# Patient Record
Sex: FEMALE | Race: WHITE | NOT HISPANIC OR LATINO | Employment: OTHER | RURAL
[De-identification: names, ages, dates, MRNs, and addresses within clinical notes are randomized per-mention and may not be internally consistent; named-entity substitution may affect disease eponyms.]

---

## 2017-04-27 DIAGNOSIS — E55.9 VITAMIN D DEFICIENCY DISEASE: ICD-10-CM

## 2017-04-27 DIAGNOSIS — R53.83 FATIGUE, UNSPECIFIED TYPE: ICD-10-CM

## 2017-04-27 DIAGNOSIS — E78.5 HYPERLIPIDEMIA, UNSPECIFIED HYPERLIPIDEMIA TYPE: ICD-10-CM

## 2017-04-27 DIAGNOSIS — D64.9 ANEMIA, UNSPECIFIED TYPE: Primary | ICD-10-CM

## 2017-04-29 LAB
25(OH)D3+25(OH)D2 SERPL-MCNC: 31 NG/ML (ref 30–100)
ALBUMIN SERPL-MCNC: 4.5 G/DL (ref 3.6–4.8)
ALBUMIN/GLOB SERPL: 1.7 {RATIO} (ref 1.2–2.2)
ALP SERPL-CCNC: 87 IU/L (ref 39–117)
ALT SERPL-CCNC: 16 IU/L (ref 0–32)
AST SERPL-CCNC: 17 IU/L (ref 0–40)
BASOPHILS # BLD AUTO: 0 X10E3/UL (ref 0–0.2)
BASOPHILS NFR BLD AUTO: 0 %
BILIRUB SERPL-MCNC: 0.4 MG/DL (ref 0–1.2)
BUN SERPL-MCNC: 15 MG/DL (ref 8–27)
BUN/CREAT SERPL: 18 (ref 12–28)
CALCIUM SERPL-MCNC: 9.4 MG/DL (ref 8.7–10.3)
CHLORIDE SERPL-SCNC: 100 MMOL/L (ref 96–106)
CHOLEST SERPL-MCNC: 198 MG/DL (ref 100–199)
CO2 SERPL-SCNC: 22 MMOL/L (ref 18–29)
CREAT SERPL-MCNC: 0.85 MG/DL (ref 0.57–1)
EOSINOPHIL # BLD AUTO: 0.1 X10E3/UL (ref 0–0.4)
EOSINOPHIL NFR BLD AUTO: 2 %
ERYTHROCYTE [DISTWIDTH] IN BLOOD BY AUTOMATED COUNT: 13.9 % (ref 12.3–15.4)
FERRITIN SERPL-MCNC: 68 NG/ML (ref 15–150)
FOLATE SERPL-MCNC: 10.7 NG/ML
GLOBULIN SER CALC-MCNC: 2.6 G/DL (ref 1.5–4.5)
GLUCOSE SERPL-MCNC: 100 MG/DL (ref 65–99)
HCT VFR BLD AUTO: 41.9 % (ref 34–46.6)
HDLC SERPL-MCNC: 56 MG/DL
HGB BLD-MCNC: 14.1 G/DL (ref 11.1–15.9)
IMM GRANULOCYTES # BLD: 0 X10E3/UL (ref 0–0.1)
IMM GRANULOCYTES NFR BLD: 0 %
IRON SERPL-MCNC: 93 UG/DL (ref 27–139)
LDLC SERPL CALC-MCNC: 118 MG/DL (ref 0–99)
LYMPHOCYTES # BLD AUTO: 1.5 X10E3/UL (ref 0.7–3.1)
LYMPHOCYTES NFR BLD AUTO: 18 %
MCH RBC QN AUTO: 29.7 PG (ref 26.6–33)
MCHC RBC AUTO-ENTMCNC: 33.7 G/DL (ref 31.5–35.7)
MCV RBC AUTO: 88 FL (ref 79–97)
MONOCYTES # BLD AUTO: 0.4 X10E3/UL (ref 0.1–0.9)
MONOCYTES NFR BLD AUTO: 5 %
NEUTROPHILS # BLD AUTO: 6.4 X10E3/UL (ref 1.4–7)
NEUTROPHILS NFR BLD AUTO: 75 %
PLATELET # BLD AUTO: 280 X10E3/UL (ref 150–379)
POTASSIUM SERPL-SCNC: 4.5 MMOL/L (ref 3.5–5.2)
PROT SERPL-MCNC: 7.1 G/DL (ref 6–8.5)
RBC # BLD AUTO: 4.74 X10E6/UL (ref 3.77–5.28)
RETICS/RBC NFR AUTO: 1.1 % (ref 0.6–2.6)
SODIUM SERPL-SCNC: 142 MMOL/L (ref 134–144)
T4 SERPL-MCNC: 8.1 UG/DL (ref 4.5–12)
TRIGL SERPL-MCNC: 121 MG/DL (ref 0–149)
TSH SERPL DL<=0.005 MIU/L-ACNC: 1.47 UIU/ML (ref 0.45–4.5)
VIT B12 SERPL-MCNC: 505 PG/ML (ref 211–946)
VLDLC SERPL CALC-MCNC: 24 MG/DL (ref 5–40)
WBC # BLD AUTO: 8.4 X10E3/UL (ref 3.4–10.8)

## 2017-04-30 PROBLEM — I10 HYPERTENSION: Status: ACTIVE | Noted: 2017-04-30

## 2017-04-30 PROBLEM — Z00.00 WELLNESS EXAMINATION: Status: ACTIVE | Noted: 2017-04-30

## 2017-04-30 PROBLEM — G89.29 CHRONIC BACK PAIN: Status: ACTIVE | Noted: 2017-04-30

## 2017-04-30 PROBLEM — N20.9 UROLITHIASIS: Status: ACTIVE | Noted: 2017-04-30

## 2017-04-30 PROBLEM — F41.9 ANXIETY: Status: ACTIVE | Noted: 2017-04-30

## 2017-04-30 PROBLEM — R93.89 ABNORMAL X-RAY: Status: ACTIVE | Noted: 2017-04-30

## 2017-04-30 PROBLEM — M54.9 CHRONIC BACK PAIN: Status: ACTIVE | Noted: 2017-04-30

## 2017-04-30 PROBLEM — D64.9 ANEMIA: Status: ACTIVE | Noted: 2017-04-30

## 2017-04-30 PROBLEM — G89.29 CHRONIC NECK PAIN: Status: ACTIVE | Noted: 2017-04-30

## 2017-04-30 PROBLEM — M54.2 CHRONIC NECK PAIN: Status: ACTIVE | Noted: 2017-04-30

## 2017-04-30 PROBLEM — E55.9 VITAMIN D DEFICIENCY: Status: ACTIVE | Noted: 2017-04-30

## 2017-04-30 PROBLEM — E78.5 HYPERLIPIDEMIA: Status: ACTIVE | Noted: 2017-04-30

## 2017-04-30 PROBLEM — R31.9 HEMATURIA: Status: ACTIVE | Noted: 2017-04-30

## 2017-04-30 PROBLEM — F32.A DEPRESSION: Status: ACTIVE | Noted: 2017-04-30

## 2017-07-10 ENCOUNTER — OFFICE VISIT (OUTPATIENT)
Dept: FAMILY MEDICINE CLINIC | Facility: CLINIC | Age: 62
End: 2017-07-10

## 2017-07-10 VITALS
SYSTOLIC BLOOD PRESSURE: 138 MMHG | BODY MASS INDEX: 32.32 KG/M2 | OXYGEN SATURATION: 97 % | DIASTOLIC BLOOD PRESSURE: 80 MMHG | HEART RATE: 98 BPM | WEIGHT: 194 LBS | HEIGHT: 65 IN | TEMPERATURE: 99 F | RESPIRATION RATE: 16 BRPM

## 2017-07-10 DIAGNOSIS — G89.29 CHRONIC BACK PAIN, UNSPECIFIED BACK LOCATION, UNSPECIFIED BACK PAIN LATERALITY: ICD-10-CM

## 2017-07-10 DIAGNOSIS — I10 ESSENTIAL HYPERTENSION: ICD-10-CM

## 2017-07-10 DIAGNOSIS — M54.2 CHRONIC NECK PAIN: ICD-10-CM

## 2017-07-10 DIAGNOSIS — R11.0 NAUSEA: ICD-10-CM

## 2017-07-10 DIAGNOSIS — R73.01 ELEVATED FASTING GLUCOSE: Primary | ICD-10-CM

## 2017-07-10 DIAGNOSIS — G89.29 CHRONIC NECK PAIN: ICD-10-CM

## 2017-07-10 DIAGNOSIS — M54.9 CHRONIC BACK PAIN, UNSPECIFIED BACK LOCATION, UNSPECIFIED BACK PAIN LATERALITY: ICD-10-CM

## 2017-07-10 PROBLEM — Z98.890 HISTORY OF NISSEN FUNDOPLICATION: Status: ACTIVE | Noted: 2017-07-10

## 2017-07-10 PROCEDURE — 99213 OFFICE O/P EST LOW 20 MIN: CPT | Performed by: FAMILY MEDICINE

## 2017-07-10 RX ORDER — AMITRIPTYLINE HYDROCHLORIDE 100 MG/1
TABLET, FILM COATED ORAL
Qty: 180 TABLET | Refills: 1 | Status: SHIPPED | OUTPATIENT
Start: 2017-07-10 | End: 2017-12-05 | Stop reason: SDUPTHER

## 2017-07-10 RX ORDER — SENNOSIDES 8.6 MG
1300 CAPSULE ORAL EVERY 8 HOURS PRN
COMMUNITY
End: 2018-05-04

## 2017-07-10 RX ORDER — ONDANSETRON 4 MG/1
4 TABLET, ORALLY DISINTEGRATING ORAL EVERY 8 HOURS PRN
Qty: 10 TABLET | Refills: 0 | Status: SHIPPED | OUTPATIENT
Start: 2017-07-10 | End: 2021-04-08

## 2017-07-10 NOTE — PROGRESS NOTES
"Subjective   Sedrick Leahy is a 62 y.o. female presenting with chief complaint of:   Chief Complaint   Patient presents with   • Anemia   • Hyperlipidemia   • Hypertension   • Follow-up     labs \"my blood sugar\"       History of Present Illness :  Alone.   Has multiple chronic problems; interval appointment.  One of these problems is HTN.      The HTN has been present for years/it is chronic.  The HTN is assumed essential/without testing needed to look for other.  The HTN is controlled manifest by todays blood pressure and no home monitoring.   Other chronic problem/s to consider:   Chronic neck pain:  The pain has been present for years/over a year.   It is chronic.   The pain is stable. 1-2 /10 pain most of time and usually is worse after activities.  The pain limits activities.  The problem has been evaulated and the patient has no desire current eval/change tx.  Chronic back pain:  The pain has been present for years/over a year.   It is chronic.   The pain is stable.  1-2 /10 pain most of time and usually is worse after activities.  The pain limits activities.  The problem has been evaulated and the patient has same as neck; mowing grass with pain.   GE reflux: This has been present for years/over a year.  It is controlled with the nissen's she had years ago   It is stable as there is no change; no heartburn and no dysphagia but when has any nausea cannot vomit; wants refill or rarely used Zofran.   Hyperlipidemia: This has been present for years/over a year.  It is chronic.   It is controlled.   Labs are not done regularly.  Obesity: This has been present for years/over a year.  It is chronic.     It is stable; there has been no recent major change in weight, or dieting.     Has an/another acute issue today:  Elevated fasting glucose: This has been present for years/over a year.  It is chronic.  It is controlled. No home accuchecks have yet been requested.  No signs hypoglycmeia manifest as syncope/near " syncope or diaphoretic/sweating spisodes.  A1c below if available.    Date Glucose, Serum Hemoglobin A1c  2008-09-29 125  (not fasting)   2010-10-19 71    2011-02-01 99    2011-12-13 89    2012-12-31 96    2014-09-16 91      4.2017 epic       The following portions of the patient's history were reviewed and updated as appropriate: allergies, current medications, past family history, past medical history, past social history, past surgical history and problem list.      Current Outpatient Prescriptions:   •  acetaminophen (TYLENOL 8 HOUR ARTHRITIS PAIN) 650 MG 8 hr tablet, Take 1,300 mg by mouth Every 8 (Eight) Hours As Needed for Mild Pain ., Disp: , Rfl:   •  amitriptyline (ELAVIL) 100 MG tablet, One to two by mouth at bedtime as needed, Disp: 180 tablet, Rfl: 1  •  ondansetron ODT (ZOFRAN ODT) 4 MG disintegrating tablet, Place 1 tablet under the tongue Every 8 (Eight) Hours As Needed for Nausea or Vomiting., Disp: 10 tablet, Rfl: 0    Allergies   Allergen Reactions   • Penicillins Swelling and Rash       Review of Systems  GENERAL:  Active/slower with limits, speed, samni for age and neck/back pain. Sleep is ok; denies apnea.  ENDO:  No syncope, near or diaphoretic sweaty spells.  BS on meter range 100-200; no download noted.  HEENT: No head injury or headache,  No vision change, No hearing loss.  Ears without pain/drainage.  No sore throat.  No nasal/sinus congestion/drainage. No epistaxis.  CHEST: No chest wall tenderness or mass. No cough,  without wheeze, SOB; no hemoptysis.  CV: No chest pain, palpatations, ankle edema.  GI: No heartburn,or dysphagia.  No abdominal pain, diarrhea, constipation, rectal bleeding, or melena.  Occ nausea without ability to vomit.   :  Voids without dysuria, or incontience to completion.  ORTHO: No painful/swollen joints but various on /off sore.  No change daily sore neck or back.  No acute neck or back pain without recent injury.   NEURO: No dizziness, weakness of  extremities.  No change occ UE/LE numbness/parethesias.   PSYCH: No memory loss.  Mood good; not that anxious, depressed but/and not suicidal.  Tolerated stress.     Results for orders placed or performed in visit on 04/27/17   Comprehensive Metabolic Panel   Result Value Ref Range    Glucose 100 (H) 65 - 99 mg/dL    BUN 15 8 - 27 mg/dL    Creatinine 0.85 0.57 - 1.00 mg/dL    eGFR Non African Am 74 >59 mL/min/1.73    eGFR African Am 85 >59 mL/min/1.73    BUN/Creatinine Ratio 18 12 - 28    Sodium 142 134 - 144 mmol/L    Potassium 4.5 3.5 - 5.2 mmol/L    Chloride 100 96 - 106 mmol/L    Total CO2 22 18 - 29 mmol/L    Calcium 9.4 8.7 - 10.3 mg/dL    Total Protein 7.1 6.0 - 8.5 g/dL    Albumin 4.5 3.6 - 4.8 g/dL    Globulin 2.6 1.5 - 4.5 g/dL    A/G Ratio 1.7 1.2 - 2.2    Total Bilirubin 0.4 0.0 - 1.2 mg/dL    Alkaline Phosphatase 87 39 - 117 IU/L    AST (SGOT) 17 0 - 40 IU/L    ALT (SGPT) 16 0 - 32 IU/L   Lipid Panel   Result Value Ref Range    Total Cholesterol 198 100 - 199 mg/dL    Triglycerides 121 0 - 149 mg/dL    HDL Cholesterol 56 >39 mg/dL    VLDL Cholesterol 24 5 - 40 mg/dL    LDL Cholesterol  118 (H) 0 - 99 mg/dL   TSH   Result Value Ref Range    TSH 1.470 0.450 - 4.500 uIU/mL   T4   Result Value Ref Range    T4, Total 8.1 4.5 - 12.0 ug/dL   Vitamin D 25 hydroxy   Result Value Ref Range    25 Hydroxy, Vitamin D 31.0 30.0 - 100.0 ng/mL   Iron level   Result Value Ref Range    Iron 93 27 - 139 ug/dL   Ferritin   Result Value Ref Range    Ferritin 68 15 - 150 ng/mL   Vitamin B12   Result Value Ref Range    Vitamin B-12 505 211 - 946 pg/mL   Folate   Result Value Ref Range    Folate 10.7 >3.0 ng/mL   Reticulocytes   Result Value Ref Range    Reticulocyte Absolute 1.1 0.6 - 2.6 %   CBC & Differential   Result Value Ref Range    WBC 8.4 3.4 - 10.8 x10E3/uL    RBC 4.74 3.77 - 5.28 x10E6/uL    Hemoglobin 14.1 11.1 - 15.9 g/dL    Hematocrit 41.9 34.0 - 46.6 %    MCV 88 79 - 97 fL    MCH 29.7 26.6 - 33.0 pg    MCHC 33.7  "31.5 - 35.7 g/dL    RDW 13.9 12.3 - 15.4 %    Platelets 280 150 - 379 x10E3/uL    Neutrophil Rel % 75 %    Lymphocyte Rel % 18 %    Monocyte Rel % 5 %    Eosinophil Rel % 2 %    Basophil Rel % 0 %    Neutrophils Absolute 6.4 1.4 - 7.0 x10E3/uL    Lymphocytes Absolute 1.5 0.7 - 3.1 x10E3/uL    Monocytes Absolute 0.4 0.1 - 0.9 x10E3/uL    Eosinophils Absolute 0.1 0.0 - 0.4 x10E3/uL    Basophils Absolute 0.0 0.0 - 0.2 x10E3/uL    Immature Granulocyte Rel % 0 %    Immature Grans Absolute 0.0 0.0 - 0.1 x10E3/uL       Lab Results   Component Value Date    HGBA1C 5.9 05/21/2015       Lab Results   Component Value Date     04/28/2017     05/22/2015     05/20/2015    K 4.5 04/28/2017    K 4.1 05/22/2015    K 4.2 05/20/2015     04/28/2017     05/22/2015     05/20/2015    CO2 22 04/28/2017    CO2 28 05/22/2015    CO2 25 05/20/2015    GLUCOSE 84 05/22/2015    GLUCOSE 118 (H) 05/20/2015    BUN 15 04/28/2017    BUN 12 05/22/2015    BUN 14 05/20/2015    CREATININE 0.85 04/28/2017    CREATININE 0.81 05/22/2015    CREATININE 0.76 05/20/2015    CALCIUM 9.4 04/28/2017    CALCIUM 8.8 05/22/2015    CALCIUM 9.4 05/20/2015       No results found for: PSA     Lab Results:  CBC:    Lab Results - Last 18 Months  Lab Units 04/28/17  0710   WBC x10E3/uL 8.4   HEMATOCRIT % 41.9     CMP:    Lab Results - Last 18 Months  Lab Units 04/28/17  0710   SODIUM mmol/L 142   CHLORIDE mmol/L 100   TOTAL CO2, ARTERIAL mmol/L 22   BUN mg/dL 15   CREATININE mg/dL 0.85   EGFR IF NONAFRICN AM mL/min/1.73 74   EGFR IF AFRICN AM mL/min/1.73 85   CALCIUM mg/dL 9.4     THYROID:    Lab Results - Last 18 Months  Lab Units 04/28/17  0710   TSH uIU/mL 1.470     A1C:No results for input(s): HGBA1C in the last 00744 hours.    Objective   /80 (BP Location: Right arm, Patient Position: Sitting, Cuff Size: Large Adult)  Pulse 98  Temp 99 °F (37.2 °C) (Oral)   Resp 16  Ht 65\" (165.1 cm)  Wt 194 lb (88 kg)  SpO2 97%  BMI 32.28 " kg/m2    Physical Exam  GENERAL:  Well nourished/developed in no acute distress. Obese   SKIN: Turgor excellent, without wound, rash, lesion.  HEENT: Normal cephalic without trauma.  Pupils equal round reactive to light. Extraocular motions full without nystagmus.   External canals nonobstructive nontender without reddness. Tymphatic membranes brown with tyler structures intact.   Oral cavity without growths, exudates, and moist.  Posterior pharnyx without mass, obstruction, reddness.  No thyroidmegaly, mass, tenderness, lymphadenopathy and supple.  CV: Regular rhythm.  No murmur, gallop,  edema. Posterior pulses intact.  No carotid bruits.  CHEST: No chest wall tenderness or mass.   LUNGS: Symmetric motion with clear to auscultation.  No dullness to percussion  ABD: Soft, nontender without mass.   ORTHO: Symmetric extremities without swelling/point tenderness.  Full gross range of motion.  NEURO: CN 2-12 grossly intact.  Symmetric facies. 1/4 x bicep equal reflexes.  UE/LE   3/5 strength throughout.  Nonfocal use extremities. Speech clear.  Intact light touch with monofilament, vibratory sensation with tuning fork; equal toes/distal feet.    PSYCH: Oriented x 3.  Pleasant calm, well kept.  Purposeful/directed conservation with intact short/long gross memory.     Assessment/Plan     1. Elevated fasting glucose  Relatively new dx  - Hemoglobin A1c  - Basic Metabolic Panel    2. Essential hypertension  controlled  - Hemoglobin A1c  - Basic Metabolic Panel    3. Chronic neck pain  DDD/DJD; tolerated    4. Chronic back pain, unspecified back location, unspecified back pain laterality  DDD/DJD; tolerated    5. Nausea-problem with nissen  - ondansetron ODT (ZOFRAN ODT) 4 MG disintegrating tablet; Place 1 tablet under the tongue Every 8 (Eight) Hours As Needed for Nausea or Vomiting.  Dispense: 10 tablet; Refill: 0    Rx: reviewed.  Any other changes above and:   LAB: reviewed/above.  Orders above and:   Wrap-up/other  instructions: discussed as applicable  Regular cardio exercise something everyone should consider and try to do.  Normal weight a goal for everyone.  Healthy diet helpful for weight management, illness prevention.  If over 50-screening exams include men PSA/rectal exam, women mammograms, and  everyone colonoscopy screening for colon cancer.   Patient Instructions   Offered referral to dietary anytime you want for more training      Follow up: Return in about 4 weeks (around 8/7/2017).

## 2017-07-11 LAB
BUN SERPL-MCNC: 13 MG/DL (ref 5–21)
BUN/CREAT SERPL: 16.7 (ref 7–25)
CALCIUM SERPL-MCNC: 9.5 MG/DL (ref 8.4–10.4)
CHLORIDE SERPL-SCNC: 104 MMOL/L (ref 98–110)
CO2 SERPL-SCNC: 26 MMOL/L (ref 24–31)
CREAT SERPL-MCNC: 0.78 MG/DL (ref 0.5–1.4)
GLUCOSE SERPL-MCNC: 87 MG/DL (ref 70–100)
HBA1C MFR BLD: 6 %
POTASSIUM SERPL-SCNC: 4.4 MMOL/L (ref 3.5–5.3)
SODIUM SERPL-SCNC: 141 MMOL/L (ref 135–145)

## 2017-07-12 RX ORDER — METFORMIN HYDROCHLORIDE 500 MG/1
500 TABLET, EXTENDED RELEASE ORAL
Qty: 90 TABLET | Refills: 1 | Status: SHIPPED | OUTPATIENT
Start: 2017-07-12 | End: 2017-12-05 | Stop reason: SDUPTHER

## 2017-08-11 ENCOUNTER — OFFICE VISIT (OUTPATIENT)
Dept: FAMILY MEDICINE CLINIC | Facility: CLINIC | Age: 62
End: 2017-08-11

## 2017-08-11 VITALS
BODY MASS INDEX: 31.99 KG/M2 | DIASTOLIC BLOOD PRESSURE: 80 MMHG | HEIGHT: 65 IN | HEART RATE: 90 BPM | WEIGHT: 192 LBS | OXYGEN SATURATION: 97 % | SYSTOLIC BLOOD PRESSURE: 132 MMHG | RESPIRATION RATE: 16 BRPM | TEMPERATURE: 98.4 F

## 2017-08-11 DIAGNOSIS — R73.01 ELEVATED FASTING GLUCOSE: Primary | ICD-10-CM

## 2017-08-11 DIAGNOSIS — I10 ESSENTIAL HYPERTENSION: ICD-10-CM

## 2017-08-11 PROCEDURE — 99213 OFFICE O/P EST LOW 20 MIN: CPT | Performed by: FAMILY MEDICINE

## 2017-12-05 RX ORDER — AMITRIPTYLINE HYDROCHLORIDE 100 MG/1
TABLET, FILM COATED ORAL
Qty: 180 TABLET | Refills: 1 | Status: SHIPPED | OUTPATIENT
Start: 2017-12-05 | End: 2018-06-25 | Stop reason: SDUPTHER

## 2017-12-05 RX ORDER — METFORMIN HYDROCHLORIDE 500 MG/1
500 TABLET, EXTENDED RELEASE ORAL
Qty: 90 TABLET | Refills: 1 | Status: SHIPPED | OUTPATIENT
Start: 2017-12-05 | End: 2018-06-25 | Stop reason: SDUPTHER

## 2018-05-04 ENCOUNTER — OFFICE VISIT (OUTPATIENT)
Dept: FAMILY MEDICINE CLINIC | Facility: CLINIC | Age: 63
End: 2018-05-04

## 2018-05-04 VITALS
OXYGEN SATURATION: 94 % | HEIGHT: 65 IN | SYSTOLIC BLOOD PRESSURE: 134 MMHG | RESPIRATION RATE: 18 BRPM | BODY MASS INDEX: 32.65 KG/M2 | WEIGHT: 196 LBS | HEART RATE: 96 BPM | TEMPERATURE: 98.3 F | DIASTOLIC BLOOD PRESSURE: 80 MMHG

## 2018-05-04 DIAGNOSIS — F32.A DEPRESSION, UNSPECIFIED DEPRESSION TYPE: ICD-10-CM

## 2018-05-04 DIAGNOSIS — R32 URINARY INCONTINENCE, UNSPECIFIED TYPE: ICD-10-CM

## 2018-05-04 DIAGNOSIS — D64.9 ANEMIA, UNSPECIFIED TYPE: Primary | ICD-10-CM

## 2018-05-04 DIAGNOSIS — M54.10 RADICULOPATHY, UNSPECIFIED SPINAL REGION: ICD-10-CM

## 2018-05-04 DIAGNOSIS — M54.2 CHRONIC NECK PAIN: ICD-10-CM

## 2018-05-04 DIAGNOSIS — F41.9 ANXIETY: ICD-10-CM

## 2018-05-04 DIAGNOSIS — G89.29 CHRONIC BACK PAIN, UNSPECIFIED BACK LOCATION, UNSPECIFIED BACK PAIN LATERALITY: ICD-10-CM

## 2018-05-04 DIAGNOSIS — R73.01 ELEVATED FASTING GLUCOSE: ICD-10-CM

## 2018-05-04 DIAGNOSIS — M54.9 CHRONIC BACK PAIN, UNSPECIFIED BACK LOCATION, UNSPECIFIED BACK PAIN LATERALITY: ICD-10-CM

## 2018-05-04 DIAGNOSIS — E78.5 HYPERLIPIDEMIA, UNSPECIFIED HYPERLIPIDEMIA TYPE: ICD-10-CM

## 2018-05-04 DIAGNOSIS — I10 HYPERTENSION, UNSPECIFIED TYPE: ICD-10-CM

## 2018-05-04 DIAGNOSIS — G89.29 CHRONIC NECK PAIN: ICD-10-CM

## 2018-05-04 PROBLEM — Z01.89 LABORATORY TEST: Status: ACTIVE | Noted: 2018-05-04

## 2018-05-04 PROCEDURE — 99214 OFFICE O/P EST MOD 30 MIN: CPT | Performed by: FAMILY MEDICINE

## 2018-05-04 RX ORDER — NAPROXEN SODIUM 220 MG
220 TABLET ORAL 2 TIMES DAILY PRN
COMMUNITY
End: 2020-11-12

## 2018-05-04 NOTE — PROGRESS NOTES
Subjective   Sedrick Leahy is a 62 y.o. female presenting with chief complaint of:   Chief Complaint   Patient presents with   • Back Pain   • Diabetes   • Hyperlipidemia   • Hypertension   • Anemia   • Anxiety   • Depression       History of Present Illness :  Alone.   Has multiple chronic problems to consider that might have a bearing on today's issues;  an interval appointment.       Other chronic problem/s to consider:   HTN.  The HTN has been present for years/it is chronic.  The HTN is assumed essential/without testing needed to look for other.  The HTN is controlled manifest by todays blood pressure and no mention home monitoring.  Associated illness below.   Anemia/iron deficiency: This has been present for years/over a year.  It is chronic.  These has been GI evaulation in the past. There is no current melena, hematochezia. It has been benign to date and stable/watching.  Anxiety: This has been present for years/over a year.  It is chronic.  It is variable.  It is associated with stressors:her health.  Medications being used help.   Medications/Rx change not requested.  Depression: This has been present for years/over a year.  It is chronic.  It is variable.  It is associated with stressors: her health.   Medications being used help.  Medications/Rx change not requested.   No suicide ideation/intent.   Elevated fasting glucose: This has been present for years/over a year.  It is chronic.  It is controlled. No home accuchecks have yet been requested yet as only impaired.  No signs hypoglycmeia manifest as syncope/near syncope or diaphoretic/sweating spisodes.  A1c below if available.  Diet loose  Chronic neck pain:  The pain has been present for years/over a year.   It is chronic/worsing  Has plans to see JESSICA .   The pain is variable/worsening   3-5/10 pain most of time and usually is worse after activities.  The pain limits activities.  The problem has been evaulated and the patient has above.  Chronic  back pain:  The pain has been present for years/over a year.   It is chronic.   The pain is worseing and variable;  3-5/10 pain most of time and usually is worse after activities.  The pain limits activities.  The problem has been evaulated and the patient has plans to review with surgery.  Degenerative Joint Disease/arthritis:  This has been present for years/over a year.  It is a chronic condition.  It causes on/off pain and joint stiffness.  There is no joint swelling;  mostly spinal.   Hyperlipidemia: This has been present for years/over a year.  It is chronic.   It is generally controlled.   .  Labs are needed periodic to monitor condition/safetly.   Rx therapy lifestyle  Urinary incontience:  Chronic/stable.  Lives with.     Has an/another acute issue today: none.    The following portions of the patient's history were reviewed and updated as appropriate: allergies, current medications, past family history, past medical history, past social history, past surgical history and problem list.  Records acquired and reviewed; TCC migrated.      Current Outpatient Prescriptions:   •  amitriptyline (ELAVIL) 100 MG tablet, One to two by mouth at bedtime as needed, Disp: 180 tablet, Rfl: 1  •  metFORMIN ER (GLUCOPHAGE-XR) 500 MG 24 hr tablet, Take 1 tablet by mouth Daily With Breakfast., Disp: 90 tablet, Rfl: 1  •  naproxen sodium (ALEVE) 220 MG tablet, Take 220 mg by mouth 2 (Two) Times a Day As Needed for Mild Pain ., Disp: , Rfl:   •  ondansetron ODT (ZOFRAN ODT) 4 MG disintegrating tablet, Place 1 tablet under the tongue Every 8 (Eight) Hours As Needed for Nausea or Vomiting., Disp: 10 tablet, Rfl: 0    No problems with medications.  Refills if needed done    Allergies   Allergen Reactions   • Penicillins Swelling and Rash       Review of Systems  GENERAL:  Active/slower with limits, speed, stamina for age and spinal pain. Sleep is ok. No fever now.  SKIN: No rash/skin lesion of concern:   ENDO:  No syncope, near or  diaphoretic sweaty spells.  HEENT: No recent head injury; or change rare headache,  No vision change.  No significant hearing loss.  Ears without pain/drainage.  No sore throat.  No significant nasal/sinus congestion/drainage. No epistaxis.  CHEST: No chest wall tenderness or mass. No cough, without wheeze.  No SOB; no hemoptysis.  CV: No chest pain, palpitations, ankle edema.  GI: No heartburn, dysphagia.  No abdominal pain, diarrhea, constipation.  No rectal bleeding, or melena.    Colonoscopy/unable to complete due to quality of prep5.2.16  Colonoscopy/[facility]/6.6.16/5y    :  Voids without dysuria, same urinary incontinence but voids to completion.  ORTHO: No painful/swollen joints but various on /off sore.  Much worse/ sore neck or back.  No acute neck or back pain without recent injury.  NEURO: No dizziness, weakness of extremities. UE/LE numbness/paresthesias.   PSYCH: No memory loss.  Mood good; occ anxious, depressed but/and not suicidal.  Tries to tolerate stress .     Results for orders placed or performed in visit on 07/10/17   Hemoglobin A1c   Result Value Ref Range    Hemoglobin A1C 6.00 %   Basic Metabolic Panel   Result Value Ref Range    Glucose 87 70 - 100 mg/dL    BUN 13 5 - 21 mg/dL    Creatinine 0.78 0.50 - 1.40 mg/dL    eGFR Non African Am 75 >60 mL/min/1.73    eGFR African Am 90 >60 mL/min/1.73    BUN/Creatinine Ratio 16.7 7.0 - 25.0    Sodium 141 135 - 145 mmol/L    Potassium 4.4 3.5 - 5.3 mmol/L    Chloride 104 98 - 110 mmol/L    Total CO2 26.0 24.0 - 31.0 mmol/L    Calcium 9.5 8.4 - 10.4 mg/dL       No results found for: PSA     Lab Results:  CBC:  Lab Results - Last 18 Months  Lab Units 04/28/17  0710   WBC x10E3/uL 8.4   HEMOGLOBIN g/dL 14.1   HEMATOCRIT % 41.9   PLATELETS x10E3/uL 280   IRON ug/dL 93      BMP/CMP:  Lab Results - Last 18 Months  Lab Units 07/11/17  0823 04/28/17  0710   SODIUM mmol/L 141 142   POTASSIUM mmol/L 4.4 4.5   CHLORIDE mmol/L 104 100   TOTAL CO2, ARTERIAL mmol/L  "26.0 22   GLUCOSE mg/dL 87 100*   BUN mg/dL 13 15   CREATININE mg/dL 0.78 0.85   EGFR IF NONAFRICN AM mL/min/1.73 75 74   EGFR IF AFRICN AM mL/min/1.73 90 85   CALCIUM mg/dL 9.5 9.4     HEPATIC:  Lab Results - Last 18 Months  Lab Units 04/28/17  0710   ALT (SGPT) IU/L 16   AST (SGOT) IU/L 17   ALK PHOS IU/L 87     THYROID:  Lab Results - Last 18 Months  Lab Units 04/28/17  0710   TSH uIU/mL 1.470     A1C:  Lab Results - Last 18 Months  Lab Units 07/11/17  0823   HEMOGLOBIN A1C % 6.00     PSA:No results for input(s): PSA in the last 04068 hours.    Objective   /80   Pulse 96   Temp 98.3 °F (36.8 °C) (Oral)   Resp 18   Ht 165.1 cm (65\")   Wt 88.9 kg (196 lb)   SpO2 94%   Breastfeeding? No   BMI 32.62 kg/m²   Body mass index is 32.62 kg/m².    Physical Exam  GENERAL:  Well nourished/developed in no acute distress.   SKIN: Turgor excellent, without wound, rash, lesion.  HEENT: Normal cephalic without trauma.  Pupils equal round reactive to light. Extraocular motions full without nystagmus.   External canals nonobstructive nontender without reddness. Tymphatic membranes brown with tyler structures intact.   Oral cavity without growths, exudates, and moist.  Posterior pharynx without mass, obstruction, redness.  No thyromegaly, mass, tenderness, lymphadenopathy and supple.  CV: Regular rhythm.  No murmur, gallop,  edema. Posterior pulses intact.  No carotid bruits.  CHEST: No chest wall tenderness or mass.   LUNGS: Symmetric motion with clear to auscultation.   ABD: Soft, nontender without mass.   ORTHO: Symmetric extremities without swelling/point tenderness.  Full gross range of motion.  NEURO: CN 2-12 grossly intact.  Symmetric facies and UE/LE. 2-3/5 strength throughout. 1/4 x bicep knee equal reflexes.  Nonfocal use extremities. Speech clear. Intact light touch with monofilament, vibratory sensation with tuning fork; equal toes/distal feet.    PSYCH: Oriented x 3.  Pleasant calm, well kept.  " Purposeful/directed conservation with intact short/long gross memory.     Assessment/Plan     1. Anemia, unspecified type    2. Anxiety    3. Chronic back pain, unspecified back location, unspecified back pain laterality    4. Chronic neck pain    5. Radiculopathy, unspecified spinal region    6. Depression, unspecified depression type    7. Hypertension, unspecified type    8. Elevated fasting glucose    9. Urinary incontinence, unspecified type    10. Hyperlipidemia, unspecified hyperlipidemia type        Rx: reviewed/changes:  same    LAB/Testing/Referrals: reviewed/orders:   Today: needed fasting  No orders of the defined types were placed in this encounter.    Usual:   6m BMP, A1c  12m CBC, CMP, A1c, LIPID, TSH, Vit D     Discussions:   Body mass index is 32.62 kg/m².   Patient's Body mass index is 32.62 kg/m². BMI is above normal parameters. Follow-up plan includes:  exercise counseling, nutrition counseling and as able.  Non-smoker      Patient Instructions   NSAID Education/Counseling:  We discussed the risks and benefits of Non-steroidal anti-inflammatories (NSAIDs), which include GI, renal, and cardiovascular toxicity. We discussed the risk for stomach ulcers, and the need to stop the drug or add a PPI if GI symptoms develop. We discussed the increased incidence of hypertension and cardiovascnlar events in patients taking NSAIDs We discussed the role the drugs may play in worsening renal disease, if present.     Regular cardio exercise something everyone should consider and try to do; even if health limitations (ie find that exercise UE/LE/cardio that they can tolerate). (as we discussed)  Normal weight a goal for everyone (as we discussed)  Healthy diet helpful for weight management, illness prevention (as we discussed)  If over 50-screening exams include men PSA/rectal exam, women mammograms, and  everyone colonoscopy screening for colon cancer.            Follow up: Return for lab due/fasting.  , lab  during/or just before next apt;, Dr Madden-, 6 m;.  Future Appointments  Date Time Provider Department Center   5/25/2018 8:40 AM [facility] [place] [facility] None   11/13/2018 8:30 AM [facility] [place] [facility] None   11/15/2018 11:00 AM Kiran Madden MD [facility] None

## 2018-05-04 NOTE — PATIENT INSTRUCTIONS
NSAID Education/Counseling:  We discussed the risks and benefits of Non-steroidal anti-inflammatories (NSAIDs), which include GI, renal, and cardiovascular toxicity. We discussed the risk for stomach ulcers, and the need to stop the drug or add a PPI if GI symptoms develop. We discussed the increased incidence of hypertension and cardiovascnlar events in patients taking NSAIDs We discussed the role the drugs may play in worsening renal disease, if present.     Regular cardio exercise something everyone should consider and try to do; even if health limitations (ie find that exercise UE/LE/cardio that they can tolerate). (as we discussed)  Normal weight a goal for everyone (as we discussed)  Healthy diet helpful for weight management, illness prevention (as we discussed)  If over 50-screening exams include men PSA/rectal exam, women mammograms, and  everyone colonoscopy screening for colon cancer.

## 2018-05-24 DIAGNOSIS — Z00.00 WELLNESS EXAMINATION: ICD-10-CM

## 2018-05-24 DIAGNOSIS — R31.9 HEMATURIA, UNSPECIFIED TYPE: ICD-10-CM

## 2018-05-24 DIAGNOSIS — R73.01 ELEVATED FASTING GLUCOSE: ICD-10-CM

## 2018-05-24 DIAGNOSIS — E55.9 VITAMIN D DEFICIENCY: ICD-10-CM

## 2018-05-24 DIAGNOSIS — D64.9 ANEMIA, UNSPECIFIED TYPE: ICD-10-CM

## 2018-05-24 DIAGNOSIS — F32.A DEPRESSION, UNSPECIFIED DEPRESSION TYPE: ICD-10-CM

## 2018-05-24 DIAGNOSIS — F41.9 ANXIETY: ICD-10-CM

## 2018-05-24 DIAGNOSIS — E78.5 HYPERLIPIDEMIA, UNSPECIFIED HYPERLIPIDEMIA TYPE: ICD-10-CM

## 2018-05-24 DIAGNOSIS — I10 HYPERTENSION, UNSPECIFIED TYPE: Primary | ICD-10-CM

## 2018-05-25 ENCOUNTER — RESULTS ENCOUNTER (OUTPATIENT)
Dept: FAMILY MEDICINE CLINIC | Facility: CLINIC | Age: 63
End: 2018-05-25

## 2018-05-25 DIAGNOSIS — I10 HYPERTENSION, UNSPECIFIED TYPE: ICD-10-CM

## 2018-05-25 DIAGNOSIS — F41.9 ANXIETY: ICD-10-CM

## 2018-05-25 DIAGNOSIS — Z00.00 WELLNESS EXAMINATION: ICD-10-CM

## 2018-05-25 DIAGNOSIS — R31.9 HEMATURIA, UNSPECIFIED TYPE: ICD-10-CM

## 2018-05-25 DIAGNOSIS — F32.A DEPRESSION, UNSPECIFIED DEPRESSION TYPE: ICD-10-CM

## 2018-05-25 DIAGNOSIS — R73.01 ELEVATED FASTING GLUCOSE: ICD-10-CM

## 2018-05-25 DIAGNOSIS — E55.9 VITAMIN D DEFICIENCY: ICD-10-CM

## 2018-05-25 DIAGNOSIS — D64.9 ANEMIA, UNSPECIFIED TYPE: ICD-10-CM

## 2018-05-25 DIAGNOSIS — E78.5 HYPERLIPIDEMIA, UNSPECIFIED HYPERLIPIDEMIA TYPE: ICD-10-CM

## 2018-06-19 PROBLEM — F11.90 CHRONIC NARCOTIC USE: Status: ACTIVE | Noted: 2018-06-19

## 2018-06-26 RX ORDER — AMITRIPTYLINE HYDROCHLORIDE 100 MG/1
TABLET, FILM COATED ORAL
Qty: 180 TABLET | Refills: 1 | Status: SHIPPED | OUTPATIENT
Start: 2018-06-26 | End: 2018-12-31 | Stop reason: SDUPTHER

## 2018-06-26 RX ORDER — METFORMIN HYDROCHLORIDE 500 MG/1
TABLET, EXTENDED RELEASE ORAL
Qty: 90 TABLET | Refills: 1 | Status: SHIPPED | OUTPATIENT
Start: 2018-06-26 | End: 2018-10-25 | Stop reason: SDUPTHER

## 2018-10-25 RX ORDER — METFORMIN HYDROCHLORIDE 500 MG/1
TABLET, EXTENDED RELEASE ORAL
Qty: 90 TABLET | Refills: 1 | Status: SHIPPED | OUTPATIENT
Start: 2018-10-25 | End: 2019-01-25

## 2018-11-13 DIAGNOSIS — R73.01 ELEVATED FASTING GLUCOSE: Primary | ICD-10-CM

## 2018-11-13 LAB
BUN SERPL-MCNC: 14 MG/DL (ref 5–21)
BUN/CREAT SERPL: 19.4 (ref 7–25)
CALCIUM SERPL-MCNC: 9.7 MG/DL (ref 8.4–10.4)
CHLORIDE SERPL-SCNC: 100 MMOL/L (ref 98–110)
CO2 SERPL-SCNC: 31 MMOL/L (ref 24–31)
CREAT SERPL-MCNC: 0.72 MG/DL (ref 0.5–1.4)
GLUCOSE SERPL-MCNC: 88 MG/DL (ref 70–100)
HBA1C MFR BLD: 5.8 %
POTASSIUM SERPL-SCNC: 4.2 MMOL/L (ref 3.5–5.3)
SODIUM SERPL-SCNC: 141 MMOL/L (ref 135–145)

## 2018-11-15 ENCOUNTER — OFFICE VISIT (OUTPATIENT)
Dept: FAMILY MEDICINE CLINIC | Facility: CLINIC | Age: 63
End: 2018-11-15

## 2018-11-15 VITALS
HEART RATE: 118 BPM | WEIGHT: 189 LBS | SYSTOLIC BLOOD PRESSURE: 144 MMHG | OXYGEN SATURATION: 97 % | HEIGHT: 65 IN | BODY MASS INDEX: 31.49 KG/M2 | TEMPERATURE: 97.6 F | DIASTOLIC BLOOD PRESSURE: 82 MMHG

## 2018-11-15 DIAGNOSIS — G89.29 CHRONIC BACK PAIN, UNSPECIFIED BACK LOCATION, UNSPECIFIED BACK PAIN LATERALITY: ICD-10-CM

## 2018-11-15 DIAGNOSIS — I10 HYPERTENSION, UNSPECIFIED TYPE: ICD-10-CM

## 2018-11-15 DIAGNOSIS — M54.2 CHRONIC NECK PAIN: ICD-10-CM

## 2018-11-15 DIAGNOSIS — R73.01 ELEVATED FASTING GLUCOSE: ICD-10-CM

## 2018-11-15 DIAGNOSIS — M54.9 CHRONIC BACK PAIN, UNSPECIFIED BACK LOCATION, UNSPECIFIED BACK PAIN LATERALITY: ICD-10-CM

## 2018-11-15 DIAGNOSIS — D64.9 ANEMIA, UNSPECIFIED TYPE: ICD-10-CM

## 2018-11-15 DIAGNOSIS — E78.5 HYPERLIPIDEMIA, UNSPECIFIED HYPERLIPIDEMIA TYPE: ICD-10-CM

## 2018-11-15 DIAGNOSIS — R00.2 PALPITATIONS: ICD-10-CM

## 2018-11-15 DIAGNOSIS — G89.29 CHRONIC NECK PAIN: ICD-10-CM

## 2018-11-15 DIAGNOSIS — F11.90 CHRONIC NARCOTIC USE: ICD-10-CM

## 2018-11-15 PROBLEM — Z78.0 MENOPAUSE: Status: ACTIVE | Noted: 2018-11-15

## 2018-11-15 LAB
BASOPHILS # BLD AUTO: 0.06 10*3/MM3 (ref 0–0.2)
BASOPHILS NFR BLD AUTO: 0.8 % (ref 0–2)
EOSINOPHIL # BLD AUTO: 0.31 10*3/MM3 (ref 0–0.7)
EOSINOPHIL NFR BLD AUTO: 4.1 % (ref 0–4)
ERYTHROCYTE [DISTWIDTH] IN BLOOD BY AUTOMATED COUNT: 13.5 % (ref 12–15)
HCT VFR BLD AUTO: 42.5 % (ref 37–47)
HGB BLD-MCNC: 13 G/DL (ref 12–16)
IMM GRANULOCYTES # BLD: 0.01 10*3/MM3 (ref 0–0.03)
IMM GRANULOCYTES NFR BLD: 0.1 % (ref 0–5)
IRON SATN MFR SERPL: 28 % (ref 20–45)
IRON SERPL-MCNC: 94 MCG/DL (ref 42–180)
LYMPHOCYTES # BLD AUTO: 2.98 10*3/MM3 (ref 0.72–4.86)
LYMPHOCYTES NFR BLD AUTO: 39.1 % (ref 15–45)
MCH RBC QN AUTO: 29.8 PG (ref 28–32)
MCHC RBC AUTO-ENTMCNC: 30.6 G/DL (ref 33–36)
MCV RBC AUTO: 97.5 FL (ref 82–98)
MONOCYTES # BLD AUTO: 0.51 10*3/MM3 (ref 0.19–1.3)
MONOCYTES NFR BLD AUTO: 6.7 % (ref 4–12)
NEUTROPHILS # BLD AUTO: 3.76 10*3/MM3 (ref 1.87–8.4)
NEUTROPHILS NFR BLD AUTO: 49.2 % (ref 39–78)
NRBC BLD AUTO-RTO: 0 /100 WBC (ref 0–0)
PLATELET # BLD AUTO: 312 10*3/MM3 (ref 130–400)
RBC # BLD AUTO: 4.36 10*6/MM3 (ref 4.2–5.4)
T4 FREE SERPL-MCNC: 1.26 NG/DL (ref 0.78–2.19)
TIBC SERPL-MCNC: 337 MCG/DL (ref 225–420)
TSH SERPL DL<=0.005 MIU/L-ACNC: 2.16 MIU/ML (ref 0.47–4.68)
UIBC SERPL-MCNC: 243 MCG/DL
WBC # BLD AUTO: 7.63 10*3/MM3 (ref 4.8–10.8)
WRITTEN AUTHORIZATION: NORMAL

## 2018-11-15 PROCEDURE — 99214 OFFICE O/P EST MOD 30 MIN: CPT | Performed by: FAMILY MEDICINE

## 2018-11-15 NOTE — PROGRESS NOTES
Pt informed labs were ok. She wants to know if she needs to do the holter monitor before starting any medication.

## 2018-11-15 NOTE — PROGRESS NOTES
"Subjective   Sedrick Leahy is a 63 y.o. female presenting with chief complaint of:   Chief Complaint   Patient presents with   • Anemia     6 month follow-up.   • Rapid Heart Rate     Patient states that her heart rate has been fast.       History of Present Illness :  Alone.   Has multiple chronic problems to consider that might have a bearing on today's issues;  an interval appointment.       Chronic/acute problems reviewed today:   1. Hypertension, unspecified type: Chronic/stable. Stable here/if home blood pressures.  No significant chest pain, SOB, LE edema, orthopnea, near syncope, dizziness/light headness.      2. Hyperlipidemia, unspecified hyperlipidemia type: Chronic/stable.  Tolerated use of statin with labs showing improved lipid values and tolerant liver labs. No muscle aches unexpected.      3. Elevated fasting glucose: Chronic/stable. No problem/pattern hypoglycemia/hyperglycemia manifest by poly- dypsia, phagia, uria, or sweats, diaphoretic episodes, syncope/near.      4. Chronic narcotic use-Linberg: chronic/stable as had \"nerves burned\" and needing a lot less Rx.    5. Anemia, unspecified type: chronic/variable benign to date.    6. Chronic back pain, unspecified back location, unspecified back pain laterality: chronic/variable DDD/DJD without desire for surgery.  Less pain since procedure.  Occ 1-3/10 pain worse with activity.    7. Chronic neck pain: chronic/variable similar to lower back.     8. Palpitations: chronic/variable lately worse feeling of skipping and fast.      Has an/another acute issue today: none.    The following portions of the patient's history were reviewed and updated as appropriate: allergies, current medications, past family history, past medical history, past social history, past surgical history and problem list.      Current Outpatient Medications:   •  amitriptyline (ELAVIL) 100 MG tablet, TAKE ONE TO TWO TABLETS BY MOUTH AT BEDTIME AS NEEDED, Disp: 180 tablet, Rfl: " 1  •  metFORMIN ER (GLUCOPHAGE-XR) 500 MG 24 hr tablet, TAKE 1 TABLET BY MOUTH IN THE MORNING WITH  BREAKFAST, Disp: 90 tablet, Rfl: 1  •  naproxen sodium (ALEVE) 220 MG tablet, Take 220 mg by mouth 2 (Two) Times a Day As Needed for Mild Pain ., Disp: , Rfl:   •  ondansetron ODT (ZOFRAN ODT) 4 MG disintegrating tablet, Place 1 tablet under the tongue Every 8 (Eight) Hours As Needed for Nausea or Vomiting., Disp: 10 tablet, Rfl: 0-available    No problems with medications.  Refills if needed done    Allergies   Allergen Reactions   • Penicillins Swelling and Rash       Review of Systems  GENERAL:  Active/slower with limits, speed, stamina for age and spinal pain. Sleep is ok. No fever now.  SKIN: No rash/skin lesion of concern:   ENDO:  No syncope, near or diaphoretic sweaty spells.  HEENT: No recent head injury; or change rare headache,  No vision change.  No significant hearing loss.  Ears without pain/drainage.  No sore throat.  No significant nasal/sinus congestion/drainage. No epistaxis.  CHEST: No chest wall tenderness or mass. No cough, without wheeze.  No SOB; no hemoptysis.  CV: No chest pain, palpitations, ankle edema.  GI: No heartburn, dysphagia.  No abdominal pain, diarrhea, constipation.  No rectal bleeding, or melena.    :  Voids without dysuria, same urinary incontinence but voids to completion.  ORTHO: No painful/swollen joints but various on /off sore.  Much worse/ sore neck or back.  No acute neck or back pain without recent injury.  NEURO: No dizziness, weakness of extremities. UE/LE numbness/paresthesias.   PSYCH: No memory loss.  Mood good; occ anxious, depressed but/and not suicidal.  Tries to tolerate stress .   Screening:  No gyne  Mammogram: [id number]  Bone density: 11- [facility]- LS -0.3 abd hip -0.3..no bad change but enough to encourage a biphosphate ie bonovia...or miacalcin... apt with cb to discuss tx. cb /pt notified is making an appt/-7/2019  Low dose CT  "chest: NA  GI: Colonoscopy//[facility]/6.6.16/5y  Prostate: NA  Usual lab order  6m BMP, A1c  12m CBC, CMP, A1c, LIPID, TSH, Vit D     Results for orders placed or performed in visit on 11/13/18   Basic metabolic panel   Result Value Ref Range    Glucose 88 70 - 100 mg/dL    BUN 14 5 - 21 mg/dL    Creatinine 0.72 0.50 - 1.40 mg/dL    eGFR Non African Am 82 >60 mL/min/1.73    eGFR African Am 99 >60 mL/min/1.73    BUN/Creatinine Ratio 19.4 7.0 - 25.0    Sodium 141 135 - 145 mmol/L    Potassium 4.2 3.5 - 5.3 mmol/L    Chloride 100 98 - 110 mmol/L    Total CO2 31.0 24.0 - 31.0 mmol/L    Calcium 9.7 8.4 - 10.4 mg/dL   Hemoglobin A1c   Result Value Ref Range    Hemoglobin A1C 5.80 %       No results found for: PSA     Lab Results:  CBC:  Lab Results - Last 18 Months   Lab Units  11/13/18   0724   HEMOGLOBIN g/dL  13.0   HEMATOCRIT %  42.5   PLATELETS 10*3/mm3  312   IRON mcg/dL  94      BMP/CMP:  Lab Results - Last 18 Months   Lab Units  11/13/18   0724  07/11/17   0823   SODIUM mmol/L  141  141   POTASSIUM mmol/L  4.2  4.4   CHLORIDE mmol/L  100  104   TOTAL CO2, ARTERIAL mmol/L  31.0  26.0   GLUCOSE mg/dL  88  87   BUN mg/dL  14  13   CREATININE mg/dL  0.72  0.78   EGFR IF NONAFRICN AM mL/min/1.73  82  75   EGFR IF AFRICN AM mL/min/1.73  99  90   CALCIUM mg/dL  9.7  9.5     HEPATIC:No results for input(s): ALT, AST, ALKPHOS, TOTAL in the last 83205 hours.  THYROID:No results for input(s): TSH, T3FREE, FTI in the last 65931 hours.    Invalid input(s): T4FREE, T3, T4, TEUP,  TOTALT4  A1C:  Lab Results - Last 18 Months   Lab Units  11/13/18   0724  07/11/17   0823   HEMOGLOBIN A1C %  5.80  6.00     PSA:No results for input(s): PSA in the last 83913 hours.    Objective   /82 (BP Location: Left arm, Patient Position: Sitting)   Pulse 118   Temp 97.6 °F (36.4 °C) (Oral)   Ht 165.1 cm (65\")   Wt 85.7 kg (189 lb)   SpO2 97%   BMI 31.45 kg/m²   Body mass index is 31.45 kg/m².    Physical Exam  GENERAL:  Well " nourished/developed in no acute distress.   SKIN: Turgor excellent, without wound, rash, lesion.  HEENT: Normal cephalic without trauma.  Pupils equal round reactive to light. Extraocular motions full without nystagmus.   External canals nonobstructive nontender without reddness. Tymphatic membranes brown with tyler structures intact.   Oral cavity without growths, exudates, and moist.  Posterior pharynx without mass, obstruction, redness.  No thyromegaly, mass, tenderness, lymphadenopathy and supple.  CV: Regular rhythm.  No murmur, gallop,  edema. Posterior pulses intact.  No carotid bruits.  CHEST: No chest wall tenderness or mass.   LUNGS: Symmetric motion with clear to auscultation.   ABD: Soft, nontender without mass.   ORTHO: Symmetric extremities without swelling/point tenderness.  Full gross range of motion.  NEURO: CN 2-12 grossly intact.  Symmetric facies and UE/LE. 2-3/5 strength throughout. 1/4 x bicep knee equal reflexes.  Nonfocal use extremities. Speech clear. Intact light touch with monofilament, vibratory sensation with tuning fork; equal toes/distal feet.    PSYCH: Oriented x 3.  Pleasant calm, well kept.  Purposeful/directed conservation with intact short/long gross memory.    Assessment/Plan     1. Hypertension, unspecified type    2. Hyperlipidemia, unspecified hyperlipidemia type    3. Elevated fasting glucose    4. Chronic narcotic use-Linberg    5. Anemia, unspecified type    6. Chronic back pain, unspecified back location, unspecified back pain laterality    7. Chronic neck pain    8. Palpitations      ? If palpitations equals thyroid, anemia issues.     Rx: reviewed/changes:  Maybe B-blocker; await labs    LAB/Testing/Referrals: reviewed/orders:   Today:   Orders Placed This Encounter   Procedures   • T4, Free   • TSH   • Iron Profile   • CBC & Differential   6m CBC, BMP, A1c, iron  12m CBC, CMP, A1c, LIPID, TSH, Vit D, iron, B12, folate      Discussions:   holter  Body mass index is 31.45  kg/m².   Patient's Body mass index is 31.45 kg/m². BMI is above normal parameters. Recommendations include: exercise counseling, nutrition counseling and as able.  Non-smoker      Patient Instructions   Anytime your heart rate becomes bothersome enough; we can do a holter.       Follow up: Return for lab today-try to add from 11.15;  lab during/or just before next apt;, Dr Madden-, 6 m;.  Future Appointments   Date Time Provider Department Center   5/20/2019  8:20 AM LAB [facility] None   5/23/2019  9:00 AM Kiran Madden MD [facility] None

## 2018-11-16 DIAGNOSIS — R00.2 PALPITATIONS: Primary | ICD-10-CM

## 2018-12-13 ENCOUNTER — TELEPHONE (OUTPATIENT)
Dept: FAMILY MEDICINE CLINIC | Facility: CLINIC | Age: 63
End: 2018-12-13

## 2018-12-13 DIAGNOSIS — R00.2 PALPITATIONS: ICD-10-CM

## 2018-12-13 NOTE — TELEPHONE ENCOUNTER
"Vm \"I had my heart test 2 weeks ago and still havnt heard anything?\" checked timeline and no report  "

## 2018-12-31 RX ORDER — AMITRIPTYLINE HYDROCHLORIDE 100 MG/1
100-200 TABLET, FILM COATED ORAL NIGHTLY PRN
Qty: 180 TABLET | Refills: 1 | Status: SHIPPED | OUTPATIENT
Start: 2018-12-31 | End: 2019-04-17 | Stop reason: SDUPTHER

## 2019-01-25 ENCOUNTER — TELEPHONE (OUTPATIENT)
Dept: FAMILY MEDICINE CLINIC | Facility: CLINIC | Age: 64
End: 2019-01-25

## 2019-01-25 DIAGNOSIS — R73.01 ELEVATED FASTING GLUCOSE: Primary | ICD-10-CM

## 2019-01-25 RX ORDER — METFORMIN HYDROCHLORIDE 500 MG/1
500 TABLET, EXTENDED RELEASE ORAL 2 TIMES DAILY
Qty: 180 TABLET | Refills: 1 | Status: SHIPPED | OUTPATIENT
Start: 2019-01-25 | End: 2019-04-17 | Stop reason: SDUPTHER

## 2019-01-25 NOTE — TELEPHONE ENCOUNTER
Attempted to call pt and vm left with Dr Madden information, new Rx to pt pharmacy, as she will run out before next rx is due

## 2019-01-25 NOTE — TELEPHONE ENCOUNTER
"Vm \"my blood sugar has been running 143-173 and I was taking metformin 500mg one at bedtime, and now I am taking one twice a day and my sugar is 80-90, I wanted to make sure that was ok?\"  "

## 2019-01-25 NOTE — TELEPHONE ENCOUNTER
Current Outpatient Medications:   •  amitriptyline (ELAVIL) 100 MG tablet, Take 1-2 tablets by mouth At Night As Needed for Sleep., Disp: 180 tablet, Rfl: 1  •  metFORMIN ER (GLUCOPHAGE-XR) 500 MG 24 hr tablet, TAKE 1 TABLET BY MOUTH IN THE MORNING WITH  BREAKFAST, Disp: 90 tablet, Rfl: 1  •  naproxen sodium (ALEVE) 220 MG tablet, Take 220 mg by mouth 2 (Two) Times a Day As Needed for Mild Pain ., Disp: , Rfl:   •  ondansetron ODT (ZOFRAN ODT) 4 MG disintegrating tablet, Place 1 tablet under the tongue Every 8 (Eight) Hours As Needed for Nausea or Vomiting., Disp: 10 tablet, Rfl: 0    Actually 80-90 fasting is perfect  dont want her however having any lows  Next A1c should really be god

## 2019-02-22 ENCOUNTER — TELEPHONE (OUTPATIENT)
Dept: FAMILY MEDICINE CLINIC | Facility: CLINIC | Age: 64
End: 2019-02-22

## 2019-02-22 RX ORDER — AZITHROMYCIN 250 MG/1
TABLET, FILM COATED ORAL
Qty: 6 TABLET | Refills: 0 | Status: SHIPPED | OUTPATIENT
Start: 2019-02-22 | End: 2019-04-17

## 2019-02-22 NOTE — TELEPHONE ENCOUNTER
"Vm \"My glad are swollen under my neck and low grade fever, can Dr Madden call me something into [facility] pharmacy\"     pt was very congested sounding on her vm  "

## 2019-02-22 NOTE — TELEPHONE ENCOUNTER
Tcc 10-, pt rec Zpack and rx to [facility] pharmacy, called and notified pt and stated she can take Zpack

## 2019-04-17 DIAGNOSIS — R73.01 ELEVATED FASTING GLUCOSE: ICD-10-CM

## 2019-04-17 RX ORDER — METFORMIN HYDROCHLORIDE 500 MG/1
500 TABLET, EXTENDED RELEASE ORAL 2 TIMES DAILY
Qty: 180 TABLET | Refills: 1 | Status: SHIPPED | OUTPATIENT
Start: 2019-04-17 | End: 2020-07-20

## 2019-04-17 RX ORDER — AMITRIPTYLINE HYDROCHLORIDE 100 MG/1
100-200 TABLET, FILM COATED ORAL NIGHTLY PRN
Qty: 180 TABLET | Refills: 1 | Status: SHIPPED | OUTPATIENT
Start: 2019-04-17 | End: 2020-04-17 | Stop reason: SDUPTHER

## 2019-04-17 NOTE — TELEPHONE ENCOUNTER
"Pt called \"I left my medication in a hotel in [place] and I need a new prescription sent to [facility] by the mall\" advised pt that she may have to pay out of pocket until time for her next refill?  Call placed to [facility] to advise of lost medication and ok to refill, advised they will help the patient  "

## 2019-05-16 DIAGNOSIS — E55.9 VITAMIN D DEFICIENCY: Primary | ICD-10-CM

## 2019-05-16 DIAGNOSIS — E78.5 HYPERLIPIDEMIA, UNSPECIFIED HYPERLIPIDEMIA TYPE: ICD-10-CM

## 2019-05-16 DIAGNOSIS — R73.01 ELEVATED FASTING GLUCOSE: ICD-10-CM

## 2019-05-16 DIAGNOSIS — I10 HYPERTENSION, UNSPECIFIED TYPE: ICD-10-CM

## 2019-05-16 DIAGNOSIS — Z11.59 NEED FOR HEPATITIS C SCREENING TEST: ICD-10-CM

## 2019-05-16 DIAGNOSIS — D64.9 ANEMIA, UNSPECIFIED TYPE: ICD-10-CM

## 2019-05-20 ENCOUNTER — RESULTS ENCOUNTER (OUTPATIENT)
Dept: FAMILY MEDICINE CLINIC | Facility: CLINIC | Age: 64
End: 2019-05-20

## 2019-05-20 DIAGNOSIS — I10 HYPERTENSION, UNSPECIFIED TYPE: ICD-10-CM

## 2019-05-20 DIAGNOSIS — Z98.890 HISTORY OF NISSEN FUNDOPLICATION: ICD-10-CM

## 2019-05-20 DIAGNOSIS — R32 URINARY INCONTINENCE, UNSPECIFIED TYPE: ICD-10-CM

## 2019-05-20 DIAGNOSIS — E55.9 VITAMIN D DEFICIENCY: ICD-10-CM

## 2019-05-20 DIAGNOSIS — R73.01 ELEVATED FASTING GLUCOSE: ICD-10-CM

## 2019-05-20 DIAGNOSIS — D64.9 ANEMIA, UNSPECIFIED TYPE: ICD-10-CM

## 2019-05-20 DIAGNOSIS — I10 HYPERTENSION, UNSPECIFIED TYPE: Primary | ICD-10-CM

## 2019-05-20 DIAGNOSIS — Z00.00 WELLNESS EXAMINATION: ICD-10-CM

## 2019-05-20 DIAGNOSIS — Z11.59 NEED FOR HEPATITIS C SCREENING TEST: ICD-10-CM

## 2019-05-20 DIAGNOSIS — E78.5 HYPERLIPIDEMIA, UNSPECIFIED HYPERLIPIDEMIA TYPE: ICD-10-CM

## 2019-05-23 ENCOUNTER — OFFICE VISIT (OUTPATIENT)
Dept: FAMILY MEDICINE CLINIC | Facility: CLINIC | Age: 64
End: 2019-05-23

## 2019-05-23 VITALS
HEART RATE: 73 BPM | BODY MASS INDEX: 29.66 KG/M2 | DIASTOLIC BLOOD PRESSURE: 78 MMHG | RESPIRATION RATE: 16 BRPM | HEIGHT: 65 IN | WEIGHT: 178 LBS | TEMPERATURE: 98.7 F | SYSTOLIC BLOOD PRESSURE: 128 MMHG | OXYGEN SATURATION: 96 %

## 2019-05-23 DIAGNOSIS — R21 RASH: ICD-10-CM

## 2019-05-23 DIAGNOSIS — M54.2 CHRONIC NECK PAIN: ICD-10-CM

## 2019-05-23 DIAGNOSIS — I10 ESSENTIAL HYPERTENSION: Primary | ICD-10-CM

## 2019-05-23 DIAGNOSIS — D64.9 ANEMIA, UNSPECIFIED TYPE: ICD-10-CM

## 2019-05-23 DIAGNOSIS — G89.29 CHRONIC NECK PAIN: ICD-10-CM

## 2019-05-23 DIAGNOSIS — M54.9 CHRONIC BACK PAIN, UNSPECIFIED BACK LOCATION, UNSPECIFIED BACK PAIN LATERALITY: ICD-10-CM

## 2019-05-23 DIAGNOSIS — G89.29 CHRONIC BACK PAIN, UNSPECIFIED BACK LOCATION, UNSPECIFIED BACK PAIN LATERALITY: ICD-10-CM

## 2019-05-23 PROBLEM — Z87.442 HISTORY OF KIDNEY STONES: Status: ACTIVE | Noted: 2017-04-30

## 2019-05-23 PROCEDURE — 99214 OFFICE O/P EST MOD 30 MIN: CPT | Performed by: FAMILY MEDICINE

## 2019-05-23 RX ORDER — TRIAMCINOLONE ACETONIDE 1 MG/G
CREAM TOPICAL 3 TIMES DAILY
Qty: 30 G | Refills: 0 | Status: SHIPPED | OUTPATIENT
Start: 2019-05-23 | End: 2019-07-18 | Stop reason: SDUPTHER

## 2019-05-23 NOTE — PROGRESS NOTES
Subjective   Sedrick Leahy is a 63 y.o. female presenting with chief complaint of:   Chief Complaint   Patient presents with   • Hypertension     6 month follow up   • Anemia   • Rash     scalp and neck X 1 month       History of Present Illness :  Alone.   Has multiple chronic problems to consider that might have a bearing on today's issues;  an interval appointment.       Chronic/acute problems reviewed today:   1. Essential hypertension: Chronic/stable. Stable here/if home blood pressures.  No significant chest pain, SOB, LE edema, orthopnea, near syncope, dizziness/light headness.      2. Chronic back pain, unspecified back location, unspecified back pain laterality: : chronic/variable 0-3/10 upper-neck/ lower back pain with infrequent/same radiation to UE/LE.  No change LE numbness.  Has bladder/bowel control. No desire to change approach of care. Working with pain management and therapy helping     3. Chronic neck pain: : chronic/variable 1-3/10 neck/UE pain with infrequent/same radiation to UE/LE.  No change any numbness.  Has bladder/bowel control. No desire to change approach of care.      4. Anemia, unspecified type: chronic/variable benign to date.    5. Rash: acute; 1m on/off rash L and posterior neck.  Using OTC antibiotic cream.  No pain, fever.      Has an/another acute issue today: none.    The following portions of the patient's history were reviewed and updated as appropriate: allergies, current medications, past family history, past medical history, past social history, past surgical history and problem list.      Current Outpatient Medications:   •  amitriptyline (ELAVIL) 100 MG tablet, Take 1-2 tablets by mouth At Night As Needed for Sleep., Disp: 180 tablet, Rfl: 1  •  metFORMIN ER (GLUCOPHAGE-XR) 500 MG 24 hr tablet, Take 1 tablet by mouth 2 (Two) Times a Day., Disp: 180 tablet, Rfl: 1  •  ondansetron ODT (ZOFRAN ODT) 4 MG disintegrating tablet, Place 1 tablet under the tongue Every 8 (Eight)  Hours As Needed for Nausea or Vomiting., Disp: 10 tablet, Rfl: 0  •  naproxen sodium (ALEVE) 220 MG tablet, Take 220 mg by mouth 2 (Two) Times a Day As Needed for Mild Pain ., Disp: , Rfl:     No problems with medications.  Refills if needed done    Allergies   Allergen Reactions   • Penicillins Swelling and Rash       Review of Systems  GENERAL:  Active/slower with limits, speed, stamina for age and spinal pain. Sleep is ok. No fever now.  SKIN: No rash/skin lesion of concern: other than L neck/see above  ENDO:  No syncope, near or diaphoretic sweaty spells.  HEENT: No recent head injury; or change occ headache.   No vision change.  No significant hearing loss.  Ears without pain/drainage.  No sore throat.  No significant nasal/sinus congestion/drainage. No epistaxis.  CHEST: No chest wall tenderness or mass. No cough, without wheeze.  No SOB; no hemoptysis.  CV: No chest pain, palpitations, ankle edema.  GI: No heartburn, dysphagia.  No abdominal pain, diarrhea, constipation.  No rectal bleeding, or melena.    :  Voids without dysuria, same urinary incontinence but voids to completion.  ORTHO: No painful/swollen joints but various on /off sore.  Variable sore neck or back.  No acute neck or back pain without recent injury.  NEURO: No dizziness, weakness of extremities or significant UE/LE numbness/paresthesias.   PSYCH: No memory loss.  Mood variable; with son having dx renal cancer; more anxious, depressed but/and not suicidal.  Tries to tolerate stress .   Screening:  No gyne  Mammogram: 12.22.17-[facility]; not 100% sure  Bone density: 11- [facility]- LS -0.3 abd hip -0.3..no bad change but enough to encourage a biphosphate ie bonovia...or miacalcin... apt with cb to discuss tx. cb /pt notified is making an appt/kh-7/2019-advised  Low dose CT chest:Tobacco-smoker/age 14/2 ppd/dc 1982 (9y)  NA  GI: Colonoscopy/[facility]/6.6.16/5y  Prostate: NA  Usual lab order  6m BMP, A1c  12m CBC, CMP, A1c, LIPID, TSH, Vit D      Lab Results:  Results for orders placed or performed in visit on 11/13/18   Basic metabolic panel   Result Value Ref Range    Glucose 88 70 - 100 mg/dL    BUN 14 5 - 21 mg/dL    Creatinine 0.72 0.50 - 1.40 mg/dL    eGFR Non African Am 82 >60 mL/min/1.73    eGFR African Am 99 >60 mL/min/1.73    BUN/Creatinine Ratio 19.4 7.0 - 25.0    Sodium 141 135 - 145 mmol/L    Potassium 4.2 3.5 - 5.3 mmol/L    Chloride 100 98 - 110 mmol/L    Total CO2 31.0 24.0 - 31.0 mmol/L    Calcium 9.7 8.4 - 10.4 mg/dL   Hemoglobin A1c   Result Value Ref Range    Hemoglobin A1C 5.80 %   Iron Profile   Result Value Ref Range    TIBC 337 225 - 420 mcg/dL    UIBC 243 mcg/dL    Iron 94 42 - 180 mcg/dL    Iron Saturation 28 20 - 45 %   T4, Free   Result Value Ref Range    Free T4 1.26 0.78 - 2.19 ng/dL   TSH   Result Value Ref Range    TSH 2.160 0.470 - 4.680 mIU/mL   Written Authorization   Result Value Ref Range    Written Authorization Comment    CBC & Differential   Result Value Ref Range    WBC 7.63 4.80 - 10.80 10*3/mm3    RBC 4.36 4.20 - 5.40 10*6/mm3    Hemoglobin 13.0 12.0 - 16.0 g/dL    Hematocrit 42.5 37.0 - 47.0 %    MCV 97.5 82.0 - 98.0 fL    MCH 29.8 28.0 - 32.0 pg    MCHC 30.6 (L) 33.0 - 36.0 g/dL    RDW 13.5 12.0 - 15.0 %    Platelets 312 130 - 400 10*3/mm3    Neutrophil Rel % 49.2 39.0 - 78.0 %    Lymphocyte Rel % 39.1 15.0 - 45.0 %    Monocyte Rel % 6.7 4.0 - 12.0 %    Eosinophil Rel % 4.1 (H) 0.0 - 4.0 %    Basophil Rel % 0.8 0.0 - 2.0 %    Neutrophils Absolute 3.76 1.87 - 8.40 10*3/mm3    Lymphocytes Absolute 2.98 0.72 - 4.86 10*3/mm3    Monocytes Absolute 0.51 0.19 - 1.30 10*3/mm3    Eosinophils Absolute 0.31 0.00 - 0.70 10*3/mm3    Basophils Absolute 0.06 0.00 - 0.20 10*3/mm3    Immature Granulocyte Rel % 0.1 0.0 - 5.0 %    Immature Grans Absolute 0.01 0.00 - 0.03 10*3/mm3    nRBC 0.0 0.0 - 0.0 /100 WBC       A1C:  Lab Results - Last 18 Months   Lab Units 11/13/18  0724   HEMOGLOBIN A1C % 5.80     PSA:No results for  "input(s): PSA in the last 98343 hours.  CBC:  Lab Results - Last 18 Months   Lab Units 11/13/18  0724   WBC 10*3/mm3 7.63   HEMOGLOBIN g/dL 13.0   HEMATOCRIT % 42.5   PLATELETS 10*3/mm3 312   IRON mcg/dL 94      BMP/CMP:  Lab Results - Last 18 Months   Lab Units 11/13/18  0724   SODIUM mmol/L 141   POTASSIUM mmol/L 4.2   CHLORIDE mmol/L 100   TOTAL CO2 mmol/L 31.0   GLUCOSE mg/dL 88   BUN mg/dL 14   CREATININE mg/dL 0.72   EGFR IF NONAFRICN AM mL/min/1.73 82   EGFR IF AFRICN AM mL/min/1.73 99   CALCIUM mg/dL 9.7     HEPATIC:No results for input(s): ALT, AST, ALKPHOS, TOTAL in the last 89042 hours.  THYROID:  Lab Results - Last 18 Months   Lab Units 11/13/18  0724   TSH mIU/mL 2.160       Objective   /78 (BP Location: Left arm, Patient Position: Sitting, Cuff Size: Adult)   Pulse 73   Temp 98.7 °F (37.1 °C) (Oral)   Resp 16   Ht 165.1 cm (65\")   Wt 80.7 kg (178 lb)   SpO2 96%   Breastfeeding? No   BMI 29.62 kg/m²   Body mass index is 29.62 kg/m².    Physical Exam  GENERAL:  Well nourished/developed in no acute distress.   SKIN: Turgor excellent, without wound, rash, lesion except PHOTO-L neck  HEENT: Normal cephalic without trauma.  Pupils equal round reactive to light. Extraocular motions full without nystagmus.   External canals nonobstructive nontender without reddness. Tymphatic membranes brown with tyler structures intact.   Oral cavity without growths, exudates, and moist.  Posterior pharynx without mass, obstruction, redness.  No thyromegaly, mass, tenderness, lymphadenopathy and supple.  CV: Regular rhythm.  No murmur, gallop,  edema. Posterior pulses intact.  No carotid bruits.  CHEST: No chest wall tenderness or mass.   LUNGS: Symmetric motion with clear to auscultation.   ABD: Soft, nontender without mass.   ORTHO: Symmetric extremities without swelling/point tenderness.  Full gross range of motion.  NEURO: CN 2-12 grossly intact.  Symmetric facies and UE/LE. 2-3/5 strength throughout. 1/4 " x bicep knee equal reflexes.  Nonfocal use extremities. Speech clear. Intact light touch with monofilament, vibratory sensation with tuning fork; equal toes/distal feet.    PSYCH: Oriented x 3.  Pleasant calm, well kept.  Purposeful/directed conservation with intact short/long gross memory.    Assessment/Plan     1. Essential hypertension    2. Chronic back pain, unspecified back location, unspecified back pain laterality    3. Chronic neck pain    4. Anemia, unspecified type    5. Rash      Rash appears nonspecific dermatitis; maybe even contact.     Rx: reviewed/changes:  New Medications Ordered This Visit   Medications   • triamcinolone (KENALOG) 0.1 % cream     Sig: Apply  topically to the appropriate area as directed 3 (Three) Times a Day.     Dispense:  30 g     Refill:  0     LAB/Testing/Referrals: reviewed/orders:   Today:   No orders of the defined types were placed in this encounter.    Chronic/recurrent labs above or change to:   Same; due     Discussions:   Watch for contacts, pattern of rash after exposures.   Body mass index is 29.62 kg/m².  Patient's Body mass index is 29.62 kg/m². BMI is above normal parameters. Recommendations include: exercise counseling, nutrition counseling and as able.    Tobacco use reviewed:  Non-smoker      Patient Instructions   With mammogram this year have us also order you a bone density      Follow up: Return for fasting lab due (avoid ro gone);  lab/Dr Madden 6m.  Future Appointments   Date Time Provider Department Center   5/28/2019  8:15 AM LAB [facility] None   11/22/2019  8:10 AM LAB [facility] None   11/25/2019  9:15 AM Kiran Madden MD [facility] None

## 2019-05-29 LAB
25(OH)D3+25(OH)D2 SERPL-MCNC: 21.6 NG/ML (ref 30–100)
ALBUMIN SERPL-MCNC: 4.2 G/DL (ref 3.5–5.2)
ALBUMIN/GLOB SERPL: 1.8 G/DL
ALP SERPL-CCNC: 104 U/L (ref 39–117)
ALT SERPL-CCNC: 13 U/L (ref 1–33)
AST SERPL-CCNC: 13 U/L (ref 1–32)
BASOPHILS # BLD AUTO: 0.05 10*3/MM3 (ref 0–0.2)
BASOPHILS NFR BLD AUTO: 0.7 % (ref 0–1.5)
BILIRUB SERPL-MCNC: 0.3 MG/DL (ref 0.2–1.2)
BUN SERPL-MCNC: 12 MG/DL (ref 8–23)
BUN/CREAT SERPL: 13.6 (ref 7–25)
CALCIUM SERPL-MCNC: 9.8 MG/DL (ref 8.6–10.5)
CHLORIDE SERPL-SCNC: 105 MMOL/L (ref 98–107)
CHOLEST SERPL-MCNC: 168 MG/DL (ref 0–200)
CHOLEST/HDLC SERPL: 2.95 {RATIO}
CO2 SERPL-SCNC: 27.1 MMOL/L (ref 22–29)
CREAT SERPL-MCNC: 0.88 MG/DL (ref 0.57–1)
EOSINOPHIL # BLD AUTO: 0.28 10*3/MM3 (ref 0–0.4)
EOSINOPHIL NFR BLD AUTO: 4.1 % (ref 0.3–6.2)
ERYTHROCYTE [DISTWIDTH] IN BLOOD BY AUTOMATED COUNT: 13.2 % (ref 12.3–15.4)
FOLATE SERPL-MCNC: 9.15 NG/ML (ref 4.78–24.2)
GLOBULIN SER CALC-MCNC: 2.3 GM/DL
GLUCOSE SERPL-MCNC: 92 MG/DL (ref 65–99)
HBA1C MFR BLD: 5.7 % (ref 4.8–5.6)
HCT VFR BLD AUTO: 42.4 % (ref 34–46.6)
HCV AB S/CO SERPL IA: <0.1 S/CO RATIO (ref 0–0.9)
HDLC SERPL-MCNC: 57 MG/DL (ref 40–60)
HGB BLD-MCNC: 12.7 G/DL (ref 12–15.9)
IMM GRANULOCYTES # BLD AUTO: 0.01 10*3/MM3 (ref 0–0.05)
IMM GRANULOCYTES NFR BLD AUTO: 0.1 % (ref 0–0.5)
IRON SERPL-MCNC: 78 MCG/DL (ref 37–145)
LDLC SERPL CALC-MCNC: 91 MG/DL (ref 0–100)
LYMPHOCYTES # BLD AUTO: 2.46 10*3/MM3 (ref 0.7–3.1)
LYMPHOCYTES NFR BLD AUTO: 36.2 % (ref 19.6–45.3)
MCH RBC QN AUTO: 29.3 PG (ref 26.6–33)
MCHC RBC AUTO-ENTMCNC: 30 G/DL (ref 31.5–35.7)
MCV RBC AUTO: 97.9 FL (ref 79–97)
MICROALBUMIN UR-MCNC: 6.9 UG/ML
MONOCYTES # BLD AUTO: 0.5 10*3/MM3 (ref 0.1–0.9)
MONOCYTES NFR BLD AUTO: 7.4 % (ref 5–12)
NEUTROPHILS # BLD AUTO: 3.5 10*3/MM3 (ref 1.7–7)
NEUTROPHILS NFR BLD AUTO: 51.5 % (ref 42.7–76)
NRBC BLD AUTO-RTO: 0 /100 WBC (ref 0–0.2)
PLATELET # BLD AUTO: 323 10*3/MM3 (ref 140–450)
POTASSIUM SERPL-SCNC: 4.4 MMOL/L (ref 3.5–5.2)
PROT SERPL-MCNC: 6.5 G/DL (ref 6–8.5)
RBC # BLD AUTO: 4.33 10*6/MM3 (ref 3.77–5.28)
SODIUM SERPL-SCNC: 143 MMOL/L (ref 136–145)
TRIGL SERPL-MCNC: 99 MG/DL (ref 0–150)
TSH SERPL DL<=0.005 MIU/L-ACNC: 2.25 MIU/ML (ref 0.27–4.2)
VIT B12 SERPL-MCNC: 338 PG/ML (ref 211–946)
VLDLC SERPL CALC-MCNC: 19.8 MG/DL
WBC # BLD AUTO: 6.8 10*3/MM3 (ref 3.4–10.8)

## 2019-07-18 DIAGNOSIS — R21 RASH: ICD-10-CM

## 2019-07-18 RX ORDER — TRIAMCINOLONE ACETONIDE 1 MG/G
CREAM TOPICAL 3 TIMES DAILY
Qty: 30 G | Refills: 0 | Status: SHIPPED | OUTPATIENT
Start: 2019-07-18 | End: 2019-11-25

## 2019-10-28 ENCOUNTER — RESULTS ENCOUNTER (OUTPATIENT)
Dept: FAMILY MEDICINE CLINIC | Facility: CLINIC | Age: 64
End: 2019-10-28

## 2019-10-28 ENCOUNTER — TELEPHONE (OUTPATIENT)
Dept: FAMILY MEDICINE CLINIC | Facility: CLINIC | Age: 64
End: 2019-10-28

## 2019-10-28 DIAGNOSIS — Z12.12 SCREENING FOR MALIGNANT NEOPLASM OF THE RECTUM: ICD-10-CM

## 2019-10-28 DIAGNOSIS — Z12.11 ENCOUNTER FOR SCREENING FOR MALIGNANT NEOPLASM OF COLON: ICD-10-CM

## 2019-10-28 DIAGNOSIS — Z12.11 ENCOUNTER FOR SCREENING FOR MALIGNANT NEOPLASM OF COLON: Primary | ICD-10-CM

## 2019-11-19 DIAGNOSIS — D64.9 ANEMIA, UNSPECIFIED TYPE: ICD-10-CM

## 2019-11-19 DIAGNOSIS — I10 ESSENTIAL HYPERTENSION: Primary | ICD-10-CM

## 2019-11-19 DIAGNOSIS — R73.01 ELEVATED FASTING GLUCOSE: ICD-10-CM

## 2019-11-19 DIAGNOSIS — R31.9 HEMATURIA, UNSPECIFIED TYPE: ICD-10-CM

## 2019-11-22 ENCOUNTER — RESULTS ENCOUNTER (OUTPATIENT)
Dept: FAMILY MEDICINE CLINIC | Facility: CLINIC | Age: 64
End: 2019-11-22

## 2019-11-22 DIAGNOSIS — D64.9 ANEMIA, UNSPECIFIED TYPE: ICD-10-CM

## 2019-11-22 DIAGNOSIS — R31.9 HEMATURIA, UNSPECIFIED TYPE: ICD-10-CM

## 2019-11-22 DIAGNOSIS — R19.5 POSITIVE COLORECTAL CANCER SCREENING USING COLOGUARD TEST: Primary | ICD-10-CM

## 2019-11-22 DIAGNOSIS — R73.01 ELEVATED FASTING GLUCOSE: ICD-10-CM

## 2019-11-22 DIAGNOSIS — I10 ESSENTIAL HYPERTENSION: ICD-10-CM

## 2019-11-25 ENCOUNTER — OFFICE VISIT (OUTPATIENT)
Dept: FAMILY MEDICINE CLINIC | Facility: CLINIC | Age: 64
End: 2019-11-25

## 2019-11-25 VITALS
TEMPERATURE: 98.8 F | HEIGHT: 65 IN | RESPIRATION RATE: 16 BRPM | SYSTOLIC BLOOD PRESSURE: 136 MMHG | WEIGHT: 181 LBS | BODY MASS INDEX: 30.16 KG/M2 | OXYGEN SATURATION: 98 % | HEART RATE: 100 BPM | DIASTOLIC BLOOD PRESSURE: 74 MMHG

## 2019-11-25 DIAGNOSIS — R00.2 PALPITATIONS: ICD-10-CM

## 2019-11-25 DIAGNOSIS — R32 URINARY INCONTINENCE, UNSPECIFIED TYPE: ICD-10-CM

## 2019-11-25 DIAGNOSIS — R73.01 ELEVATED FASTING GLUCOSE: ICD-10-CM

## 2019-11-25 DIAGNOSIS — G89.29 CHRONIC BACK PAIN, UNSPECIFIED BACK LOCATION, UNSPECIFIED BACK PAIN LATERALITY: ICD-10-CM

## 2019-11-25 DIAGNOSIS — D64.9 ANEMIA, UNSPECIFIED TYPE: ICD-10-CM

## 2019-11-25 DIAGNOSIS — Z78.0 MENOPAUSE: ICD-10-CM

## 2019-11-25 DIAGNOSIS — F32.A DEPRESSION, UNSPECIFIED DEPRESSION TYPE: ICD-10-CM

## 2019-11-25 DIAGNOSIS — E55.9 VITAMIN D DEFICIENCY: ICD-10-CM

## 2019-11-25 DIAGNOSIS — Z12.31 ENCOUNTER FOR SCREENING MAMMOGRAM FOR BREAST CANCER: Primary | ICD-10-CM

## 2019-11-25 DIAGNOSIS — E78.5 HYPERLIPIDEMIA, UNSPECIFIED HYPERLIPIDEMIA TYPE: ICD-10-CM

## 2019-11-25 DIAGNOSIS — Z00.00 WELLNESS EXAMINATION: ICD-10-CM

## 2019-11-25 DIAGNOSIS — M54.9 CHRONIC BACK PAIN, UNSPECIFIED BACK LOCATION, UNSPECIFIED BACK PAIN LATERALITY: ICD-10-CM

## 2019-11-25 DIAGNOSIS — F11.90 CHRONIC NARCOTIC USE: ICD-10-CM

## 2019-11-25 DIAGNOSIS — R19.5 POSITIVE COLORECTAL CANCER SCREENING USING DNA-BASED STOOL TEST: ICD-10-CM

## 2019-11-25 DIAGNOSIS — F41.9 ANXIETY: ICD-10-CM

## 2019-11-25 DIAGNOSIS — M54.2 CHRONIC NECK PAIN: ICD-10-CM

## 2019-11-25 DIAGNOSIS — G89.29 CHRONIC NECK PAIN: ICD-10-CM

## 2019-11-25 DIAGNOSIS — I10 ESSENTIAL HYPERTENSION: ICD-10-CM

## 2019-11-25 PROCEDURE — 99214 OFFICE O/P EST MOD 30 MIN: CPT | Performed by: FAMILY MEDICINE

## 2019-11-25 PROCEDURE — G0439 PPPS, SUBSEQ VISIT: HCPCS | Performed by: FAMILY MEDICINE

## 2019-11-25 NOTE — PROGRESS NOTES
The ABCs of the Annual Wellness Visit  Subsequent Medicare Wellness Visit    Chief Complaint   Patient presents with   • Annual Exam       Subjective   History of Present Illness:  Sedrick Leahy is a 64 y.o. female who presents for a Subsequent Medicare Wellness Visit.    HEALTH RISK ASSESSMENT    Recent Hospitalizations:  No hospitalization(s) within the last year.    Current Medical Providers:  Patient Care Team:  Kiran Madden MD as PCP - General    Smoking Status:  Social History     Tobacco Use   Smoking Status Former Smoker   • Packs/day: 2.00   • Types: Cigarettes   • Start date: 1954   • Last attempt to quit: 7/10/1990   • Years since quittin.3   Smokeless Tobacco Never Used       Alcohol Consumption:  Social History     Substance and Sexual Activity   Alcohol Use No       Depression Screen:   PHQ-2/PHQ-9 Depression Screening 2019   Little interest or pleasure in doing things 0   Feeling down, depressed, or hopeless 1   Total Score 1       Fall Risk Screen:  MOIRA Fall Risk Assessment was completed, and patient is at LOW risk for falls.Assessment completed on:2019    Health Habits and Functional and Cognitive Screening:  No flowsheet data found.      Does the patient have evidence of cognitive impairment? No    Asprin use counseling:Does not need ASA (and currently is not on it)    Age-appropriate Screening Schedule:  Refer to the list below for future screening recommendations based on patient's age, sex and/or medical conditions. Orders for these recommended tests are listed in the plan section. The patient has been provided with a written plan.    Health Maintenance   Topic Date Due   • TDAP/TD VACCINES (1 - Tdap) 06/15/1973   • ZOSTER VACCINE (1 of 2) 06/15/2004   • PAP SMEAR  10/06/2016   • DIABETIC EYE EXAM  10/06/2016   • COLONOSCOPY  10/06/2016   • HEMOGLOBIN A1C  2019   • MAMMOGRAM  2019   • LIPID PANEL  2020   • URINE MICROALBUMIN  2020   •  PNEUMOCOCCAL VACCINES (65+ LOW/MEDIUM RISK) (2 of 2 - PPSV23) 10/22/2020   • DIABETIC FOOT EXAM  11/25/2020   • INFLUENZA VACCINE  Completed          The following portions of the patient's history were reviewed and updated as appropriate: allergies, current medications, past family history, past medical history, past social history, past surgical history and problem list.    Outpatient Medications Prior to Visit   Medication Sig Dispense Refill   • amitriptyline (ELAVIL) 100 MG tablet Take 1-2 tablets by mouth At Night As Needed for Sleep. 180 tablet 1   • metFORMIN ER (GLUCOPHAGE-XR) 500 MG 24 hr tablet Take 1 tablet by mouth 2 (Two) Times a Day. 180 tablet 1   • naproxen sodium (ALEVE) 220 MG tablet Take 220 mg by mouth 2 (Two) Times a Day As Needed for Mild Pain .     • ondansetron ODT (ZOFRAN ODT) 4 MG disintegrating tablet Place 1 tablet under the tongue Every 8 (Eight) Hours As Needed for Nausea or Vomiting. 10 tablet 0   • triamcinolone (KENALOG) 0.1 % cream Apply  topically to the appropriate area as directed 3 (Three) Times a Day. 30 g 0     No facility-administered medications prior to visit.        Patient Active Problem List   Diagnosis   • Wellness examination-done   • Abnormal x-ray-resolved   • Anemia: iron,   • Chronic neck pain   • Chronic back pain-luca   • Depression   • Anxiety   • History of kidney stones   • Vitamin D deficiency   • Hypertension   • Hyperlipidemia-diet   • Hematuria-ayan   • Elevated fasting glucose   • Nausea-problem with nissen   • History of Nissen fundoplication   • Radiculopathy   • Urinary incontinence   • Laboratory test*   • Chronic narcotic use-Luca   • Menopause: 2005-offered   • Palpitations: holter benign   • Rash       Advanced Care Planning:  Patient does not have an advance directive - not interested in additional information    Review of Systems    Compared to one year ago, the patient feels her physical health is the same.  Compared to one year ago,  the patient feels her mental health is the same.    GENERAL:  Active/slower with limits, speed, stamina for age and spinal pain. Sleep is ok. No fever now.  SKIN: No rash/skin lesion of concern.   ENDO:  No syncope, near or diaphoretic sweaty spells.  HEENT: No recent head injury; or change occ headache.   No vision change.  No significant hearing loss.  Ears without pain/drainage.  No sore throat.  No significant nasal/sinus congestion/drainage. No epistaxis.  CHEST: No chest wall tenderness or mass. No cough, without wheeze.  No SOB; no hemoptysis.  CV: No chest pain, palpitations, ankle edema.  GI: No heartburn, dysphagia.  No abdominal pain, diarrhea, constipation.  No rectal bleeding, or melena.    :  Voids without dysuria, same urinary incontinence but voids to completion.  ORTHO: No painful/swollen joints but various on /off sore.  Variable sore neck or back.  No acute neck or back pain without recent injury.  NEURO: No dizziness, weakness of extremities or significant UE/LE numbness/paresthesias.   PSYCH: No memory loss.  Mood variable; with son having dx renal cancer; more anxious, depressed but/and not suicidal.  Tries to tolerate stress .   Screening:  No gyne  Mammogram: 12.22.17-[facility]; not 100% sure  Bone density: 11- [facility]- LS -0.3 abd hip -0.3..no bad change but enough to encourage a biphosphate ie bonovia...or miacalcin... apt with cb to discuss tx. cb /pt notified is making an appt/-7/2019-advised  Low dose CT chest:Tobacco-smoker/age 14/2 ppd/dc 1982 (9y)  NA  GI: Colonoscopy/[facility]/6.6.16/5y  Prostate: NA  Usual lab order  6m BMP, A1c  12m CBC, CMP, A1c, LIPID, TSH, Vit D     Reviewed chart for potential of high risk medication in the elderly: yes  Reviewed chart for potential of harmful drug interactions in the elderly:yes    Objective         Vitals:    11/25/19 0911   BP: 136/74   Pulse: 100   Resp: 16   Temp: 98.8 °F (37.1 °C)   SpO2: 98%   Weight: 82.1 kg (181 lb)   Height:  "165.1 cm (65\")       Body mass index is 30.12 kg/m².  Discussed the patient's BMI with her. The BMI is in the acceptable range.    Physical Exam  GENERAL:  Well nourished/developed in no acute distress.   SKIN: Turgor excellent, without wound, rash, lesion.  HEENT: Normal cephalic without trauma.  Pupils equal round reactive to light. Extraocular motions full without nystagmus.   External canals nonobstructive nontender without reddness. Tymphatic membranes brown with tyler structures intact.   Oral cavity without growths, exudates, and moist.  Posterior pharynx without mass, obstruction, redness.  No thyromegaly, mass, tenderness, lymphadenopathy and supple.  CV: Regular rhythm.  No murmur, gallop,  edema. Posterior pulses intact.  No carotid bruits.  CHEST: No chest wall tenderness or mass.   LUNGS: Symmetric motion with clear to auscultation.   ABD: Soft, nontender without mass.   ORTHO: Symmetric extremities without swelling/point tenderness.  Full gross range of motion.  NEURO: CN 2-12 grossly intact.  Symmetric facies and UE/LE. 2-3/5 strength throughout. 1/4 x bicep knee equal reflexes.  Nonfocal use extremities. Speech clear. Intact light touch with monofilament, vibratory sensation with tuning fork; equal toes/distal feet.    PSYCH: Oriented x 3.  Pleasant calm, well kept.  Purposeful/directed conservation with intact short/long gross memory.        Assessment/Plan   Medicare Risks and Personalized Health Plan  CMS Preventative Services Quick Reference  screening/vaccine for age and advance directives    The above risks/problems have been discussed with the patient.  Pertinent information has been shared with the patient in the After Visit Summary.  Follow up plans and orders are seen below in the Assessment/Plan Section.    Diagnoses and all orders for this visit:    1. Encounter for screening mammogram for breast cancer (Primary)  -     Mammo Screening Digital Tomosynthesis Bilateral With CAD; " Future    2. Palpitations: holter benign    3. Essential hypertension  -     CBC & Differential  -     Comprehensive metabolic panel  -     Hemoglobin A1c  -     Iron  -     Vitamin D 25 Hydroxy    4. Hyperlipidemia, unspecified hyperlipidemia type  -     CBC & Differential  -     Comprehensive metabolic panel  -     Hemoglobin A1c  -     Iron  -     Vitamin D 25 Hydroxy    5. Vitamin D deficiency  -     Cancel: Vitamin D 25 Hydroxy  -     CBC & Differential  -     Comprehensive metabolic panel  -     Hemoglobin A1c  -     Iron  -     Vitamin D 25 Hydroxy    6. Chronic neck pain    7. Chronic back pain, unspecified back location, unspecified back pain laterality    8. Urinary incontinence, unspecified type  -     CBC & Differential  -     Comprehensive metabolic panel  -     Hemoglobin A1c  -     Iron  -     Vitamin D 25 Hydroxy    9. Anemia, unspecified type  -     CBC & Differential  -     Comprehensive metabolic panel  -     Hemoglobin A1c  -     Iron  -     Vitamin D 25 Hydroxy    10. Elevated fasting glucose  -     CBC & Differential  -     Comprehensive metabolic panel  -     Hemoglobin A1c  -     Iron  -     Vitamin D 25 Hydroxy    11. Anxiety    12. Chronic narcotic use-Nazario    13. Depression, unspecified depression type    14. Wellness examination-done    15. Positive colorectal cancer screening using DNA-based stool test  -     Ambulatory Referral to Gastroenterology    16. Menopause: 2005-offered  -     DEXA Bone Density Axial; Future      Follow Up:  Return for lab today/, THEN, lab;, Dr Madden-, 6 m;.     An After Visit Summary and PPPS were given to the patient.

## 2019-11-25 NOTE — PATIENT INSTRUCTIONS
"Medicare offers certain visits called \"wellness\" that allows for time to deal with and  review the many aspects of \"being well\" that just might not get mentioned during other visits with your doctor through the year.  This includes things like reviews of health screenings (mammograms, various labs),  weight, exercise, vaccines for just a few examples.      In order to help you with this we wish to make you aware of a few things for you to consider:    1. Advanced directives.  These are documents used to help direct your care if your health/situation should reach a point that you cannot make your own decisions.  While it is likely you do not currently have a need for these documents now; it is something that we all might face at any time.   The hand outs you are being given today are simply for you to review and use to learn more about these documents and consider them as you wish.      2. Vaccines: Certain vaccines are important after age 50, 60, and 65 and some health situations (for example COPD), require even boosters beyond age 65.  We are happy to review with you your vaccine status and vaccines that might be needed for you at this point:      a. Tetanus.   Like anyone this needs to given every 10 years; sooner for/with lacerations/wounds.   Likely when getting this booster it needs to be a tetanus called Tdap (tetanus mixed with diptheria and pertussis).   Years ago you had this vaccine.  We now know we can lose our immunity to pertussis (a part of this vaccine) and run a risk of catching this.  Now only would this make us ill; but more importantly we can spread this to very young children (and for them it can be a much more dangerous illness).   We call this the grandparent vaccine for this reason.     b. Pneumonia (strept).   This comes now with two brands.   It is recommended to take pneumovax first; and a year later take the cousin prevnar.  Even if you have had these before; we need to review when and " your current health situation/s as you may need boosters.     c. Shingles.  You do not want to catch shingles.  Though you will recover from this; the pain associated with shingles can be severe.  Even if you have had the now older zostavax, or have had shingles; it is recommended you still get the Shingrix (the new vaccine just available early 2018 shingles vaccine).  A new shingles vaccine (a shot to lower your chance of catching shingles) is now available (shingrix).  This vaccine is the second vaccine created for this purpose; (we have had zostavax for years).  Shingrix provides a much better and longer immunity for shingles than zostavax.  For this and other reasons Shingrix can be started at age 50.  If you have had zostavax in the past; you can still take Shingrix.      This vaccine is not paid for in a doctor's office by medicare, medicaid and probably most insurances.  Like zostavax; this is covered in drug stores.  This is a vaccine that if you chose to get you need to get at a drug store that gives vaccines (like [facility], [facility] and [facility].      d. Yearly flu vaccine given from September through April each year (there is a special vaccine for those over 65).     e. Travel vaccines:  If you are one to do international travel; be sure and ask us for any particular unusual vaccines you may need.     f.  Miscellaneous:  If you have certain health situations/disease you may need specific/particular vaccines not give to the general public.     3. Exercise: regular cardio exercise something everyone should consider and try to do; even if health limitations (ie find that exercise UE/LE/cardio that they can tolerate).   Normal weight a goal for everyone (as we discussed)    4. Healthy diet helpful for weight management, illness prevention.     5. If over 50-screening exams include men PSA/rectal exam, women mammograms, and everyone colonoscopy screening for colon cancer.    6. If you use  tobacco you should stop. We are providing you some information to consider that could make this process easier.      ##################################

## 2019-11-25 NOTE — PROGRESS NOTES
Subjective   Sedrick Leahy is a 64 y.o. female presenting with chief complaint of:   Chief Complaint   Patient presents with   • Annual Exam       History of Present Illness :  Alone.  Here for many issues.       Has multiple chronic problems to consider that might have a bearing on today's issues;  an interval appointment.       Chronic/acute problems reviewed today:   1. Encounter for screening mammogram for breast cancer: Chronic/ongoing yearly need to review for breast cancer.  No breast pain, masses, discharge.       2. Palpitations: holter benign: chronic/stable occ palpitation without syncope/near.    3. Essential hypertension: Chronic/stable. Stable here/if home blood pressures.  No significant chest pain, SOB, LE edema, orthopnea, near syncope, dizziness/light headness.      4. Hyperlipidemia, unspecified hyperlipidemia type: Chronic/stable: prefers no statinPrefers lifestyle over Rx  with diet-exercise advised and lab moitored.     5. Vitamin D deficiency: chronic/variable up/down with past labs and risk to run low especially in winter. Lab monitored.      6. Chronic neck pain: : chronic/variable 2-4/10 neck/UE pain with infrequent and denies significant radiation to UE/LE  No change any numbness.  Has some leak/stable with bladder/bowel control. No desire to change approach of care.      7. Chronic back pain, unspecified back location, unspecified back pain laterality: : chronic/variable 2-4/10 lower back pain with infrequent/same occ radiation to LE.  No change LE numbness.  Has some leak but reasonable bladder/bowel control. No desire to change approach of care.      8. Urinary incontinence, unspecified type: occ leak; wears pads.  No desire more testing/treatment change.    9. Anemia, unspecified type: Chronic or past history/stable: This has been present before.    There has been GI evaulation in the past. There is no current melena, hematochezia. It has been benign to date and stable/watching.   Contributing comorbidities to date: iron def and now + cologuard.     10. Elevated fasting glucose: Chronic/stable  No problem/pattern hypoglycemia/hyperglycemia manifest by poly- dypsia, phagia, uria, or sweats, diaphoretic episodes, syncope/near.     11. Anxiety: Chronic/stable:   On/off anxiety tolerated well.  Rx helps and not interested in Rx change. Stress ongoing with son/cancer.      12. Chronic narcotic use-Linberg: Long term/current use of opiate analgesic.  The patient has improved activities of daily living, increased function, and decreased pain with opioid pain medication.  There are currently no side effects with this medication.    Bravo OK with refills.     13. Depression, unspecified depression type: chronic/stable without currently significant  mood swings, down moods, nervousness, difficulty with concentration to function home/work.  Others close have not been concerned.  No suicide ideation/intent.  Rx helps       14. Wellness examination-done: Chronic ongoing over time screening or advice for general health care/wellness.      15. Positive colorectal cancer screening using DNA-based stool test: acute/recent.  Advised risk for polyp/cancer.    16. Menopause: 2005-offered: Chronic/stable.  Last bone density/if below.   Declined in past Rx.  Ok now with further bone density screening when due.        Has an/another acute issue today: none.    The following portions of the patient's history were reviewed and updated as appropriate: allergies, current medications, past family history, past medical history, past social history, past surgical history and problem list.      Current Outpatient Medications:   •  amitriptyline (ELAVIL) 100 MG tablet, Take 1-2 tablets by mouth At Night As Needed for Sleep., Disp: 180 tablet, Rfl: 1  •  metFORMIN ER (GLUCOPHAGE-XR) 500 MG 24 hr tablet, Take 1 tablet by mouth 2 (Two) Times a Day., Disp: 180 tablet, Rfl: 1  •  naproxen sodium (ALEVE) 220 MG tablet, Take 220  mg by mouth 2 (Two) Times a Day As Needed for Mild Pain ., Disp: , Rfl:   •  ondansetron ODT (ZOFRAN ODT) 4 MG disintegrating tablet, Place 1 tablet under the tongue Every 8 (Eight) Hours As Needed for Nausea or Vomiting., Disp: 10 tablet, Rfl: 0    No problems with medications.  Refills if needed done    Allergies   Allergen Reactions   • Penicillins Swelling and Rash       Review of Systems  GENERAL:  Active/slower with limits, speed, stamina for age and spinal pain. Sleep is ok. No fever now.  SKIN: No rash/skin lesion of concern.   ENDO:  No syncope, near or diaphoretic sweaty spells.  HEENT: No recent head injury; or change occ headache.   No vision change.  No significant hearing loss.  Ears without pain/drainage.  No sore throat.  No significant nasal/sinus congestion/drainage. No epistaxis.  CHEST: No chest wall tenderness or mass. No cough, without wheeze.  No SOB; no hemoptysis.  CV: No chest pain, palpitations, ankle edema.  GI: No heartburn, dysphagia.  No abdominal pain, diarrhea, constipation.  No rectal bleeding, or melena.    :  Voids without dysuria, same urinary incontinence but voids to completion.  ORTHO: No painful/swollen joints but various on /off sore.  Variable sore neck or back.  No acute neck or back pain without recent injury.  NEURO: No dizziness, weakness of extremities or significant UE/LE numbness/paresthesias.   PSYCH: No memory loss.  Mood variable; with son having dx renal cancer; more anxious, depressed but/and not suicidal.  Tries to tolerate stress .   Screening:  No gyne  Mammogram: 12.22.17-[facility]; not 100% sure  Bone density: 11- [facility]- LS -0.3 abd hip -0.3..no bad change but enough to encourage a biphosphate ie bonovia...or miacalcin... apt with cb to discuss tx. cb /pt notified is making an appt/kh-7/2019-advised  Low dose CT chest:Tobacco-smoker/age 14/2 ppd/dc 1982 (9y)  NA  GI: Colonoscopy//[facility]/6.6.16/5y  Prostate: NA  Usual lab order  6m BMP, A1c  12m CBC,  CMP, A1c, LIPID, TSH, Vit D     Lab Results:  Results for orders placed or performed in visit on 05/20/19   Vitamin D 25 Hydroxy   Result Value Ref Range    25 Hydroxy, Vitamin D 21.6 (L) 30.0 - 100.0 ng/ml   Vitamin B12   Result Value Ref Range    Vitamin B-12 338 211 - 946 pg/mL   Folate   Result Value Ref Range    Folate 9.15 4.78 - 24.20 ng/mL   Iron   Result Value Ref Range    Iron 78 37 - 145 mcg/dL   Hepatitis C antibody   Result Value Ref Range    Hep C Virus Ab <0.1 0.0 - 0.9 s/co ratio   Hemoglobin A1c   Result Value Ref Range    Hemoglobin A1C 5.70 (H) 4.80 - 5.60 %   Comprehensive metabolic panel   Result Value Ref Range    Glucose 92 65 - 99 mg/dL    BUN 12 8 - 23 mg/dL    Creatinine 0.88 0.57 - 1.00 mg/dL    eGFR Non African Am 65 >60 mL/min/1.73    eGFR African Am 78 >60 mL/min/1.73    BUN/Creatinine Ratio 13.6 7.0 - 25.0    Sodium 143 136 - 145 mmol/L    Potassium 4.4 3.5 - 5.2 mmol/L    Chloride 105 98 - 107 mmol/L    Total CO2 27.1 22.0 - 29.0 mmol/L    Calcium 9.8 8.6 - 10.5 mg/dL    Total Protein 6.5 6.0 - 8.5 g/dL    Albumin 4.20 3.50 - 5.20 g/dL    Globulin 2.3 gm/dL    A/G Ratio 1.8 g/dL    Total Bilirubin 0.3 0.2 - 1.2 mg/dL    Alkaline Phosphatase 104 39 - 117 U/L    AST (SGOT) 13 1 - 32 U/L    ALT (SGPT) 13 1 - 33 U/L   Lipid Panel With / Chol / HDL Ratio   Result Value Ref Range    Total Cholesterol 168 0 - 200 mg/dL    Triglycerides 99 0 - 150 mg/dL    HDL Cholesterol 57 40 - 60 mg/dL    VLDL Cholesterol 19.8 mg/dL    LDL Cholesterol  91 0 - 100 mg/dL    Chol/HDL Ratio 2.95    TSH   Result Value Ref Range    TSH 2.250 0.270 - 4.200 mIU/mL   MicroAlbumin, Urine, Random - Urine, Clean Catch   Result Value Ref Range    Microalbumin, Urine 6.9 Not Estab. ug/mL   CBC & Differential   Result Value Ref Range    WBC 6.80 3.40 - 10.80 10*3/mm3    RBC 4.33 3.77 - 5.28 10*6/mm3    Hemoglobin 12.7 12.0 - 15.9 g/dL    Hematocrit 42.4 34.0 - 46.6 %    MCV 97.9 (H) 79.0 - 97.0 fL    MCH 29.3 26.6 - 33.0  "pg    MCHC 30.0 (L) 31.5 - 35.7 g/dL    RDW 13.2 12.3 - 15.4 %    Platelets 323 140 - 450 10*3/mm3    Neutrophil Rel % 51.5 42.7 - 76.0 %    Lymphocyte Rel % 36.2 19.6 - 45.3 %    Monocyte Rel % 7.4 5.0 - 12.0 %    Eosinophil Rel % 4.1 0.3 - 6.2 %    Basophil Rel % 0.7 0.0 - 1.5 %    Neutrophils Absolute 3.50 1.70 - 7.00 10*3/mm3    Lymphocytes Absolute 2.46 0.70 - 3.10 10*3/mm3    Monocytes Absolute 0.50 0.10 - 0.90 10*3/mm3    Eosinophils Absolute 0.28 0.00 - 0.40 10*3/mm3    Basophils Absolute 0.05 0.00 - 0.20 10*3/mm3    Immature Granulocyte Rel % 0.1 0.0 - 0.5 %    Immature Grans Absolute 0.01 0.00 - 0.05 10*3/mm3    nRBC 0.0 0.0 - 0.2 /100 WBC       A1C:  Lab Results - Last 18 Months   Lab Units 05/28/19  0709 11/13/18  0724   HEMOGLOBIN A1C % 5.70* 5.80     PSA:No results for input(s): PSA in the last 54541 hours.  CBC:  Lab Results - Last 18 Months   Lab Units 05/28/19  0709 11/13/18  0724   WBC 10*3/mm3 6.80 7.63   HEMOGLOBIN g/dL 12.7 13.0   HEMATOCRIT % 42.4 42.5   PLATELETS 10*3/mm3 323 312   IRON mcg/dL 78 94      BMP/CMP:  Lab Results - Last 18 Months   Lab Units 05/28/19  0709 11/13/18  0724   SODIUM mmol/L 143 141   POTASSIUM mmol/L 4.4 4.2   CHLORIDE mmol/L 105 100   TOTAL CO2 mmol/L 27.1 31.0   GLUCOSE mg/dL 92 88   BUN mg/dL 12 14   CREATININE mg/dL 0.88 0.72   EGFR IF NONAFRICN AM mL/min/1.73 65 82   EGFR IF AFRICN AM mL/min/1.73 78 99   CALCIUM mg/dL 9.8 9.7     HEPATIC:  Lab Results - Last 18 Months   Lab Units 05/28/19  0709   ALT (SGPT) U/L 13   AST (SGOT) U/L 13   ALK PHOS U/L 104     THYROID:  Lab Results - Last 18 Months   Lab Units 05/28/19  0709 11/13/18  0724   TSH mIU/mL 2.250 2.160       Objective   /74   Pulse 100   Temp 98.8 °F (37.1 °C)   Resp 16   Ht 165.1 cm (65\")   Wt 82.1 kg (181 lb)   SpO2 98%   Breastfeeding? No   BMI 30.12 kg/m²   Body mass index is 30.12 kg/m².    Physical Exam  GENERAL:  Well nourished/developed in no acute distress.   SKIN: Turgor excellent, " without wound, rash, lesion.  HEENT: Normal cephalic without trauma.  Pupils equal round reactive to light. Extraocular motions full without nystagmus.   External canals nonobstructive nontender without reddness. Tymphatic membranes brown with tyler structures intact.   Oral cavity without growths, exudates, and moist.  Posterior pharynx without mass, obstruction, redness.  No thyromegaly, mass, tenderness, lymphadenopathy and supple.  CV: Regular rhythm.  No murmur, gallop,  edema. Posterior pulses intact.  No carotid bruits.  CHEST: No chest wall tenderness or mass.   LUNGS: Symmetric motion with clear to auscultation.   ABD: Soft, nontender without mass.   ORTHO: Symmetric extremities without swelling/point tenderness.  Full gross range of motion.  NEURO: CN 2-12 grossly intact.  Symmetric facies and UE/LE. 2-3/5 strength throughout. 1/4 x bicep knee equal reflexes.  Nonfocal use extremities. Speech clear. Intact light touch with monofilament, vibratory sensation with tuning fork; equal toes/distal feet.    PSYCH: Oriented x 3.  Pleasant calm, well kept.  Purposeful/directed conservation with intact short/long gross memory.     Assessment/Plan     1. Encounter for screening mammogram for breast cancer    2. Palpitations: holter benign    3. Essential hypertension    4. Hyperlipidemia, unspecified hyperlipidemia type    5. Vitamin D deficiency    6. Chronic neck pain    7. Chronic back pain, unspecified back location, unspecified back pain laterality    8. Urinary incontinence, unspecified type    9. Anemia, unspecified type    10. Elevated fasting glucose    11. Anxiety    12. Chronic narcotic use-Nazario    13. Depression, unspecified depression type    14. Wellness examination-done    15. Positive colorectal cancer screening using DNA-based stool test    16. Menopause: 2005-offered        Rx: reviewed/changes:  No orders of the defined types were placed in this encounter.      LAB/Testing/Referrals:  "reviewed/orders:   Today:   Orders Placed This Encounter   Procedures   • DEXA Bone Density Axial   • Mammo Screening Digital Tomosynthesis Bilateral With CAD   • Vitamin D 25 Hydroxy   • Ambulatory Referral to Gastroenterology     Chronic/recurrent labs above or change to:   same     Discussions:   Needs colonoscopy-what + cologuard means  Needs mammogram  Needs bone density  Medicare wellness  Body mass index is 30.12 kg/m².  Patient's Body mass index is 30.12 kg/m². BMI is within normal parameters. No follow-up required..      Tobacco use reviewed:    Non-smoker    Patient Instructions   Medicare offers certain visits called \"wellness\" that allows for time to deal with and  review the many aspects of \"being well\" that just might not get mentioned during other visits with your doctor through the year.  This includes things like reviews of health screenings (mammograms, various labs),  weight, exercise, vaccines for just a few examples.      In order to help you with this we wish to make you aware of a few things for you to consider:    1. Advanced directives.  These are documents used to help direct your care if your health/situation should reach a point that you cannot make your own decisions.  While it is likely you do not currently have a need for these documents now; it is something that we all might face at any time.   The hand outs you are being given today are simply for you to review and use to learn more about these documents and consider them as you wish.      2. Vaccines: Certain vaccines are important after age 50, 60, and 65 and some health situations (for example COPD), require even boosters beyond age 65.  We are happy to review with you your vaccine status and vaccines that might be needed for you at this point:      a. Tetanus.   Like anyone this needs to given every 10 years; sooner for/with lacerations/wounds.   Likely when getting this booster it needs to be a tetanus called Tdap (tetanus mixed " with diptheria and pertussis).   Years ago you had this vaccine.  We now know we can lose our immunity to pertussis (a part of this vaccine) and run a risk of catching this.  Now only would this make us ill; but more importantly we can spread this to very young children (and for them it can be a much more dangerous illness).   We call this the grandparent vaccine for this reason.     b. Pneumonia (strept).   This comes now with two brands.   It is recommended to take pneumovax first; and a year later take the cousin prevnar.  Even if you have had these before; we need to review when and your current health situation/s as you may need boosters.     c. Shingles.  You do not want to catch shingles.  Though you will recover from this; the pain associated with shingles can be severe.  Even if you have had the now older zostavax, or have had shingles; it is recommended you still get the Shingrix (the new vaccine just available early 2018 shingles vaccine).  A new shingles vaccine (a shot to lower your chance of catching shingles) is now available (shingrix).  This vaccine is the second vaccine created for this purpose; (we have had zostavax for years).  Shingrix provides a much better and longer immunity for shingles than zostavax.  For this and other reasons Shingrix can be started at age 50.  If you have had zostavax in the past; you can still take Shingrix.      This vaccine is not paid for in a doctor's office by medicare, medicaid and probably most insurances.  Like zostavax; this is covered in drug stores.  This is a vaccine that if you chose to get you need to get at a drug store that gives vaccines (like [facility], [facility] and [facility].      d. Yearly flu vaccine given from September through April each year (there is a special vaccine for those over 65).     e. Travel vaccines:  If you are one to do international travel; be sure and ask us for any particular unusual vaccines you may need.      f.  Miscellaneous:  If you have certain health situations/disease you may need specific/particular vaccines not give to the general public.     3. Exercise: regular cardio exercise something everyone should consider and try to do; even if health limitations (ie find that exercise UE/LE/cardio that they can tolerate).   Normal weight a goal for everyone (as we discussed)    4. Healthy diet helpful for weight management, illness prevention.     5. If over 50-screening exams include men PSA/rectal exam, women mammograms, and everyone colonoscopy screening for colon cancer.    6. If you use tobacco you should stop. We are providing you some information to consider that could make this process easier.      ##################################                Follow up: Return for lab today/, THEN, lab;, Dr Madden-, 6 m;.  Future Appointments   Date Time Provider Department Center   5/20/2020  9:15 AM Kiran Madden MD [facility] None

## 2019-11-26 DIAGNOSIS — E55.9 VITAMIN D DEFICIENCY: Primary | ICD-10-CM

## 2019-11-26 LAB
25(OH)D3+25(OH)D2 SERPL-MCNC: 18.8 NG/ML (ref 30–100)
ALBUMIN SERPL-MCNC: 4.2 G/DL (ref 3.5–5.2)
ALBUMIN/GLOB SERPL: 1.7 G/DL
ALP SERPL-CCNC: 86 U/L (ref 39–117)
ALT SERPL-CCNC: 8 U/L (ref 1–33)
AST SERPL-CCNC: 15 U/L (ref 1–32)
BASOPHILS # BLD AUTO: 0.05 10*3/MM3 (ref 0–0.2)
BASOPHILS NFR BLD AUTO: 0.8 % (ref 0–1.5)
BILIRUB SERPL-MCNC: 0.3 MG/DL (ref 0.2–1.2)
BUN SERPL-MCNC: 14 MG/DL (ref 8–23)
BUN/CREAT SERPL: 16.9 (ref 7–25)
CALCIUM SERPL-MCNC: 9.5 MG/DL (ref 8.6–10.5)
CHLORIDE SERPL-SCNC: 102 MMOL/L (ref 98–107)
CO2 SERPL-SCNC: 25.6 MMOL/L (ref 22–29)
CREAT SERPL-MCNC: 0.83 MG/DL (ref 0.57–1)
EOSINOPHIL # BLD AUTO: 0.12 10*3/MM3 (ref 0–0.4)
EOSINOPHIL NFR BLD AUTO: 1.9 % (ref 0.3–6.2)
ERYTHROCYTE [DISTWIDTH] IN BLOOD BY AUTOMATED COUNT: 12.3 % (ref 12.3–15.4)
GLOBULIN SER CALC-MCNC: 2.5 GM/DL
GLUCOSE SERPL-MCNC: 81 MG/DL (ref 65–99)
HBA1C MFR BLD: 5.7 % (ref 4.8–5.6)
HCT VFR BLD AUTO: 38.6 % (ref 34–46.6)
HGB BLD-MCNC: 13.1 G/DL (ref 12–15.9)
IMM GRANULOCYTES # BLD AUTO: 0.01 10*3/MM3 (ref 0–0.05)
IMM GRANULOCYTES NFR BLD AUTO: 0.2 % (ref 0–0.5)
IRON SERPL-MCNC: 130 MCG/DL (ref 37–145)
LYMPHOCYTES # BLD AUTO: 2.7 10*3/MM3 (ref 0.7–3.1)
LYMPHOCYTES NFR BLD AUTO: 41.9 % (ref 19.6–45.3)
MCH RBC QN AUTO: 31 PG (ref 26.6–33)
MCHC RBC AUTO-ENTMCNC: 33.9 G/DL (ref 31.5–35.7)
MCV RBC AUTO: 91.5 FL (ref 79–97)
MONOCYTES # BLD AUTO: 0.4 10*3/MM3 (ref 0.1–0.9)
MONOCYTES NFR BLD AUTO: 6.2 % (ref 5–12)
NEUTROPHILS # BLD AUTO: 3.16 10*3/MM3 (ref 1.7–7)
NEUTROPHILS NFR BLD AUTO: 49 % (ref 42.7–76)
NRBC BLD AUTO-RTO: 0 /100 WBC (ref 0–0.2)
PLATELET # BLD AUTO: 269 10*3/MM3 (ref 140–450)
POTASSIUM SERPL-SCNC: 4.4 MMOL/L (ref 3.5–5.2)
PROT SERPL-MCNC: 6.7 G/DL (ref 6–8.5)
RBC # BLD AUTO: 4.22 10*6/MM3 (ref 3.77–5.28)
SODIUM SERPL-SCNC: 143 MMOL/L (ref 136–145)
WBC # BLD AUTO: 6.44 10*3/MM3 (ref 3.4–10.8)

## 2019-11-26 RX ORDER — ERGOCALCIFEROL 1.25 MG/1
50000 CAPSULE ORAL WEEKLY
Qty: 8 CAPSULE | Refills: 0 | Status: SHIPPED | OUTPATIENT
Start: 2019-11-26 | End: 2020-11-12

## 2019-12-04 ENCOUNTER — OFFICE VISIT (OUTPATIENT)
Dept: GASTROENTEROLOGY | Facility: CLINIC | Age: 64
End: 2019-12-04

## 2019-12-04 VITALS
HEART RATE: 69 BPM | OXYGEN SATURATION: 98 % | WEIGHT: 180 LBS | SYSTOLIC BLOOD PRESSURE: 132 MMHG | BODY MASS INDEX: 28.93 KG/M2 | HEIGHT: 66 IN | DIASTOLIC BLOOD PRESSURE: 78 MMHG

## 2019-12-04 DIAGNOSIS — R10.13 EPIGASTRIC PAIN: ICD-10-CM

## 2019-12-04 DIAGNOSIS — R19.5 POSITIVE COLORECTAL CANCER SCREENING USING COLOGUARD TEST: ICD-10-CM

## 2019-12-04 DIAGNOSIS — E11.9 TYPE 2 DIABETES MELLITUS WITHOUT COMPLICATION, WITHOUT LONG-TERM CURRENT USE OF INSULIN (HCC): ICD-10-CM

## 2019-12-04 DIAGNOSIS — Z80.0 FAMILY HISTORY OF COLON CANCER IN MOTHER: ICD-10-CM

## 2019-12-04 DIAGNOSIS — R19.5 DARK STOOLS: ICD-10-CM

## 2019-12-04 DIAGNOSIS — Z98.890 HISTORY OF NISSEN FUNDOPLICATION: ICD-10-CM

## 2019-12-04 DIAGNOSIS — K63.5 HYPERPLASTIC COLONIC POLYP, UNSPECIFIED PART OF COLON: Primary | ICD-10-CM

## 2019-12-04 PROCEDURE — 99214 OFFICE O/P EST MOD 30 MIN: CPT | Performed by: NURSE PRACTITIONER

## 2019-12-04 RX ORDER — SODIUM, POTASSIUM,MAG SULFATES 17.5-3.13G
SOLUTION, RECONSTITUTED, ORAL ORAL
Qty: 2 BOTTLE | Refills: 0 | Status: ON HOLD | OUTPATIENT
Start: 2019-12-04 | End: 2020-01-14

## 2019-12-04 NOTE — PROGRESS NOTES
Sedrick Leahy  1955    12/4/2019  Chief Complaint   Patient presents with   • GI Problem     Positive Cologuard     Subjective   HPI  Sedrick Leahy is a 64 y.o. female who presents with a recent lab study to include Cologuard positive per Dr Madden.  She has chronic constipation and she uses Miralax daily.  No bright red blood in her stools.  No diarrhea.  She does state that 2 weeks ago she had several days of dark stools and some upper abdominal discomfort, however, that only last 24 hours.  That has resolved completely.  Her last colonoscopy was ok in June 2016.  Endoscopy 2014 with #50 Parag passed, but pt denies any dysphagia at this time.  Labs show H&H 13.1/38.6 on November 2019.  Past Medical History:   Diagnosis Date   • Diabetes mellitus (CMS/HCC)    • Hx of colonic polyp    • Hypertension      Past Surgical History:   Procedure Laterality Date   • APPENDECTOMY     • COLONOSCOPY  06/06/2016    Normal exam repeat in 5 years   • COLONOSCOPY W/ POLYPECTOMY  04/04/2012    Hyperplastic polyp cecum repeat exam in 1 year   • ENDOSCOPY  08/14/2014    Very tortuous distal esophagus dilated 50 Fr, HH        Outpatient Medications Marked as Taking for the 12/4/19 encounter (Office Visit) with Tracey Schilling APRN   Medication Sig Dispense Refill   • amitriptyline (ELAVIL) 100 MG tablet Take 1-2 tablets by mouth At Night As Needed for Sleep. 180 tablet 1   • metFORMIN ER (GLUCOPHAGE-XR) 500 MG 24 hr tablet Take 1 tablet by mouth 2 (Two) Times a Day. 180 tablet 1   • naproxen sodium (ALEVE) 220 MG tablet Take 220 mg by mouth 2 (Two) Times a Day As Needed for Mild Pain .     • ondansetron ODT (ZOFRAN ODT) 4 MG disintegrating tablet Place 1 tablet under the tongue Every 8 (Eight) Hours As Needed for Nausea or Vomiting. 10 tablet 0   • vitamin D (ERGOCALCIFEROL) 1.25 MG (14343 UT) capsule capsule Take 1 capsule by mouth 1 (One) Time Per Week. 8 capsule 0     Allergies   Allergen Reactions   • Penicillins  Swelling and Rash     Social History     Socioeconomic History   • Marital status:      Spouse name: Jason   • Number of children: 2   • Years of education: 12   • Highest education level: Not on file   Occupational History   • Occupation: retired     Comment: [employer]   Tobacco Use   • Smoking status: Former Smoker     Packs/day: 2.00     Types: Cigarettes     Start date: 1955     Last attempt to quit: 7/10/1990     Years since quittin.4   • Smokeless tobacco: Never Used   Substance and Sexual Activity   • Alcohol use: No   • Drug use: No   • Sexual activity: Yes     Partners: Male     Birth control/protection: None     Family History   Problem Relation Age of Onset   • Colon cancer Mother    • Colon polyps Mother    • No Known Problems Father    • Diabetes Brother    • Diabetes Son    • Breast cancer Maternal Aunt    • Heart attack Paternal Grandmother    • Lymphoma Maternal Aunt    • Lung cancer Maternal Aunt    • Cancer Maternal Aunt    • Diabetes Maternal Aunt    • Diabetes Son    • Diabetes Brother      Health Maintenance   Topic Date Due   • TDAP/TD VACCINES (1 - Tdap) 06/15/1973   • ZOSTER VACCINE (1 of 2) 06/15/2004   • PAP SMEAR  10/06/2016   • DIABETIC EYE EXAM  10/06/2016   • MAMMOGRAM  2019   • HEMOGLOBIN A1C  2020   • LIPID PANEL  2020   • URINE MICROALBUMIN  2020   • PNEUMOCOCCAL VACCINES (65+ LOW/MEDIUM RISK) (2 of 2 - PPSV23) 10/22/2020   • MEDICARE ANNUAL WELLNESS  2020   • DIABETIC FOOT EXAM  2020   • COLONOSCOPY  2026   • HEPATITIS C SCREENING  Completed   • INFLUENZA VACCINE  Completed     Review of Systems   Constitutional: Negative for activity change, appetite change, chills, diaphoresis, fatigue, fever and unexpected weight change.   HENT: Negative for ear pain, hearing loss, mouth sores, sore throat, trouble swallowing and voice change.    Eyes: Negative.    Respiratory: Negative for cough, choking, shortness of breath and  "wheezing.    Cardiovascular: Negative for chest pain and palpitations.   Gastrointestinal: Negative for abdominal pain, blood in stool, constipation, diarrhea, nausea and vomiting.   Endocrine: Negative for cold intolerance and heat intolerance.   Genitourinary: Negative for decreased urine volume, dysuria, frequency, hematuria and urgency.   Musculoskeletal: Negative for back pain, gait problem and myalgias.   Skin: Negative for color change, pallor and rash.   Allergic/Immunologic: Negative for food allergies and immunocompromised state.   Neurological: Negative for dizziness, tremors, seizures, syncope, weakness, light-headedness, numbness and headaches.   Hematological: Negative for adenopathy. Does not bruise/bleed easily.   Psychiatric/Behavioral: Negative for agitation and confusion. The patient is not nervous/anxious.    All other systems reviewed and are negative.    Objective   Vitals:    12/04/19 1237   BP: 132/78   Pulse: 69   SpO2: 98%   Weight: 81.6 kg (180 lb)   Height: 167.6 cm (66\")     Body mass index is 29.05 kg/m².  Physical Exam   Constitutional: She is oriented to person, place, and time. She appears well-developed and well-nourished.   HENT:   Head: Normocephalic and atraumatic.   Eyes: Pupils are equal, round, and reactive to light.   Neck: Normal range of motion. Neck supple. No tracheal deviation present.   Cardiovascular: Normal rate, regular rhythm and normal heart sounds. Exam reveals no gallop and no friction rub.   No murmur heard.  Pulmonary/Chest: Effort normal and breath sounds normal. No respiratory distress. She has no wheezes. She has no rales. She exhibits no tenderness.   Abdominal: Soft. Bowel sounds are normal. She exhibits no distension. There is no hepatosplenomegaly. There is no tenderness. There is no rigidity, no rebound and no guarding.   Musculoskeletal: Normal range of motion. She exhibits no edema, tenderness or deformity.   Neurological: She is alert and oriented to " person, place, and time. She has normal reflexes.   Skin: Skin is warm and dry. No rash noted. No pallor.   Psychiatric: She has a normal mood and affect. Her behavior is normal. Judgment and thought content normal.     Assessment/Plan   Sedrick was seen today for gi problem.    Diagnoses and all orders for this visit:    Hyperplastic colonic polyp, unspecified part of colon  Comments:  last colonoscopy June 2016 unremarkable  Orders:  -     Case Request; Standing  -     Follow Anesthesia Guidelines / Standing Orders; Future  -     Obtain Informed Consent; Future  -     Implement Anesthesia Orders Day of Procedure; Standing  -     Obtain Informed Consent; Standing  -     Verify bowel prep was successful; Standing  -     Case Request  -     sodium-potassium-magnesium sulfates (SUPREP BOWEL PREP KIT) 17.5-3.13-1.6 GM/177ML solution oral solution; Take as directed per office    Positive colorectal cancer screening using Cologuard test  -     Case Request; Standing  -     Follow Anesthesia Guidelines / Standing Orders; Future  -     Obtain Informed Consent; Future  -     Implement Anesthesia Orders Day of Procedure; Standing  -     Obtain Informed Consent; Standing  -     Verify bowel prep was successful; Standing  -     Case Request  -     sodium-potassium-magnesium sulfates (SUPREP BOWEL PREP KIT) 17.5-3.13-1.6 GM/177ML solution oral solution; Take as directed per office    Family history of colon cancer in mother  -     Case Request; Standing  -     Follow Anesthesia Guidelines / Standing Orders; Future  -     Obtain Informed Consent; Future  -     Implement Anesthesia Orders Day of Procedure; Standing  -     Obtain Informed Consent; Standing  -     Verify bowel prep was successful; Standing  -     Case Request  -     sodium-potassium-magnesium sulfates (SUPREP BOWEL PREP KIT) 17.5-3.13-1.6 GM/177ML solution oral solution; Take as directed per office    Epigastric pain  Comments:  2 weeks ago, resolved at this time:  self limiting in nature    Dark stools  Comments:  RESOLVED, this was 2 weeks ago  But will check stools for OB to follow up  Orders:  -     Occult Blood X 3, Stool - Stool, Per Rectum    Type 2 diabetes mellitus without complication, without long-term current use of insulin (CMS/[facility])    History of Nissen fundoplication      COLONOSCOPY WITH ANESTHESIA (N/A)  EMR Dragon/transcription disclaimer: Much of this encounter note is electronic transcription/translation of spoken language to printed text. The electronic translation of spoken language may be erroneous, or at times, nonsensical words or phrases may be inadvertently transcribed. Although I have reviewed the note for such errors, some may still exist.  Body mass index is 29.05 kg/m².  No Follow-up on file.    Patient's Body mass index is 29.05 kg/m². BMI is within normal parameters. No follow-up required..      All risks, benefits, alternatives, and indications of colonoscopy and/or Endoscopy procedure have been discussed with the patient. Risks to include perforation of the colon requiring possible surgery or colostomy, risk of bleeding from biopsies or removal of colon tissue, possibility of missing a colon polyp or cancer, or adverse drug reaction.  Benefits to include the diagnosis and management of disease of the colon and rectum. Alternatives to include barium enema, radiographic evaluation, lab testing or no intervention. Pt verbalizes understanding and agrees.     Tracey Schilling, APRN  12/4/2019  2:26 PM      Obesity, Adult  Obesity is the condition of having too much total body fat. Being overweight or obese means that your weight is greater than what is considered healthy for your body size. Obesity is determined by a measurement called BMI. BMI is an estimate of body fat and is calculated from height and weight. For adults, a BMI of 30 or higher is considered obese.  Obesity can eventually lead to other health concerns and major illnesses,  including:  · Stroke.  · Coronary artery disease (CAD).  · Type 2 diabetes.  · Some types of cancer, including cancers of the colon, breast, uterus, and gallbladder.  · Osteoarthritis.  · High blood pressure (hypertension).  · High cholesterol.  · Sleep apnea.  · Gallbladder stones.  · Infertility problems.  What are the causes?  The main cause of obesity is taking in (consuming) more calories than your body uses for energy. Other factors that contribute to this condition may include:  · Being born with genes that make you more likely to become obese.  · Having a medical condition that causes obesity. These conditions include:  ¨ Hypothyroidism.  ¨ Polycystic ovarian syndrome (PCOS).  ¨ Binge-eating disorder.  ¨ Cushing syndrome.  · Taking certain medicines, such as steroids, antidepressants, and seizure medicines.  · Not being physically active (sedentary lifestyle).  · Living where there are limited places to exercise safely or buy healthy foods.  · Not getting enough sleep.  What increases the risk?  The following factors may increase your risk of this condition:  · Having a family history of obesity.  · Being a woman of -American descent.  · Being a man of  descent.  What are the signs or symptoms?  Having excessive body fat is the main symptom of this condition.  How is this diagnosed?  This condition may be diagnosed based on:  · Your symptoms.  · Your medical history.  · A physical exam. Your health care provider may measure:  ¨ Your BMI. If you are an adult with a BMI between 25 and less than 30, you are considered overweight. If you are an adult with a BMI of 30 or higher, you are considered obese.  ¨ The distances around your hips and your waist (circumferences). These may be compared to each other to help diagnose your condition.  ¨ Your skinfold thickness. Your health care provider may gently pinch a fold of your skin and measure it.  How is this treated?  Treatment for this condition  often includes changing your lifestyle. Treatment may include some or all of the following:  · Dietary changes. Work with your health care provider and a dietitian to set a weight-loss goal that is healthy and reasonable for you. Dietary changes may include eating:  ¨ Smaller portions. A portion size is the amount of a particular food that is healthy for you to eat at one time. This varies from person to person.  ¨ Low-calorie or low-fat options.  ¨ More whole grains, fruits, and vegetables.  · Regular physical activity. This may include aerobic activity (cardio) and strength training.  · Medicine to help you lose weight. Your health care provider may prescribe medicine if you are unable to lose 1 pound a week after 6 weeks of eating more healthily and doing more physical activity.  · Surgery. Surgical options may include gastric banding and gastric bypass. Surgery may be done if:  ¨ Other treatments have not helped to improve your condition.  ¨ You have a BMI of 40 or higher.  ¨ You have life-threatening health problems related to obesity.  Follow these instructions at home:     Eating and drinking     · Follow recommendations from your health care provider about what you eat and drink. Your health care provider may advise you to:  ¨ Limit fast foods, sweets, and processed snack foods.  ¨ Choose low-fat options, such as low-fat milk instead of whole milk.  ¨ Eat 5 or more servings of fruits or vegetables every day.  ¨ Eat at home more often. This gives you more control over what you eat.  ¨ Choose healthy foods when you eat out.  ¨ Learn what a healthy portion size is.  ¨ Keep low-fat snacks on hand.  ¨ Avoid sugary drinks, such as soda, fruit juice, iced tea sweetened with sugar, and flavored milk.  ¨ Eat a healthy breakfast.  · Drink enough water to keep your urine clear or pale yellow.  · Do not go without eating for long periods of time (do not fast) or follow a fad diet. Fasting and fad diets can be unhealthy  and even dangerous.  Physical Activity   · Exercise regularly, as told by your health care provider. Ask your health care provider what types of exercise are safe for you and how often you should exercise.  · Warm up and stretch before being active.  · Cool down and stretch after being active.  · Rest between periods of activity.  Lifestyle   · Limit the time that you spend in front of your TV, computer, or video game system.  · Find ways to reward yourself that do not involve food.  · Limit alcohol intake to no more than 1 drink a day for nonpregnant women and 2 drinks a day for men. One drink equals 12 oz of beer, 5 oz of wine, or 1½ oz of hard liquor.  General instructions   · Keep a weight loss journal to keep track of the food you eat and how much you exercise you get.  · Take over-the-counter and prescription medicines only as told by your health care provider.  · Take vitamins and supplements only as told by your health care provider.  · Consider joining a support group. Your health care provider may be able to recommend a support group.  · Keep all follow-up visits as told by your health care provider. This is important.  Contact a health care provider if:  · You are unable to meet your weight loss goal after 6 weeks of dietary and lifestyle changes.  This information is not intended to replace advice given to you by your health care provider. Make sure you discuss any questions you have with your health care provider.  Document Released: 01/25/2006 Document Revised: 05/22/2017 Document Reviewed: 10/05/2016  [employer] Interactive Patient Education © 2017 [employer]      If you smoke or use tobacco, 4 minutes reading provided  Steps to Quit Smoking  Smoking tobacco can be harmful to your health and can affect almost every organ in your body. Smoking puts you, and those around you, at risk for developing many serious chronic diseases. Quitting smoking is difficult, but it is one of the best things that you  can do for your health. It is never too late to quit.  What are the benefits of quitting smoking?  When you quit smoking, you lower your risk of developing serious diseases and conditions, such as:  · Lung cancer or lung disease, such as COPD.  · Heart disease.  · Stroke.  · Heart attack.  · Infertility.  · Osteoporosis and bone fractures.  Additionally, symptoms such as coughing, wheezing, and shortness of breath may get better when you quit. You may also find that you get sick less often because your body is stronger at fighting off colds and infections. If you are pregnant, quitting smoking can help to reduce your chances of having a baby of low birth weight.  How do I get ready to quit?  When you decide to quit smoking, create a plan to make sure that you are successful. Before you quit:  · Pick a date to quit. Set a date within the next two weeks to give you time to prepare.  · Write down the reasons why you are quitting. Keep this list in places where you will see it often, such as on your bathroom mirror or in your car or wallet.  · Identify the people, places, things, and activities that make you want to smoke (triggers) and avoid them. Make sure to take these actions:  ¨ Throw away all cigarettes at home, at work, and in your car.  ¨ Throw away smoking accessories, such as ashtrays and lighters.  ¨ Clean your car and make sure to empty the ashtray.  ¨ Clean your home, including curtains and carpets.  · Tell your family, friends, and coworkers that you are quitting. Support from your loved ones can make quitting easier.  · Talk with your health care provider about your options for quitting smoking.  · Find out what treatment options are covered by your health insurance.  What strategies can I use to quit smoking?  Talk with your healthcare provider about different strategies to quit smoking. Some strategies include:  · Quitting smoking altogether instead of gradually lessening how much you smoke over a  period of time. Research shows that quitting “cold turkey” is more successful than gradually quitting.  · Attending in-person counseling to help you build problem-solving skills. You are more likely to have success in quitting if you attend several counseling sessions. Even short sessions of 10 minutes can be effective.  · Finding resources and support systems that can help you to quit smoking and remain smoke-free after you quit. These resources are most helpful when you use them often. They can include:  ¨ Online chats with a counselor.  ¨ Telephone quitlines.  ¨ Printed self-help materials.  ¨ Support groups or group counseling.  ¨ Text messaging programs.  ¨ Mobile phone applications.  · Taking medicines to help you quit smoking. (If you are pregnant or breastfeeding, talk with your health care provider first.) Some medicines contain nicotine and some do not. Both types of medicines help with cravings, but the medicines that include nicotine help to relieve withdrawal symptoms. Your health care provider may recommend:  ¨ Nicotine patches, gum, or lozenges.  ¨ Nicotine inhalers or sprays.  ¨ Non-nicotine medicine that is taken by mouth.  Talk with your health care provider about combining strategies, such as taking medicines while you are also receiving in-person counseling. Using these two strategies together makes you more likely to succeed in quitting than if you used either strategy on its own.  If you are pregnant or breastfeeding, talk with your health care provider about finding counseling or other support strategies to quit smoking. Do not take medicine to help you quit smoking unless told to do so by your health care provider.  What things can I do to make it easier to quit?  Quitting smoking might feel overwhelming at first, but there is a lot that you can do to make it easier. Take these important actions:  · Reach out to your family and friends and ask that they support and encourage you during this  time. Call telephone quitlines, reach out to support groups, or work with a counselor for support.  · Ask people who smoke to avoid smoking around you.  · Avoid places that trigger you to smoke, such as bars, parties, or smoke-break areas at work.  · Spend time around people who do not smoke.  · Lessen stress in your life, because stress can be a smoking trigger for some people. To lessen stress, try:  ¨ Exercising regularly.  ¨ Deep-breathing exercises.  ¨ Yoga.  ¨ Meditating.  ¨ Performing a body scan. This involves closing your eyes, scanning your body from head to toe, and noticing which parts of your body are particularly tense. Purposefully relax the muscles in those areas.  · Download or purchase mobile phone or tablet apps (applications) that can help you stick to your quit plan by providing reminders, tips, and encouragement. There are many free apps, such as QuitGuide from the CDC (Centers for Disease Control and Prevention). You can find other support for quitting smoking (smoking cessation) through smokefree.gov and other websites.  How will I feel when I quit smoking?  Within the first 24 hours of quitting smoking, you may start to feel some withdrawal symptoms. These symptoms are usually most noticeable 2-3 days after quitting, but they usually do not last beyond 2-3 weeks. Changes or symptoms that you might experience include:  · Mood swings.  · Restlessness, anxiety, or irritation.  · Difficulty concentrating.  · Dizziness.  · Strong cravings for sugary foods in addition to nicotine.  · Mild weight gain.  · Constipation.  · Nausea.  · Coughing or a sore throat.  · Changes in how your medicines work in your body.  · A depressed mood.  · Difficulty sleeping (insomnia).  After the first 2-3 weeks of quitting, you may start to notice more positive results, such as:  · Improved sense of smell and taste.  · Decreased coughing and sore throat.  · Slower heart rate.  · Lower blood pressure.  · Clearer  skin.  · The ability to breathe more easily.  · Fewer sick days.  Quitting smoking is very challenging for most people. Do not get discouraged if you are not successful the first time. Some people need to make many attempts to quit before they achieve long-term success. Do your best to stick to your quit plan, and talk with your health care provider if you have any questions or concerns.  This information is not intended to replace advice given to you by your health care provider. Make sure you discuss any questions you have with your health care provider.  Document Released: 12/12/2002 Document Revised: 08/15/2017 Document Reviewed: 05/03/2016  [employer] Interactive Patient Education © 2017 [employer]

## 2020-01-09 ENCOUNTER — TELEPHONE (OUTPATIENT)
Dept: GASTROENTEROLOGY | Facility: CLINIC | Age: 65
End: 2020-01-09

## 2020-01-09 NOTE — TELEPHONE ENCOUNTER
In regards to note left on my desk by Giselle, pt was calling to see if she could get the Occult Blood x3 Stool study over in [place]?  I told her that would be fine.    She then asked to see if there were any earlier cx's for her colonoscopy that is scheduled 2/6/2020?  She said she was just nervous due to a positive cologuard result. I explained that I would have to transfer her to [place], Dr. Howell's , to see if there had been any cx's for a sooner appt.    Pt VU & will get lab test done soon and have results faxed.  I transferred her to [place].    Rae Solo MA

## 2020-01-10 DIAGNOSIS — Z12.31 ENCOUNTER FOR SCREENING MAMMOGRAM FOR BREAST CANCER: ICD-10-CM

## 2020-01-14 ENCOUNTER — HOSPITAL ENCOUNTER (OUTPATIENT)
Facility: HOSPITAL | Age: 65
Setting detail: HOSPITAL OUTPATIENT SURGERY
Discharge: HOME | End: 2020-01-14
Attending: INTERNAL MEDICINE | Admitting: INTERNAL MEDICINE

## 2020-01-14 ENCOUNTER — ANESTHESIA EVENT (OUTPATIENT)
Dept: GASTROENTEROLOGY | Facility: HOSPITAL | Age: 65
End: 2020-01-14

## 2020-01-14 ENCOUNTER — ANESTHESIA (OUTPATIENT)
Dept: GASTROENTEROLOGY | Facility: HOSPITAL | Age: 65
End: 2020-01-14

## 2020-01-14 VITALS
SYSTOLIC BLOOD PRESSURE: 106 MMHG | TEMPERATURE: 97.2 F | HEIGHT: 64 IN | HEART RATE: 74 BPM | RESPIRATION RATE: 19 BRPM | DIASTOLIC BLOOD PRESSURE: 57 MMHG | WEIGHT: 180 LBS | OXYGEN SATURATION: 100 % | BODY MASS INDEX: 30.73 KG/M2

## 2020-01-14 DIAGNOSIS — Z80.0 FAMILY HISTORY OF COLON CANCER IN MOTHER: ICD-10-CM

## 2020-01-14 DIAGNOSIS — K63.5 HYPERPLASTIC COLONIC POLYP, UNSPECIFIED PART OF COLON: ICD-10-CM

## 2020-01-14 DIAGNOSIS — R19.5 POSITIVE COLORECTAL CANCER SCREENING USING COLOGUARD TEST: ICD-10-CM

## 2020-01-14 LAB
COLLECT DATE SP2 STL: NORMAL
COLLECT DATE SP3 STL: NORMAL
COLLECT DATE STL: NORMAL
GLUCOSE BLDC GLUCOMTR-MCNC: 75 MG/DL (ref 70–130)
HEMOCCULT STL QL: NEGATIVE
Lab: 800

## 2020-01-14 PROCEDURE — 45378 DIAGNOSTIC COLONOSCOPY: CPT | Performed by: INTERNAL MEDICINE

## 2020-01-14 PROCEDURE — 82272 OCCULT BLD FECES 1-3 TESTS: CPT | Performed by: NURSE PRACTITIONER

## 2020-01-14 PROCEDURE — 25010000002 PROPOFOL 10 MG/ML EMULSION: Performed by: NURSE ANESTHETIST, CERTIFIED REGISTERED

## 2020-01-14 PROCEDURE — 82962 GLUCOSE BLOOD TEST: CPT

## 2020-01-14 RX ORDER — SODIUM CHLORIDE 0.9 % (FLUSH) 0.9 %
10 SYRINGE (ML) INJECTION AS NEEDED
Status: DISCONTINUED | OUTPATIENT
Start: 2020-01-14 | End: 2020-01-14 | Stop reason: HOSPADM

## 2020-01-14 RX ORDER — SODIUM CHLORIDE 9 MG/ML
100 INJECTION, SOLUTION INTRAVENOUS CONTINUOUS
Status: CANCELLED | OUTPATIENT
Start: 2020-01-14

## 2020-01-14 RX ORDER — PROPOFOL 10 MG/ML
VIAL (ML) INTRAVENOUS AS NEEDED
Status: DISCONTINUED | OUTPATIENT
Start: 2020-01-14 | End: 2020-01-14 | Stop reason: SURG

## 2020-01-14 RX ORDER — SODIUM CHLORIDE 0.9 % (FLUSH) 0.9 %
10 SYRINGE (ML) INJECTION AS NEEDED
Status: CANCELLED | OUTPATIENT
Start: 2020-01-14

## 2020-01-14 RX ORDER — SODIUM CHLORIDE 9 MG/ML
500 INJECTION, SOLUTION INTRAVENOUS CONTINUOUS PRN
Status: DISCONTINUED | OUTPATIENT
Start: 2020-01-14 | End: 2020-01-14 | Stop reason: HOSPADM

## 2020-01-14 RX ORDER — SODIUM CHLORIDE 0.9 % (FLUSH) 0.9 %
10 SYRINGE (ML) INJECTION EVERY 12 HOURS SCHEDULED
Status: CANCELLED | OUTPATIENT
Start: 2020-01-14

## 2020-01-14 RX ADMIN — PROPOFOL 200 MG: 10 INJECTION, EMULSION INTRAVENOUS at 07:54

## 2020-01-14 RX ADMIN — SODIUM CHLORIDE 500 ML: 9 INJECTION, SOLUTION INTRAVENOUS at 07:23

## 2020-01-14 RX ADMIN — LIDOCAINE HYDROCHLORIDE 50 MG: 20 INJECTION, SOLUTION INTRAVENOUS at 07:54

## 2020-01-14 NOTE — H&P
[facility] Gastroenterology  Pre Procedure History & Physical    Chief Complaint:   Cologuard positive    Subjective     HPI:   Here for colonoscopy.  Cologuard positive.    Past Medical History:   Past Medical History:   Diagnosis Date   • Diabetes mellitus (CMS/HCC)    • Hx of colonic polyp    • Hypertension        Past Surgical History:  Past Surgical History:   Procedure Laterality Date   • APPENDECTOMY     • COLONOSCOPY  06/06/2016    Normal exam repeat in 5 years   • COLONOSCOPY W/ POLYPECTOMY  04/04/2012    Hyperplastic polyp cecum repeat exam in 1 year   • ENDOSCOPY  08/14/2014    Very tortuous distal esophagus dilated 50 Fr, HH    • HYSTERECTOMY     • NECK SURGERY         Family History:  Family History   Problem Relation Age of Onset   • Colon cancer Mother    • Colon polyps Mother    • No Known Problems Father    • Diabetes Brother    • Diabetes Son    • Breast cancer Maternal Aunt    • Heart attack Paternal Grandmother    • Lymphoma Maternal Aunt    • Lung cancer Maternal Aunt    • Cancer Maternal Aunt    • Diabetes Maternal Aunt    • Diabetes Son    • Diabetes Brother        Social History:   reports that she quit smoking about 29 years ago. Her smoking use included cigarettes. She started smoking about 65 years ago. She smoked 2.00 packs per day. She has never used smokeless tobacco. She reports that she does not drink alcohol or use drugs.    Medications:   Prior to Admission medications    Medication Sig Start Date End Date Taking? Authorizing Provider   sodium-potassium-magnesium sulfates (SUPREP BOWEL PREP KIT) 17.5-3.13-1.6 GM/177ML solution oral solution Take as directed per office 12/4/19 1/14/20 Yes Tracey Schilling APRN   amitriptyline (ELAVIL) 100 MG tablet Take 1-2 tablets by mouth At Night As Needed for Sleep. 4/17/19   Kiran Madden MD   metFORMIN ER (GLUCOPHAGE-XR) 500 MG 24 hr tablet Take 1 tablet by mouth 2 (Two) Times a Day. 4/17/19   Kiran Madden MD   naproxen  "sodium (ALEVE) 220 MG tablet Take 220 mg by mouth 2 (Two) Times a Day As Needed for Mild Pain .    Provider, MD Cira   ondansetron ODT (ZOFRAN ODT) 4 MG disintegrating tablet Place 1 tablet under the tongue Every 8 (Eight) Hours As Needed for Nausea or Vomiting. 7/10/17   Kiran Madden MD   vitamin D (ERGOCALCIFEROL) 1.25 MG (98655 UT) capsule capsule Take 1 capsule by mouth 1 (One) Time Per Week. 11/26/19   Kiran Madden MD       Allergies:  Penicillins    Objective     Blood pressure 156/68, pulse 86, temperature 97.2 °F (36.2 °C), temperature source Temporal, resp. rate 18, height 162.6 cm (64\"), weight 81.6 kg (180 lb), SpO2 100 %, not currently breastfeeding.    Physical Exam   Constitutional: Pt is oriented to person, place, and in no distress.   HENT: Mouth/Throat: Oropharynx is clear.   Cardiovascular: Normal rate, regular rhythm.    Pulmonary/Chest: Effort normal. No respiratory distress. No  wheezes.   Abdominal: Soft. Non-distended.  Skin: Skin is warm and dry.   Psychiatric: Mood, memory, affect and judgment appear normal.     Assessment/Plan     Diagnosis:  Cologuard positive    Anticipated Surgical Procedure:    Proceed with colonoscopy as scheduled    The following major R/B/A were discussed with the patient, however the list is not all inclusive . Risk:  Bleeding (immediate and delayed), perforation (rupture or tear), reaction to medication, missed lesion/cancer, pain during the procedure, infection, need for surgery, need for ostomy, need for mechanical ventilation (breathing machine), death.  Benefits: removal of polyp/tissue, burn/clip/or inject to stop bleeding, removal of foreign body, dilate any stricture.  Alternatives: Xray or CT, surgery, do nothing with associated risk   The patient was given time to ask question and received explanation, and agrees to proceed as per History and Physical.   No guarantee given or expressed.    EMR Dragon/transcription disclaimer: Much " of this encounter note is an electronic transcription/translation of spoken language to printed text.  The electronic translation of spoken language may permit erroneous, or at times, nonsensical words or phrases to be inadvertently transcribed.  Although I have reviewed the note for such errors, some may still exist.    Abdullahi Howell MD  7:52 AM  1/14/2020

## 2020-01-14 NOTE — ANESTHESIA POSTPROCEDURE EVALUATION
"Patient: Sedrick Leahy    Procedure Summary     Date:  01/14/20 Room / Location:   [facility] /  [facility]    Anesthesia Start:  0752 Anesthesia Stop:  0814    Procedure:  COLONOSCOPY WITH ANESTHESIA (N/A ) Diagnosis:       Hyperplastic colonic polyp, unspecified part of colon      Positive colorectal cancer screening using Cologuard test      Family history of colon cancer in mother      (Hyperplastic colonic polyp, unspecified part of colon [K63.5])      (Positive colorectal cancer screening using Cologuard test [R19.5])      (Family history of colon cancer in mother [Z80.0])    Surgeon:  Abdullahi Howell MD Provider:  Vincenzo Watson CRNA    Anesthesia Type:  MAC ASA Status:  2          Anesthesia Type: MAC    Vitals  Vitals Value Taken Time   /57 1/14/2020  8:13 AM   Temp     Pulse 79 1/14/2020  8:14 AM   Resp 18 1/14/2020  8:13 AM   SpO2 98 % 1/14/2020  8:14 AM   Vitals shown include unvalidated device data.        Post Anesthesia Care and Evaluation    Patient location during evaluation: PACU  Patient participation: complete - patient participated  Level of consciousness: awake and alert  Pain management: adequate  Airway patency: patent  Anesthetic complications: No anesthetic complications    Cardiovascular status: acceptable  Respiratory status: acceptable  Hydration status: acceptable    Comments: Blood pressure 106/57, pulse 86, temperature 97.2 °F (36.2 °C), temperature source Temporal, resp. rate 18, height 162.6 cm (64\"), weight 81.6 kg (180 lb), SpO2 100 %, not currently breastfeeding.    Pt discharged from PACU based on selina score >8      "

## 2020-01-14 NOTE — ANESTHESIA PREPROCEDURE EVALUATION
Anesthesia Evaluation     history of anesthetic complications: PONV  NPO Solid Status: > 8 hours  NPO Liquid Status: > 2 hours           Airway   Mallampati: II  TM distance: >3 FB  Neck ROM: full  Dental          Pulmonary - normal exam    breath sounds clear to auscultation  (-) asthma, recent URI, sleep apnea, not a smoker  Cardiovascular - normal exam  Exercise tolerance: good (4-7 METS)    Rhythm: regular  Rate: normal    (+) hypertension, hyperlipidemia,   (-) pacemaker, past MI, angina, cardiac stents, CABG      Neuro/Psych  (+) psychiatric history Anxiety and Depression,     (-) seizures, TIA, CVA  GI/Hepatic/Renal/Endo    (+)  GERD,  diabetes mellitus (improved with weight loss),   (-) liver disease, no renal disease, no thyroid disorder    Musculoskeletal     Abdominal    Substance History      OB/GYN          Other                        Anesthesia Plan    ASA 2     MAC     intravenous induction     Anesthetic plan, all risks, benefits, and alternatives have been provided, discussed and informed consent has been obtained with: patient.

## 2020-01-20 DIAGNOSIS — Z78.0 MENOPAUSE: ICD-10-CM

## 2020-01-21 ENCOUNTER — RESULTS ENCOUNTER (OUTPATIENT)
Dept: FAMILY MEDICINE CLINIC | Facility: CLINIC | Age: 65
End: 2020-01-21

## 2020-01-21 DIAGNOSIS — E55.9 VITAMIN D DEFICIENCY: ICD-10-CM

## 2020-01-24 ENCOUNTER — TELEPHONE (OUTPATIENT)
Dept: GASTROENTEROLOGY | Facility: CLINIC | Age: 65
End: 2020-01-24

## 2020-04-17 RX ORDER — AMITRIPTYLINE HYDROCHLORIDE 100 MG/1
100-200 TABLET, FILM COATED ORAL NIGHTLY PRN
Qty: 180 TABLET | Refills: 1 | Status: SHIPPED | OUTPATIENT
Start: 2020-04-17 | End: 2020-11-17

## 2020-06-30 ENCOUNTER — PATIENT MESSAGE (OUTPATIENT)
Dept: FAMILY MEDICINE CLINIC | Facility: CLINIC | Age: 65
End: 2020-06-30

## 2020-06-30 NOTE — TELEPHONE ENCOUNTER
I am not trying to pass about but she really needs to work with her pharmacist because there is multiple suppliers of metformin and there is different types of metformin and only the pharmacist will know which ones have been recalled and which ones have not    I do want her to stay on metformin and hope her pharmacist can find an alternative supplier

## 2020-06-30 NOTE — TELEPHONE ENCOUNTER
From: Sedrick Leahy  To: Kiran Madden MD  Sent: 6/30/2020 1:26 PM CDT  Subject: Prescription Question    Do I need to do something about my metformin? I got a letter from [employer] that my medicine was causing cancer and to notify my Dr.  Can you let me know what you want me to do.

## 2020-07-17 ENCOUNTER — TRANSCRIBE ORDERS (OUTPATIENT)
Dept: ADMINISTRATIVE | Facility: HOSPITAL | Age: 65
End: 2020-07-17

## 2020-07-17 ENCOUNTER — HOSPITAL ENCOUNTER (OUTPATIENT)
Dept: ULTRASOUND IMAGING | Facility: HOSPITAL | Age: 65
Discharge: HOME | End: 2020-07-17
Admitting: NURSE PRACTITIONER

## 2020-07-17 DIAGNOSIS — R22.1 SWOLLEN NECK: ICD-10-CM

## 2020-07-17 DIAGNOSIS — R22.1 SWOLLEN NECK: Primary | ICD-10-CM

## 2020-07-17 PROCEDURE — 76536 US EXAM OF HEAD AND NECK: CPT

## 2020-07-20 ENCOUNTER — OFFICE VISIT (OUTPATIENT)
Dept: FAMILY MEDICINE CLINIC | Facility: CLINIC | Age: 65
End: 2020-07-20

## 2020-07-20 VITALS
BODY MASS INDEX: 29.41 KG/M2 | OXYGEN SATURATION: 98 % | DIASTOLIC BLOOD PRESSURE: 78 MMHG | SYSTOLIC BLOOD PRESSURE: 116 MMHG | RESPIRATION RATE: 16 BRPM | WEIGHT: 183 LBS | TEMPERATURE: 98.6 F | HEART RATE: 106 BPM | HEIGHT: 66 IN

## 2020-07-20 DIAGNOSIS — R59.0 CERVICAL LYMPHADENOPATHY: ICD-10-CM

## 2020-07-20 DIAGNOSIS — R73.01 ELEVATED FASTING GLUCOSE: ICD-10-CM

## 2020-07-20 PROBLEM — M85.80 OSTEOPENIA: Status: ACTIVE | Noted: 2020-07-20

## 2020-07-20 PROCEDURE — 99213 OFFICE O/P EST LOW 20 MIN: CPT | Performed by: FAMILY MEDICINE

## 2020-07-20 RX ORDER — GABAPENTIN 400 MG/1
CAPSULE ORAL
COMMUNITY
Start: 2020-07-13 | End: 2020-11-12

## 2020-07-21 ENCOUNTER — TELEPHONE (OUTPATIENT)
Dept: OTOLARYNGOLOGY | Facility: CLINIC | Age: 65
End: 2020-07-21

## 2020-07-21 LAB
ALBUMIN SERPL-MCNC: 4.3 G/DL (ref 3.5–5.2)
ALBUMIN/GLOB SERPL: 1.6 G/DL
ALP SERPL-CCNC: 111 U/L (ref 39–117)
ALT SERPL-CCNC: 6 U/L (ref 1–33)
AST SERPL-CCNC: 12 U/L (ref 1–32)
BASOPHILS # BLD AUTO: 0.05 10*3/MM3 (ref 0–0.2)
BASOPHILS NFR BLD AUTO: 0.5 % (ref 0–1.5)
BILIRUB SERPL-MCNC: 0.2 MG/DL (ref 0–1.2)
BUN SERPL-MCNC: 18 MG/DL (ref 8–23)
BUN/CREAT SERPL: 19.4 (ref 7–25)
CALCIUM SERPL-MCNC: 9.6 MG/DL (ref 8.6–10.5)
CHLORIDE SERPL-SCNC: 104 MMOL/L (ref 98–107)
CO2 SERPL-SCNC: 28.1 MMOL/L (ref 22–29)
CREAT SERPL-MCNC: 0.93 MG/DL (ref 0.57–1)
EOSINOPHIL # BLD AUTO: 0.13 10*3/MM3 (ref 0–0.4)
EOSINOPHIL NFR BLD AUTO: 1.3 % (ref 0.3–6.2)
ERYTHROCYTE [DISTWIDTH] IN BLOOD BY AUTOMATED COUNT: 12.6 % (ref 12.3–15.4)
GLOBULIN SER CALC-MCNC: 2.7 GM/DL
GLUCOSE SERPL-MCNC: 118 MG/DL (ref 65–99)
HBA1C MFR BLD: 6 % (ref 4.8–5.6)
HCT VFR BLD AUTO: 42.1 % (ref 34–46.6)
HGB BLD-MCNC: 13.4 G/DL (ref 12–15.9)
IMM GRANULOCYTES # BLD AUTO: 0.02 10*3/MM3 (ref 0–0.05)
IMM GRANULOCYTES NFR BLD AUTO: 0.2 % (ref 0–0.5)
LYMPHOCYTES # BLD AUTO: 3.35 10*3/MM3 (ref 0.7–3.1)
LYMPHOCYTES NFR BLD AUTO: 34.4 % (ref 19.6–45.3)
MCH RBC QN AUTO: 29.8 PG (ref 26.6–33)
MCHC RBC AUTO-ENTMCNC: 31.8 G/DL (ref 31.5–35.7)
MCV RBC AUTO: 93.8 FL (ref 79–97)
MONOCYTES # BLD AUTO: 0.66 10*3/MM3 (ref 0.1–0.9)
MONOCYTES NFR BLD AUTO: 6.8 % (ref 5–12)
NEUTROPHILS # BLD AUTO: 5.54 10*3/MM3 (ref 1.7–7)
NEUTROPHILS NFR BLD AUTO: 56.8 % (ref 42.7–76)
NRBC BLD AUTO-RTO: 0 /100 WBC (ref 0–0.2)
PLATELET # BLD AUTO: 371 10*3/MM3 (ref 140–450)
POTASSIUM SERPL-SCNC: 4.9 MMOL/L (ref 3.5–5.2)
PROT SERPL-MCNC: 7 G/DL (ref 6–8.5)
RBC # BLD AUTO: 4.49 10*6/MM3 (ref 3.77–5.28)
SODIUM SERPL-SCNC: 143 MMOL/L (ref 136–145)
WBC # BLD AUTO: 9.75 10*3/MM3 (ref 3.4–10.8)

## 2020-07-24 ENCOUNTER — TRANSCRIBE ORDERS (OUTPATIENT)
Dept: ADMINISTRATIVE | Facility: HOSPITAL | Age: 65
End: 2020-07-24

## 2020-07-24 ENCOUNTER — OFFICE VISIT (OUTPATIENT)
Dept: OTOLARYNGOLOGY | Facility: CLINIC | Age: 65
End: 2020-07-24

## 2020-07-24 VITALS — WEIGHT: 184 LBS | HEIGHT: 64 IN | TEMPERATURE: 97.7 F | BODY MASS INDEX: 31.41 KG/M2

## 2020-07-24 DIAGNOSIS — R59.0 ANTERIOR CERVICAL ADENOPATHY: Primary | ICD-10-CM

## 2020-07-24 DIAGNOSIS — Z01.818 PREOP TESTING: Primary | ICD-10-CM

## 2020-07-24 DIAGNOSIS — R22.1 MASS OF RIGHT SIDE OF NECK: ICD-10-CM

## 2020-07-24 DIAGNOSIS — K11.20 SIALADENITIS: ICD-10-CM

## 2020-07-24 PROCEDURE — 99203 OFFICE O/P NEW LOW 30 MIN: CPT | Performed by: OTOLARYNGOLOGY

## 2020-07-24 NOTE — PROGRESS NOTES
Triston Bey Jr, MD     ENT NEW PATIENT NOTE     Chief Complaint   Patient presents with   • Swollen Glands     R side        HISTORY OF PRESENT ILLNESS:  Accompanied by:   No one  Sedrick Leahy is a  65 y.o. female with knot in R neck. Sent for evaluation.  With knot in neck. Present for a couple months.  Arose suddenly.  No prior URI.  She says very tender. Sore in Submax area R side.  Smoke- none  Drink- none  Teeth- fixed.  Swallowing- no pain, will hang at times. Will hurt in neck at times.  Hemoptysis- none, no cough.  Rx- She has had steroids and antibiotics.    Review of Systems  Reviewed per patient intake note and confirmed with patient    Past History:  Past Medical History:   Diagnosis Date   • Diabetes mellitus (CMS/HCC)    • Hx of colonic polyp    • Hypertension      Past Surgical History:   Procedure Laterality Date   • APPENDECTOMY     • COLONOSCOPY  2016    Normal exam repeat in 5 years   • COLONOSCOPY N/A 2020    Procedure: COLONOSCOPY WITH ANESTHESIA;  Surgeon: Abdullahi Howell MD;  Location: [facility] ENDOSCOPY;  Service: Gastroenterology   • COLONOSCOPY W/ POLYPECTOMY  2012    Hyperplastic polyp cecum repeat exam in 1 year   • ENDOSCOPY  2014    Very tortuous distal esophagus dilated 50 Fr, HH    • HYSTERECTOMY     • NECK SURGERY       Family History   Problem Relation Age of Onset   • Colon cancer Mother    • Colon polyps Mother    • No Known Problems Father    • Diabetes Brother    • Diabetes Son    • Breast cancer Maternal Aunt    • Heart attack Paternal Grandmother    • Lymphoma Maternal Aunt    • Lung cancer Maternal Aunt    • Cancer Maternal Aunt    • Diabetes Maternal Aunt    • Diabetes Son    • Diabetes Brother      Social History     Tobacco Use   • Smoking status: Former Smoker     Packs/day: 2.00     Types: Cigarettes     Start date: 1955     Last attempt to quit: 7/10/1990     Years since quittin.0   • Smokeless tobacco: Never Used   Substance Use  Topics   • Alcohol use: No   • Drug use: No     No outpatient medications have been marked as taking for the 7/24/20 encounter (Office Visit) with Triston Bey Jr., MD.     Allergies:  Penicillins        Vital Signs:   Temp:  [97.7 °F (36.5 °C)] 97.7 °F (36.5 °C)  EXAMINATION:  CONSTITUTIONAL:    well nourished, well-developed, alert, oriented, in no acute distress     BODY HABITUS:    obese  moderate    COMMUNICATION:    able to communicate normally, normal voice quality    HEAD:     Normocephalic, without obvious abnormality, atraumatic    FACE:    structure normal, no tenderness present, no lesions/masses, no evidence of trauma    SALIVARY GLANDS:    parotid glands with no tenderness, no swelling, no masses, submandibular glands with normal size, nontender    Submax R side is mildly enlarged, ptotic not does not feel abnormal for age.    EYE:    ocular motility normal, eyelids normal, orbits normal, no proptosis, conjunctiva clear, sclera non-icteric, pupils equal, round, reactive to light and accomodation  Color:   blue    HEARING:    response to conversational voice normal    EARS:    Otoscopic exam   normal pinna with no lesions, Canals normal size and shape, Tympanic membranes normal, Ossicular chain intact, Middle ear clear     NOSE EXTERNAL:    APPEARANCE: normal, straight, with good projection, no tenderness, no lesions, no tenderness, good nasal support, patent nares    NOSE INTERNAL:    Anterior rhinoscopy  intact mucosa, normal inferior turbinates, septum midline without perforation, secretions clear and normal amount, nares patent, normal nasal airway without obstruction, no lesions    ORAL CAVITY:    Normal lips with no lesions, dentition normal for age, FOM intact without lesions and normal salivary flow, Mucosa intact without lesions, Hard and soft palate normal without lesions    OROPHARYNX:    Direct examination  oropharyngeal mucosa normal, tonsil(s) with normal appearance      NECK:     R submax area with multiple diffusely enlarged nodes, largest 3 cm, firm, mobile, Mildly tender, larynx normal mobility, trachea midline    LYMPH NODES :    no adenopathy    THYROID:    no overt thyromegaly, no tenderness, nodules or mass present on palpation, position midline    CHEST/RESPIRATORY:    respiratory effort normal, no rales, rubs or wheezing, no stridor, normal appearance to chest    CARDIOVASCULAR:    regular rate and rhythm, no murmurs, gallups, no peripheral edema    NEURO/PSYCHIATRIC :    oriented appropriately for age, mood normal, affect appropriate, cranial nerves intact grossly (unless specifically described), gait normal for age    RESULTS REVIEW:    I have reviewed the patients old records in the chart.  I reviewed the patient's new clinical results.  I have personally reviewed the patient's ultrasound  of the neck images.  I have personally reviewed the patient's ultrasound  of the neck report.       Neck U/S 7/17/2020     ASSESSMENT:     Diagnosis Plan   1. Anterior cervical adenopathy  US Guided Lymph Node Biopsy    Tissue Pathology Exam    Non-gynecologic Cytology    R superior neck   2. Sialadenitis      Possible   3. Mass of right side of neck      Probable adenopathy           PLAN:      Medical and surgical options were discussed including observation, continued medical management, medication modification, surgical management and fine needle aspiration. Risks, benefits and alternatives were discussed and questions were answered. After considering the options, the patient decided to proceed with fine needle aspiration.  Patient has adenopathy. I will order U/S guided bx of the area. She is low risk for malignancy. If U/S suggestive, I will get CT and consider open biopsy.  Heat to R neck  Salivary gland care  MY CHART:  Patient is Patient is using My Chart     Orders Placed This Encounter   Procedures   • US Guided Lymph Node Biopsy          Patient understand(s) and agree(s) with the  treatment plan as described.  Return After biopsy of Neck, flex scope, for Recheck R neck.       Triston Bey Jr, MD  07/24/20  11:48

## 2020-07-24 NOTE — PROGRESS NOTES
Lory Chiu LPN   Patient Intake Note    Review of Systems  Review of Systems   Constitutional: Negative for appetite change, fatigue and fever.   HENT:        See HPI   Respiratory: Negative for cough, choking and shortness of breath.    Gastrointestinal: Negative for diarrhea, nausea and vomiting.   Skin: Negative for rash.   Neurological: Negative for dizziness, light-headedness and headaches.   Psychiatric/Behavioral: Negative for sleep disturbance.       Tobacco Use: Screening and Cessation Intervention  Social History    Tobacco Use      Smoking status: Former Smoker        Packs/day: 2.00        Types: Cigarettes        Start date: 1954        Quit date: 7/10/1990        Years since quittin.0      Smokeless tobacco: Never Used        Lory Chiu LPN  2020  11:34

## 2020-07-24 NOTE — PATIENT INSTRUCTIONS
APPLICATION OF HEAT AND ICE    At times, judicious application of heat or ice can accelerate healing, diminish swelling and reduce pain. Dr. Bey will frequently prescribe heat, ice or both as part of the treatment of your injury or surgery.     How to apply ice:  Never apply ice directly to the skin. Always place an insulating layer, such as a washcloth or ice bag, between the ice and the skin.  Ice bags can be made of zip lock bags, water and ice, wrapped in a washcloth. Do not fill the bag too full and this will conform well around curves of the body, head and neck.  If your injury has just occurred, remember rest, ice, compression, and elevation (RICE). In an acute injury, ice is best applied for 48 to 72 hours to minimize swelling and pain. Doing this as often as possible (at least 4 times daily, but constantly if possible).     How to apply heat:  Never apply a heating pad while sleeping. This may lead to a burn. Heating pads apply continuous heat that can build up while sleeping.  Hot water bottles are excellent for applying heat.  Make a hot compress by heating a washcloth in the microwave, placing it in a zip lock bag and apply to the affected area.  You can use either dry heat or moist heat, whichever feels the best. Using moist heat will loosen scabbing and crusting, making the scabs easier to remove. This will also unstick eyelids that are stuck together.     Apply heat  for at least 15-20 minutes 4-5 times daily for 3 days  Apply to R neck    CARE OF THE SALIVARY GLANDS     At times the salivary (spit) glands will swell or get infected, causing obstruction to the flow of saliva. This swelling can occur in front of or underneath the ear, underneath the jaw in the upper neck area, or in the floor of the mouth.     There are several things to do in order to get relief. Basically you will be trying to clean out the old secretions, to prevent build-up and pressure. This will lower the risk of infection  as well.     -Drink plenty of water- this thins the secretions by preventing dehydration.                                     -Massage the affected gland- this will force the saliva out and hopefully clear any blockage or a stone.                                                                                                                  -Suck on lemon wedges- Lemon wedges will cause a tight feeling in the cheeks, but will force the gland to squeeze down on itself, clearing the gland. Use real lemon or lime wedges. Avoid candies, as the sugar will thicken the secretions.     -Warm soaks to the affected side, frequently. Use washcloths warmed in the microwave. Do not sleep with a heating pad.                                         -Pain relievers- prescribed as needed. Advil/Ibuprofen/Aleve can be very helpful, used as directed on the bottle.                                                                        -Antibiotics- used if infection is present.    CT scan ordered     [email or web address]

## 2020-07-31 ENCOUNTER — LAB (OUTPATIENT)
Dept: LAB | Facility: HOSPITAL | Age: 65
End: 2020-07-31

## 2020-07-31 PROCEDURE — C9803 HOPD COVID-19 SPEC COLLECT: HCPCS | Performed by: OTOLARYNGOLOGY

## 2020-07-31 PROCEDURE — U0003 INFECTIOUS AGENT DETECTION BY NUCLEIC ACID (DNA OR RNA); SEVERE ACUTE RESPIRATORY SYNDROME CORONAVIRUS 2 (SARS-COV-2) (CORONAVIRUS DISEASE [COVID-19]), AMPLIFIED PROBE TECHNIQUE, MAKING USE OF HIGH THROUGHPUT TECHNOLOGIES AS DESCRIBED BY CMS-2020-01-R: HCPCS | Performed by: OTOLARYNGOLOGY

## 2020-08-01 LAB
COVID LABCORP PRIORITY: NORMAL
SARS-COV-2 RNA RESP QL NAA+PROBE: NOT DETECTED

## 2020-08-03 ENCOUNTER — HOSPITAL ENCOUNTER (OUTPATIENT)
Dept: ULTRASOUND IMAGING | Facility: HOSPITAL | Age: 65
Discharge: HOME | End: 2020-08-03
Admitting: OTOLARYNGOLOGY

## 2020-08-03 DIAGNOSIS — R59.0 ANTERIOR CERVICAL ADENOPATHY: ICD-10-CM

## 2020-08-03 PROCEDURE — 88172 CYTP DX EVAL FNA 1ST EA SITE: CPT | Performed by: OTOLARYNGOLOGY

## 2020-08-03 PROCEDURE — 88173 CYTOPATH EVAL FNA REPORT: CPT | Performed by: OTOLARYNGOLOGY

## 2020-08-03 PROCEDURE — 76942 ECHO GUIDE FOR BIOPSY: CPT

## 2020-08-03 PROCEDURE — 88333 PATH CONSLTJ SURG CYTO XM 1: CPT | Performed by: OTOLARYNGOLOGY

## 2020-08-03 PROCEDURE — 88185 FLOWCYTOMETRY/TC ADD-ON: CPT | Performed by: OTOLARYNGOLOGY

## 2020-08-03 PROCEDURE — 88184 FLOWCYTOMETRY/ TC 1 MARKER: CPT | Performed by: OTOLARYNGOLOGY

## 2020-08-09 LAB
CYTO UR: NORMAL
LAB AP CASE REPORT: NORMAL
Lab: NORMAL
PATH REPORT.FINAL DX SPEC: NORMAL
PATH REPORT.GROSS SPEC: NORMAL

## 2020-08-18 ENCOUNTER — OFFICE VISIT (OUTPATIENT)
Dept: OTOLARYNGOLOGY | Facility: CLINIC | Age: 65
End: 2020-08-18

## 2020-08-18 VITALS
TEMPERATURE: 97.9 F | DIASTOLIC BLOOD PRESSURE: 81 MMHG | SYSTOLIC BLOOD PRESSURE: 129 MMHG | BODY MASS INDEX: 31.58 KG/M2 | HEART RATE: 90 BPM | HEIGHT: 64 IN | WEIGHT: 185 LBS

## 2020-08-18 DIAGNOSIS — R59.0 ANTERIOR CERVICAL ADENOPATHY: Primary | ICD-10-CM

## 2020-08-18 DIAGNOSIS — J35.1 LINGUAL TONSIL HYPERTROPHY: ICD-10-CM

## 2020-08-18 DIAGNOSIS — R22.1 MASS OF RIGHT SIDE OF NECK: ICD-10-CM

## 2020-08-18 DIAGNOSIS — J35.2 ADENOID HYPERPLASIA: ICD-10-CM

## 2020-08-18 DIAGNOSIS — K11.20 SIALADENITIS: ICD-10-CM

## 2020-08-18 PROCEDURE — 31575 DIAGNOSTIC LARYNGOSCOPY: CPT | Performed by: OTOLARYNGOLOGY

## 2020-08-18 PROCEDURE — 99213 OFFICE O/P EST LOW 20 MIN: CPT | Performed by: OTOLARYNGOLOGY

## 2020-08-18 RX ORDER — OXYMETAZOLINE HYDROCHLORIDE 0.05 G/100ML
2 SPRAY NASAL 3 TIMES DAILY
Qty: 2 ML | Refills: 0 | Status: SHIPPED | OUTPATIENT
Start: 2020-08-18 | End: 2020-08-21

## 2020-08-18 RX ORDER — HYDROCODONE BITARTRATE AND ACETAMINOPHEN 7.5; 325 MG/1; MG/1
1 TABLET ORAL EVERY 8 HOURS PRN
COMMUNITY
Start: 2020-08-04 | End: 2020-11-22 | Stop reason: HOSPADM

## 2020-08-18 RX ORDER — FLUTICASONE PROPIONATE 50 MCG
2 SPRAY, SUSPENSION (ML) NASAL 2 TIMES DAILY
Qty: 48 G | Refills: 3 | Status: SHIPPED | OUTPATIENT
Start: 2020-08-18 | End: 2021-01-04

## 2020-08-18 NOTE — PROGRESS NOTES
Triston Bey Jr, MD     ENT FOLLOW UP NOTE     Chief Complaint   Patient presents with   • Follow-up     Anterior Cervical adenopathy        HISTORY OF PRESENT ILLNESS:  Accompanied by:  No one  Sedrick Leahy is a  65 y.o. female who is here for follow up. S/p FNA R neck.  She has completed FNA. She is here for apth and follow up.  No growth in neck noted.    Review of Systems   Constitutional: Negative for appetite change, fatigue and fever.   HENT:        See HPI   Respiratory: Negative for cough, choking and shortness of breath.    Gastrointestinal: Negative for diarrhea, nausea and vomiting.   Skin: Negative for rash.   Neurological: Negative for dizziness, light-headedness, numbness and headaches.   Psychiatric/Behavioral: Negative for sleep disturbance.       Past History:  Past medical and surgical history, family history and social history reviewed and updated when appropriate.  Current medications and allergies reviewed and updated when appropriate.  Allergies:  Penicillins        Vital Signs:   Temp:  [97.9 °F (36.6 °C)] 97.9 °F (36.6 °C)  Heart Rate:  [90] 90  BP: (129)/(81) 129/81  EXAMINATION:  CONSTITUTIONAL:    well nourished, well-developed, alert, oriented, in no acute distress      BODY HABITUS:    obese  moderate     COMMUNICATION:    able to communicate normally, normal voice quality     HEAD:     Normocephalic, without obvious abnormality, atraumatic     FACE:    structure normal, no tenderness present, no lesions/masses, no evidence of trauma     SALIVARY GLANDS:    parotid glands with no tenderness, no swelling, no masses, submandibular glands with normal size, nontender    Submax R side is mildly enlarged, ptotic not does not feel abnormal for age.     EYE:    ocular motility normal, eyelids normal, orbits normal, no proptosis, conjunctiva clear, sclera non-icteric, pupils equal, round, reactive to light and accomodation  Color:   blue     HEARING:    response to conversational voice  normal     EARS:    Otoscopic exam   normal pinna with no lesions, Canals normal size and shape, Tympanic membranes normal, Ossicular chain intact, Middle ear clear     NOSE EXTERNAL:    APPEARANCE: normal, straight, with good projection, no tenderness, no lesions, no tenderness, good nasal support, patent nares     NOSE INTERNAL:    Anterior rhinoscopy  intact mucosa, normal inferior turbinates, septum midline without perforation, secretions clear and normal amount, nares patent, normal nasal airway without obstruction, no lesions     ORAL CAVITY:    Normal lips with no lesions, dentition normal for age, FOM intact without lesions and normal salivary flow, Mucosa intact without lesions, Hard and soft palate normal without lesions     OROPHARYNX:    Direct examination  oropharyngeal mucosa normal, tonsil(s) with normal appearance       NECK:    R submax area with multiple diffusely enlarged nodes, largest 3 cm, firm, mobile, Mildly tender, larynx normal mobility, trachea midline     LYMPH NODES :    no adenopathy     THYROID:    no overt thyromegaly, no tenderness, nodules or mass present on palpation, position midline     CHEST/RESPIRATORY:    respiratory effort normal, no rales, rubs or wheezing, no stridor, normal appearance to chest     CARDIOVASCULAR:    regular rate and rhythm, no murmurs, gallups, no peripheral edema     NEURO/PSYCHIATRIC :    oriented appropriately for age, mood normal, affect appropriate, cranial nerves intact grossly (unless specifically described), gait normal for age    RESULTS REVIEW:    I have reviewed the patients old records in the chart.  I reviewed the patient's new clinical results.  Lab Results   Component Value Date    FINALDX  08/03/2020     Right cervical lymph node, fine-needle aspiration, smears (2) and ThinPrep preparation (1):  A.  Heterogenous population of lymphocytes, tingible body macro phages and blood.  B.  No Andres-Steven cells identified.  C.  No cytologic  evidence of malignancy.    Comment: The overall cytologic findings favor a reactive lymph node.  Flow cytometric analysis performed by [facility] reveals no detectable clonal B-cell population or an abnormal T-cell population.  The CD4: CD8 ratio is elevated (9.95: 1).  This is a nonspecific finding and may be found in a variety of reactive/benign conditions, Hodgkin lymphoma and certain T-cell lymphoproliferative disorders.  Please refer to the complete flow cytometry report which is appended.  Clinical follow-up is recommended and if the lymphadenopathy fails to resolve, an excisional biopsy of a lymph node would be recommended for complete histologic evaluation.                ASSESSMENT:      Diagnosis Plan   1. Anterior cervical adenopathy      FNA- no evidence malignancy   2. Sialadenitis     3. Mass of right side of neck      Cervical lymph nodes   4. Adenoid hyperplasia      hydropic appearance   5. Lingual tonsil hypertrophy      Mild to mod           PLAN:      Conservative management.  Patient has benign FNA results. I will follow for growth. She has thickened adenoid and lingual tonsil hypertorphy. I will try and shrink the tissue with nasal steroids.  Flonase BID  Afrin x 3 days  Nasal saline  MY CHART:  Patient is Patient is using My Chart  New Medications Ordered This Visit   Medications   • oxymetazoline (AFRIN) 0.05 % nasal spray     Si sprays into the nostril(s) as directed by provider 3 (Three) Times a Day for 3 days. Use for 3 days then stop     Dispense:  2 mL     Refill:  0   • fluticasone (FLONASE) 50 MCG/ACT nasal spray     Si sprays into the nostril(s) as directed by provider 2 (Two) Times a Day.     Dispense:  48 g     Refill:  3          Patient understand(s) and agree(s) with the treatment plan as described.    Return in about 3 months (around 2020) for Recheck Adenoids, Lingula tonsils and R neck, Flex scope.     Triston Bey Jr, MD  20  09:56

## 2020-08-18 NOTE — PROGRESS NOTES
Triston Bey Jr, MD   ENT PROCEDURE NOTE:  Anesthesia:   topical 4% tetracaine and oxymetazoline mix    Endoscopy Type:   Flexible Laryngoscopy    Indications for Procedure:   1. Anterior cervical adenopathy    2. Sialadenitis    3. Mass of right side of neck         Procedure Details:    The patient was placed in an upright position.  The laryngoscope was passed left nasal passge.  The nasal cavities, nasopharynx, oropharynx, hypopharynx, and larynx were all examined.  Vocal cords were examined during respiration and phonation.  The following findings were noted:    Findings:   LARYNGOSCOPY FINDINGS :    NASOPHARYNX:     Adenoids: mod enlarged and hydropic, no ulceration or mass    Eustachian tubes:         BILATERAL: bluish     Palate:        normal mobility, without lesions  Bilateral    Vault size: small  OROPHARYNX:     Lateral walls:         Redundant          BILATERAL: mild    Posterior walls:         normal, no lesions    Tonsils:         BILATERAL: atrophic    BASEOF TONGUE:          normal, no lesions, normal position  Bilateral    LINGUAL TONSILS:          BILATERAL: mildly enlarged    VALLECULA:         normal, no lesions, no pooling  Bilateral  EPIGLOTTIS:    normal color, position, without lesions  HYPOPHARYNX:    normal mucosa without lesions  Bilateral  ARYEPIGLOTTIC FOLDS:     normal mucosa, no lesions  ARYTENOIDS:    normal position, normal size, normal mobility, no lesions, tower size normal  FALSE CORDS:     normal mucosa, no lesions, normal mobility  Bilateral  TRUE VOCAL FOLDS:      normal appearance, mobility, no lesions  Bilateral  GLOTTIS:     normal closure, with good cord apposition  SUBGLOTTIS:     unobstructed, patent, no lesions    Condition:  Stable.  Patient tolerated procedure well.    Complications:  None    Post-procedure instructions reviewed with Patient  Instructions documented in AVS for patient to review

## 2020-08-18 NOTE — PATIENT INSTRUCTIONS
Afrin use:  Use 2 puffs each nostril 3 times daily for 3 days only!!  Stop using after 3 days unless instructed to use longer    Nasal steroid use:  Using nasal steroids:  You will be prescribed one of the following nasal steroids: Flonase, Nasacort, Nasonex, Rhinocort, Qnasl, Zetonna  2 puffs each nostril 2 times daily  Start as soon as possible    Use Afrin first and wait 10 minutes to allow the nose to open. Then administer nasal steroids.    NASAL SALINE:  Use 2 puffs each nostril 4-6 times daily and more frequently if possible.  You can buy saline spray or you can make your own and use an old spray bottle to administer  Use a humidifier at bedside  Recipe for saline:  Water                                 1 quart  Salt (table)                        1 tablespoon  Gylcerin (or Pallavi Syrup)    1 teaspoon  Sodium bicarbonate           1 teaspoon  Sprays or Keira pots are recommended    Heat to R neck     [email or web address]

## 2020-09-28 ENCOUNTER — TRANSCRIBE ORDERS (OUTPATIENT)
Dept: ADMINISTRATIVE | Facility: HOSPITAL | Age: 65
End: 2020-09-28

## 2020-09-28 DIAGNOSIS — M54.12 CERVICAL RADICULOPATHY: Primary | ICD-10-CM

## 2020-10-12 ENCOUNTER — HOSPITAL ENCOUNTER (OUTPATIENT)
Dept: CT IMAGING | Facility: HOSPITAL | Age: 65
Discharge: HOME | End: 2020-10-12
Admitting: PHYSICIAN ASSISTANT

## 2020-10-12 DIAGNOSIS — M54.12 CERVICAL RADICULOPATHY: ICD-10-CM

## 2020-10-12 PROCEDURE — 72125 CT NECK SPINE W/O DYE: CPT

## 2020-11-12 ENCOUNTER — HOSPITAL ENCOUNTER (OUTPATIENT)
Dept: GENERAL RADIOLOGY | Facility: HOSPITAL | Age: 65
Discharge: HOME | End: 2020-11-12

## 2020-11-12 ENCOUNTER — APPOINTMENT (OUTPATIENT)
Dept: PREADMISSION TESTING | Facility: HOSPITAL | Age: 65
End: 2020-11-12

## 2020-11-12 ENCOUNTER — TRANSCRIBE ORDERS (OUTPATIENT)
Dept: ADMINISTRATIVE | Facility: HOSPITAL | Age: 65
End: 2020-11-12

## 2020-11-12 VITALS
WEIGHT: 189.6 LBS | OXYGEN SATURATION: 93 % | BODY MASS INDEX: 32.37 KG/M2 | RESPIRATION RATE: 18 BRPM | SYSTOLIC BLOOD PRESSURE: 146 MMHG | HEIGHT: 64 IN | DIASTOLIC BLOOD PRESSURE: 72 MMHG | HEART RATE: 90 BPM

## 2020-11-12 DIAGNOSIS — Z01.818 PREOP TESTING: Primary | ICD-10-CM

## 2020-11-12 LAB
ALBUMIN SERPL-MCNC: 4.2 G/DL (ref 3.5–5.2)
ALBUMIN/GLOB SERPL: 1.5 G/DL
ALP SERPL-CCNC: 102 U/L (ref 39–117)
ALT SERPL W P-5'-P-CCNC: 8 U/L (ref 1–33)
ANION GAP SERPL CALCULATED.3IONS-SCNC: 8 MMOL/L (ref 5–15)
APTT PPP: 30.2 SECONDS (ref 24.1–35)
AST SERPL-CCNC: 16 U/L (ref 1–32)
BACTERIA UR QL AUTO: ABNORMAL /HPF
BASOPHILS # BLD AUTO: 0.03 10*3/MM3 (ref 0–0.2)
BASOPHILS NFR BLD AUTO: 0.4 % (ref 0–1.5)
BILIRUB SERPL-MCNC: 0.3 MG/DL (ref 0–1.2)
BILIRUB UR QL STRIP: NEGATIVE
BUN SERPL-MCNC: 13 MG/DL (ref 8–23)
BUN/CREAT SERPL: 16.3 (ref 7–25)
CALCIUM SPEC-SCNC: 9.5 MG/DL (ref 8.6–10.5)
CHLORIDE SERPL-SCNC: 104 MMOL/L (ref 98–107)
CLARITY UR: CLEAR
CO2 SERPL-SCNC: 29 MMOL/L (ref 22–29)
COLOR UR: YELLOW
CREAT SERPL-MCNC: 0.8 MG/DL (ref 0.57–1)
DEPRECATED RDW RBC AUTO: 45.4 FL (ref 37–54)
EOSINOPHIL # BLD AUTO: 0.08 10*3/MM3 (ref 0–0.4)
EOSINOPHIL NFR BLD AUTO: 1 % (ref 0.3–6.2)
ERYTHROCYTE [DISTWIDTH] IN BLOOD BY AUTOMATED COUNT: 13.4 % (ref 12.3–15.4)
GFR SERPL CREATININE-BSD FRML MDRD: 72 ML/MIN/1.73
GLOBULIN UR ELPH-MCNC: 2.8 GM/DL
GLUCOSE SERPL-MCNC: 95 MG/DL (ref 65–99)
GLUCOSE UR STRIP-MCNC: NEGATIVE MG/DL
HCT VFR BLD AUTO: 40.3 % (ref 34–46.6)
HGB BLD-MCNC: 12.9 G/DL (ref 12–15.9)
HGB UR QL STRIP.AUTO: NEGATIVE
IMM GRANULOCYTES # BLD AUTO: 0.02 10*3/MM3 (ref 0–0.05)
IMM GRANULOCYTES NFR BLD AUTO: 0.2 % (ref 0–0.5)
INR PPP: 0.94 (ref 0.91–1.09)
KETONES UR QL STRIP: NEGATIVE
LEUKOCYTE ESTERASE UR QL STRIP.AUTO: ABNORMAL
LYMPHOCYTES # BLD AUTO: 2.88 10*3/MM3 (ref 0.7–3.1)
LYMPHOCYTES NFR BLD AUTO: 35.8 % (ref 19.6–45.3)
MCH RBC QN AUTO: 29.2 PG (ref 26.6–33)
MCHC RBC AUTO-ENTMCNC: 32 G/DL (ref 31.5–35.7)
MCV RBC AUTO: 91.2 FL (ref 79–97)
MONOCYTES # BLD AUTO: 0.48 10*3/MM3 (ref 0.1–0.9)
MONOCYTES NFR BLD AUTO: 6 % (ref 5–12)
NEUTROPHILS NFR BLD AUTO: 4.56 10*3/MM3 (ref 1.7–7)
NEUTROPHILS NFR BLD AUTO: 56.6 % (ref 42.7–76)
NITRITE UR QL STRIP: NEGATIVE
NRBC BLD AUTO-RTO: 0 /100 WBC (ref 0–0.2)
PH UR STRIP.AUTO: 6 [PH] (ref 5–8)
PLATELET # BLD AUTO: 292 10*3/MM3 (ref 140–450)
PMV BLD AUTO: 10.5 FL (ref 6–12)
POTASSIUM SERPL-SCNC: 4.2 MMOL/L (ref 3.5–5.2)
PROT SERPL-MCNC: 7 G/DL (ref 6–8.5)
PROT UR QL STRIP: NEGATIVE
PROTHROMBIN TIME: 12.2 SECONDS (ref 11.9–14.6)
RBC # BLD AUTO: 4.42 10*6/MM3 (ref 3.77–5.28)
RBC # UR: ABNORMAL /HPF
REF LAB TEST METHOD: ABNORMAL
SODIUM SERPL-SCNC: 141 MMOL/L (ref 136–145)
SP GR UR STRIP: 1.01 (ref 1–1.03)
SQUAMOUS #/AREA URNS HPF: ABNORMAL /HPF
UROBILINOGEN UR QL STRIP: ABNORMAL
WBC # BLD AUTO: 8.05 10*3/MM3 (ref 3.4–10.8)
WBC UR QL AUTO: ABNORMAL /HPF

## 2020-11-12 PROCEDURE — 85730 THROMBOPLASTIN TIME PARTIAL: CPT

## 2020-11-12 PROCEDURE — 85025 COMPLETE CBC W/AUTO DIFF WBC: CPT

## 2020-11-12 PROCEDURE — 87086 URINE CULTURE/COLONY COUNT: CPT

## 2020-11-12 PROCEDURE — 93010 ELECTROCARDIOGRAM REPORT: CPT | Performed by: INTERNAL MEDICINE

## 2020-11-12 PROCEDURE — 71045 X-RAY EXAM CHEST 1 VIEW: CPT

## 2020-11-12 PROCEDURE — 81001 URINALYSIS AUTO W/SCOPE: CPT

## 2020-11-12 PROCEDURE — 36415 COLL VENOUS BLD VENIPUNCTURE: CPT

## 2020-11-12 PROCEDURE — 93005 ELECTROCARDIOGRAM TRACING: CPT

## 2020-11-12 PROCEDURE — 80053 COMPREHEN METABOLIC PANEL: CPT

## 2020-11-12 PROCEDURE — 85610 PROTHROMBIN TIME: CPT

## 2020-11-12 RX ORDER — OMEGA-3S/DHA/EPA/FISH OIL/D3 300MG-1000
1 CAPSULE ORAL DAILY
COMMUNITY
End: 2022-08-30

## 2020-11-12 NOTE — DISCHARGE INSTRUCTIONS
DAY OF SURGERY INSTRUCTIONS        YOUR SURGEON: dr berrios    PROCEDURE: ***Removal Of Instrumentation - N/A Exploration Of Fusion C5-6, Anterior Cervical Discectomy Fusion C4-5, C6-7 Revison Anterior Fusion C5-6 With Instrumentation C4-7 - N/A    DATE OF SURGERY: ***11/20/2020    ARRIVAL TIME: AS DIRECTED BY OFFICE    YOU MAY TAKE THE FOLLOWING MEDICATION(S) THE MORNING OF SURGERY WITH A SIP OF WATER: ***no medicines    ALL OTHER HOME MEDICATION CHECK WITH YOUR PHYSICIAN                            MANAGING PAIN AFTER SURGERY    We know you are probably wondering what your pain will be like after surgery.  Following surgery it is unrealistic to expect you will not have pain.   Pain is how our bodies let us know that something is wrong or cautions us to be careful.  That said, our goal is to make your pain tolerable.    Methods we may use to treat your pain include (oral or IV medications, PCAs, epidurals, nerve blocks, etc.)   While some procedures require IV pain medications for a short time after surgery, transitioning to pain medications by mouth allows for better management of pain.   Your nurse will encourage you to take oral pain medications whenever possible.  IV medications work almost immediately, but only last a short while.  Taking medications by mouth allows for a more constant level of medication in your blood stream for a longer period of time.      Once your pain is out of control it is harder to get back under control.  It is important you are aware when your next dose of pain medication is due.  If you are admitted, your nurse may write the time of your next dose on the white board in your room to help you remember.      We are interested in your pain and encourage you to inform us about aggravating factors during your visit.   Many times a simple repositioning every few hours can make a big difference.    If your physician says it is okay, do not let your pain prevent you from getting out of  bed. Be sure to call your nurse for assistance prior to getting up so you do not fall.      Before surgery, please decide your tolerable pain goal.  These faces help describe the pain ratings we use on a 0-10 scale.   Be prepared to tell us your goal and whether or not you take pain or anxiety medications at home.          BEFORE YOU COME TO THE HOSPITAL  (Pre-op instructions)  • Do not eat, drink, smoke or chew gum after midnight the night before surgery.  This also includes no mints.  • Morning of surgery take only the medicines you have been instructed with a sip of water unless otherwise instructed  by your physician.  • Do not shave, wear makeup or dark nail polish.  • Remove all jewelry including rings.  • Leave anything you consider valuable at home.  • Leave your suitcase in the car until after your surgery.  • Bring the following with you if applicable:  o Picture ID and insurance, Medicare or Medicaid cards  o Co-pay/deductible required by insurance (cash, check, credit card)  o Copy of advance directive, living will or power-of- documents if not brought to PAT  o CPAP or BIPAP mask and tubing  o Relaxation aids ( book, magazine), etc.  o Hearing aids                        ON THE DAY OF SURGERY  · On the day of surgery check in at registration located at the main entrance of the hospital.   ? You will be registered and given a beeper with instructions where to wait in the main lobby.  ? When your beeper lights up and vibrates a member of the Outpatient Surgery staff will meet you at the double doors under the stair steps and escort you to your preoperative room.   · You may have cloth compression devices placed on your legs. These help to prevent blood clots and reduce swelling in your legs.  · An IV may be inserted into one of your veins.  · In the operating room, you may be given one or more of the following:  ? A medicine to help you relax (sedative).  ? A medicine to numb the area (local  "anesthetic).  ? A medicine to make you fall asleep (general anesthetic).  ? A medicine that is injected into an area of your body to numb everything below the injection site (regional anesthetic).  · Your surgical site will be marked or identified.  · You may be given an antibiotic through your IV to help prevent infection.  Contact a health care provider if you:  · Develop a fever of more than 100.4°F (38°C) or other feelings of illness during the 48 hours before your surgery.  · Have symptoms that get worse.  Have questions or concerns about your surgery    General Anesthesia/Surgery, Adult  General anesthesia is the use of medicines to make a person \"go to sleep\" (unconscious) for a medical procedure. General anesthesia must be used for certain procedures, and is often recommended for procedures that:  · Last a long time.  · Require you to be still or in an unusual position.  · Are major and can cause blood loss.  The medicines used for general anesthesia are called general anesthetics. As well as making you unconscious for a certain amount of time, these medicines:  · Prevent pain.  · Control your blood pressure.  · Relax your muscles.  Tell a health care provider about:  · Any allergies you have.  · All medicines you are taking, including vitamins, herbs, eye drops, creams, and over-the-counter medicines.  · Any problems you or family members have had with anesthetic medicines.  · Types of anesthetics you have had in the past.  · Any blood disorders you have.  · Any surgeries you have had.  · Any medical conditions you have.  · Any recent upper respiratory, chest, or ear infections.  · Any history of:  ? Heart or lung conditions, such as heart failure, sleep apnea, asthma, or chronic obstructive pulmonary disease (COPD).  ?  service.  ? Depression or anxiety.  · Any tobacco or drug use, including marijuana or alcohol use.  · Whether you are pregnant or may be pregnant.  What are the risks?  Generally, " this is a safe procedure. However, problems may occur, including:  · Allergic reaction.  · Lung and heart problems.  · Inhaling food or liquid from the stomach into the lungs (aspiration).  · Nerve injury.  · Air in the bloodstream, which can lead to stroke.  · Extreme agitation or confusion (delirium) when you wake up from the anesthetic.  · Waking up during your procedure and being unable to move. This is rare.  These problems are more likely to develop if you are having a major surgery or if you have an advanced or serious medical condition. You can prevent some of these complications by answering all of your health care provider's questions thoroughly and by following all instructions before your procedure.  General anesthesia can cause side effects, including:  · Nausea or vomiting.  · A sore throat from the breathing tube.  · Hoarseness.  · Wheezing or coughing.  · Shaking chills.  · Tiredness.  · Body aches.  · Anxiety.  · Sleepiness or drowsiness.  · Confusion or agitation.  RISKS AND COMPLICATIONS OF SURGERY  Your health care provider will discuss possible risks and complications with you before surgery. Common risks and complications include:    · Problems due to the use of anesthetics.  · Blood loss and replacement (does not apply to minor surgical procedures).  · Temporary increase in pain due to surgery.  · Uncorrected pain or problems that the surgery was meant to correct.  · Infection.  · New damage.    What happens before the procedure?    Medicines  Ask your health care provider about:  · Changing or stopping your regular medicines. This is especially important if you are taking diabetes medicines or blood thinners.  · Taking medicines such as aspirin and ibuprofen. These medicines can thin your blood. Do not take these medicines unless your health care provider tells you to take them.  · Taking over-the-counter medicines, vitamins, herbs, and supplements. Do not take these during the week before  your procedure unless your health care provider approves them.  General instructions  · Starting 3-6 weeks before the procedure, do not use any products that contain nicotine or tobacco, such as cigarettes and e-cigarettes. If you need help quitting, ask your health care provider.  · If you brush your teeth on the morning of the procedure, make sure to spit out all of the toothpaste.  · Tell your health care provider if you become ill or develop a cold, cough, or fever.  · If instructed by your health care provider, bring your sleep apnea device with you on the day of your surgery (if applicable).  · Ask your health care provider if you will be going home the same day, the following day, or after a longer hospital stay.  ? Plan to have someone take you home from the hospital or clinic.  ? Plan to have a responsible adult care for you for at least 24 hours after you leave the hospital or clinic. This is important.  What happens during the procedure?  · You will be given anesthetics through both of the following:  ? A mask placed over your nose and mouth.  ? An IV in one of your veins.  · You may receive a medicine to help you relax (sedative).  · After you are unconscious, a breathing tube may be inserted down your throat to help you breathe. This will be removed before you wake up.  · An anesthesia specialist will stay with you throughout your procedure. He or she will:  ? Keep you comfortable and safe by continuing to give you medicines and adjusting the amount of medicine that you get.  ? Monitor your blood pressure, pulse, and oxygen levels to make sure that the anesthetics do not cause any problems.  The procedure may vary among health care providers and hospitals.  What happens after the procedure?  · Your blood pressure, temperature, heart rate, breathing rate, and blood oxygen level will be monitored until the medicines you were given have worn off.  · You will wake up in a recovery area. You may wake up  slowly.  · If you feel anxious or agitated, you may be given medicine to help you calm down.  · If you will be going home the same day, your health care provider may check to make sure you can walk, drink, and urinate.  · Your health care provider will treat any pain or side effects you have before you go home.  · Do not drive for 24 hours if you were given a sedative.  Summary  · General anesthesia is used to keep you still and prevent pain during a procedure.  · It is important to tell your healthcare provider about your medical history and any surgeries you have had, and previous experience with anesthesia.  · Follow your healthcare provider’s instructions about when to stop eating, drinking, or taking certain medicines before your procedure.  · Plan to have someone take you home from the hospital or clinic.  This information is not intended to replace advice given to you by your health care provider. Make sure you discuss any questions you have with your health care provider.  Document Released: 03/26/2009 Document Revised: 08/03/2018 Document Reviewed: 08/03/2018  [employer] Interactive Patient Education © 2019 [employer]       Fall Prevention in Hospitals, Adult  As a hospital patient, your condition and the treatments you receive can increase your risk for falls. Some additional risk factors for falls in a hospital include:  · Being in an unfamiliar environment.  · Being on bed rest.  · Your surgery.  · Taking certain medicines.  · Your tubing requirements, such as intravenous (IV) therapy or catheters.  It is important that you learn how to decrease fall risks while at the hospital. Below are important tips that can help prevent falls.  SAFETY TIPS FOR PREVENTING FALLS  Talk about your risk of falling.  · Ask your health care provider why you are at risk for falling. Is it your medicine, illness, tubing placement, or something else?  · Make a plan with your health care provider to keep you safe from  falls.  · Ask your health care provider or pharmacist about side effects of your medicines. Some medicines can make you dizzy or affect your coordination.  Ask for help.  · Ask for help before getting out of bed. You may need to press your call button.  · Ask for assistance in getting safely to the toilet.  · Ask for a walker or cane to be put at your bedside. Ask that most of the side rails on your bed be placed up before your health care provider leaves the room.  · Ask family or friends to sit with you.  · Ask for things that are out of your reach, such as your glasses, hearing aids, telephone, bedside table, or call button.  Follow these tips to avoid falling:  · Stay lying or seated, rather than standing, while waiting for help.  · Wear rubber-soled slippers or shoes whenever you walk in the hospital.  · Avoid quick, sudden movements.  ¨ Change positions slowly.  ¨ Sit on the side of your bed before standing.  ¨ Stand up slowly and wait before you start to walk.  · Let your health care provider know if there is a spill on the floor.  · Pay careful attention to the medical equipment, electrical cords, and tubes around you.  · When you need help, use your call button by your bed or in the bathroom. Wait for one of your health care providers to help you.  · If you feel dizzy or unsure of your footing, return to bed and wait for assistance.  · Avoid being distracted by the TV, telephone, or another person in your room.  · Do not lean or support yourself on rolling objects, such as IV poles or bedside tables.     This information is not intended to replace advice given to you by your health care provider. Make sure you discuss any questions you have with your health care provider.     Document Released: 12/15/2001 Document Revised: 01/08/2016 Document Reviewed: 08/25/2013  [employer] Interactive Patient Education ©2016 [employer]       [facility] 4% Patient Instruction Sheet    Chlorhexidine Before  Surgery  Chlorhexidine gluconate (CHG) is a germ-killing (antiseptic) solution that is used to clean the skin. It gets rid of the bacteria that normally live on the skin. Cleaning your skin with CHG before surgery helps lower the risk for infection after surgery.    How to use CHG solution  · You will take 2 showers, one shower the night before surgery, the second shower the morning of surgery before coming to the hospital.  · Use CHG only as told by your health care provider, and follow the instructions on the label.  · Use CHG solution while taking a shower. Follow these steps when using CHG solution (unless your health care provider gives you different instructions):  1. Start the shower.  2. Use your normal soap and shampoo to wash your face and hair.  3. Turn off the shower or move out of the shower stream.  4. Pour the CHG onto a clean washcloth. Do not use any type of brush or rough-edged sponge.  5. Starting at your neck, lather your body down to your toes. Make sure you:  6. Pay special attention to the part of your body where you will be having surgery. Scrub this area for at least 1 minute.  7. Use the full amount of CHG as directed. Usually, this is one half bottle for each shower.  8. Do not use CHG on your head or face. If the solution gets into your ears or eyes, rinse them well with water.  9. Avoid your genital area.  10. Avoid any areas of skin that have broken skin, cuts, or scrapes.  11. Scrub your back and under your arms. Make sure to wash skin folds.  12. Let the lather sit on your skin for 1-2 minutes or as long as told by your health care  provider.  13. Thoroughly rinse your entire body in the shower. Make sure that all body creases and crevices are rinsed well.  14. Dry off with a clean towel. Do not put any substances on your body afterward, such as powder, lotion, or perfume.  15. Put on clean clothes or pajamas.  16. If it is the night before your surgery, sleep in clean sheets.    What  are the risks?  Risks of using CHG include:  · A skin reaction.  · Hearing loss, if CHG gets in your ears.  · Eye injury, if CHG gets in your eyes and is not rinsed out.  · The CHG product catching fire.  Make sure that you avoid smoking and flames after applying CHG to your skin.  Do not use CHG:  · If you have a chlorhexidine allergy or have previously reacted to chlorhexidine.  · On babies younger than 2 months of age.      On the day of surgery, when you are taken to your room in Outpatient Surgery you will be given a CHG prepackaged cloth to wipe the site for your surgery.  How to use CHG prepackaged cloths  · Follow the instructions on the label.  · Use the CHG cloth on clean, dry skin. Follow these steps when using a CHG cloth (unless your health care provider gives you different instructions):  1. Using the CHG cloth, vigorously scrub the part of your body where you will be having surgery. Scrub using a back-and-forth motion for 3 minutes. The area on your body should be completely wet with CHG when you are finished scrubbing.  2. Do not rinse. Discard the cloth and let the area air-dry for 1 minute. Do not put any substances on your body afterward, such as powder, lotion, or perfume.  Contact a health care provider if:  · Your skin gets irritated after scrubbing.  · You have questions about using your solution or cloth.  Get help right away if:  · Your eyes become very red or swollen.  · Your eyes itch badly.  · Your skin itches badly and is red or swollen.  · Your hearing changes.  · You have trouble seeing.  · You have swelling or tingling in your mouth or throat.  · You have trouble breathing.  · You swallow any chlorhexidine.  Summary  · Chlorhexidine gluconate (CHG) is a germ-killing (antiseptic) solution that is used to clean the skin. Cleaning your skin with CHG before surgery helps lower the risk for infection after surgery.  · You may be given CHG to use at home. It may be in a bottle or in a  prepackaged cloth to use on your skin. Carefully follow your health care provider's instructions and the instructions on the product label.  · Do not use CHG if you have a chlorhexidine allergy.  · Contact your health care provider if your skin gets irritated after scrubbing.  This information is not intended to replace advice given to you by your health care provider. Make sure you discuss any questions you have with your health care provider.  Document Released: 09/11/2013 Document Revised: 11/15/2018 Document Reviewed: 11/15/2018  [employer] Interactive Patient Education © 2019 [employer]          PATIENT/FAMILY/RESPONSIBLE PARTY VERBALIZES UNDERSTANDING OF ABOVE EDUCATION.  COPY OF PAIN SCALE GIVEN AND REVIEWED WITH VERBALIZED UNDERSTANDING.

## 2020-11-13 LAB
QT INTERVAL: 386 MS
QTC INTERVAL: 442 MS

## 2020-11-14 LAB — BACTERIA SPEC AEROBE CULT: NO GROWTH

## 2020-11-17 ENCOUNTER — LAB (OUTPATIENT)
Dept: LAB | Facility: HOSPITAL | Age: 65
End: 2020-11-17

## 2020-11-17 PROCEDURE — C9803 HOPD COVID-19 SPEC COLLECT: HCPCS | Performed by: ORTHOPAEDIC SURGERY

## 2020-11-17 PROCEDURE — U0003 INFECTIOUS AGENT DETECTION BY NUCLEIC ACID (DNA OR RNA); SEVERE ACUTE RESPIRATORY SYNDROME CORONAVIRUS 2 (SARS-COV-2) (CORONAVIRUS DISEASE [COVID-19]), AMPLIFIED PROBE TECHNIQUE, MAKING USE OF HIGH THROUGHPUT TECHNOLOGIES AS DESCRIBED BY CMS-2020-01-R: HCPCS | Performed by: ORTHOPAEDIC SURGERY

## 2020-11-17 RX ORDER — AMITRIPTYLINE HYDROCHLORIDE 100 MG/1
TABLET, FILM COATED ORAL
Qty: 180 TABLET | Refills: 0 | Status: SHIPPED | OUTPATIENT
Start: 2020-11-17 | End: 2021-03-30 | Stop reason: SDUPTHER

## 2020-11-17 RX ORDER — AMITRIPTYLINE HYDROCHLORIDE 100 MG/1
TABLET, FILM COATED ORAL
Qty: 180 TABLET | Refills: 0 | Status: SHIPPED | OUTPATIENT
Start: 2020-11-17 | End: 2020-11-17

## 2020-11-19 LAB
COVID LABCORP PRIORITY: NORMAL
SARS-COV-2 RNA RESP QL NAA+PROBE: NOT DETECTED

## 2020-11-20 ENCOUNTER — ANESTHESIA (OUTPATIENT)
Dept: PERIOP | Facility: HOSPITAL | Age: 65
End: 2020-11-20

## 2020-11-20 ENCOUNTER — ANESTHESIA EVENT (OUTPATIENT)
Dept: PERIOP | Facility: HOSPITAL | Age: 65
End: 2020-11-20

## 2020-11-20 ENCOUNTER — APPOINTMENT (OUTPATIENT)
Dept: GENERAL RADIOLOGY | Facility: HOSPITAL | Age: 65
End: 2020-11-20

## 2020-11-20 ENCOUNTER — HOSPITAL ENCOUNTER (INPATIENT)
Facility: HOSPITAL | Age: 65
LOS: 2 days | Discharge: HOME | End: 2020-11-22
Attending: ORTHOPAEDIC SURGERY | Admitting: ORTHOPAEDIC SURGERY

## 2020-11-20 DIAGNOSIS — Z78.9 DECREASED ACTIVITIES OF DAILY LIVING (ADL): ICD-10-CM

## 2020-11-20 DIAGNOSIS — Z74.09 IMPAIRED MOBILITY: ICD-10-CM

## 2020-11-20 DIAGNOSIS — M48.02 STENOSIS, CERVICAL SPINE: Primary | ICD-10-CM

## 2020-11-20 LAB
ABO GROUP BLD: NORMAL
BLD GP AB SCN SERPL QL: NEGATIVE
GLUCOSE BLDC GLUCOMTR-MCNC: 105 MG/DL (ref 70–130)
GLUCOSE BLDC GLUCOMTR-MCNC: 148 MG/DL (ref 70–130)
GLUCOSE BLDC GLUCOMTR-MCNC: 164 MG/DL (ref 70–130)
GLUCOSE BLDC GLUCOMTR-MCNC: 169 MG/DL (ref 70–130)
GLUCOSE BLDC GLUCOMTR-MCNC: 99 MG/DL (ref 70–130)
RH BLD: POSITIVE
T&S EXPIRATION DATE: NORMAL

## 2020-11-20 PROCEDURE — 25010000002 PROPOFOL 10 MG/ML EMULSION: Performed by: NURSE ANESTHETIST, CERTIFIED REGISTERED

## 2020-11-20 PROCEDURE — 0PP304Z REMOVAL OF INTERNAL FIXATION DEVICE FROM CERVICAL VERTEBRA, OPEN APPROACH: ICD-10-PCS | Performed by: ORTHOPAEDIC SURGERY

## 2020-11-20 PROCEDURE — 0RG20A0 FUSION OF 2 OR MORE CERVICAL VERTEBRAL JOINTS WITH INTERBODY FUSION DEVICE, ANTERIOR APPROACH, ANTERIOR COLUMN, OPEN APPROACH: ICD-10-PCS | Performed by: ORTHOPAEDIC SURGERY

## 2020-11-20 PROCEDURE — 25010000003 HYDROMORPHONE 1 MG/ML SOLUTION: Performed by: ORTHOPAEDIC SURGERY

## 2020-11-20 PROCEDURE — 0RG2070 FUSION OF 2 OR MORE CERVICAL VERTEBRAL JOINTS WITH AUTOLOGOUS TISSUE SUBSTITUTE, ANTERIOR APPROACH, ANTERIOR COLUMN, OPEN APPROACH: ICD-10-PCS | Performed by: ORTHOPAEDIC SURGERY

## 2020-11-20 PROCEDURE — 94799 UNLISTED PULMONARY SVC/PX: CPT

## 2020-11-20 PROCEDURE — C1713 ANCHOR/SCREW BN/BN,TIS/BN: HCPCS | Performed by: ORTHOPAEDIC SURGERY

## 2020-11-20 PROCEDURE — L0174 CERV SR 2PC THOR EXT PRE OTS: HCPCS | Performed by: ORTHOPAEDIC SURGERY

## 2020-11-20 PROCEDURE — 25010000002 FENTANYL CITRATE (PF) 100 MCG/2ML SOLUTION: Performed by: ANESTHESIOLOGY

## 2020-11-20 PROCEDURE — P9612 CATHETERIZE FOR URINE SPEC: HCPCS

## 2020-11-20 PROCEDURE — 82962 GLUCOSE BLOOD TEST: CPT

## 2020-11-20 PROCEDURE — 86901 BLOOD TYPING SEROLOGIC RH(D): CPT | Performed by: ORTHOPAEDIC SURGERY

## 2020-11-20 PROCEDURE — 25010000002 HYDROMORPHONE PER 4 MG: Performed by: ANESTHESIOLOGY

## 2020-11-20 PROCEDURE — 86850 RBC ANTIBODY SCREEN: CPT | Performed by: ORTHOPAEDIC SURGERY

## 2020-11-20 PROCEDURE — 0RB30ZZ EXCISION OF CERVICAL VERTEBRAL DISC, OPEN APPROACH: ICD-10-PCS | Performed by: ORTHOPAEDIC SURGERY

## 2020-11-20 PROCEDURE — 25010000002 ONDANSETRON PER 1 MG: Performed by: NURSE ANESTHETIST, CERTIFIED REGISTERED

## 2020-11-20 PROCEDURE — 72040 X-RAY EXAM NECK SPINE 2-3 VW: CPT

## 2020-11-20 PROCEDURE — 25010000002 ONDANSETRON PER 1 MG: Performed by: ORTHOPAEDIC SURGERY

## 2020-11-20 PROCEDURE — 25010000002 SUCCINYLCHOLINE PER 20 MG: Performed by: NURSE ANESTHETIST, CERTIFIED REGISTERED

## 2020-11-20 PROCEDURE — 86900 BLOOD TYPING SEROLOGIC ABO: CPT | Performed by: ORTHOPAEDIC SURGERY

## 2020-11-20 PROCEDURE — 76000 FLUOROSCOPY <1 HR PHYS/QHP: CPT

## 2020-11-20 DEVICE — IMPLANTABLE DEVICE: Type: IMPLANTABLE DEVICE | Status: FUNCTIONAL

## 2020-11-20 DEVICE — INTERBODY FUSION DEVICE
Type: IMPLANTABLE DEVICE | Status: FUNCTIONAL
Brand: FORTILINK-C IBF SYSTEM

## 2020-11-20 DEVICE — KT HEMOST ABS SURGIFOAM PORCN 1GRAM: Type: IMPLANTABLE DEVICE | Status: FUNCTIONAL

## 2020-11-20 DEVICE — SCRW VARI SLF/DRL 4X14MM: Type: IMPLANTABLE DEVICE | Status: FUNCTIONAL

## 2020-11-20 DEVICE — BONE CANC CRUSHED 5CC 1TO4MM: Type: IMPLANTABLE DEVICE | Status: FUNCTIONAL

## 2020-11-20 DEVICE — ALLOGRFT FLD BIOBURST 1ML: Type: IMPLANTABLE DEVICE | Status: FUNCTIONAL

## 2020-11-20 RX ORDER — PROPOFOL 10 MG/ML
VIAL (ML) INTRAVENOUS AS NEEDED
Status: DISCONTINUED | OUTPATIENT
Start: 2020-11-20 | End: 2020-11-20 | Stop reason: SURG

## 2020-11-20 RX ORDER — DEXTROSE MONOHYDRATE 25 G/50ML
12.5 INJECTION, SOLUTION INTRAVENOUS AS NEEDED
Status: DISCONTINUED | OUTPATIENT
Start: 2020-11-20 | End: 2020-11-20 | Stop reason: HOSPADM

## 2020-11-20 RX ORDER — OXYCODONE HCL 20 MG/1
20 TABLET, FILM COATED, EXTENDED RELEASE ORAL ONCE
Status: COMPLETED | OUTPATIENT
Start: 2020-11-20 | End: 2020-11-20

## 2020-11-20 RX ORDER — SODIUM CHLORIDE 0.9 % (FLUSH) 0.9 %
10 SYRINGE (ML) INJECTION EVERY 12 HOURS SCHEDULED
Status: DISCONTINUED | OUTPATIENT
Start: 2020-11-20 | End: 2020-11-20 | Stop reason: HOSPADM

## 2020-11-20 RX ORDER — MAGNESIUM HYDROXIDE 1200 MG/15ML
LIQUID ORAL AS NEEDED
Status: DISCONTINUED | OUTPATIENT
Start: 2020-11-20 | End: 2020-11-20 | Stop reason: HOSPADM

## 2020-11-20 RX ORDER — ONDANSETRON 4 MG/1
4 TABLET, FILM COATED ORAL EVERY 6 HOURS PRN
Status: DISCONTINUED | OUTPATIENT
Start: 2020-11-20 | End: 2020-11-22 | Stop reason: HOSPADM

## 2020-11-20 RX ORDER — OXYCODONE AND ACETAMINOPHEN 10; 325 MG/1; MG/1
1 TABLET ORAL EVERY 4 HOURS PRN
Status: DISCONTINUED | OUTPATIENT
Start: 2020-11-20 | End: 2020-11-21

## 2020-11-20 RX ORDER — SUFENTANIL CITRATE 50 UG/ML
INJECTION EPIDURAL; INTRAVENOUS AS NEEDED
Status: DISCONTINUED | OUTPATIENT
Start: 2020-11-20 | End: 2020-11-20 | Stop reason: SURG

## 2020-11-20 RX ORDER — ONDANSETRON 2 MG/ML
4 INJECTION INTRAMUSCULAR; INTRAVENOUS EVERY 6 HOURS PRN
Status: DISCONTINUED | OUTPATIENT
Start: 2020-11-20 | End: 2020-11-20 | Stop reason: SDUPTHER

## 2020-11-20 RX ORDER — SODIUM CHLORIDE 9 MG/ML
INJECTION, SOLUTION INTRAVENOUS AS NEEDED
Status: DISCONTINUED | OUTPATIENT
Start: 2020-11-20 | End: 2020-11-20 | Stop reason: HOSPADM

## 2020-11-20 RX ORDER — MIDAZOLAM HYDROCHLORIDE 1 MG/ML
1 INJECTION INTRAMUSCULAR; INTRAVENOUS
Status: DISCONTINUED | OUTPATIENT
Start: 2020-11-20 | End: 2020-11-20 | Stop reason: HOSPADM

## 2020-11-20 RX ORDER — FLUMAZENIL 0.1 MG/ML
0.2 INJECTION INTRAVENOUS AS NEEDED
Status: DISCONTINUED | OUTPATIENT
Start: 2020-11-20 | End: 2020-11-20 | Stop reason: HOSPADM

## 2020-11-20 RX ORDER — FAMOTIDINE 20 MG/1
20 TABLET, FILM COATED ORAL EVERY 12 HOURS SCHEDULED
Status: DISCONTINUED | OUTPATIENT
Start: 2020-11-20 | End: 2020-11-22 | Stop reason: HOSPADM

## 2020-11-20 RX ORDER — SODIUM CHLORIDE 0.9 % (FLUSH) 0.9 %
10 SYRINGE (ML) INJECTION AS NEEDED
Status: DISCONTINUED | OUTPATIENT
Start: 2020-11-20 | End: 2020-11-20 | Stop reason: HOSPADM

## 2020-11-20 RX ORDER — ONDANSETRON 2 MG/ML
4 INJECTION INTRAMUSCULAR; INTRAVENOUS EVERY 6 HOURS PRN
Status: DISCONTINUED | OUTPATIENT
Start: 2020-11-20 | End: 2020-11-22 | Stop reason: HOSPADM

## 2020-11-20 RX ORDER — LIDOCAINE HYDROCHLORIDE 10 MG/ML
0.5 INJECTION, SOLUTION EPIDURAL; INFILTRATION; INTRACAUDAL; PERINEURAL ONCE AS NEEDED
Status: DISCONTINUED | OUTPATIENT
Start: 2020-11-20 | End: 2020-11-20 | Stop reason: HOSPADM

## 2020-11-20 RX ORDER — SODIUM CHLORIDE 0.9 % (FLUSH) 0.9 %
10 SYRINGE (ML) INJECTION AS NEEDED
Status: DISCONTINUED | OUTPATIENT
Start: 2020-11-20 | End: 2020-11-22 | Stop reason: HOSPADM

## 2020-11-20 RX ORDER — SODIUM CHLORIDE, SODIUM LACTATE, POTASSIUM CHLORIDE, CALCIUM CHLORIDE 600; 310; 30; 20 MG/100ML; MG/100ML; MG/100ML; MG/100ML
9 INJECTION, SOLUTION INTRAVENOUS CONTINUOUS
Status: DISCONTINUED | OUTPATIENT
Start: 2020-11-20 | End: 2020-11-20 | Stop reason: SDUPTHER

## 2020-11-20 RX ORDER — NALOXONE HCL 0.4 MG/ML
0.04 VIAL (ML) INJECTION AS NEEDED
Status: DISCONTINUED | OUTPATIENT
Start: 2020-11-20 | End: 2020-11-20 | Stop reason: HOSPADM

## 2020-11-20 RX ORDER — SODIUM CHLORIDE 0.9 % (FLUSH) 0.9 %
3 SYRINGE (ML) INJECTION EVERY 12 HOURS SCHEDULED
Status: DISCONTINUED | OUTPATIENT
Start: 2020-11-20 | End: 2020-11-22 | Stop reason: HOSPADM

## 2020-11-20 RX ORDER — HYDROMORPHONE HYDROCHLORIDE 1 MG/ML
0.5 INJECTION, SOLUTION INTRAMUSCULAR; INTRAVENOUS; SUBCUTANEOUS
Status: DISCONTINUED | OUTPATIENT
Start: 2020-11-20 | End: 2020-11-20 | Stop reason: HOSPADM

## 2020-11-20 RX ORDER — OMEGA-3S/DHA/EPA/FISH OIL/D3 300MG-1000
400 CAPSULE ORAL DAILY
Status: DISCONTINUED | OUTPATIENT
Start: 2020-11-20 | End: 2020-11-22 | Stop reason: HOSPADM

## 2020-11-20 RX ORDER — IBUPROFEN 600 MG/1
600 TABLET ORAL ONCE AS NEEDED
Status: DISCONTINUED | OUTPATIENT
Start: 2020-11-20 | End: 2020-11-20 | Stop reason: HOSPADM

## 2020-11-20 RX ORDER — TIZANIDINE 4 MG/1
4 TABLET ORAL EVERY 8 HOURS PRN
Status: DISCONTINUED | OUTPATIENT
Start: 2020-11-20 | End: 2020-11-21

## 2020-11-20 RX ORDER — SODIUM CHLORIDE 9 MG/ML
75 INJECTION, SOLUTION INTRAVENOUS CONTINUOUS
Status: DISCONTINUED | OUTPATIENT
Start: 2020-11-20 | End: 2020-11-22 | Stop reason: HOSPADM

## 2020-11-20 RX ORDER — CLINDAMYCIN PHOSPHATE 900 MG/50ML
900 INJECTION INTRAVENOUS ONCE
Status: COMPLETED | OUTPATIENT
Start: 2020-11-20 | End: 2020-11-20

## 2020-11-20 RX ORDER — OXYCODONE AND ACETAMINOPHEN 10; 325 MG/1; MG/1
1 TABLET ORAL ONCE AS NEEDED
Status: DISCONTINUED | OUTPATIENT
Start: 2020-11-20 | End: 2020-11-20 | Stop reason: HOSPADM

## 2020-11-20 RX ORDER — ONDANSETRON 2 MG/ML
INJECTION INTRAMUSCULAR; INTRAVENOUS AS NEEDED
Status: DISCONTINUED | OUTPATIENT
Start: 2020-11-20 | End: 2020-11-20 | Stop reason: SURG

## 2020-11-20 RX ORDER — SODIUM CHLORIDE, SODIUM LACTATE, POTASSIUM CHLORIDE, CALCIUM CHLORIDE 600; 310; 30; 20 MG/100ML; MG/100ML; MG/100ML; MG/100ML
1000 INJECTION, SOLUTION INTRAVENOUS CONTINUOUS
Status: DISCONTINUED | OUTPATIENT
Start: 2020-11-20 | End: 2020-11-20 | Stop reason: SDUPTHER

## 2020-11-20 RX ORDER — FAMOTIDINE 10 MG/ML
20 INJECTION, SOLUTION INTRAVENOUS EVERY 12 HOURS SCHEDULED
Status: DISCONTINUED | OUTPATIENT
Start: 2020-11-20 | End: 2020-11-22 | Stop reason: HOSPADM

## 2020-11-20 RX ORDER — ROCURONIUM BROMIDE 10 MG/ML
INJECTION, SOLUTION INTRAVENOUS AS NEEDED
Status: DISCONTINUED | OUTPATIENT
Start: 2020-11-20 | End: 2020-11-20 | Stop reason: SURG

## 2020-11-20 RX ORDER — FENTANYL CITRATE 50 UG/ML
25 INJECTION, SOLUTION INTRAMUSCULAR; INTRAVENOUS
Status: DISCONTINUED | OUTPATIENT
Start: 2020-11-20 | End: 2020-11-20 | Stop reason: HOSPADM

## 2020-11-20 RX ORDER — SODIUM CHLORIDE, SODIUM LACTATE, POTASSIUM CHLORIDE, CALCIUM CHLORIDE 600; 310; 30; 20 MG/100ML; MG/100ML; MG/100ML; MG/100ML
100 INJECTION, SOLUTION INTRAVENOUS CONTINUOUS PRN
Status: DISCONTINUED | OUTPATIENT
Start: 2020-11-20 | End: 2020-11-20 | Stop reason: SDUPTHER

## 2020-11-20 RX ORDER — SUCCINYLCHOLINE CHLORIDE 20 MG/ML
INJECTION INTRAMUSCULAR; INTRAVENOUS AS NEEDED
Status: DISCONTINUED | OUTPATIENT
Start: 2020-11-20 | End: 2020-11-20 | Stop reason: SURG

## 2020-11-20 RX ORDER — LABETALOL HYDROCHLORIDE 5 MG/ML
5 INJECTION, SOLUTION INTRAVENOUS
Status: DISCONTINUED | OUTPATIENT
Start: 2020-11-20 | End: 2020-11-20 | Stop reason: HOSPADM

## 2020-11-20 RX ORDER — SODIUM CHLORIDE 9 MG/ML
75 INJECTION, SOLUTION INTRAVENOUS CONTINUOUS
Status: DISPENSED | OUTPATIENT
Start: 2020-11-20 | End: 2020-11-21

## 2020-11-20 RX ORDER — CLINDAMYCIN PHOSPHATE 900 MG/50ML
900 INJECTION INTRAVENOUS EVERY 8 HOURS
Status: COMPLETED | OUTPATIENT
Start: 2020-11-20 | End: 2020-11-21

## 2020-11-20 RX ORDER — ONDANSETRON 2 MG/ML
4 INJECTION INTRAMUSCULAR; INTRAVENOUS AS NEEDED
Status: DISCONTINUED | OUTPATIENT
Start: 2020-11-20 | End: 2020-11-20 | Stop reason: HOSPADM

## 2020-11-20 RX ORDER — DIPHENHYDRAMINE HCL 25 MG
25 CAPSULE ORAL NIGHTLY PRN
Status: DISCONTINUED | OUTPATIENT
Start: 2020-11-20 | End: 2020-11-22 | Stop reason: HOSPADM

## 2020-11-20 RX ORDER — FLUTICASONE PROPIONATE 50 MCG
2 SPRAY, SUSPENSION (ML) NASAL 2 TIMES DAILY
Status: DISCONTINUED | OUTPATIENT
Start: 2020-11-20 | End: 2020-11-22 | Stop reason: HOSPADM

## 2020-11-20 RX ADMIN — PROPOFOL 100 MG: 10 INJECTION, EMULSION INTRAVENOUS at 07:04

## 2020-11-20 RX ADMIN — FAMOTIDINE 20 MG: 20 TABLET, FILM COATED ORAL at 20:04

## 2020-11-20 RX ADMIN — FENTANYL CITRATE 25 MCG: 50 INJECTION INTRAMUSCULAR; INTRAVENOUS at 10:25

## 2020-11-20 RX ADMIN — HYDROMORPHONE HYDROCHLORIDE 1 MG: 1 INJECTION, SOLUTION INTRAMUSCULAR; INTRAVENOUS; SUBCUTANEOUS at 17:34

## 2020-11-20 RX ADMIN — OXYCODONE HYDROCHLORIDE AND ACETAMINOPHEN 1 TABLET: 10; 325 TABLET ORAL at 22:15

## 2020-11-20 RX ADMIN — CLINDAMYCIN IN 5 PERCENT DEXTROSE 900 MG: 18 INJECTION, SOLUTION INTRAVENOUS at 15:39

## 2020-11-20 RX ADMIN — ONDANSETRON HYDROCHLORIDE 4 MG: 2 SOLUTION INTRAMUSCULAR; INTRAVENOUS at 20:12

## 2020-11-20 RX ADMIN — HYDROMORPHONE HYDROCHLORIDE 0.5 MG: 1 INJECTION, SOLUTION INTRAMUSCULAR; INTRAVENOUS; SUBCUTANEOUS at 10:20

## 2020-11-20 RX ADMIN — ONDANSETRON HYDROCHLORIDE 4 MG: 2 SOLUTION INTRAMUSCULAR; INTRAVENOUS at 09:53

## 2020-11-20 RX ADMIN — SUCCINYLCHOLINE CHLORIDE 120 MG: 20 INJECTION, SOLUTION INTRAMUSCULAR; INTRAVENOUS at 07:04

## 2020-11-20 RX ADMIN — SODIUM CHLORIDE, POTASSIUM CHLORIDE, SODIUM LACTATE AND CALCIUM CHLORIDE 100 ML/HR: 600; 310; 30; 20 INJECTION, SOLUTION INTRAVENOUS at 06:09

## 2020-11-20 RX ADMIN — HYDROMORPHONE HYDROCHLORIDE 0.5 MG: 1 INJECTION, SOLUTION INTRAMUSCULAR; INTRAVENOUS; SUBCUTANEOUS at 10:10

## 2020-11-20 RX ADMIN — ROCURONIUM BROMIDE 5 MG: 10 INJECTION INTRAVENOUS at 07:04

## 2020-11-20 RX ADMIN — SUFENTANIL CITRATE 15 MCG: 50 INJECTION EPIDURAL; INTRAVENOUS at 07:13

## 2020-11-20 RX ADMIN — SODIUM CHLORIDE, POTASSIUM CHLORIDE, SODIUM LACTATE AND CALCIUM CHLORIDE: 600; 310; 30; 20 INJECTION, SOLUTION INTRAVENOUS at 06:56

## 2020-11-20 RX ADMIN — SODIUM CHLORIDE 75 ML/HR: 9 INJECTION, SOLUTION INTRAVENOUS at 15:39

## 2020-11-20 RX ADMIN — CLINDAMYCIN IN 5 PERCENT DEXTROSE 900 MG: 18 INJECTION, SOLUTION INTRAVENOUS at 07:13

## 2020-11-20 RX ADMIN — SODIUM CHLORIDE, POTASSIUM CHLORIDE, SODIUM LACTATE AND CALCIUM CHLORIDE 9 ML/HR: 600; 310; 30; 20 INJECTION, SOLUTION INTRAVENOUS at 06:54

## 2020-11-20 RX ADMIN — SUFENTANIL CITRATE 25 MCG: 50 INJECTION EPIDURAL; INTRAVENOUS at 07:04

## 2020-11-20 RX ADMIN — OXYCODONE HYDROCHLORIDE AND ACETAMINOPHEN 1 TABLET: 10; 325 TABLET ORAL at 14:32

## 2020-11-20 RX ADMIN — OXYCODONE HYDROCHLORIDE 20 MG: 20 TABLET, FILM COATED, EXTENDED RELEASE ORAL at 05:42

## 2020-11-20 RX ADMIN — SUFENTANIL CITRATE 5 MCG: 50 INJECTION EPIDURAL; INTRAVENOUS at 07:30

## 2020-11-20 RX ADMIN — ONDANSETRON HYDROCHLORIDE 4 MG: 2 SOLUTION INTRAMUSCULAR; INTRAVENOUS at 14:21

## 2020-11-20 RX ADMIN — HYDROMORPHONE HYDROCHLORIDE 0.5 MG: 1 INJECTION, SOLUTION INTRAMUSCULAR; INTRAVENOUS; SUBCUTANEOUS at 10:30

## 2020-11-20 RX ADMIN — METFORMIN HYDROCHLORIDE 500 MG: 500 TABLET ORAL at 17:35

## 2020-11-20 RX ADMIN — PROPOFOL 175 MCG/KG/MIN: 10 INJECTION, EMULSION INTRAVENOUS at 07:07

## 2020-11-20 RX ADMIN — SODIUM CHLORIDE, POTASSIUM CHLORIDE, SODIUM LACTATE AND CALCIUM CHLORIDE 100 ML/HR: 600; 310; 30; 20 INJECTION, SOLUTION INTRAVENOUS at 10:18

## 2020-11-20 RX ADMIN — SODIUM CHLORIDE, PRESERVATIVE FREE 3 ML: 5 INJECTION INTRAVENOUS at 20:04

## 2020-11-20 RX ADMIN — FLUTICASONE PROPIONATE 2 SPRAY: 50 SPRAY, METERED NASAL at 20:04

## 2020-11-20 RX ADMIN — SUFENTANIL CITRATE 5 MCG: 50 INJECTION EPIDURAL; INTRAVENOUS at 09:53

## 2020-11-20 NOTE — ANESTHESIA POSTPROCEDURE EVALUATION
Patient: Sedrick Leahy    Procedure Summary     Date: 11/20/20 Room / Location:  [facility] OR  /  [facility]    Anesthesia Start: 0658 Anesthesia Stop: 1002    Procedures:       REMOVAL OF INSTRUMENTATION (N/A Spine Cervical)      EXPLORATION OF FUSION C5-6, ANTERIOR CERVICAL DISCECTOMY FUSION C4-5, C6-7 REVISON ANTERIOR FUSION C5-6 WITH INSTRUMENTATION C4-7 (N/A Spine Cervical) Diagnosis: (M54.12)    Surgeon: KEN Gilbert MD Provider: Nii Benson CRNA    Anesthesia Type: general ASA Status: 2          Anesthesia Type: general    Vitals  Vitals Value Taken Time   /59 11/20/20 1054   Temp 97.5 °F (36.4 °C) 11/20/20 1054   Pulse 102 11/20/20 1058   Resp 16 11/20/20 1054   SpO2 93 % 11/20/20 1058   Vitals shown include unvalidated device data.        Post Anesthesia Care and Evaluation    Patient location during evaluation: PACU  Patient participation: complete - patient participated  Level of consciousness: awake and alert  Pain management: adequate  Airway patency: patent  Anesthetic complications: No anesthetic complications  PONV Status: none  Cardiovascular status: acceptable and hemodynamically stable  Respiratory status: acceptable  Hydration status: acceptable    Comments: Blood pressure 118/63, pulse 103, temperature 97.8 °F (36.6 °C), temperature source Axillary, resp. rate 14, SpO2 95 %, not currently breastfeeding.    Patient discharged from PACU based upon Olga score. Please see RN notes for further details

## 2020-11-20 NOTE — ANESTHESIA PREPROCEDURE EVALUATION
Anesthesia Evaluation     Patient summary reviewed   NPO Solid Status: > 8 hours             Airway   Mallampati: II  TM distance: >3 FB  Neck ROM: full  Dental      Pulmonary    (-) COPD, asthma, sleep apnea, not a smoker  Cardiovascular   Exercise tolerance: good (4-7 METS)    ECG reviewed    (-) pacemaker, past MI, angina, cardiac stents      Neuro/Psych  (-) seizures, TIA, CVA  GI/Hepatic/Renal/Endo    (+) obesity,   diabetes mellitus,   (-) GERD, liver disease, no renal disease    Musculoskeletal     Abdominal    Substance History      OB/GYN          Other                        Anesthesia Plan    ASA 2     general     intravenous induction     Anesthetic plan, all risks, benefits, and alternatives have been provided, discussed and informed consent has been obtained with: patient.

## 2020-11-20 NOTE — ANESTHESIA PROCEDURE NOTES
Airway  Urgency: elective    Date/Time: 11/20/2020 7:04 AM  Airway not difficult    General Information and Staff    Patient location during procedure: OR  CRNA: Nii Benson CRNA    Indications and Patient Condition  Indications for airway management: airway protection    Preoxygenated: yes  Mask difficulty assessment: 1 - vent by mask    Final Airway Details  Final airway type: endotracheal airway      Successful airway: ETT  Cuffed: yes   Successful intubation technique: direct laryngoscopy  Endotracheal tube insertion site: oral  Blade: Sal  Blade size: 2  ETT size (mm): 7.0  Cormack-Lehane Classification: grade IIa - partial view of glottis  Placement verified by: chest auscultation and capnometry   Measured from: lips  Number of attempts at approach: 1  Assessment: lips, teeth, and gum same as pre-op    Additional Comments  Atraumatic intubation

## 2020-11-21 LAB
ANION GAP SERPL CALCULATED.3IONS-SCNC: 9 MMOL/L (ref 5–15)
BASOPHILS # BLD AUTO: 0.02 10*3/MM3 (ref 0–0.2)
BASOPHILS NFR BLD AUTO: 0.2 % (ref 0–1.5)
BUN SERPL-MCNC: 15 MG/DL (ref 8–23)
BUN/CREAT SERPL: 23.8 (ref 7–25)
CALCIUM SPEC-SCNC: 8.3 MG/DL (ref 8.6–10.5)
CHLORIDE SERPL-SCNC: 103 MMOL/L (ref 98–107)
CO2 SERPL-SCNC: 24 MMOL/L (ref 22–29)
CREAT SERPL-MCNC: 0.63 MG/DL (ref 0.57–1)
DEPRECATED RDW RBC AUTO: 46.2 FL (ref 37–54)
EOSINOPHIL # BLD AUTO: 0 10*3/MM3 (ref 0–0.4)
EOSINOPHIL NFR BLD AUTO: 0 % (ref 0.3–6.2)
ERYTHROCYTE [DISTWIDTH] IN BLOOD BY AUTOMATED COUNT: 13.5 % (ref 12.3–15.4)
GFR SERPL CREATININE-BSD FRML MDRD: 95 ML/MIN/1.73
GLUCOSE BLDC GLUCOMTR-MCNC: 114 MG/DL (ref 70–130)
GLUCOSE SERPL-MCNC: 131 MG/DL (ref 65–99)
HCT VFR BLD AUTO: 35.3 % (ref 34–46.6)
HGB BLD-MCNC: 11.4 G/DL (ref 12–15.9)
IMM GRANULOCYTES # BLD AUTO: 0.03 10*3/MM3 (ref 0–0.05)
IMM GRANULOCYTES NFR BLD AUTO: 0.2 % (ref 0–0.5)
LYMPHOCYTES # BLD AUTO: 2.04 10*3/MM3 (ref 0.7–3.1)
LYMPHOCYTES NFR BLD AUTO: 16.9 % (ref 19.6–45.3)
MCH RBC QN AUTO: 29.7 PG (ref 26.6–33)
MCHC RBC AUTO-ENTMCNC: 32.3 G/DL (ref 31.5–35.7)
MCV RBC AUTO: 91.9 FL (ref 79–97)
MONOCYTES # BLD AUTO: 0.97 10*3/MM3 (ref 0.1–0.9)
MONOCYTES NFR BLD AUTO: 8.1 % (ref 5–12)
NEUTROPHILS NFR BLD AUTO: 74.6 % (ref 42.7–76)
NEUTROPHILS NFR BLD AUTO: 8.98 10*3/MM3 (ref 1.7–7)
NRBC BLD AUTO-RTO: 0 /100 WBC (ref 0–0.2)
PLATELET # BLD AUTO: 253 10*3/MM3 (ref 140–450)
PMV BLD AUTO: 10.4 FL (ref 6–12)
POTASSIUM SERPL-SCNC: 4.3 MMOL/L (ref 3.5–5.2)
RBC # BLD AUTO: 3.84 10*6/MM3 (ref 3.77–5.28)
SODIUM SERPL-SCNC: 136 MMOL/L (ref 136–145)
WBC # BLD AUTO: 12.04 10*3/MM3 (ref 3.4–10.8)

## 2020-11-21 PROCEDURE — 25010000002 ONDANSETRON PER 1 MG: Performed by: ORTHOPAEDIC SURGERY

## 2020-11-21 PROCEDURE — 97110 THERAPEUTIC EXERCISES: CPT

## 2020-11-21 PROCEDURE — 97530 THERAPEUTIC ACTIVITIES: CPT

## 2020-11-21 PROCEDURE — 25010000003 HYDROMORPHONE 1 MG/ML SOLUTION: Performed by: ORTHOPAEDIC SURGERY

## 2020-11-21 PROCEDURE — 82962 GLUCOSE BLOOD TEST: CPT

## 2020-11-21 PROCEDURE — 97165 OT EVAL LOW COMPLEX 30 MIN: CPT

## 2020-11-21 PROCEDURE — 25010000002 METOCLOPRAMIDE PER 10 MG: Performed by: FAMILY MEDICINE

## 2020-11-21 PROCEDURE — 80048 BASIC METABOLIC PNL TOTAL CA: CPT | Performed by: ORTHOPAEDIC SURGERY

## 2020-11-21 PROCEDURE — 97161 PT EVAL LOW COMPLEX 20 MIN: CPT

## 2020-11-21 PROCEDURE — 85025 COMPLETE CBC W/AUTO DIFF WBC: CPT | Performed by: ORTHOPAEDIC SURGERY

## 2020-11-21 RX ORDER — OXYCODONE AND ACETAMINOPHEN 7.5; 325 MG/1; MG/1
1 TABLET ORAL EVERY 4 HOURS PRN
Status: DISCONTINUED | OUTPATIENT
Start: 2020-11-21 | End: 2020-11-22 | Stop reason: HOSPADM

## 2020-11-21 RX ORDER — TIZANIDINE 4 MG/1
2 TABLET ORAL EVERY 8 HOURS PRN
Status: DISCONTINUED | OUTPATIENT
Start: 2020-11-21 | End: 2020-11-22 | Stop reason: HOSPADM

## 2020-11-21 RX ORDER — OXYCODONE AND ACETAMINOPHEN 10; 325 MG/1; MG/1
1 TABLET ORAL EVERY 4 HOURS PRN
Status: DISCONTINUED | OUTPATIENT
Start: 2020-11-21 | End: 2020-11-22 | Stop reason: HOSPADM

## 2020-11-21 RX ORDER — OXYCODONE AND ACETAMINOPHEN 7.5; 325 MG/1; MG/1
1 TABLET ORAL EVERY 4 HOURS PRN
Status: DISCONTINUED | OUTPATIENT
Start: 2020-11-21 | End: 2020-11-21

## 2020-11-21 RX ORDER — METOCLOPRAMIDE HYDROCHLORIDE 5 MG/ML
10 INJECTION INTRAMUSCULAR; INTRAVENOUS EVERY 6 HOURS PRN
Status: DISCONTINUED | OUTPATIENT
Start: 2020-11-21 | End: 2020-11-22 | Stop reason: HOSPADM

## 2020-11-21 RX ADMIN — OXYCODONE HYDROCHLORIDE AND ACETAMINOPHEN 1 TABLET: 10; 325 TABLET ORAL at 11:58

## 2020-11-21 RX ADMIN — FAMOTIDINE 20 MG: 10 INJECTION INTRAVENOUS at 21:32

## 2020-11-21 RX ADMIN — FLUTICASONE PROPIONATE 2 SPRAY: 50 SPRAY, METERED NASAL at 21:31

## 2020-11-21 RX ADMIN — HYDROMORPHONE HYDROCHLORIDE 1 MG: 1 INJECTION, SOLUTION INTRAMUSCULAR; INTRAVENOUS; SUBCUTANEOUS at 08:49

## 2020-11-21 RX ADMIN — HYDROMORPHONE HYDROCHLORIDE 1 MG: 1 INJECTION, SOLUTION INTRAMUSCULAR; INTRAVENOUS; SUBCUTANEOUS at 00:52

## 2020-11-21 RX ADMIN — OXYCODONE HYDROCHLORIDE AND ACETAMINOPHEN 1 TABLET: 10; 325 TABLET ORAL at 05:08

## 2020-11-21 RX ADMIN — OXYCODONE HYDROCHLORIDE AND ACETAMINOPHEN 1 TABLET: 10; 325 TABLET ORAL at 23:50

## 2020-11-21 RX ADMIN — OXYCODONE HYDROCHLORIDE AND ACETAMINOPHEN 1 TABLET: 10; 325 TABLET ORAL at 19:48

## 2020-11-21 RX ADMIN — ONDANSETRON HYDROCHLORIDE 4 MG: 2 SOLUTION INTRAMUSCULAR; INTRAVENOUS at 08:48

## 2020-11-21 RX ADMIN — METOCLOPRAMIDE HYDROCHLORIDE 10 MG: 5 INJECTION INTRAMUSCULAR; INTRAVENOUS at 11:58

## 2020-11-21 RX ADMIN — SODIUM CHLORIDE 75 ML/HR: 9 INJECTION, SOLUTION INTRAVENOUS at 21:35

## 2020-11-21 RX ADMIN — HYDROMORPHONE HYDROCHLORIDE 1 MG: 1 INJECTION, SOLUTION INTRAMUSCULAR; INTRAVENOUS; SUBCUTANEOUS at 12:39

## 2020-11-21 RX ADMIN — METFORMIN HYDROCHLORIDE 500 MG: 500 TABLET ORAL at 19:39

## 2020-11-21 RX ADMIN — FAMOTIDINE 20 MG: 20 TABLET, FILM COATED ORAL at 08:39

## 2020-11-21 RX ADMIN — CLINDAMYCIN IN 5 PERCENT DEXTROSE 900 MG: 18 INJECTION, SOLUTION INTRAVENOUS at 00:52

## 2020-11-21 RX ADMIN — CLINDAMYCIN IN 5 PERCENT DEXTROSE 900 MG: 18 INJECTION, SOLUTION INTRAVENOUS at 06:23

## 2020-11-21 RX ADMIN — METOCLOPRAMIDE HYDROCHLORIDE 10 MG: 5 INJECTION INTRAMUSCULAR; INTRAVENOUS at 01:35

## 2020-11-21 RX ADMIN — ONDANSETRON HYDROCHLORIDE 4 MG: 2 SOLUTION INTRAMUSCULAR; INTRAVENOUS at 17:14

## 2020-11-21 RX ADMIN — SODIUM CHLORIDE 75 ML/HR: 9 INJECTION, SOLUTION INTRAVENOUS at 06:21

## 2020-11-21 RX ADMIN — SODIUM CHLORIDE, PRESERVATIVE FREE 3 ML: 5 INJECTION INTRAVENOUS at 22:05

## 2020-11-21 RX ADMIN — SODIUM CHLORIDE, PRESERVATIVE FREE 3 ML: 5 INJECTION INTRAVENOUS at 08:39

## 2020-11-21 RX ADMIN — METFORMIN HYDROCHLORIDE 500 MG: 500 TABLET ORAL at 08:39

## 2020-11-22 ENCOUNTER — READMISSION MANAGEMENT (OUTPATIENT)
Dept: CALL CENTER | Facility: HOSPITAL | Age: 65
End: 2020-11-22

## 2020-11-22 VITALS
DIASTOLIC BLOOD PRESSURE: 70 MMHG | SYSTOLIC BLOOD PRESSURE: 134 MMHG | TEMPERATURE: 98.5 F | HEART RATE: 98 BPM | RESPIRATION RATE: 16 BRPM | OXYGEN SATURATION: 92 %

## 2020-11-22 PROCEDURE — 25010000002 DEXAMETHASONE SODIUM PHOSPHATE 10 MG/ML SOLUTION: Performed by: PHYSICIAN ASSISTANT

## 2020-11-22 PROCEDURE — 97116 GAIT TRAINING THERAPY: CPT

## 2020-11-22 PROCEDURE — 97110 THERAPEUTIC EXERCISES: CPT

## 2020-11-22 RX ORDER — DEXAMETHASONE SODIUM PHOSPHATE 10 MG/ML
10 INJECTION, SOLUTION INTRAMUSCULAR; INTRAVENOUS EVERY 6 HOURS
Status: DISCONTINUED | OUTPATIENT
Start: 2020-11-22 | End: 2020-11-22 | Stop reason: HOSPADM

## 2020-11-22 RX ORDER — OXYCODONE AND ACETAMINOPHEN 7.5; 325 MG/1; MG/1
1 TABLET ORAL EVERY 4 HOURS PRN
Start: 2020-11-22 | End: 2020-11-28

## 2020-11-22 RX ORDER — TIZANIDINE 2 MG/1
2 TABLET ORAL EVERY 8 HOURS PRN
Start: 2020-11-22 | End: 2021-01-04

## 2020-11-22 RX ADMIN — FLUTICASONE PROPIONATE 2 SPRAY: 50 SPRAY, METERED NASAL at 08:10

## 2020-11-22 RX ADMIN — TIZANIDINE 2 MG: 4 TABLET ORAL at 00:20

## 2020-11-22 RX ADMIN — OXYCODONE HYDROCHLORIDE AND ACETAMINOPHEN 1 TABLET: 10; 325 TABLET ORAL at 03:49

## 2020-11-22 RX ADMIN — METFORMIN HYDROCHLORIDE 500 MG: 500 TABLET ORAL at 08:10

## 2020-11-22 RX ADMIN — FAMOTIDINE 20 MG: 20 TABLET, FILM COATED ORAL at 08:10

## 2020-11-22 RX ADMIN — OXYCODONE HYDROCHLORIDE AND ACETAMINOPHEN 1 TABLET: 10; 325 TABLET ORAL at 08:10

## 2020-11-22 RX ADMIN — DEXAMETHASONE SODIUM PHOSPHATE 10 MG: 10 INJECTION, SOLUTION INTRAMUSCULAR; INTRAVENOUS at 10:02

## 2020-11-22 NOTE — OUTREACH NOTE
Prep Survey      Responses   [facility] facility patient discharged from?  [place]   Is LACE score < 7 ?  Yes   [facility]   Date of Admission  11/20/20   Date of Discharge  11/22/20   Discharge Disposition  Home or Self Care   Discharge diagnosis  removal cervical instrumentation, anterior cervical discectomy & fusion   Does the patient have one of the following disease processes/diagnoses(primary or secondary)?  General Surgery   Does the patient have Home health ordered?  No   Is there a DME ordered?  No   Prep survey completed?  Yes          Radha Carbone RN

## 2020-11-23 ENCOUNTER — TRANSITIONAL CARE MANAGEMENT TELEPHONE ENCOUNTER (OUTPATIENT)
Dept: CALL CENTER | Facility: HOSPITAL | Age: 65
End: 2020-11-23

## 2020-11-23 NOTE — OUTREACH NOTE
Call Center TCM Note      Responses   [facility], [place], operated by [facility] patient discharged from?  [place]   Does the patient have one of the following disease processes/diagnoses(primary or secondary)?  General Surgery   TCM attempt successful?  No   Unsuccessful attempts  Attempt 1          Willa Long LPN    11/23/2020, 14:13 EST

## 2020-11-23 NOTE — OUTREACH NOTE
Call Center TCM Note      Responses   [facility] patient discharged from?  [place]   Does the patient have one of the following disease processes/diagnoses(primary or secondary)?  General Surgery   TCM attempt successful?  No   Unsuccessful attempts  Attempt 2          Willa Long LPN    11/23/2020, 17:11 EST

## 2020-11-24 ENCOUNTER — TRANSITIONAL CARE MANAGEMENT TELEPHONE ENCOUNTER (OUTPATIENT)
Dept: CALL CENTER | Facility: HOSPITAL | Age: 65
End: 2020-11-24

## 2020-11-24 NOTE — OUTREACH NOTE
Call Center TCM Note      Responses   [facility] patient discharged from?  [place]   Does the patient have one of the following disease processes/diagnoses(primary or secondary)?  General Surgery   TCM attempt successful?  No   Unsuccessful attempts  Attempt 3          Willa Long LPN    11/24/2020, 15:26 EST

## 2020-11-25 ENCOUNTER — TRANSCRIBE ORDERS (OUTPATIENT)
Dept: ADMINISTRATIVE | Facility: HOSPITAL | Age: 65
End: 2020-11-25

## 2020-11-25 DIAGNOSIS — Z11.59 SCREENING FOR VIRAL DISEASE: Primary | ICD-10-CM

## 2020-11-30 ENCOUNTER — LAB (OUTPATIENT)
Dept: LAB | Facility: HOSPITAL | Age: 65
End: 2020-11-30

## 2020-11-30 PROCEDURE — U0003 INFECTIOUS AGENT DETECTION BY NUCLEIC ACID (DNA OR RNA); SEVERE ACUTE RESPIRATORY SYNDROME CORONAVIRUS 2 (SARS-COV-2) (CORONAVIRUS DISEASE [COVID-19]), AMPLIFIED PROBE TECHNIQUE, MAKING USE OF HIGH THROUGHPUT TECHNOLOGIES AS DESCRIBED BY CMS-2020-01-R: HCPCS | Performed by: ORTHOPAEDIC SURGERY

## 2020-11-30 PROCEDURE — C9803 HOPD COVID-19 SPEC COLLECT: HCPCS | Performed by: ORTHOPAEDIC SURGERY

## 2020-12-02 LAB
COVID LABCORP PRIORITY: NORMAL
SARS-COV-2 RNA RESP QL NAA+PROBE: NOT DETECTED

## 2020-12-03 ENCOUNTER — APPOINTMENT (OUTPATIENT)
Dept: GENERAL RADIOLOGY | Facility: HOSPITAL | Age: 65
End: 2020-12-03

## 2020-12-03 ENCOUNTER — ANESTHESIA (OUTPATIENT)
Dept: PERIOP | Facility: HOSPITAL | Age: 65
End: 2020-12-03

## 2020-12-03 ENCOUNTER — HOSPITAL ENCOUNTER (INPATIENT)
Facility: HOSPITAL | Age: 65
LOS: 1 days | Discharge: HOME | End: 2020-12-03
Attending: ORTHOPAEDIC SURGERY | Admitting: ORTHOPAEDIC SURGERY

## 2020-12-03 ENCOUNTER — ANESTHESIA EVENT (OUTPATIENT)
Dept: PERIOP | Facility: HOSPITAL | Age: 65
End: 2020-12-03

## 2020-12-03 VITALS
RESPIRATION RATE: 20 BRPM | HEIGHT: 64 IN | WEIGHT: 177.03 LBS | DIASTOLIC BLOOD PRESSURE: 67 MMHG | SYSTOLIC BLOOD PRESSURE: 135 MMHG | OXYGEN SATURATION: 94 % | BODY MASS INDEX: 30.22 KG/M2 | TEMPERATURE: 96.9 F | HEART RATE: 101 BPM

## 2020-12-03 LAB
ABO GROUP BLD: NORMAL
BLD GP AB SCN SERPL QL: NEGATIVE
GLUCOSE BLDC GLUCOMTR-MCNC: 120 MG/DL (ref 70–130)
GLUCOSE BLDC GLUCOMTR-MCNC: 91 MG/DL (ref 70–130)
RH BLD: POSITIVE
T&S EXPIRATION DATE: NORMAL

## 2020-12-03 PROCEDURE — 72040 X-RAY EXAM NECK SPINE 2-3 VW: CPT

## 2020-12-03 PROCEDURE — 86900 BLOOD TYPING SEROLOGIC ABO: CPT | Performed by: ORTHOPAEDIC SURGERY

## 2020-12-03 PROCEDURE — 25010000003 BUPIVACAINE LIPOSOME 1.3 % SUSPENSION: Performed by: ORTHOPAEDIC SURGERY

## 2020-12-03 PROCEDURE — 25010000002 PHENYLEPHRINE HCL 0.8 MG/10ML SOLUTION PREFILLED SYRINGE: Performed by: NURSE ANESTHETIST, CERTIFIED REGISTERED

## 2020-12-03 PROCEDURE — 25010000002 FENTANYL CITRATE (PF) 100 MCG/2ML SOLUTION: Performed by: NURSE ANESTHETIST, CERTIFIED REGISTERED

## 2020-12-03 PROCEDURE — 25010000002 HYDROMORPHONE PER 4 MG: Performed by: ANESTHESIOLOGY

## 2020-12-03 PROCEDURE — 86901 BLOOD TYPING SEROLOGIC RH(D): CPT | Performed by: ORTHOPAEDIC SURGERY

## 2020-12-03 PROCEDURE — 01N10ZZ RELEASE CERVICAL NERVE, OPEN APPROACH: ICD-10-PCS | Performed by: ORTHOPAEDIC SURGERY

## 2020-12-03 PROCEDURE — 25010000002 DEXAMETHASONE PER 1 MG: Performed by: ANESTHESIOLOGY

## 2020-12-03 PROCEDURE — 25010000002 DEXAMETHASONE PER 1 MG: Performed by: NURSE ANESTHETIST, CERTIFIED REGISTERED

## 2020-12-03 PROCEDURE — 25010000002 ONDANSETRON PER 1 MG: Performed by: ORTHOPAEDIC SURGERY

## 2020-12-03 PROCEDURE — 4A11X4G MONITORING OF PERIPHERAL NERVOUS ELECTRICAL ACTIVITY, INTRAOPERATIVE, EXTERNAL APPROACH: ICD-10-PCS | Performed by: ORTHOPAEDIC SURGERY

## 2020-12-03 PROCEDURE — 25010000002 FENTANYL CITRATE (PF) 100 MCG/2ML SOLUTION: Performed by: ANESTHESIOLOGY

## 2020-12-03 PROCEDURE — C1713 ANCHOR/SCREW BN/BN,TIS/BN: HCPCS | Performed by: ORTHOPAEDIC SURGERY

## 2020-12-03 PROCEDURE — 86850 RBC ANTIBODY SCREEN: CPT | Performed by: ORTHOPAEDIC SURGERY

## 2020-12-03 PROCEDURE — 25010000002 DEXAMETHASONE SODIUM PHOSPHATE 10 MG/ML SOLUTION: Performed by: ORTHOPAEDIC SURGERY

## 2020-12-03 PROCEDURE — C9290 INJ, BUPIVACAINE LIPOSOME: HCPCS | Performed by: ORTHOPAEDIC SURGERY

## 2020-12-03 PROCEDURE — 0RG2071 FUSION OF 2 OR MORE CERVICAL VERTEBRAL JOINTS WITH AUTOLOGOUS TISSUE SUBSTITUTE, POSTERIOR APPROACH, POSTERIOR COLUMN, OPEN APPROACH: ICD-10-PCS | Performed by: ORTHOPAEDIC SURGERY

## 2020-12-03 PROCEDURE — 82962 GLUCOSE BLOOD TEST: CPT

## 2020-12-03 PROCEDURE — 25010000002 ONDANSETRON PER 1 MG: Performed by: ANESTHESIOLOGY

## 2020-12-03 PROCEDURE — 25010000002 SUCCINYLCHOLINE PER 20 MG: Performed by: NURSE ANESTHETIST, CERTIFIED REGISTERED

## 2020-12-03 PROCEDURE — 25010000002 PROPOFOL 10 MG/ML EMULSION: Performed by: NURSE ANESTHETIST, CERTIFIED REGISTERED

## 2020-12-03 PROCEDURE — 25010000002 ONDANSETRON PER 1 MG: Performed by: NURSE ANESTHETIST, CERTIFIED REGISTERED

## 2020-12-03 PROCEDURE — 76000 FLUOROSCOPY <1 HR PHYS/QHP: CPT

## 2020-12-03 DEVICE — IMPLANTABLE DEVICE
Type: IMPLANTABLE DEVICE | Site: SPINE CERVICAL | Status: FUNCTIONAL
Brand: CORUS SPINAL SYSTEM-X

## 2020-12-03 DEVICE — IMPLANTABLE DEVICE
Type: IMPLANTABLE DEVICE | Site: SPINE CERVICAL | Status: FUNCTIONAL
Brand: CAVUX CERVICAL CAGE-X

## 2020-12-03 DEVICE — IMPLANTABLE DEVICE: Type: IMPLANTABLE DEVICE | Site: SPINE CERVICAL | Status: FUNCTIONAL

## 2020-12-03 DEVICE — THE ALLY BONE SCREW IS A SINGLE-USE IMPLANT MADE OF TITANIUM ALLOY AND USED IN BONE RECONSTRUCTION, OSTEOTOMY, ARTHRODESIS, JOIN FUSION, FRACTURE REPAIR AND FIXATION APPROPRIATE FOR THE SIZE OF THE DEVICE.
Type: IMPLANTABLE DEVICE | Site: SPINE CERVICAL | Status: FUNCTIONAL
Brand: ALLY BONE SCREW

## 2020-12-03 DEVICE — KT HEMOST ABS SURGIFOAM PORCN 1GRAM: Type: IMPLANTABLE DEVICE | Status: FUNCTIONAL

## 2020-12-03 RX ORDER — NALOXONE HCL 0.4 MG/ML
0.04 VIAL (ML) INJECTION AS NEEDED
Status: DISCONTINUED | OUTPATIENT
Start: 2020-12-03 | End: 2020-12-03 | Stop reason: HOSPADM

## 2020-12-03 RX ORDER — SUCCINYLCHOLINE CHLORIDE 20 MG/ML
INJECTION INTRAMUSCULAR; INTRAVENOUS AS NEEDED
Status: DISCONTINUED | OUTPATIENT
Start: 2020-12-03 | End: 2020-12-03 | Stop reason: SURG

## 2020-12-03 RX ORDER — HYDROMORPHONE HYDROCHLORIDE 1 MG/ML
0.5 INJECTION, SOLUTION INTRAMUSCULAR; INTRAVENOUS; SUBCUTANEOUS
Status: DISCONTINUED | OUTPATIENT
Start: 2020-12-03 | End: 2020-12-03 | Stop reason: HOSPADM

## 2020-12-03 RX ORDER — FENTANYL CITRATE 50 UG/ML
25 INJECTION, SOLUTION INTRAMUSCULAR; INTRAVENOUS
Status: DISCONTINUED | OUTPATIENT
Start: 2020-12-03 | End: 2020-12-03 | Stop reason: HOSPADM

## 2020-12-03 RX ORDER — ONDANSETRON 2 MG/ML
4 INJECTION INTRAMUSCULAR; INTRAVENOUS ONCE
Status: COMPLETED | OUTPATIENT
Start: 2020-12-03 | End: 2020-12-03

## 2020-12-03 RX ORDER — SODIUM CHLORIDE 0.9 % (FLUSH) 0.9 %
10 SYRINGE (ML) INJECTION EVERY 12 HOURS SCHEDULED
Status: DISCONTINUED | OUTPATIENT
Start: 2020-12-03 | End: 2020-12-03 | Stop reason: HOSPADM

## 2020-12-03 RX ORDER — FLUMAZENIL 0.1 MG/ML
0.2 INJECTION INTRAVENOUS AS NEEDED
Status: DISCONTINUED | OUTPATIENT
Start: 2020-12-03 | End: 2020-12-03 | Stop reason: HOSPADM

## 2020-12-03 RX ORDER — DEXAMETHASONE SODIUM PHOSPHATE 4 MG/ML
INJECTION, SOLUTION INTRA-ARTICULAR; INTRALESIONAL; INTRAMUSCULAR; INTRAVENOUS; SOFT TISSUE AS NEEDED
Status: DISCONTINUED | OUTPATIENT
Start: 2020-12-03 | End: 2020-12-03 | Stop reason: SURG

## 2020-12-03 RX ORDER — SCOLOPAMINE TRANSDERMAL SYSTEM 1 MG/1
1 PATCH, EXTENDED RELEASE TRANSDERMAL CONTINUOUS
Status: DISCONTINUED | OUTPATIENT
Start: 2020-12-03 | End: 2020-12-03 | Stop reason: HOSPADM

## 2020-12-03 RX ORDER — NEOSTIGMINE METHYLSULFATE 5 MG/5 ML
SYRINGE (ML) INTRAVENOUS AS NEEDED
Status: DISCONTINUED | OUTPATIENT
Start: 2020-12-03 | End: 2020-12-03 | Stop reason: SURG

## 2020-12-03 RX ORDER — SODIUM CHLORIDE 9 MG/ML
INJECTION, SOLUTION INTRAVENOUS AS NEEDED
Status: DISCONTINUED | OUTPATIENT
Start: 2020-12-03 | End: 2020-12-03 | Stop reason: HOSPADM

## 2020-12-03 RX ORDER — DEXTROSE MONOHYDRATE 25 G/50ML
12.5 INJECTION, SOLUTION INTRAVENOUS AS NEEDED
Status: DISCONTINUED | OUTPATIENT
Start: 2020-12-03 | End: 2020-12-03 | Stop reason: HOSPADM

## 2020-12-03 RX ORDER — LABETALOL HYDROCHLORIDE 5 MG/ML
5 INJECTION, SOLUTION INTRAVENOUS
Status: DISCONTINUED | OUTPATIENT
Start: 2020-12-03 | End: 2020-12-03 | Stop reason: HOSPADM

## 2020-12-03 RX ORDER — OXYCODONE AND ACETAMINOPHEN 7.5; 325 MG/1; MG/1
1 TABLET ORAL EVERY 6 HOURS PRN
COMMUNITY
End: 2021-10-20

## 2020-12-03 RX ORDER — LIDOCAINE HYDROCHLORIDE 10 MG/ML
0.5 INJECTION, SOLUTION EPIDURAL; INFILTRATION; INTRACAUDAL; PERINEURAL ONCE AS NEEDED
Status: DISCONTINUED | OUTPATIENT
Start: 2020-12-03 | End: 2020-12-03 | Stop reason: HOSPADM

## 2020-12-03 RX ORDER — SODIUM CHLORIDE, SODIUM LACTATE, POTASSIUM CHLORIDE, CALCIUM CHLORIDE 600; 310; 30; 20 MG/100ML; MG/100ML; MG/100ML; MG/100ML
100 INJECTION, SOLUTION INTRAVENOUS CONTINUOUS PRN
Status: DISCONTINUED | OUTPATIENT
Start: 2020-12-03 | End: 2020-12-03 | Stop reason: HOSPADM

## 2020-12-03 RX ORDER — SODIUM CHLORIDE, SODIUM LACTATE, POTASSIUM CHLORIDE, CALCIUM CHLORIDE 600; 310; 30; 20 MG/100ML; MG/100ML; MG/100ML; MG/100ML
1000 INJECTION, SOLUTION INTRAVENOUS CONTINUOUS
Status: DISCONTINUED | OUTPATIENT
Start: 2020-12-03 | End: 2020-12-03 | Stop reason: HOSPADM

## 2020-12-03 RX ORDER — PROPOFOL 10 MG/ML
VIAL (ML) INTRAVENOUS AS NEEDED
Status: DISCONTINUED | OUTPATIENT
Start: 2020-12-03 | End: 2020-12-03 | Stop reason: SURG

## 2020-12-03 RX ORDER — DEXAMETHASONE SODIUM PHOSPHATE 4 MG/ML
4 INJECTION, SOLUTION INTRA-ARTICULAR; INTRALESIONAL; INTRAMUSCULAR; INTRAVENOUS; SOFT TISSUE ONCE AS NEEDED
Status: COMPLETED | OUTPATIENT
Start: 2020-12-03 | End: 2020-12-03

## 2020-12-03 RX ORDER — OXYCODONE AND ACETAMINOPHEN 7.5; 325 MG/1; MG/1
2 TABLET ORAL EVERY 4 HOURS PRN
Status: DISCONTINUED | OUTPATIENT
Start: 2020-12-03 | End: 2020-12-03 | Stop reason: HOSPADM

## 2020-12-03 RX ORDER — MIDAZOLAM HYDROCHLORIDE 1 MG/ML
1 INJECTION INTRAMUSCULAR; INTRAVENOUS
Status: DISCONTINUED | OUTPATIENT
Start: 2020-12-03 | End: 2020-12-03 | Stop reason: HOSPADM

## 2020-12-03 RX ORDER — IBUPROFEN 600 MG/1
600 TABLET ORAL ONCE AS NEEDED
Status: DISCONTINUED | OUTPATIENT
Start: 2020-12-03 | End: 2020-12-03 | Stop reason: HOSPADM

## 2020-12-03 RX ORDER — SODIUM CHLORIDE 0.9 % (FLUSH) 0.9 %
10 SYRINGE (ML) INJECTION AS NEEDED
Status: DISCONTINUED | OUTPATIENT
Start: 2020-12-03 | End: 2020-12-03 | Stop reason: HOSPADM

## 2020-12-03 RX ORDER — OXYCODONE HCL 20 MG/1
20 TABLET, FILM COATED, EXTENDED RELEASE ORAL ONCE
Status: DISCONTINUED | OUTPATIENT
Start: 2020-12-03 | End: 2020-12-03 | Stop reason: HOSPADM

## 2020-12-03 RX ORDER — SODIUM CHLORIDE 0.9 % (FLUSH) 0.9 %
3 SYRINGE (ML) INJECTION AS NEEDED
Status: DISCONTINUED | OUTPATIENT
Start: 2020-12-03 | End: 2020-12-03 | Stop reason: HOSPADM

## 2020-12-03 RX ORDER — IBUPROFEN 600 MG/1
600 TABLET ORAL EVERY 6 HOURS PRN
Status: DISCONTINUED | OUTPATIENT
Start: 2020-12-03 | End: 2020-12-03 | Stop reason: HOSPADM

## 2020-12-03 RX ORDER — PHENYLEPHRINE HCL IN 0.9% NACL 0.8MG/10ML
SYRINGE (ML) INTRAVENOUS AS NEEDED
Status: DISCONTINUED | OUTPATIENT
Start: 2020-12-03 | End: 2020-12-03 | Stop reason: SURG

## 2020-12-03 RX ORDER — OXYCODONE AND ACETAMINOPHEN 10; 325 MG/1; MG/1
1 TABLET ORAL ONCE AS NEEDED
Status: DISCONTINUED | OUTPATIENT
Start: 2020-12-03 | End: 2020-12-03 | Stop reason: HOSPADM

## 2020-12-03 RX ORDER — EPHEDRINE SULFATE 50 MG/ML
INJECTION, SOLUTION INTRAVENOUS AS NEEDED
Status: DISCONTINUED | OUTPATIENT
Start: 2020-12-03 | End: 2020-12-03 | Stop reason: SURG

## 2020-12-03 RX ORDER — ROCURONIUM BROMIDE 10 MG/ML
INJECTION, SOLUTION INTRAVENOUS AS NEEDED
Status: DISCONTINUED | OUTPATIENT
Start: 2020-12-03 | End: 2020-12-03 | Stop reason: SURG

## 2020-12-03 RX ORDER — ONDANSETRON 2 MG/ML
4 INJECTION INTRAMUSCULAR; INTRAVENOUS AS NEEDED
Status: COMPLETED | OUTPATIENT
Start: 2020-12-03 | End: 2020-12-03

## 2020-12-03 RX ORDER — CLINDAMYCIN PHOSPHATE 900 MG/50ML
900 INJECTION INTRAVENOUS ONCE
Status: COMPLETED | OUTPATIENT
Start: 2020-12-03 | End: 2020-12-03

## 2020-12-03 RX ORDER — ONDANSETRON 2 MG/ML
INJECTION INTRAMUSCULAR; INTRAVENOUS AS NEEDED
Status: DISCONTINUED | OUTPATIENT
Start: 2020-12-03 | End: 2020-12-03 | Stop reason: SURG

## 2020-12-03 RX ORDER — SODIUM CHLORIDE, SODIUM LACTATE, POTASSIUM CHLORIDE, CALCIUM CHLORIDE 600; 310; 30; 20 MG/100ML; MG/100ML; MG/100ML; MG/100ML
9 INJECTION, SOLUTION INTRAVENOUS CONTINUOUS
Status: DISCONTINUED | OUTPATIENT
Start: 2020-12-03 | End: 2020-12-03 | Stop reason: HOSPADM

## 2020-12-03 RX ORDER — FENTANYL CITRATE 50 UG/ML
INJECTION, SOLUTION INTRAMUSCULAR; INTRAVENOUS AS NEEDED
Status: DISCONTINUED | OUTPATIENT
Start: 2020-12-03 | End: 2020-12-03 | Stop reason: SURG

## 2020-12-03 RX ORDER — DEXAMETHASONE SODIUM PHOSPHATE 10 MG/ML
10 INJECTION, SOLUTION INTRAMUSCULAR; INTRAVENOUS ONCE
Status: COMPLETED | OUTPATIENT
Start: 2020-12-03 | End: 2020-12-03

## 2020-12-03 RX ORDER — LIDOCAINE HYDROCHLORIDE 40 MG/ML
SOLUTION TOPICAL AS NEEDED
Status: DISCONTINUED | OUTPATIENT
Start: 2020-12-03 | End: 2020-12-03 | Stop reason: SURG

## 2020-12-03 RX ORDER — BUPIVACAINE HYDROCHLORIDE AND EPINEPHRINE 2.5; 5 MG/ML; UG/ML
INJECTION, SOLUTION INFILTRATION; PERINEURAL AS NEEDED
Status: DISCONTINUED | OUTPATIENT
Start: 2020-12-03 | End: 2020-12-03 | Stop reason: HOSPADM

## 2020-12-03 RX ADMIN — EPHEDRINE SULFATE 10 MG: 50 INJECTION INTRAVENOUS at 09:02

## 2020-12-03 RX ADMIN — DEXAMETHASONE SODIUM PHOSPHATE 10 MG: 10 INJECTION, SOLUTION INTRAMUSCULAR; INTRAVENOUS at 13:30

## 2020-12-03 RX ADMIN — HYDROMORPHONE HYDROCHLORIDE 0.5 MG: 1 INJECTION, SOLUTION INTRAMUSCULAR; INTRAVENOUS; SUBCUTANEOUS at 10:50

## 2020-12-03 RX ADMIN — FENTANYL CITRATE 25 MCG: 50 INJECTION, SOLUTION INTRAMUSCULAR; INTRAVENOUS at 10:41

## 2020-12-03 RX ADMIN — ONDANSETRON HYDROCHLORIDE 4 MG: 2 SOLUTION INTRAMUSCULAR; INTRAVENOUS at 13:08

## 2020-12-03 RX ADMIN — OXYCODONE HYDROCHLORIDE AND ACETAMINOPHEN 2 TABLET: 7.5; 325 TABLET ORAL at 12:46

## 2020-12-03 RX ADMIN — Medication 80 MCG: at 09:02

## 2020-12-03 RX ADMIN — VASOPRESSIN 1 ML: 20 INJECTION INTRAVENOUS at 09:16

## 2020-12-03 RX ADMIN — ROCURONIUM BROMIDE 20 MG: 10 INJECTION INTRAVENOUS at 08:59

## 2020-12-03 RX ADMIN — FENTANYL CITRATE 100 MCG: 50 INJECTION, SOLUTION INTRAMUSCULAR; INTRAVENOUS at 09:23

## 2020-12-03 RX ADMIN — HYDROMORPHONE HYDROCHLORIDE 0.5 MG: 1 INJECTION, SOLUTION INTRAMUSCULAR; INTRAVENOUS; SUBCUTANEOUS at 10:40

## 2020-12-03 RX ADMIN — ONDANSETRON HYDROCHLORIDE 4 MG: 2 SOLUTION INTRAMUSCULAR; INTRAVENOUS at 08:49

## 2020-12-03 RX ADMIN — SCOPALAMINE 1 PATCH: 1 PATCH, EXTENDED RELEASE TRANSDERMAL at 08:41

## 2020-12-03 RX ADMIN — GLYCOPYRROLATE 0.2 MG: 0.2 INJECTION, SOLUTION INTRAMUSCULAR; INTRAVENOUS at 10:15

## 2020-12-03 RX ADMIN — SODIUM CHLORIDE, POTASSIUM CHLORIDE, SODIUM LACTATE AND CALCIUM CHLORIDE 9 ML/HR: 600; 310; 30; 20 INJECTION, SOLUTION INTRAVENOUS at 08:41

## 2020-12-03 RX ADMIN — ROCURONIUM BROMIDE 10 MG: 10 INJECTION INTRAVENOUS at 08:49

## 2020-12-03 RX ADMIN — PROPOFOL 150 MG: 10 INJECTION, EMULSION INTRAVENOUS at 08:49

## 2020-12-03 RX ADMIN — ONDANSETRON HYDROCHLORIDE 4 MG: 2 SOLUTION INTRAMUSCULAR; INTRAVENOUS at 12:05

## 2020-12-03 RX ADMIN — SUCCINYLCHOLINE CHLORIDE 100 MG: 20 INJECTION, SOLUTION INTRAMUSCULAR; INTRAVENOUS at 08:49

## 2020-12-03 RX ADMIN — SODIUM CHLORIDE, POTASSIUM CHLORIDE, SODIUM LACTATE AND CALCIUM CHLORIDE 100 ML/HR: 600; 310; 30; 20 INJECTION, SOLUTION INTRAVENOUS at 08:41

## 2020-12-03 RX ADMIN — LIDOCAINE HYDROCHLORIDE 1 EACH: 40 SOLUTION TOPICAL at 08:51

## 2020-12-03 RX ADMIN — CLINDAMYCIN IN 5 PERCENT DEXTROSE 900 MG: 18 INJECTION, SOLUTION INTRAVENOUS at 09:00

## 2020-12-03 RX ADMIN — ONDANSETRON HYDROCHLORIDE 4 MG: 2 SOLUTION INTRAMUSCULAR; INTRAVENOUS at 13:09

## 2020-12-03 RX ADMIN — DEXAMETHASONE SODIUM PHOSPHATE 4 MG: 4 INJECTION, SOLUTION INTRA-ARTICULAR; INTRALESIONAL; INTRAMUSCULAR; INTRAVENOUS; SOFT TISSUE at 08:41

## 2020-12-03 RX ADMIN — Medication 2 MG: at 10:15

## 2020-12-03 RX ADMIN — LIDOCAINE HYDROCHLORIDE 50 MG: 20 INJECTION, SOLUTION INTRAVENOUS at 08:49

## 2020-12-03 RX ADMIN — FENTANYL CITRATE 100 MCG: 50 INJECTION, SOLUTION INTRAMUSCULAR; INTRAVENOUS at 08:49

## 2020-12-03 RX ADMIN — DEXAMETHASONE SODIUM PHOSPHATE 4 MG: 4 INJECTION, SOLUTION INTRAMUSCULAR; INTRAVENOUS at 08:49

## 2020-12-03 RX ADMIN — SODIUM CHLORIDE, POTASSIUM CHLORIDE, SODIUM LACTATE AND CALCIUM CHLORIDE: 600; 310; 30; 20 INJECTION, SOLUTION INTRAVENOUS at 08:46

## 2020-12-03 NOTE — ANESTHESIA PREPROCEDURE EVALUATION
Anesthesia Evaluation     Patient summary reviewed   history of anesthetic complications: PONV  NPO Solid Status: > 8 hours             Airway   Mallampati: II  TM distance: >3 FB  Neck ROM: full  Dental      Pulmonary    (-) COPD, asthma, sleep apnea, not a smoker  Cardiovascular   Exercise tolerance: good (4-7 METS)    ECG reviewed    (-) pacemaker, past MI, angina, cardiac stents      Neuro/Psych  (-) seizures, TIA, CVA  GI/Hepatic/Renal/Endo    (+) obesity,   diabetes mellitus,   (-) GERD, liver disease, no renal disease    Musculoskeletal     Abdominal    Substance History      OB/GYN          Other                          Anesthesia Plan    ASA 2     general     intravenous induction     Anesthetic plan, all risks, benefits, and alternatives have been provided, discussed and informed consent has been obtained with: patient.

## 2020-12-03 NOTE — ANESTHESIA PROCEDURE NOTES
Airway  Urgency: elective    Date/Time: 12/3/2020 8:51 AM  Airway not difficult    General Information and Staff    Patient location during procedure: OR  CRNA: Cayetano Fortune CRNA    Indications and Patient Condition  Indications for airway management: airway protection    Preoxygenated: yes  MILS maintained throughout  Mask difficulty assessment: 1 - vent by mask    Final Airway Details  Final airway type: endotracheal airway      Successful airway: ETT  Cuffed: yes   Successful intubation technique: direct laryngoscopy  Endotracheal tube insertion site: oral  Blade: Sal  Blade size: 2  ETT size (mm): 7.0  Cormack-Lehane Classification: grade IIa - partial view of glottis  Placement verified by: chest auscultation and capnometry   Cuff volume (mL): 5  Measured from: lips  ETT/EBT  to lips (cm): 20  Number of attempts at approach: 1  Assessment: lips, teeth, and gum same as pre-op and atraumatic intubation    Additional Comments  Cervical collar removed head/neck remained in neutral alignment during induction/intubation

## 2020-12-09 RX ORDER — ONDANSETRON 4 MG/1
4 TABLET, FILM COATED ORAL EVERY 8 HOURS PRN
Qty: 6 TABLET | Refills: 0 | Status: SHIPPED | OUTPATIENT
Start: 2020-12-09 | End: 2021-04-08

## 2020-12-09 NOTE — TELEPHONE ENCOUNTER
"Per Dr Madden \"zofran 4 mg #6\" notified pt and stated understanding, advised not better and will call Dr Gilbert tomorrow  "

## 2020-12-09 NOTE — TELEPHONE ENCOUNTER
"zofran 4 mg #6    Pt met coworker AM as she was coming in the office, parking lot \"something for nausea , cant vomit d/t surgery several years ago\"    FYI-hasnt been seen since 7-20-20, no follow up on the schedule,      had most recent surgery with Dr Gilbert for spinal fusion C4-7 on 12-3-20      Wants zofran MD2    "

## 2020-12-28 ENCOUNTER — OFFICE VISIT (OUTPATIENT)
Dept: FAMILY MEDICINE CLINIC | Facility: CLINIC | Age: 65
End: 2020-12-28

## 2020-12-28 ENCOUNTER — HOSPITAL ENCOUNTER (EMERGENCY)
Facility: HOSPITAL | Age: 65
Discharge: HOME | End: 2020-12-28
Attending: EMERGENCY MEDICINE | Admitting: EMERGENCY MEDICINE

## 2020-12-28 ENCOUNTER — APPOINTMENT (OUTPATIENT)
Dept: CT IMAGING | Facility: HOSPITAL | Age: 65
End: 2020-12-28

## 2020-12-28 VITALS
BODY MASS INDEX: 30.73 KG/M2 | DIASTOLIC BLOOD PRESSURE: 70 MMHG | RESPIRATION RATE: 16 BRPM | OXYGEN SATURATION: 98 % | HEART RATE: 80 BPM | SYSTOLIC BLOOD PRESSURE: 130 MMHG | WEIGHT: 180 LBS | HEIGHT: 64 IN

## 2020-12-28 VITALS
WEIGHT: 180 LBS | BODY MASS INDEX: 30.73 KG/M2 | RESPIRATION RATE: 17 BRPM | HEART RATE: 112 BPM | DIASTOLIC BLOOD PRESSURE: 80 MMHG | SYSTOLIC BLOOD PRESSURE: 104 MMHG | HEIGHT: 64 IN | OXYGEN SATURATION: 95 % | TEMPERATURE: 97.5 F

## 2020-12-28 DIAGNOSIS — R13.10 DYSPHAGIA, UNSPECIFIED TYPE: ICD-10-CM

## 2020-12-28 DIAGNOSIS — K92.2 GASTROINTESTINAL HEMORRHAGE, UNSPECIFIED GASTROINTESTINAL HEMORRHAGE TYPE: Primary | ICD-10-CM

## 2020-12-28 DIAGNOSIS — R10.9 ABDOMINAL PAIN, UNSPECIFIED ABDOMINAL LOCATION: ICD-10-CM

## 2020-12-28 LAB
ABO GROUP BLD: NORMAL
ALBUMIN SERPL-MCNC: 4.1 G/DL (ref 3.5–5.2)
ALBUMIN/GLOB SERPL: 1.2 G/DL
ALP SERPL-CCNC: 114 U/L (ref 39–117)
ALT SERPL W P-5'-P-CCNC: 9 U/L (ref 1–33)
ANION GAP SERPL CALCULATED.3IONS-SCNC: 9 MMOL/L (ref 5–15)
AST SERPL-CCNC: 18 U/L (ref 1–32)
BACTERIA UR QL AUTO: ABNORMAL /HPF
BASOPHILS # BLD AUTO: 0.05 10*3/MM3 (ref 0–0.2)
BASOPHILS NFR BLD AUTO: 0.6 % (ref 0–1.5)
BILIRUB SERPL-MCNC: 0.2 MG/DL (ref 0–1.2)
BILIRUB UR QL STRIP: NEGATIVE
BLD GP AB SCN SERPL QL: NEGATIVE
BUN SERPL-MCNC: 11 MG/DL (ref 8–23)
BUN/CREAT SERPL: 15.9 (ref 7–25)
CALCIUM SPEC-SCNC: 9.2 MG/DL (ref 8.6–10.5)
CHLORIDE SERPL-SCNC: 104 MMOL/L (ref 98–107)
CLARITY UR: ABNORMAL
CO2 SERPL-SCNC: 28 MMOL/L (ref 22–29)
COLOR UR: YELLOW
CREAT SERPL-MCNC: 0.69 MG/DL (ref 0.57–1)
D-LACTATE SERPL-SCNC: 1.1 MMOL/L (ref 0.5–2)
DEPRECATED RDW RBC AUTO: 46.6 FL (ref 37–54)
DEVELOPER EXPIRATION DATE: ABNORMAL
DEVELOPER LOT NUMBER: 185
EOSINOPHIL # BLD AUTO: 0.1 10*3/MM3 (ref 0–0.4)
EOSINOPHIL NFR BLD AUTO: 1.1 % (ref 0.3–6.2)
ERYTHROCYTE [DISTWIDTH] IN BLOOD BY AUTOMATED COUNT: 13.8 % (ref 12.3–15.4)
EXPIRATION DATE: ABNORMAL
FECAL OCCULT BLOOD SCREEN, POC: POSITIVE
GFR SERPL CREATININE-BSD FRML MDRD: 85 ML/MIN/1.73
GLOBULIN UR ELPH-MCNC: 3.4 GM/DL
GLUCOSE SERPL-MCNC: 107 MG/DL (ref 65–99)
GLUCOSE UR STRIP-MCNC: NEGATIVE MG/DL
HCT VFR BLD AUTO: 39.6 % (ref 34–46.6)
HGB BLD-MCNC: 13.1 G/DL (ref 12–15.9)
HGB UR QL STRIP.AUTO: NEGATIVE
HOLD SPECIMEN: NORMAL
HOLD SPECIMEN: NORMAL
HYALINE CASTS UR QL AUTO: ABNORMAL /LPF
IMM GRANULOCYTES # BLD AUTO: 0.02 10*3/MM3 (ref 0–0.05)
IMM GRANULOCYTES NFR BLD AUTO: 0.2 % (ref 0–0.5)
KETONES UR QL STRIP: NEGATIVE
LEUKOCYTE ESTERASE UR QL STRIP.AUTO: ABNORMAL
LIPASE SERPL-CCNC: 43 U/L (ref 13–60)
LYMPHOCYTES # BLD AUTO: 3.73 10*3/MM3 (ref 0.7–3.1)
LYMPHOCYTES NFR BLD AUTO: 42.1 % (ref 19.6–45.3)
Lab: 185
MCH RBC QN AUTO: 30.5 PG (ref 26.6–33)
MCHC RBC AUTO-ENTMCNC: 33.1 G/DL (ref 31.5–35.7)
MCV RBC AUTO: 92.3 FL (ref 79–97)
MONOCYTES # BLD AUTO: 0.51 10*3/MM3 (ref 0.1–0.9)
MONOCYTES NFR BLD AUTO: 5.8 % (ref 5–12)
NEGATIVE CONTROL: NEGATIVE
NEUTROPHILS NFR BLD AUTO: 4.45 10*3/MM3 (ref 1.7–7)
NEUTROPHILS NFR BLD AUTO: 50.2 % (ref 42.7–76)
NITRITE UR QL STRIP: NEGATIVE
NRBC BLD AUTO-RTO: 0 /100 WBC (ref 0–0.2)
PH UR STRIP.AUTO: 6 [PH] (ref 5–8)
PLATELET # BLD AUTO: 311 10*3/MM3 (ref 140–450)
PMV BLD AUTO: 9.8 FL (ref 6–12)
POSITIVE CONTROL: POSITIVE
POTASSIUM SERPL-SCNC: 4.1 MMOL/L (ref 3.5–5.2)
PROCALCITONIN SERPL-MCNC: 0.03 NG/ML (ref 0–0.25)
PROT SERPL-MCNC: 7.5 G/DL (ref 6–8.5)
PROT UR QL STRIP: NEGATIVE
RBC # BLD AUTO: 4.29 10*6/MM3 (ref 3.77–5.28)
RBC # UR: ABNORMAL /HPF
REF LAB TEST METHOD: ABNORMAL
RH BLD: POSITIVE
SARS-COV-2 RNA PNL SPEC NAA+PROBE: NOT DETECTED
SODIUM SERPL-SCNC: 141 MMOL/L (ref 136–145)
SP GR UR STRIP: 1.03 (ref 1–1.03)
SQUAMOUS #/AREA URNS HPF: ABNORMAL /HPF
T&S EXPIRATION DATE: NORMAL
UROBILINOGEN UR QL STRIP: ABNORMAL
WBC # BLD AUTO: 8.86 10*3/MM3 (ref 3.4–10.8)
WBC UR QL AUTO: ABNORMAL /HPF
WHOLE BLOOD HOLD SPECIMEN: NORMAL
WHOLE BLOOD HOLD SPECIMEN: NORMAL

## 2020-12-28 PROCEDURE — 99283 EMERGENCY DEPT VISIT LOW MDM: CPT

## 2020-12-28 PROCEDURE — 83690 ASSAY OF LIPASE: CPT

## 2020-12-28 PROCEDURE — 84145 PROCALCITONIN (PCT): CPT | Performed by: EMERGENCY MEDICINE

## 2020-12-28 PROCEDURE — 25010000002 ONDANSETRON PER 1 MG: Performed by: EMERGENCY MEDICINE

## 2020-12-28 PROCEDURE — 96375 TX/PRO/DX INJ NEW DRUG ADDON: CPT

## 2020-12-28 PROCEDURE — 85025 COMPLETE CBC W/AUTO DIFF WBC: CPT

## 2020-12-28 PROCEDURE — 81001 URINALYSIS AUTO W/SCOPE: CPT | Performed by: EMERGENCY MEDICINE

## 2020-12-28 PROCEDURE — 99213 OFFICE O/P EST LOW 20 MIN: CPT | Performed by: FAMILY MEDICINE

## 2020-12-28 PROCEDURE — 25010000002 IOPAMIDOL 61 % SOLUTION: Performed by: EMERGENCY MEDICINE

## 2020-12-28 PROCEDURE — 83605 ASSAY OF LACTIC ACID: CPT | Performed by: EMERGENCY MEDICINE

## 2020-12-28 PROCEDURE — 82270 OCCULT BLOOD FECES: CPT | Performed by: EMERGENCY MEDICINE

## 2020-12-28 PROCEDURE — 74177 CT ABD & PELVIS W/CONTRAST: CPT

## 2020-12-28 PROCEDURE — 86901 BLOOD TYPING SEROLOGIC RH(D): CPT | Performed by: EMERGENCY MEDICINE

## 2020-12-28 PROCEDURE — 87635 SARS-COV-2 COVID-19 AMP PRB: CPT | Performed by: EMERGENCY MEDICINE

## 2020-12-28 PROCEDURE — 80053 COMPREHEN METABOLIC PANEL: CPT

## 2020-12-28 PROCEDURE — 96374 THER/PROPH/DIAG INJ IV PUSH: CPT

## 2020-12-28 PROCEDURE — 86850 RBC ANTIBODY SCREEN: CPT | Performed by: EMERGENCY MEDICINE

## 2020-12-28 PROCEDURE — 86900 BLOOD TYPING SEROLOGIC ABO: CPT | Performed by: EMERGENCY MEDICINE

## 2020-12-28 PROCEDURE — 36415 COLL VENOUS BLD VENIPUNCTURE: CPT

## 2020-12-28 RX ORDER — ONDANSETRON 2 MG/ML
4 INJECTION INTRAMUSCULAR; INTRAVENOUS ONCE
Status: COMPLETED | OUTPATIENT
Start: 2020-12-28 | End: 2020-12-28

## 2020-12-28 RX ORDER — SODIUM CHLORIDE 0.9 % (FLUSH) 0.9 %
10 SYRINGE (ML) INJECTION AS NEEDED
Status: DISCONTINUED | OUTPATIENT
Start: 2020-12-28 | End: 2020-12-28 | Stop reason: HOSPADM

## 2020-12-28 RX ORDER — PANTOPRAZOLE SODIUM 40 MG/10ML
80 INJECTION, POWDER, LYOPHILIZED, FOR SOLUTION INTRAVENOUS ONCE
Status: COMPLETED | OUTPATIENT
Start: 2020-12-28 | End: 2020-12-28

## 2020-12-28 RX ORDER — SUCRALFATE 1 G/1
1 TABLET ORAL 4 TIMES DAILY
Qty: 40 TABLET | Refills: 0 | Status: SHIPPED | OUTPATIENT
Start: 2020-12-28 | End: 2021-10-20

## 2020-12-28 RX ADMIN — PANTOPRAZOLE SODIUM 80 MG: 40 INJECTION, POWDER, FOR SOLUTION INTRAVENOUS at 20:02

## 2020-12-28 RX ADMIN — IOPAMIDOL 100 ML: 612 INJECTION, SOLUTION INTRAVENOUS at 19:43

## 2020-12-28 RX ADMIN — ONDANSETRON HYDROCHLORIDE 4 MG: 2 SOLUTION INTRAMUSCULAR; INTRAVENOUS at 20:02

## 2020-12-28 NOTE — PROGRESS NOTES
Subjective   Sedrick Leahy is a 65 y.o. female.     No chief complaint on file.      History of Present Illness     Kelsea is a 65 yr old lady patient of dr santiago put oin my schedule by the [facility]. She notes several hours of dark black stools with upper abd pain without near syncope or syncope. Denies any nsaid use.      Current Outpatient Medications:   •  amitriptyline (ELAVIL) 100 MG tablet, One to two tablets by mouth at night as needed for sleep, Disp: 180 tablet, Rfl: 0  •  Cholecalciferol (Vitamin D3) 10 MCG (400 UNIT) chewable tablet, Chew Daily., Disp: , Rfl:   •  fluticasone (FLONASE) 50 MCG/ACT nasal spray, 2 sprays into the nostril(s) as directed by provider 2 (Two) Times a Day., Disp: 48 g, Rfl: 3  •  metFORMIN (GLUCOPHAGE) 500 MG tablet, Take 1 tablet by mouth 2 (Two) Times a Day With Meals., Disp: 60 tablet, Rfl: 5  •  ondansetron (Zofran) 4 MG tablet, Take 1 tablet by mouth Every 8 (Eight) Hours As Needed for Nausea or Vomiting., Disp: 6 tablet, Rfl: 0  •  ondansetron ODT (ZOFRAN ODT) 4 MG disintegrating tablet, Place 1 tablet under the tongue Every 8 (Eight) Hours As Needed for Nausea or Vomiting., Disp: 10 tablet, Rfl: 0  •  oxyCODONE-acetaminophen (PERCOCET) 7.5-325 MG per tablet, Take 1 tablet by mouth Every 6 (Six) Hours As Needed., Disp: , Rfl:   •  tiZANidine (ZANAFLEX) 2 MG tablet, Take 1 tablet by mouth Every 8 (Eight) Hours As Needed for Muscle Spasms., Disp:  , Rfl:   Allergies   Allergen Reactions   • Penicillins Swelling and Rash       Past Medical History:   Diagnosis Date   • Arthritis    • Diabetes mellitus (CMS/HCC)    • Hx of colonic polyp    • Hypertension    • Lower back pain    • Neck pain    • PONV (postoperative nausea and vomiting)      Past Surgical History:   Procedure Laterality Date   • ANTERIOR CERVICAL DISCECTOMY W/ FUSION N/A 11/20/2020    Procedure: EXPLORATION OF FUSION C5-6, ANTERIOR CERVICAL DISCECTOMY FUSION C4-5, C6-7 REVISON ANTERIOR FUSION C5-6 WITH INSTRUMENTATION  "C4-7;  Surgeon: KEN Gilbert MD;  Location: [facility] OR;  Service: Orthopedic Spine;  Laterality: N/A;   • APPENDECTOMY     • COLONOSCOPY  06/06/2016    Normal exam repeat in 5 years   • COLONOSCOPY N/A 1/14/2020    Procedure: COLONOSCOPY WITH ANESTHESIA;  Surgeon: Abdullahi Howell MD;  Location: [facility] ENDOSCOPY;  Service: Gastroenterology   • COLONOSCOPY W/ POLYPECTOMY  04/04/2012    Hyperplastic polyp cecum repeat exam in 1 year   • ENDOSCOPY  08/14/2014    Very tortuous distal esophagus dilated 50 Fr, HH    • HARDWARE REMOVAL N/A 11/20/2020    Procedure: REMOVAL OF INSTRUMENTATION;  Surgeon: KEN Gilbert MD;  Location: [facility] OR;  Service: Orthopedic Spine;  Laterality: N/A;   • HYSTERECTOMY     • NECK SURGERY     • NISSEN FUNDOPLICATION     • POSTERIOR CERVICAL FUSION N/A 12/3/2020    Procedure: POSTERIOR SPINAL FUSION WITH INSTRUMENTATION C4-7;  Surgeon: KEN Gilbert MD;  Location: [facility] OR;  Service: Orthopedic Spine;  Laterality: N/A;   • US GUIDED LYMPH NODE BIOPSY  8/3/2020       Review of Systems   Constitutional: Negative.    HENT: Negative.    Eyes: Negative.    Respiratory: Negative.    Cardiovascular: Negative.    Gastrointestinal: Positive for abdominal pain, blood in stool and nausea.   Endocrine: Negative.    Genitourinary: Negative.    Musculoskeletal: Negative.    Skin: Negative.    Allergic/Immunologic: Negative.    Neurological: Negative.    Hematological: Negative.    Psychiatric/Behavioral: Negative.        Objective  /70   Pulse 80   Resp 16   Ht 162.6 cm (64\")   Wt 81.6 kg (180 lb)   SpO2 98%   BMI 30.90 kg/m²   Physical Exam  Vitals signs and nursing note reviewed.   Constitutional:       Appearance: Normal appearance. She is normal weight.   HENT:      Head: Normocephalic and atraumatic.      Right Ear: Ear canal normal.      Left Ear: Ear canal normal.      Nose: Nose normal.      Mouth/Throat:      Mouth: Mucous membranes are moist.   Eyes:      " Extraocular Movements: Extraocular movements intact.      Pupils: Pupils are equal, round, and reactive to light.   Neck:      Musculoskeletal: Normal range of motion and neck supple.   Cardiovascular:      Rate and Rhythm: Normal rate and regular rhythm.      Pulses: Normal pulses.      Heart sounds: Normal heart sounds.   Pulmonary:      Effort: Pulmonary effort is normal.      Breath sounds: Normal breath sounds.   Abdominal:      General: Abdomen is flat. Bowel sounds are normal.      Palpations: Abdomen is soft.   Musculoskeletal: Normal range of motion.   Skin:     General: Skin is warm and dry.      Capillary Refill: Capillary refill takes less than 2 seconds.   Neurological:      General: No focal deficit present.      Mental Status: She is alert and oriented to person, place, and time. Mental status is at baseline.   Psychiatric:         Mood and Affect: Mood normal.         Behavior: Behavior normal.         Thought Content: Thought content normal.         Judgment: Judgment normal.         Assessment/Plan   Diagnoses and all orders for this visit:    1. Gastrointestinal hemorrhage, unspecified gastrointestinal hemorrhage type (Primary)        Her  is taking her to the [facility], [place], operated by [facility] er  Now for eval         No orders of the defined types were placed in this encounter.      Follow up: 2 month(s) with dr lincoln

## 2020-12-29 NOTE — ED PROVIDER NOTES
Subjective   Patient with upper abdominal pain and black-colored stool.  Something given the ER she is got a history of Nissen fundoplication done about 3 years ago for reflux disease as far as I know in [place].  Denies any other complaint associate with this.  There is no vomiting but the patient says she cannot vomit.  No history of iron use.  There is no history of any gastroesophageal bleeding in the past.  She recently had a neck surgery but even before the neck surgery she was having difficulty swallowing which has gotten worse she is does not have any food bolus impaction but states that she has to chew her food very well is able to take liquids but has difficulty swallowing pills.      Abdominal Pain  Pain location:  Epigastric  Pain quality: aching, bloating and burning    Pain radiates to:  Does not radiate  Pain severity:  Moderate  Onset quality:  Gradual  Timing:  Constant  Progression:  Worsening  Chronicity:  New  Context: not alcohol use, not awakening from sleep, not diet changes, not eating, not recent illness, not recent sexual activity, not recent travel, not sick contacts and not suspicious food intake    Relieved by:  Nothing  Worsened by:  Nothing  Ineffective treatments:  None tried  Associated symptoms: hematochezia, melena and nausea    Associated symptoms: no anorexia, no chest pain, no chills, no constipation, no fatigue, no fever, no flatus, no hematemesis, no hematuria, no shortness of breath and no vomiting    Risk factors: no alcohol abuse, no aspirin use, not elderly, no NSAID use and no recent hospitalization        Review of Systems   Constitutional: Negative.  Negative for chills, fatigue and fever.   HENT: Negative.    Eyes: Negative.    Respiratory: Negative.  Negative for shortness of breath.    Cardiovascular: Negative.  Negative for chest pain.   Gastrointestinal: Positive for abdominal pain, hematochezia, melena and nausea. Negative for anorexia, constipation, flatus,  hematemesis and vomiting.   Endocrine: Negative.    Genitourinary: Negative.  Negative for hematuria.   Skin: Negative.    Neurological: Negative.    Hematological: Negative.    All other systems reviewed and are negative.      Past Medical History:   Diagnosis Date   • Arthritis    • Diabetes mellitus (CMS/HCC)    • Hx of colonic polyp    • Hypertension    • Lower back pain    • Neck pain    • PONV (postoperative nausea and vomiting)        Allergies   Allergen Reactions   • Penicillins Swelling and Rash       Past Surgical History:   Procedure Laterality Date   • ANTERIOR CERVICAL DISCECTOMY W/ FUSION N/A 11/20/2020    Procedure: EXPLORATION OF FUSION C5-6, ANTERIOR CERVICAL DISCECTOMY FUSION C4-5, C6-7 REVISON ANTERIOR FUSION C5-6 WITH INSTRUMENTATION C4-7;  Surgeon: KEN Gilbert MD;  Location: [facility] OR;  Service: Orthopedic Spine;  Laterality: N/A;   • APPENDECTOMY     • COLONOSCOPY  06/06/2016    Normal exam repeat in 5 years   • COLONOSCOPY N/A 1/14/2020    Procedure: COLONOSCOPY WITH ANESTHESIA;  Surgeon: Abdullahi Howell MD;  Location: [facility] ENDOSCOPY;  Service: Gastroenterology   • COLONOSCOPY W/ POLYPECTOMY  04/04/2012    Hyperplastic polyp cecum repeat exam in 1 year   • ENDOSCOPY  08/14/2014    Very tortuous distal esophagus dilated 50 Fr, HH    • HARDWARE REMOVAL N/A 11/20/2020    Procedure: REMOVAL OF INSTRUMENTATION;  Surgeon: KEN Gilbert MD;  Location: [facility] OR;  Service: Orthopedic Spine;  Laterality: N/A;   • HYSTERECTOMY     • NECK SURGERY     • NISSEN FUNDOPLICATION     • POSTERIOR CERVICAL FUSION N/A 12/3/2020    Procedure: POSTERIOR SPINAL FUSION WITH INSTRUMENTATION C4-7;  Surgeon: KEN Gilbert MD;  Location: [facility] OR;  Service: Orthopedic Spine;  Laterality: N/A;   • US GUIDED LYMPH NODE BIOPSY  8/3/2020       Family History   Problem Relation Age of Onset   • Colon cancer Mother    • Colon polyps Mother    • No Known Problems Father    • Diabetes Brother    •  Diabetes Son    • Breast cancer Maternal Aunt    • Heart attack Paternal Grandmother    • Lymphoma Maternal Aunt    • Lung cancer Maternal Aunt    • Cancer Maternal Aunt    • Diabetes Maternal Aunt    • Diabetes Son    • Diabetes Brother        Social History     Socioeconomic History   • Marital status:      Spouse name: Jason   • Number of children: 2   • Years of education: 12   • Highest education level: Not on file   Occupational History   • Occupation: retired     Comment: [employer]   Tobacco Use   • Smoking status: Former Smoker     Packs/day: 2.00     Types: Cigarettes     Start date: 1954     Quit date: 7/10/1990     Years since quittin.4   • Smokeless tobacco: Never Used   Substance and Sexual Activity   • Alcohol use: No   • Drug use: No   • Sexual activity: Defer           Objective   Physical Exam  Vitals signs and nursing note reviewed. Exam conducted with a chaperone present.   Constitutional:       General: She is awake. She is not in acute distress.     Appearance: Normal appearance. She is well-developed. She is not toxic-appearing.   HENT:      Head: Normocephalic and atraumatic.      Nose:      Right Nostril: No epistaxis.      Left Nostril: No epistaxis.   Eyes:      General: Lids are normal. No scleral icterus.     Conjunctiva/sclera: Conjunctivae normal.      Pupils: Pupils are equal, round, and reactive to light.   Neck:      Musculoskeletal: Normal range of motion and neck supple. No neck rigidity.      Vascular: No hepatojugular reflux or JVD.   Cardiovascular:      Rate and Rhythm: Regular rhythm. Tachycardia present.      Chest Wall: PMI is not displaced.      Pulses: Normal pulses. No decreased pulses.      Heart sounds: Normal heart sounds. No murmur.   Pulmonary:      Effort: Pulmonary effort is normal. No accessory muscle usage or respiratory distress.      Breath sounds: Normal breath sounds. No decreased breath sounds or wheezing.   Abdominal:      General:  Abdomen is flat. Bowel sounds are normal. There is no distension or abdominal bruit.      Palpations: Abdomen is soft. There is no shifting dullness, fluid wave, mass or pulsatile mass.      Tenderness: There is abdominal tenderness in the epigastric area. There is no right CVA tenderness, left CVA tenderness, guarding or rebound.      Hernia: No hernia is present.   Genitourinary:     Rectum: Guaiac result positive.      Comments: Nonmelanotic stool  Musculoskeletal: Normal range of motion.      Right lower leg: No edema.      Left lower leg: No edema.   Skin:     General: Skin is warm and dry.      Capillary Refill: Capillary refill takes less than 2 seconds.      Coloration: Skin is not cyanotic, jaundiced, mottled or pale.   Neurological:      General: No focal deficit present.      Mental Status: She is alert and oriented to person, place, and time. Mental status is at baseline.      GCS: GCS eye subscore is 4. GCS verbal subscore is 5. GCS motor subscore is 6.      Cranial Nerves: Cranial nerves are intact. No cranial nerve deficit.      Sensory: Sensation is intact.      Motor: Motor function is intact.      Deep Tendon Reflexes: Reflexes are normal and symmetric.   Psychiatric:         Behavior: Behavior normal. Behavior is cooperative.         Procedures           ED Course  ED Course as of Dec 28 2127   Mon Dec 28, 2020   1824 Patient with dark-colored stool upper abdominal pain was sent to the ER for evaluation she will also require endoscopic evaluation for difficulty swallowing at a later time Case turned over to Dr. Escudero    [TS]   2126 I took over from Dr. Jennings at shift change.  The patient CT scan is found to come back and reveals nothing acute.  I talked with the patient at length.  I told her this is probably an upper GI bleed of some kind.  I offered to put her in the hospital for faster evaluation of this but she says she has been in and out of the hospital so much frequently lately that she  would prefer to go home and follow-up as an outpatient.  Since her blood counts are all fine I think it is reasonable.  We will put her on some Carafate that she can crush up to swallow to try to get this under control in the meantime.  I did advise her to follow-up with Dr. Reed as soon as possible because he is her regular GI doctor.  She is discharged in stable condition.    [TR]      ED Course User Index  [TR] Ten Escudero Jr., MD  [TS] Thomas Vail MD                                           MDM  Number of Diagnoses or Management Options  Abdominal pain, unspecified abdominal location: new and requires workup  Dysphagia, unspecified type: new and requires workup  Gastrointestinal hemorrhage, unspecified gastrointestinal hemorrhage type: new and requires workup  Diagnosis management comments: DD   I considered gastric etiology, gastritis, gastroenteritis, peptic ulcer disease, gastroesophageal reflux disease, , gallbladder disease, liver disease, pancreatic disease, biliary colic, cholecystitis, cholelithiasis, hepatitis, pancreatitis, cholangitis, porphyria and diabetic ketoacidosis as a possible cause of abdominal pain in this patient. This is a partial list of diagnoses considered.           Amount and/or Complexity of Data Reviewed  Clinical lab tests: ordered and reviewed  Tests in the radiology section of CPT®: ordered and reviewed  Tests in the medicine section of CPT®: reviewed and ordered    Risk of Complications, Morbidity, and/or Mortality  Presenting problems: moderate  Diagnostic procedures: moderate  Management options: moderate    Patient Progress  Patient progress: stable      Final diagnoses:   Gastrointestinal hemorrhage, unspecified gastrointestinal hemorrhage type   Abdominal pain, unspecified abdominal location   Dysphagia, unspecified type            Ten Escudero Jr., MD  12/28/20 8627

## 2021-01-04 ENCOUNTER — TRANSCRIBE ORDERS (OUTPATIENT)
Dept: LAB | Facility: HOSPITAL | Age: 66
End: 2021-01-04

## 2021-01-04 ENCOUNTER — OFFICE VISIT (OUTPATIENT)
Dept: GASTROENTEROLOGY | Facility: CLINIC | Age: 66
End: 2021-01-04

## 2021-01-04 VITALS
DIASTOLIC BLOOD PRESSURE: 72 MMHG | TEMPERATURE: 97.8 F | HEART RATE: 74 BPM | SYSTOLIC BLOOD PRESSURE: 132 MMHG | WEIGHT: 179 LBS | BODY MASS INDEX: 29.82 KG/M2 | HEIGHT: 65 IN | RESPIRATION RATE: 16 BRPM

## 2021-01-04 DIAGNOSIS — Z78.9 NONSMOKER: ICD-10-CM

## 2021-01-04 DIAGNOSIS — Z01.818 PRE-OP TESTING: Primary | ICD-10-CM

## 2021-01-04 DIAGNOSIS — R13.19 ESOPHAGEAL DYSPHAGIA: ICD-10-CM

## 2021-01-04 DIAGNOSIS — R10.13 EPIGASTRIC PAIN: ICD-10-CM

## 2021-01-04 DIAGNOSIS — E66.9 OBESITY, UNSPECIFIED OBESITY SEVERITY, UNSPECIFIED OBESITY TYPE: ICD-10-CM

## 2021-01-04 DIAGNOSIS — R19.5 DARK STOOLS: Primary | ICD-10-CM

## 2021-01-04 PROCEDURE — 99214 OFFICE O/P EST MOD 30 MIN: CPT | Performed by: CLINICAL NURSE SPECIALIST

## 2021-01-04 RX ORDER — PANTOPRAZOLE SODIUM 40 MG/1
40 TABLET, DELAYED RELEASE ORAL DAILY
Qty: 30 TABLET | Refills: 11 | Status: SHIPPED | OUTPATIENT
Start: 2021-01-04 | End: 2021-10-20

## 2021-01-04 NOTE — PROGRESS NOTES
Sedrick Leahy  1955    1/4/2021  Chief Complaint   Patient presents with   • GI Problem     Abdominal pain and black stools     Subjective   HPI  Sedrick Leahy is a 65 y.o. female who presents with a complaint of dark stools that began approx 12/26/20. Led her to the ER on 12/28 and she says that she had epigastric abdominal pain described as a burning persistent with gnawing. Rated 5 out of 10 at times. Today a 2 out of 10. Worse with foods.   Eating makes it worse. She denies any NSAIDS. She has nausea no vomiting. H/H 13.1/39.6. she has some dysphagia in the upper esophageal region with bulky foods or solids. At times difficulty with pills. She says that this began approx 3 months ago. She has seen ENT as well as they found some nodules on the side of her neck they were benign. She has had recent neck surgery for disc replacement November 20th and again in December.   CT of the abdomen and pelvis did show her to have s/p NISSEN, mild constipation, calcified aorta. Bilobed cyst iwhtin the left lobe of the liver.   Past Medical History:   Diagnosis Date   • Arthritis    • Diabetes mellitus (CMS/HCC)    • Hx of colonic polyp    • Hypertension    • Lower back pain    • Neck pain    • PONV (postoperative nausea and vomiting)      Past Surgical History:   Procedure Laterality Date   • ANTERIOR CERVICAL DISCECTOMY W/ FUSION N/A 11/20/2020    Procedure: EXPLORATION OF FUSION C5-6, ANTERIOR CERVICAL DISCECTOMY FUSION C4-5, C6-7 REVISON ANTERIOR FUSION C5-6 WITH INSTRUMENTATION C4-7;  Surgeon: KEN Gilbert MD;  Location: [facility] OR;  Service: Orthopedic Spine;  Laterality: N/A;   • APPENDECTOMY     • COLONOSCOPY  06/06/2016    Normal exam repeat in 5 years   • COLONOSCOPY N/A 1/14/2020    Diverticulosis repeat exam in 5 years   • COLONOSCOPY W/ POLYPECTOMY  04/04/2012    Hyperplastic polyp cecum repeat exam in 1 year   • ENDOSCOPY  08/14/2014    Very tortuous distal esophagus dilated 50 Fr, HH    • HARDWARE  REMOVAL N/A 2020    Procedure: REMOVAL OF INSTRUMENTATION;  Surgeon: KEN Gilbert MD;  Location:  [facility] OR;  Service: Orthopedic Spine;  Laterality: N/A;   • HYSTERECTOMY     • NECK SURGERY     • NISSEN FUNDOPLICATION     • POSTERIOR CERVICAL FUSION N/A 12/3/2020    Procedure: POSTERIOR SPINAL FUSION WITH INSTRUMENTATION C4-7;  Surgeon: KEN Gilbert MD;  Location:  [facility] OR;  Service: Orthopedic Spine;  Laterality: N/A;   • US GUIDED LYMPH NODE BIOPSY  8/3/2020       Outpatient Medications Marked as Taking for the 21 encounter (Office Visit) with Marva Caal APRN   Medication Sig Dispense Refill   • amitriptyline (ELAVIL) 100 MG tablet One to two tablets by mouth at night as needed for sleep 180 tablet 0   • Cholecalciferol (Vitamin D3) 10 MCG (400 UNIT) chewable tablet Chew Daily.     • ondansetron (Zofran) 4 MG tablet Take 1 tablet by mouth Every 8 (Eight) Hours As Needed for Nausea or Vomiting. 6 tablet 0   • ondansetron ODT (ZOFRAN ODT) 4 MG disintegrating tablet Place 1 tablet under the tongue Every 8 (Eight) Hours As Needed for Nausea or Vomiting. 10 tablet 0   • sucralfate (CARAFATE) 1 g tablet Take 1 tablet by mouth 4 (Four) Times a Day. 40 tablet 0     Allergies   Allergen Reactions   • Penicillins Swelling and Rash     Social History     Socioeconomic History   • Marital status:      Spouse name: Jason   • Number of children: 2   • Years of education: 12   • Highest education level: Not on file   Occupational History   • Occupation: retired     Comment: [employer]   Tobacco Use   • Smoking status: Former Smoker     Packs/day: 2.00     Types: Cigarettes     Start date: 1955     Quit date: 7/10/1990     Years since quittin.5   • Smokeless tobacco: Never Used   Substance and Sexual Activity   • Alcohol use: No   • Drug use: No   • Sexual activity: Defer     Family History   Problem Relation Age of Onset   • Colon cancer Mother    • Colon polyps Mother      • No Known Problems Father    • Diabetes Brother    • Diabetes Son    • Breast cancer Maternal Aunt    • Heart attack Paternal Grandmother    • Lymphoma Maternal Aunt    • Lung cancer Maternal Aunt    • Cancer Maternal Aunt    • Diabetes Maternal Aunt    • Diabetes Son    • Diabetes Brother      Health Maintenance   Topic Date Due   • TDAP/TD VACCINES (1 - Tdap) 06/15/1973   • ZOSTER VACCINE (1 of 2) 06/15/2004   • PAP SMEAR  10/06/2016   • DIABETIC EYE EXAM  10/06/2016   • LIPID PANEL  05/28/2020   • URINE MICROALBUMIN  05/28/2020   • DXA SCAN  07/21/2020   • Pneumococcal Vaccine 65+ (2 of 2 - PPSV23) 10/22/2020   • ANNUAL WELLNESS VISIT  11/25/2020   • DIABETIC FOOT EXAM  11/25/2020   • HEMOGLOBIN A1C  01/20/2021   • MAMMOGRAM  01/10/2022   • COLONOSCOPY  01/14/2025   • HEPATITIS C SCREENING  Completed   • INFLUENZA VACCINE  Completed   • MENINGOCOCCAL VACCINE  Aged Out     Review of Systems   Constitutional: Negative for activity change, appetite change, chills, diaphoresis, fatigue, fever and unexpected weight change.   HENT: Negative for ear pain, hearing loss, mouth sores, sore throat, trouble swallowing and voice change.    Eyes: Negative.    Respiratory: Negative for cough, choking, shortness of breath and wheezing.    Cardiovascular: Negative for chest pain and palpitations.   Gastrointestinal: Positive for abdominal pain, blood in stool and nausea. Negative for constipation, diarrhea and vomiting.   Endocrine: Negative for cold intolerance and heat intolerance.   Genitourinary: Negative for decreased urine volume, dysuria, frequency, hematuria and urgency.   Musculoskeletal: Negative for back pain, gait problem and myalgias.   Skin: Negative for color change, pallor and rash.   Allergic/Immunologic: Negative for food allergies and immunocompromised state.   Neurological: Negative for dizziness, tremors, seizures, syncope, weakness, light-headedness, numbness and headaches.   Hematological: Negative for  "adenopathy. Does not bruise/bleed easily.   Psychiatric/Behavioral: Negative for agitation and confusion. The patient is not nervous/anxious.    All other systems reviewed and are negative.    Objective   Vitals:    01/04/21 1244   BP: 132/72   Pulse: 74   Resp: 16   Temp: 97.8 °F (36.6 °C)   Weight: 81.2 kg (179 lb)   Height: 163.8 cm (64.5\")     Body mass index is 30.25 kg/m².  Physical Exam  Constitutional:       Appearance: She is well-developed.   HENT:      Head: Normocephalic and atraumatic.   Eyes:      Pupils: Pupils are equal, round, and reactive to light.   Neck:      Musculoskeletal: Normal range of motion and neck supple.      Trachea: No tracheal deviation.   Cardiovascular:      Rate and Rhythm: Normal rate and regular rhythm.      Heart sounds: Normal heart sounds. No murmur. No friction rub. No gallop.    Pulmonary:      Effort: Pulmonary effort is normal. No respiratory distress.      Breath sounds: Normal breath sounds. No wheezing or rales.   Chest:      Chest wall: No tenderness.   Abdominal:      General: Bowel sounds are normal. There is no distension.      Palpations: Abdomen is soft. Abdomen is not rigid.      Tenderness: There is no abdominal tenderness. There is no guarding or rebound.   Musculoskeletal: Normal range of motion.         General: No tenderness or deformity.   Skin:     General: Skin is warm and dry.      Coloration: Skin is not pale.      Findings: No rash.   Neurological:      Mental Status: She is alert and oriented to person, place, and time.      Deep Tendon Reflexes: Reflexes are normal and symmetric.   Psychiatric:         Behavior: Behavior normal.         Thought Content: Thought content normal.         Judgment: Judgment normal.       Assessment/Plan   Diagnoses and all orders for this visit:    1. Dark stools (Primary)  -     pantoprazole (PROTONIX) 40 MG EC tablet; Take 1 tablet by mouth Daily.  Dispense: 30 tablet; Refill: 11    2. Epigastric pain  -     Case " Request; Standing  -     Follow Anesthesia Guidelines / Standing Orders; Future  -     Obtain Informed Consent; Future  -     Implement Anesthesia Orders Day of Procedure; Standing  -     Obtain Informed Consent; Standing  -     Case Request    3. Nonsmoker    4. Obesity, unspecified obesity severity, unspecified obesity type    5. Esophageal dysphagia  -     FL Esophagram Complete Single Contrast    recommend that she get esophagram first we will get clearance from Dr Gilbert for her Endoscopy.   PPI therapy  Continue Carafate  H/H normal at 13.1/39.6.     ESOPHAGOGASTRODUODENOSCOPY WITH ANESTHESIA (N/A)  Part of this note may be an electronic transcription/translation of spoken language to printed text using the Dragon Dictation System.  Body mass index is 30.25 kg/m².  Return in about 2 months (around 3/4/2021).    Patient's Body mass index is 30.25 kg/m². BMI is above normal parameters. Recommendations include: nutrition counseling.      All risks, benefits, alternatives, and indications of colonoscopy and/or Endoscopy procedure have been discussed with the patient. Risks to include perforation of the colon requiring possible surgery or colostomy, risk of bleeding from biopsies or removal of colon tissue, possibility of missing a colon polyp or cancer, or adverse drug reaction.  Benefits to include the diagnosis and management of disease of the colon and rectum. Alternatives to include barium enema, radiographic evaluation, lab testing or no intervention. Pt verbalizes understanding and agrees.     Marva Caal, APRN  1/4/2021  13:24 CST      Obesity, Adult  Obesity is the condition of having too much total body fat. Being overweight or obese means that your weight is greater than what is considered healthy for your body size. Obesity is determined by a measurement called BMI. BMI is an estimate of body fat and is calculated from height and weight. For adults, a BMI of 30 or higher is considered  obese.  Obesity can eventually lead to other health concerns and major illnesses, including:  · Stroke.  · Coronary artery disease (CAD).  · Type 2 diabetes.  · Some types of cancer, including cancers of the colon, breast, uterus, and gallbladder.  · Osteoarthritis.  · High blood pressure (hypertension).  · High cholesterol.  · Sleep apnea.  · Gallbladder stones.  · Infertility problems.  What are the causes?  The main cause of obesity is taking in (consuming) more calories than your body uses for energy. Other factors that contribute to this condition may include:  · Being born with genes that make you more likely to become obese.  · Having a medical condition that causes obesity. These conditions include:  ¨ Hypothyroidism.  ¨ Polycystic ovarian syndrome (PCOS).  ¨ Binge-eating disorder.  ¨ Cushing syndrome.  · Taking certain medicines, such as steroids, antidepressants, and seizure medicines.  · Not being physically active (sedentary lifestyle).  · Living where there are limited places to exercise safely or buy healthy foods.  · Not getting enough sleep.  What increases the risk?  The following factors may increase your risk of this condition:  · Having a family history of obesity.  · Being a woman of -American descent.  · Being a man of  descent.  What are the signs or symptoms?  Having excessive body fat is the main symptom of this condition.  How is this diagnosed?  This condition may be diagnosed based on:  · Your symptoms.  · Your medical history.  · A physical exam. Your health care provider may measure:  ¨ Your BMI. If you are an adult with a BMI between 25 and less than 30, you are considered overweight. If you are an adult with a BMI of 30 or higher, you are considered obese.  ¨ The distances around your hips and your waist (circumferences). These may be compared to each other to help diagnose your condition.  ¨ Your skinfold thickness. Your health care provider may gently pinch a fold of  your skin and measure it.  How is this treated?  Treatment for this condition often includes changing your lifestyle. Treatment may include some or all of the following:  · Dietary changes. Work with your health care provider and a dietitian to set a weight-loss goal that is healthy and reasonable for you. Dietary changes may include eating:  ¨ Smaller portions. A portion size is the amount of a particular food that is healthy for you to eat at one time. This varies from person to person.  ¨ Low-calorie or low-fat options.  ¨ More whole grains, fruits, and vegetables.  · Regular physical activity. This may include aerobic activity (cardio) and strength training.  · Medicine to help you lose weight. Your health care provider may prescribe medicine if you are unable to lose 1 pound a week after 6 weeks of eating more healthily and doing more physical activity.  · Surgery. Surgical options may include gastric banding and gastric bypass. Surgery may be done if:  ¨ Other treatments have not helped to improve your condition.  ¨ You have a BMI of 40 or higher.  ¨ You have life-threatening health problems related to obesity.  Follow these instructions at home:     Eating and drinking     · Follow recommendations from your health care provider about what you eat and drink. Your health care provider may advise you to:  ¨ Limit fast foods, sweets, and processed snack foods.  ¨ Choose low-fat options, such as low-fat milk instead of whole milk.  ¨ Eat 5 or more servings of fruits or vegetables every day.  ¨ Eat at home more often. This gives you more control over what you eat.  ¨ Choose healthy foods when you eat out.  ¨ Learn what a healthy portion size is.  ¨ Keep low-fat snacks on hand.  ¨ Avoid sugary drinks, such as soda, fruit juice, iced tea sweetened with sugar, and flavored milk.  ¨ Eat a healthy breakfast.  · Drink enough water to keep your urine clear or pale yellow.  · Do not go without eating for long periods of  time (do not fast) or follow a fad diet. Fasting and fad diets can be unhealthy and even dangerous.  Physical Activity   · Exercise regularly, as told by your health care provider. Ask your health care provider what types of exercise are safe for you and how often you should exercise.  · Warm up and stretch before being active.  · Cool down and stretch after being active.  · Rest between periods of activity.  Lifestyle   · Limit the time that you spend in front of your TV, computer, or video game system.  · Find ways to reward yourself that do not involve food.  · Limit alcohol intake to no more than 1 drink a day for nonpregnant women and 2 drinks a day for men. One drink equals 12 oz of beer, 5 oz of wine, or 1½ oz of hard liquor.  General instructions   · Keep a weight loss journal to keep track of the food you eat and how much you exercise you get.  · Take over-the-counter and prescription medicines only as told by your health care provider.  · Take vitamins and supplements only as told by your health care provider.  · Consider joining a support group. Your health care provider may be able to recommend a support group.  · Keep all follow-up visits as told by your health care provider. This is important.  Contact a health care provider if:  · You are unable to meet your weight loss goal after 6 weeks of dietary and lifestyle changes.  This information is not intended to replace advice given to you by your health care provider. Make sure you discuss any questions you have with your health care provider.  Document Released: 01/25/2006 Document Revised: 05/22/2017 Document Reviewed: 10/05/2016  [employer] Interactive Patient Education © 2017 [employer]      If you smoke or use tobacco, 4 minutes reading provided  Steps to Quit Smoking  Smoking tobacco can be harmful to your health and can affect almost every organ in your body. Smoking puts you, and those around you, at risk for developing many serious chronic  diseases. Quitting smoking is difficult, but it is one of the best things that you can do for your health. It is never too late to quit.  What are the benefits of quitting smoking?  When you quit smoking, you lower your risk of developing serious diseases and conditions, such as:  · Lung cancer or lung disease, such as COPD.  · Heart disease.  · Stroke.  · Heart attack.  · Infertility.  · Osteoporosis and bone fractures.  Additionally, symptoms such as coughing, wheezing, and shortness of breath may get better when you quit. You may also find that you get sick less often because your body is stronger at fighting off colds and infections. If you are pregnant, quitting smoking can help to reduce your chances of having a baby of low birth weight.  How do I get ready to quit?  When you decide to quit smoking, create a plan to make sure that you are successful. Before you quit:  · Pick a date to quit. Set a date within the next two weeks to give you time to prepare.  · Write down the reasons why you are quitting. Keep this list in places where you will see it often, such as on your bathroom mirror or in your car or wallet.  · Identify the people, places, things, and activities that make you want to smoke (triggers) and avoid them. Make sure to take these actions:  ¨ Throw away all cigarettes at home, at work, and in your car.  ¨ Throw away smoking accessories, such as ashtrays and lighters.  ¨ Clean your car and make sure to empty the ashtray.  ¨ Clean your home, including curtains and carpets.  · Tell your family, friends, and coworkers that you are quitting. Support from your loved ones can make quitting easier.  · Talk with your health care provider about your options for quitting smoking.  · Find out what treatment options are covered by your health insurance.  What strategies can I use to quit smoking?  Talk with your healthcare provider about different strategies to quit smoking. Some strategies  include:  · Quitting smoking altogether instead of gradually lessening how much you smoke over a period of time. Research shows that quitting “cold turkey” is more successful than gradually quitting.  · Attending in-person counseling to help you build problem-solving skills. You are more likely to have success in quitting if you attend several counseling sessions. Even short sessions of 10 minutes can be effective.  · Finding resources and support systems that can help you to quit smoking and remain smoke-free after you quit. These resources are most helpful when you use them often. They can include:  ¨ Online chats with a counselor.  ¨ Telephone quitlines.  ¨ Printed self-help materials.  ¨ Support groups or group counseling.  ¨ Text messaging programs.  ¨ Mobile phone applications.  · Taking medicines to help you quit smoking. (If you are pregnant or breastfeeding, talk with your health care provider first.) Some medicines contain nicotine and some do not. Both types of medicines help with cravings, but the medicines that include nicotine help to relieve withdrawal symptoms. Your health care provider may recommend:  ¨ Nicotine patches, gum, or lozenges.  ¨ Nicotine inhalers or sprays.  ¨ Non-nicotine medicine that is taken by mouth.  Talk with your health care provider about combining strategies, such as taking medicines while you are also receiving in-person counseling. Using these two strategies together makes you more likely to succeed in quitting than if you used either strategy on its own.  If you are pregnant or breastfeeding, talk with your health care provider about finding counseling or other support strategies to quit smoking. Do not take medicine to help you quit smoking unless told to do so by your health care provider.  What things can I do to make it easier to quit?  Quitting smoking might feel overwhelming at first, but there is a lot that you can do to make it easier. Take these important  actions:  · Reach out to your family and friends and ask that they support and encourage you during this time. Call telephone quitlines, reach out to support groups, or work with a counselor for support.  · Ask people who smoke to avoid smoking around you.  · Avoid places that trigger you to smoke, such as bars, parties, or smoke-break areas at work.  · Spend time around people who do not smoke.  · Lessen stress in your life, because stress can be a smoking trigger for some people. To lessen stress, try:  ¨ Exercising regularly.  ¨ Deep-breathing exercises.  ¨ Yoga.  ¨ Meditating.  ¨ Performing a body scan. This involves closing your eyes, scanning your body from head to toe, and noticing which parts of your body are particularly tense. Purposefully relax the muscles in those areas.  · Download or purchase mobile phone or tablet apps (applications) that can help you stick to your quit plan by providing reminders, tips, and encouragement. There are many free apps, such as QuitGuide from the CDC (Centers for Disease Control and Prevention). You can find other support for quitting smoking (smoking cessation) through smokefree.gov and other websites.  How will I feel when I quit smoking?  Within the first 24 hours of quitting smoking, you may start to feel some withdrawal symptoms. These symptoms are usually most noticeable 2-3 days after quitting, but they usually do not last beyond 2-3 weeks. Changes or symptoms that you might experience include:  · Mood swings.  · Restlessness, anxiety, or irritation.  · Difficulty concentrating.  · Dizziness.  · Strong cravings for sugary foods in addition to nicotine.  · Mild weight gain.  · Constipation.  · Nausea.  · Coughing or a sore throat.  · Changes in how your medicines work in your body.  · A depressed mood.  · Difficulty sleeping (insomnia).  After the first 2-3 weeks of quitting, you may start to notice more positive results, such as:  · Improved sense of smell and  taste.  · Decreased coughing and sore throat.  · Slower heart rate.  · Lower blood pressure.  · Clearer skin.  · The ability to breathe more easily.  · Fewer sick days.  Quitting smoking is very challenging for most people. Do not get discouraged if you are not successful the first time. Some people need to make many attempts to quit before they achieve long-term success. Do your best to stick to your quit plan, and talk with your health care provider if you have any questions or concerns.  This information is not intended to replace advice given to you by your health care provider. Make sure you discuss any questions you have with your health care provider.  Document Released: 12/12/2002 Document Revised: 08/15/2017 Document Reviewed: 05/03/2016  [employer] Interactive Patient Education © 2017 [employer]

## 2021-01-04 NOTE — H&P (VIEW-ONLY)
Sedrick Leahy  1955    1/4/2021  Chief Complaint   Patient presents with   • GI Problem     Abdominal pain and black stools     Subjective   HPI  Sedrick Leahy is a 65 y.o. female who presents with a complaint of dark stools that began approx 12/26/20. Led her to the ER on 12/28 and she says that she had epigastric abdominal pain described as a burning persistent with gnawing. Rated 5 out of 10 at times. Today a 2 out of 10. Worse with foods.   Eating makes it worse. She denies any NSAIDS. She has nausea no vomiting. H/H 13.1/39.6. she has some dysphagia in the upper esophageal region with bulky foods or solids. At times difficulty with pills. She says that this began approx 3 months ago. She has seen ENT as well as they found some nodules on the side of her neck they were benign. She has had recent neck surgery for disc replacement November 20th and again in December.   CT of the abdomen and pelvis did show her to have s/p NISSEN, mild constipation, calcified aorta. Bilobed cyst iwhtin the left lobe of the liver.   Past Medical History:   Diagnosis Date   • Arthritis    • Diabetes mellitus (CMS/HCC)    • Hx of colonic polyp    • Hypertension    • Lower back pain    • Neck pain    • PONV (postoperative nausea and vomiting)      Past Surgical History:   Procedure Laterality Date   • ANTERIOR CERVICAL DISCECTOMY W/ FUSION N/A 11/20/2020    Procedure: EXPLORATION OF FUSION C5-6, ANTERIOR CERVICAL DISCECTOMY FUSION C4-5, C6-7 REVISON ANTERIOR FUSION C5-6 WITH INSTRUMENTATION C4-7;  Surgeon: KEN Gilbert MD;  Location: [facility] OR;  Service: Orthopedic Spine;  Laterality: N/A;   • APPENDECTOMY     • COLONOSCOPY  06/06/2016    Normal exam repeat in 5 years   • COLONOSCOPY N/A 1/14/2020    Diverticulosis repeat exam in 5 years   • COLONOSCOPY W/ POLYPECTOMY  04/04/2012    Hyperplastic polyp cecum repeat exam in 1 year   • ENDOSCOPY  08/14/2014    Very tortuous distal esophagus dilated 50 Fr, HH    • HARDWARE  REMOVAL N/A 2020    Procedure: REMOVAL OF INSTRUMENTATION;  Surgeon: KEN Gilbert MD;  Location:  [facility] OR;  Service: Orthopedic Spine;  Laterality: N/A;   • HYSTERECTOMY     • NECK SURGERY     • NISSEN FUNDOPLICATION     • POSTERIOR CERVICAL FUSION N/A 12/3/2020    Procedure: POSTERIOR SPINAL FUSION WITH INSTRUMENTATION C4-7;  Surgeon: KEN Gilbert MD;  Location:  [facility] OR;  Service: Orthopedic Spine;  Laterality: N/A;   • US GUIDED LYMPH NODE BIOPSY  8/3/2020       Outpatient Medications Marked as Taking for the 21 encounter (Office Visit) with Marva Caal APRN   Medication Sig Dispense Refill   • amitriptyline (ELAVIL) 100 MG tablet One to two tablets by mouth at night as needed for sleep 180 tablet 0   • Cholecalciferol (Vitamin D3) 10 MCG (400 UNIT) chewable tablet Chew Daily.     • ondansetron (Zofran) 4 MG tablet Take 1 tablet by mouth Every 8 (Eight) Hours As Needed for Nausea or Vomiting. 6 tablet 0   • ondansetron ODT (ZOFRAN ODT) 4 MG disintegrating tablet Place 1 tablet under the tongue Every 8 (Eight) Hours As Needed for Nausea or Vomiting. 10 tablet 0   • sucralfate (CARAFATE) 1 g tablet Take 1 tablet by mouth 4 (Four) Times a Day. 40 tablet 0     Allergies   Allergen Reactions   • Penicillins Swelling and Rash     Social History     Socioeconomic History   • Marital status:      Spouse name: Jason   • Number of children: 2   • Years of education: 12   • Highest education level: Not on file   Occupational History   • Occupation: retired     Comment: [employer]   Tobacco Use   • Smoking status: Former Smoker     Packs/day: 2.00     Types: Cigarettes     Start date: 1955     Quit date: 7/10/1990     Years since quittin.5   • Smokeless tobacco: Never Used   Substance and Sexual Activity   • Alcohol use: No   • Drug use: No   • Sexual activity: Defer     Family History   Problem Relation Age of Onset   • Colon cancer Mother    • Colon polyps Mother      • No Known Problems Father    • Diabetes Brother    • Diabetes Son    • Breast cancer Maternal Aunt    • Heart attack Paternal Grandmother    • Lymphoma Maternal Aunt    • Lung cancer Maternal Aunt    • Cancer Maternal Aunt    • Diabetes Maternal Aunt    • Diabetes Son    • Diabetes Brother      Health Maintenance   Topic Date Due   • TDAP/TD VACCINES (1 - Tdap) 06/15/1973   • ZOSTER VACCINE (1 of 2) 06/15/2004   • PAP SMEAR  10/06/2016   • DIABETIC EYE EXAM  10/06/2016   • LIPID PANEL  05/28/2020   • URINE MICROALBUMIN  05/28/2020   • DXA SCAN  07/21/2020   • Pneumococcal Vaccine 65+ (2 of 2 - PPSV23) 10/22/2020   • ANNUAL WELLNESS VISIT  11/25/2020   • DIABETIC FOOT EXAM  11/25/2020   • HEMOGLOBIN A1C  01/20/2021   • MAMMOGRAM  01/10/2022   • COLONOSCOPY  01/14/2025   • HEPATITIS C SCREENING  Completed   • INFLUENZA VACCINE  Completed   • MENINGOCOCCAL VACCINE  Aged Out     Review of Systems   Constitutional: Negative for activity change, appetite change, chills, diaphoresis, fatigue, fever and unexpected weight change.   HENT: Negative for ear pain, hearing loss, mouth sores, sore throat, trouble swallowing and voice change.    Eyes: Negative.    Respiratory: Negative for cough, choking, shortness of breath and wheezing.    Cardiovascular: Negative for chest pain and palpitations.   Gastrointestinal: Positive for abdominal pain, blood in stool and nausea. Negative for constipation, diarrhea and vomiting.   Endocrine: Negative for cold intolerance and heat intolerance.   Genitourinary: Negative for decreased urine volume, dysuria, frequency, hematuria and urgency.   Musculoskeletal: Negative for back pain, gait problem and myalgias.   Skin: Negative for color change, pallor and rash.   Allergic/Immunologic: Negative for food allergies and immunocompromised state.   Neurological: Negative for dizziness, tremors, seizures, syncope, weakness, light-headedness, numbness and headaches.   Hematological: Negative for  "adenopathy. Does not bruise/bleed easily.   Psychiatric/Behavioral: Negative for agitation and confusion. The patient is not nervous/anxious.    All other systems reviewed and are negative.    Objective   Vitals:    01/04/21 1244   BP: 132/72   Pulse: 74   Resp: 16   Temp: 97.8 °F (36.6 °C)   Weight: 81.2 kg (179 lb)   Height: 163.8 cm (64.5\")     Body mass index is 30.25 kg/m².  Physical Exam  Constitutional:       Appearance: She is well-developed.   HENT:      Head: Normocephalic and atraumatic.   Eyes:      Pupils: Pupils are equal, round, and reactive to light.   Neck:      Musculoskeletal: Normal range of motion and neck supple.      Trachea: No tracheal deviation.   Cardiovascular:      Rate and Rhythm: Normal rate and regular rhythm.      Heart sounds: Normal heart sounds. No murmur. No friction rub. No gallop.    Pulmonary:      Effort: Pulmonary effort is normal. No respiratory distress.      Breath sounds: Normal breath sounds. No wheezing or rales.   Chest:      Chest wall: No tenderness.   Abdominal:      General: Bowel sounds are normal. There is no distension.      Palpations: Abdomen is soft. Abdomen is not rigid.      Tenderness: There is no abdominal tenderness. There is no guarding or rebound.   Musculoskeletal: Normal range of motion.         General: No tenderness or deformity.   Skin:     General: Skin is warm and dry.      Coloration: Skin is not pale.      Findings: No rash.   Neurological:      Mental Status: She is alert and oriented to person, place, and time.      Deep Tendon Reflexes: Reflexes are normal and symmetric.   Psychiatric:         Behavior: Behavior normal.         Thought Content: Thought content normal.         Judgment: Judgment normal.       Assessment/Plan   Diagnoses and all orders for this visit:    1. Dark stools (Primary)  -     pantoprazole (PROTONIX) 40 MG EC tablet; Take 1 tablet by mouth Daily.  Dispense: 30 tablet; Refill: 11    2. Epigastric pain  -     Case " Request; Standing  -     Follow Anesthesia Guidelines / Standing Orders; Future  -     Obtain Informed Consent; Future  -     Implement Anesthesia Orders Day of Procedure; Standing  -     Obtain Informed Consent; Standing  -     Case Request    3. Nonsmoker    4. Obesity, unspecified obesity severity, unspecified obesity type    5. Esophageal dysphagia  -     FL Esophagram Complete Single Contrast    recommend that she get esophagram first we will get clearance from Dr Gilbert for her Endoscopy.   PPI therapy  Continue Carafate  H/H normal at 13.1/39.6.     ESOPHAGOGASTRODUODENOSCOPY WITH ANESTHESIA (N/A)  Part of this note may be an electronic transcription/translation of spoken language to printed text using the Dragon Dictation System.  Body mass index is 30.25 kg/m².  Return in about 2 months (around 3/4/2021).    Patient's Body mass index is 30.25 kg/m². BMI is above normal parameters. Recommendations include: nutrition counseling.      All risks, benefits, alternatives, and indications of colonoscopy and/or Endoscopy procedure have been discussed with the patient. Risks to include perforation of the colon requiring possible surgery or colostomy, risk of bleeding from biopsies or removal of colon tissue, possibility of missing a colon polyp or cancer, or adverse drug reaction.  Benefits to include the diagnosis and management of disease of the colon and rectum. Alternatives to include barium enema, radiographic evaluation, lab testing or no intervention. Pt verbalizes understanding and agrees.     Marva Caal, APRN  1/4/2021  13:24 CST      Obesity, Adult  Obesity is the condition of having too much total body fat. Being overweight or obese means that your weight is greater than what is considered healthy for your body size. Obesity is determined by a measurement called BMI. BMI is an estimate of body fat and is calculated from height and weight. For adults, a BMI of 30 or higher is considered  obese.  Obesity can eventually lead to other health concerns and major illnesses, including:  · Stroke.  · Coronary artery disease (CAD).  · Type 2 diabetes.  · Some types of cancer, including cancers of the colon, breast, uterus, and gallbladder.  · Osteoarthritis.  · High blood pressure (hypertension).  · High cholesterol.  · Sleep apnea.  · Gallbladder stones.  · Infertility problems.  What are the causes?  The main cause of obesity is taking in (consuming) more calories than your body uses for energy. Other factors that contribute to this condition may include:  · Being born with genes that make you more likely to become obese.  · Having a medical condition that causes obesity. These conditions include:  ¨ Hypothyroidism.  ¨ Polycystic ovarian syndrome (PCOS).  ¨ Binge-eating disorder.  ¨ Cushing syndrome.  · Taking certain medicines, such as steroids, antidepressants, and seizure medicines.  · Not being physically active (sedentary lifestyle).  · Living where there are limited places to exercise safely or buy healthy foods.  · Not getting enough sleep.  What increases the risk?  The following factors may increase your risk of this condition:  · Having a family history of obesity.  · Being a woman of -American descent.  · Being a man of  descent.  What are the signs or symptoms?  Having excessive body fat is the main symptom of this condition.  How is this diagnosed?  This condition may be diagnosed based on:  · Your symptoms.  · Your medical history.  · A physical exam. Your health care provider may measure:  ¨ Your BMI. If you are an adult with a BMI between 25 and less than 30, you are considered overweight. If you are an adult with a BMI of 30 or higher, you are considered obese.  ¨ The distances around your hips and your waist (circumferences). These may be compared to each other to help diagnose your condition.  ¨ Your skinfold thickness. Your health care provider may gently pinch a fold of  your skin and measure it.  How is this treated?  Treatment for this condition often includes changing your lifestyle. Treatment may include some or all of the following:  · Dietary changes. Work with your health care provider and a dietitian to set a weight-loss goal that is healthy and reasonable for you. Dietary changes may include eating:  ¨ Smaller portions. A portion size is the amount of a particular food that is healthy for you to eat at one time. This varies from person to person.  ¨ Low-calorie or low-fat options.  ¨ More whole grains, fruits, and vegetables.  · Regular physical activity. This may include aerobic activity (cardio) and strength training.  · Medicine to help you lose weight. Your health care provider may prescribe medicine if you are unable to lose 1 pound a week after 6 weeks of eating more healthily and doing more physical activity.  · Surgery. Surgical options may include gastric banding and gastric bypass. Surgery may be done if:  ¨ Other treatments have not helped to improve your condition.  ¨ You have a BMI of 40 or higher.  ¨ You have life-threatening health problems related to obesity.  Follow these instructions at home:     Eating and drinking     · Follow recommendations from your health care provider about what you eat and drink. Your health care provider may advise you to:  ¨ Limit fast foods, sweets, and processed snack foods.  ¨ Choose low-fat options, such as low-fat milk instead of whole milk.  ¨ Eat 5 or more servings of fruits or vegetables every day.  ¨ Eat at home more often. This gives you more control over what you eat.  ¨ Choose healthy foods when you eat out.  ¨ Learn what a healthy portion size is.  ¨ Keep low-fat snacks on hand.  ¨ Avoid sugary drinks, such as soda, fruit juice, iced tea sweetened with sugar, and flavored milk.  ¨ Eat a healthy breakfast.  · Drink enough water to keep your urine clear or pale yellow.  · Do not go without eating for long periods of  time (do not fast) or follow a fad diet. Fasting and fad diets can be unhealthy and even dangerous.  Physical Activity   · Exercise regularly, as told by your health care provider. Ask your health care provider what types of exercise are safe for you and how often you should exercise.  · Warm up and stretch before being active.  · Cool down and stretch after being active.  · Rest between periods of activity.  Lifestyle   · Limit the time that you spend in front of your TV, computer, or video game system.  · Find ways to reward yourself that do not involve food.  · Limit alcohol intake to no more than 1 drink a day for nonpregnant women and 2 drinks a day for men. One drink equals 12 oz of beer, 5 oz of wine, or 1½ oz of hard liquor.  General instructions   · Keep a weight loss journal to keep track of the food you eat and how much you exercise you get.  · Take over-the-counter and prescription medicines only as told by your health care provider.  · Take vitamins and supplements only as told by your health care provider.  · Consider joining a support group. Your health care provider may be able to recommend a support group.  · Keep all follow-up visits as told by your health care provider. This is important.  Contact a health care provider if:  · You are unable to meet your weight loss goal after 6 weeks of dietary and lifestyle changes.  This information is not intended to replace advice given to you by your health care provider. Make sure you discuss any questions you have with your health care provider.  Document Released: 01/25/2006 Document Revised: 05/22/2017 Document Reviewed: 10/05/2016  [employer] Interactive Patient Education © 2017 [employer]      If you smoke or use tobacco, 4 minutes reading provided  Steps to Quit Smoking  Smoking tobacco can be harmful to your health and can affect almost every organ in your body. Smoking puts you, and those around you, at risk for developing many serious chronic  diseases. Quitting smoking is difficult, but it is one of the best things that you can do for your health. It is never too late to quit.  What are the benefits of quitting smoking?  When you quit smoking, you lower your risk of developing serious diseases and conditions, such as:  · Lung cancer or lung disease, such as COPD.  · Heart disease.  · Stroke.  · Heart attack.  · Infertility.  · Osteoporosis and bone fractures.  Additionally, symptoms such as coughing, wheezing, and shortness of breath may get better when you quit. You may also find that you get sick less often because your body is stronger at fighting off colds and infections. If you are pregnant, quitting smoking can help to reduce your chances of having a baby of low birth weight.  How do I get ready to quit?  When you decide to quit smoking, create a plan to make sure that you are successful. Before you quit:  · Pick a date to quit. Set a date within the next two weeks to give you time to prepare.  · Write down the reasons why you are quitting. Keep this list in places where you will see it often, such as on your bathroom mirror or in your car or wallet.  · Identify the people, places, things, and activities that make you want to smoke (triggers) and avoid them. Make sure to take these actions:  ¨ Throw away all cigarettes at home, at work, and in your car.  ¨ Throw away smoking accessories, such as ashtrays and lighters.  ¨ Clean your car and make sure to empty the ashtray.  ¨ Clean your home, including curtains and carpets.  · Tell your family, friends, and coworkers that you are quitting. Support from your loved ones can make quitting easier.  · Talk with your health care provider about your options for quitting smoking.  · Find out what treatment options are covered by your health insurance.  What strategies can I use to quit smoking?  Talk with your healthcare provider about different strategies to quit smoking. Some strategies  include:  · Quitting smoking altogether instead of gradually lessening how much you smoke over a period of time. Research shows that quitting “cold turkey” is more successful than gradually quitting.  · Attending in-person counseling to help you build problem-solving skills. You are more likely to have success in quitting if you attend several counseling sessions. Even short sessions of 10 minutes can be effective.  · Finding resources and support systems that can help you to quit smoking and remain smoke-free after you quit. These resources are most helpful when you use them often. They can include:  ¨ Online chats with a counselor.  ¨ Telephone quitlines.  ¨ Printed self-help materials.  ¨ Support groups or group counseling.  ¨ Text messaging programs.  ¨ Mobile phone applications.  · Taking medicines to help you quit smoking. (If you are pregnant or breastfeeding, talk with your health care provider first.) Some medicines contain nicotine and some do not. Both types of medicines help with cravings, but the medicines that include nicotine help to relieve withdrawal symptoms. Your health care provider may recommend:  ¨ Nicotine patches, gum, or lozenges.  ¨ Nicotine inhalers or sprays.  ¨ Non-nicotine medicine that is taken by mouth.  Talk with your health care provider about combining strategies, such as taking medicines while you are also receiving in-person counseling. Using these two strategies together makes you more likely to succeed in quitting than if you used either strategy on its own.  If you are pregnant or breastfeeding, talk with your health care provider about finding counseling or other support strategies to quit smoking. Do not take medicine to help you quit smoking unless told to do so by your health care provider.  What things can I do to make it easier to quit?  Quitting smoking might feel overwhelming at first, but there is a lot that you can do to make it easier. Take these important  actions:  · Reach out to your family and friends and ask that they support and encourage you during this time. Call telephone quitlines, reach out to support groups, or work with a counselor for support.  · Ask people who smoke to avoid smoking around you.  · Avoid places that trigger you to smoke, such as bars, parties, or smoke-break areas at work.  · Spend time around people who do not smoke.  · Lessen stress in your life, because stress can be a smoking trigger for some people. To lessen stress, try:  ¨ Exercising regularly.  ¨ Deep-breathing exercises.  ¨ Yoga.  ¨ Meditating.  ¨ Performing a body scan. This involves closing your eyes, scanning your body from head to toe, and noticing which parts of your body are particularly tense. Purposefully relax the muscles in those areas.  · Download or purchase mobile phone or tablet apps (applications) that can help you stick to your quit plan by providing reminders, tips, and encouragement. There are many free apps, such as QuitGuide from the CDC (Centers for Disease Control and Prevention). You can find other support for quitting smoking (smoking cessation) through smokefree.gov and other websites.  How will I feel when I quit smoking?  Within the first 24 hours of quitting smoking, you may start to feel some withdrawal symptoms. These symptoms are usually most noticeable 2-3 days after quitting, but they usually do not last beyond 2-3 weeks. Changes or symptoms that you might experience include:  · Mood swings.  · Restlessness, anxiety, or irritation.  · Difficulty concentrating.  · Dizziness.  · Strong cravings for sugary foods in addition to nicotine.  · Mild weight gain.  · Constipation.  · Nausea.  · Coughing or a sore throat.  · Changes in how your medicines work in your body.  · A depressed mood.  · Difficulty sleeping (insomnia).  After the first 2-3 weeks of quitting, you may start to notice more positive results, such as:  · Improved sense of smell and  taste.  · Decreased coughing and sore throat.  · Slower heart rate.  · Lower blood pressure.  · Clearer skin.  · The ability to breathe more easily.  · Fewer sick days.  Quitting smoking is very challenging for most people. Do not get discouraged if you are not successful the first time. Some people need to make many attempts to quit before they achieve long-term success. Do your best to stick to your quit plan, and talk with your health care provider if you have any questions or concerns.  This information is not intended to replace advice given to you by your health care provider. Make sure you discuss any questions you have with your health care provider.  Document Released: 12/12/2002 Document Revised: 08/15/2017 Document Reviewed: 05/03/2016  [employer] Interactive Patient Education © 2017 [employer]

## 2021-01-05 ENCOUNTER — LAB (OUTPATIENT)
Dept: LAB | Facility: HOSPITAL | Age: 66
End: 2021-01-05

## 2021-01-05 LAB — SARS-COV-2 ORF1AB RESP QL NAA+PROBE: NOT DETECTED

## 2021-01-05 PROCEDURE — C9803 HOPD COVID-19 SPEC COLLECT: HCPCS | Performed by: CLINICAL NURSE SPECIALIST

## 2021-01-05 PROCEDURE — U0004 COV-19 TEST NON-CDC HGH THRU: HCPCS | Performed by: CLINICAL NURSE SPECIALIST

## 2021-01-08 ENCOUNTER — HOSPITAL ENCOUNTER (OUTPATIENT)
Dept: GENERAL RADIOLOGY | Facility: HOSPITAL | Age: 66
Discharge: HOME | End: 2021-01-08
Admitting: CLINICAL NURSE SPECIALIST

## 2021-01-08 PROCEDURE — 74220 X-RAY XM ESOPHAGUS 1CNTRST: CPT

## 2021-01-08 RX ADMIN — ANTACID/ANTIFLATULENT 1 TABLET: 380; 550; 10; 10 GRANULE, EFFERVESCENT ORAL at 13:35

## 2021-01-08 RX ADMIN — BARIUM SULFATE 355 ML: 0.6 SUSPENSION ORAL at 13:10

## 2021-01-08 RX ADMIN — BARIUM SULFATE 120 ML: 980 POWDER, FOR SUSPENSION ORAL at 13:10

## 2021-01-12 ENCOUNTER — TRANSCRIBE ORDERS (OUTPATIENT)
Dept: LAB | Facility: HOSPITAL | Age: 66
End: 2021-01-12

## 2021-01-12 DIAGNOSIS — Z01.818 PRE-OP TESTING: Primary | ICD-10-CM

## 2021-01-15 ENCOUNTER — LAB (OUTPATIENT)
Dept: LAB | Facility: HOSPITAL | Age: 66
End: 2021-01-15

## 2021-01-15 LAB — SARS-COV-2 ORF1AB RESP QL NAA+PROBE: NOT DETECTED

## 2021-01-15 PROCEDURE — C9803 HOPD COVID-19 SPEC COLLECT: HCPCS | Performed by: INTERNAL MEDICINE

## 2021-01-15 PROCEDURE — U0004 COV-19 TEST NON-CDC HGH THRU: HCPCS | Performed by: INTERNAL MEDICINE

## 2021-01-18 ENCOUNTER — ANESTHESIA EVENT (OUTPATIENT)
Dept: GASTROENTEROLOGY | Facility: HOSPITAL | Age: 66
End: 2021-01-18

## 2021-01-18 ENCOUNTER — ANESTHESIA (OUTPATIENT)
Dept: GASTROENTEROLOGY | Facility: HOSPITAL | Age: 66
End: 2021-01-18

## 2021-01-18 ENCOUNTER — HOSPITAL ENCOUNTER (OUTPATIENT)
Facility: HOSPITAL | Age: 66
Setting detail: HOSPITAL OUTPATIENT SURGERY
Discharge: HOME | End: 2021-01-18
Attending: INTERNAL MEDICINE | Admitting: INTERNAL MEDICINE

## 2021-01-18 VITALS
TEMPERATURE: 97.2 F | HEIGHT: 65 IN | OXYGEN SATURATION: 94 % | HEART RATE: 90 BPM | BODY MASS INDEX: 30.16 KG/M2 | RESPIRATION RATE: 18 BRPM | WEIGHT: 181 LBS | SYSTOLIC BLOOD PRESSURE: 135 MMHG | DIASTOLIC BLOOD PRESSURE: 78 MMHG

## 2021-01-18 DIAGNOSIS — R10.13 EPIGASTRIC PAIN: ICD-10-CM

## 2021-01-18 PROCEDURE — 25010000002 PROPOFOL 10 MG/ML EMULSION: Performed by: NURSE ANESTHETIST, CERTIFIED REGISTERED

## 2021-01-18 PROCEDURE — 43249 ESOPH EGD DILATION <30 MM: CPT | Performed by: INTERNAL MEDICINE

## 2021-01-18 PROCEDURE — C1726 CATH, BAL DIL, NON-VASCULAR: HCPCS | Performed by: INTERNAL MEDICINE

## 2021-01-18 RX ORDER — SODIUM CHLORIDE 9 MG/ML
500 INJECTION, SOLUTION INTRAVENOUS CONTINUOUS PRN
Status: DISCONTINUED | OUTPATIENT
Start: 2021-01-18 | End: 2021-01-18 | Stop reason: HOSPADM

## 2021-01-18 RX ORDER — LIDOCAINE HYDROCHLORIDE 20 MG/ML
INJECTION, SOLUTION EPIDURAL; INFILTRATION; INTRACAUDAL; PERINEURAL AS NEEDED
Status: DISCONTINUED | OUTPATIENT
Start: 2021-01-18 | End: 2021-01-18 | Stop reason: SURG

## 2021-01-18 RX ORDER — PROPOFOL 10 MG/ML
VIAL (ML) INTRAVENOUS AS NEEDED
Status: DISCONTINUED | OUTPATIENT
Start: 2021-01-18 | End: 2021-01-18 | Stop reason: SURG

## 2021-01-18 RX ORDER — SODIUM CHLORIDE 0.9 % (FLUSH) 0.9 %
10 SYRINGE (ML) INJECTION AS NEEDED
Status: DISCONTINUED | OUTPATIENT
Start: 2021-01-18 | End: 2021-01-18 | Stop reason: HOSPADM

## 2021-01-18 RX ORDER — LIDOCAINE HYDROCHLORIDE 10 MG/ML
0.5 INJECTION, SOLUTION EPIDURAL; INFILTRATION; INTRACAUDAL; PERINEURAL ONCE AS NEEDED
Status: CANCELLED | OUTPATIENT
Start: 2021-01-18

## 2021-01-18 RX ADMIN — PROPOFOL 50 MG: 10 INJECTION, EMULSION INTRAVENOUS at 11:21

## 2021-01-18 RX ADMIN — PROPOFOL 50 MG: 10 INJECTION, EMULSION INTRAVENOUS at 11:23

## 2021-01-18 RX ADMIN — PROPOFOL 50 MG: 10 INJECTION, EMULSION INTRAVENOUS at 11:27

## 2021-01-18 RX ADMIN — SODIUM CHLORIDE 500 ML: 9 INJECTION, SOLUTION INTRAVENOUS at 10:11

## 2021-01-18 RX ADMIN — LIDOCAINE HYDROCHLORIDE 100 MG: 20 INJECTION, SOLUTION EPIDURAL; INFILTRATION; INTRACAUDAL; PERINEURAL at 11:22

## 2021-01-18 RX ADMIN — PROPOFOL 50 MG: 10 INJECTION, EMULSION INTRAVENOUS at 11:25

## 2021-01-18 NOTE — ANESTHESIA POSTPROCEDURE EVALUATION
Patient: Sedrick Leahy    Procedure Summary     Date: 01/18/21 Room / Location: [facility] ENDOSCOPY 2 / [facility] END[facility]COPY    Anesthesia Start: 1119 Anesthesia Stop: 1131    Procedure: ESOPHAGOGASTRODUODENOSCOPY WITH ANESTHESIA (N/A ) Diagnosis:       Epigastric pain      (Epigastric pain [R10.13])    Surgeon: Abdullahi Howell MD Provider: FAINA Underwood CRNA    Anesthesia Type: MAC ASA Status: 2          Anesthesia Type: MAC    Vitals  No vitals data found for the desired time range.          Post Anesthesia Care and Evaluation    Patient location during evaluation: PACU  Patient participation: complete - patient participated  Level of consciousness: awake and alert  Pain score: 0  Pain management: adequate  Airway patency: patent  Anesthetic complications: No anesthetic complications    Cardiovascular status: acceptable and stable  Respiratory status: acceptable and unassisted  Hydration status: acceptable

## 2021-01-18 NOTE — INTERVAL H&P NOTE
H&P reviewed. The patient was examined and there are no changes to the H&P.      The following major R/B/A were discussed with the patient, however the list is not all inclusive . Risk:  Bleeding (immediate and delayed), perforation (rupture or tear), reaction to medication, missed lesion/cancer, pain during the procedure, infection, need for surgery, need for ostomy, need for mechanical ventilation (breathing machine), death.  Benefits: removal of polyp/tissue, burn/clip/or inject to stop bleeding, removal of foreign body, dilate any stricture.  Alternatives: Xray or CT, surgery, do nothing with associated risk   The patient was given time to ask question and received explanation, and agrees to proceed as per History and Physical.   No guarantee given or expressed.

## 2021-01-18 NOTE — ANESTHESIA PREPROCEDURE EVALUATION
Anesthesia Evaluation     Patient summary reviewed and Nursing notes reviewed   history of anesthetic complications: PONV  NPO Solid Status: > 8 hours  NPO Liquid Status: > 8 hours           Airway   Mallampati: I  TM distance: >3 FB  Neck ROM: full  No difficulty expected  Dental      Pulmonary    (-) not a smoker  Cardiovascular   Exercise tolerance: good (4-7 METS)        Neuro/Psych  (+) psychiatric history Anxiety and Depression,     (-) seizures, TIA, CVA  GI/Hepatic/Renal/Endo    (+)  GERD, GI bleeding ,     Musculoskeletal     Abdominal    Substance History      OB/GYN          Other   arthritis,                      Anesthesia Plan    ASA 2     MAC     intravenous induction     Anesthetic plan, all risks, benefits, and alternatives have been provided, discussed and informed consent has been obtained with: patient.

## 2021-01-19 ENCOUNTER — TRANSCRIBE ORDERS (OUTPATIENT)
Dept: LAB | Facility: HOSPITAL | Age: 66
End: 2021-01-19

## 2021-01-19 DIAGNOSIS — Z01.818 PRE-OP TESTING: Primary | ICD-10-CM

## 2021-01-21 ENCOUNTER — APPOINTMENT (OUTPATIENT)
Dept: LAB | Facility: HOSPITAL | Age: 66
End: 2021-01-21

## 2021-02-03 ENCOUNTER — TELEPHONE (OUTPATIENT)
Dept: FAMILY MEDICINE CLINIC | Facility: CLINIC | Age: 66
End: 2021-02-03

## 2021-02-03 NOTE — TELEPHONE ENCOUNTER
Happy to help  Bet her last pain Rx is/was coming from [facility]    Until I see her I really need to have her keep getting her Rx/pain controlled from them  I can try to squeeze her tomorrow??

## 2021-02-03 NOTE — TELEPHONE ENCOUNTER
PATIENT IS REQUESTING A CALL BACK FROM  ABOUT TAKING SOME DIFFERENT MEDICATIONS. SHE STATED SHE IS IN A LOT PAIN.     CALL BACK:[phone]

## 2021-02-05 ENCOUNTER — OFFICE VISIT (OUTPATIENT)
Dept: FAMILY MEDICINE CLINIC | Facility: CLINIC | Age: 66
End: 2021-02-05

## 2021-02-05 VITALS
HEIGHT: 65 IN | DIASTOLIC BLOOD PRESSURE: 84 MMHG | TEMPERATURE: 98 F | WEIGHT: 181 LBS | RESPIRATION RATE: 16 BRPM | SYSTOLIC BLOOD PRESSURE: 142 MMHG | OXYGEN SATURATION: 95 % | HEART RATE: 86 BPM | BODY MASS INDEX: 30.16 KG/M2

## 2021-02-05 DIAGNOSIS — M43.6 TORTICOLLIS: Primary | ICD-10-CM

## 2021-02-05 PROCEDURE — 99213 OFFICE O/P EST LOW 20 MIN: CPT | Performed by: NURSE PRACTITIONER

## 2021-02-05 PROCEDURE — 96372 THER/PROPH/DIAG INJ SC/IM: CPT | Performed by: NURSE PRACTITIONER

## 2021-02-05 RX ORDER — METHYLPREDNISOLONE 4 MG/1
TABLET ORAL
Qty: 1 EACH | Refills: 0 | Status: SHIPPED | OUTPATIENT
Start: 2021-02-05 | End: 2021-04-05

## 2021-02-05 RX ORDER — DEXAMETHASONE SODIUM PHOSPHATE 4 MG/ML
4 INJECTION, SOLUTION INTRA-ARTICULAR; INTRALESIONAL; INTRAMUSCULAR; INTRAVENOUS; SOFT TISSUE ONCE
Status: COMPLETED | OUTPATIENT
Start: 2021-02-05 | End: 2021-02-05

## 2021-02-05 RX ORDER — TIZANIDINE 4 MG/1
4 TABLET ORAL 3 TIMES DAILY PRN
Qty: 30 TABLET | Refills: 0 | Status: SHIPPED | OUTPATIENT
Start: 2021-02-05 | End: 2021-02-19

## 2021-02-05 RX ADMIN — DEXAMETHASONE SODIUM PHOSPHATE 4 MG: 4 INJECTION, SOLUTION INTRA-ARTICULAR; INTRALESIONAL; INTRAMUSCULAR; INTRAVENOUS; SOFT TISSUE at 08:40

## 2021-02-05 NOTE — PROGRESS NOTES
Subjective   Chief Complaint:  Neck pain    History of Present Illness:  This 65 y.o. female was seen in the office today.  She is evaluated for neck pain today.  She has a recent history of 2 neck surgeries in the past.  She reports she is already try to get in with her specialist can get until March 28, 2021.  Reports increased muscle spasms and pulling to the right side.    Allergies   Allergen Reactions   • Penicillins Swelling and Rash      Current Outpatient Medications on File Prior to Visit   Medication Sig   • amitriptyline (ELAVIL) 100 MG tablet One to two tablets by mouth at night as needed for sleep   • Cholecalciferol (Vitamin D3) 10 MCG (400 UNIT) chewable tablet Chew Daily.   • ondansetron ODT (ZOFRAN ODT) 4 MG disintegrating tablet Place 1 tablet under the tongue Every 8 (Eight) Hours As Needed for Nausea or Vomiting.   • oxyCODONE-acetaminophen (PERCOCET) 7.5-325 MG per tablet Take 1 tablet by mouth Every 6 (Six) Hours As Needed.   • pantoprazole (PROTONIX) 40 MG EC tablet Take 1 tablet by mouth Daily.   • sucralfate (CARAFATE) 1 g tablet Take 1 tablet by mouth 4 (Four) Times a Day.   • ondansetron (Zofran) 4 MG tablet Take 1 tablet by mouth Every 8 (Eight) Hours As Needed for Nausea or Vomiting.     No current facility-administered medications on file prior to visit.       Past Medical, Surgical, Social, and Family History:  Past Medical History:   Diagnosis Date   • Arthritis    • Diabetes mellitus (CMS/HCC)     diet controlled   • Hx of colonic polyp    • Hypertension    • Lower back pain    • Neck pain    • PONV (postoperative nausea and vomiting)      Past Surgical History:   Procedure Laterality Date   • ANTERIOR CERVICAL DISCECTOMY W/ FUSION N/A 11/20/2020    Procedure: EXPLORATION OF FUSION C5-6, ANTERIOR CERVICAL DISCECTOMY FUSION C4-5, C6-7 REVISON ANTERIOR FUSION C5-6 WITH INSTRUMENTATION C4-7;  Surgeon: KEN Gilbert MD;  Location: [facility];  Service: Orthopedic Spine;   Laterality: N/A;   • APPENDECTOMY     • COLONOSCOPY  2016    Normal exam repeat in 5 years   • COLONOSCOPY N/A 2020    Diverticulosis repeat exam in 5 years   • COLONOSCOPY W/ POLYPECTOMY  2012    Hyperplastic polyp cecum repeat exam in 1 year   • ENDOSCOPY  2014    Very tortuous distal esophagus dilated 50 Fr, HH    • ENDOSCOPY N/A 2021    Procedure: ESOPHAGOGASTRODUODENOSCOPY WITH ANESTHESIA;  Surgeon: Abdullahi Howell MD;  Location: [facility] ENDOSCOPY;  Service: Gastroenterology;  Laterality: N/A;  pre op: epigastric pain  post op:dilated  PCP: Kiran Madden MD   • HARDWARE REMOVAL N/A 2020    Procedure: REMOVAL OF INSTRUMENTATION;  Surgeon: KEN Gilbert MD;  Location: [facility] OR;  Service: Orthopedic Spine;  Laterality: N/A;   • HYSTERECTOMY     • NECK SURGERY     • NISSEN FUNDOPLICATION     • POSTERIOR CERVICAL FUSION N/A 12/3/2020    Procedure: POSTERIOR SPINAL FUSION WITH INSTRUMENTATION C4-7;  Surgeon: KEN Gilbert MD;  Location: [facility] OR;  Service: Orthopedic Spine;  Laterality: N/A;   • US GUIDED LYMPH NODE BIOPSY  8/3/2020     Social History     Socioeconomic History   • Marital status:      Spouse name: Jsaon   • Number of children: 2   • Years of education: 12   • Highest education level: Not on file   Occupational History   • Occupation: retired     Comment: [employer]   Tobacco Use   • Smoking status: Former Smoker     Packs/day: 2.00     Types: Cigarettes     Start date: 1955     Quit date: 7/10/1990     Years since quittin.5   • Smokeless tobacco: Never Used   Substance and Sexual Activity   • Alcohol use: No   • Drug use: No   • Sexual activity: Defer     Family History   Problem Relation Age of Onset   • Colon cancer Mother    • Colon polyps Mother    • No Known Problems Father    • Diabetes Brother    • Diabetes Son    • Breast cancer Maternal Aunt    • Heart attack Paternal Grandmother    • Lymphoma Maternal Aunt    •  "Lung cancer Maternal Aunt    • Cancer Maternal Aunt    • Diabetes Maternal Aunt    • Diabetes Son    • Diabetes Brother      Objective   Physical Exam  Vitals signs reviewed.   Constitutional:       General: She is not in acute distress.     Appearance: Normal appearance.   Cardiovascular:      Rate and Rhythm: Normal rate and regular rhythm.   Pulmonary:      Effort: Pulmonary effort is normal.      Breath sounds: Normal breath sounds.   Musculoskeletal:         General: Tenderness (and spasm to right neck muscles) present.     /84 (BP Location: Left arm)   Pulse 86   Temp 98 °F (36.7 °C)   Resp 16   Ht 165.1 cm (65\")   Wt 82.1 kg (181 lb)   SpO2 95%   BMI 30.12 kg/m²     Assessment/Plan   Diagnoses and all orders for this visit:    1. Torticollis (Primary)  -     tiZANidine (Zanaflex) 4 MG tablet; Take 1 tablet by mouth 3 (Three) Times a Day As Needed for Muscle Spasms.  Dispense: 30 tablet; Refill: 0  -     dexamethasone (DECADRON) injection 4 mg  -     methylPREDNISolone (MEDROL) 4 MG dose pack; Take as directed on package instructions.  Dispense: 1 each; Refill: 0    Discussion:  Advised and educated plan of care.  Advised classic torticollis, will to break the cycle with steroids and muscle relaxers.  Advised to alternate the Percocet she already has as to not get oversedated and to space out meds.  Advised shoulder blade stretches and shoulder lift stretches.    Follow-up:  Return if symptoms worsen or fail to improve, for and with specialist as scheduled..    Note e-Signed by HECTOR Montoya on 02/05/2021 at 08:28 CST  "

## 2021-02-19 DIAGNOSIS — M43.6 TORTICOLLIS: ICD-10-CM

## 2021-02-19 RX ORDER — TIZANIDINE 4 MG/1
TABLET ORAL
Qty: 30 TABLET | Refills: 0 | Status: SHIPPED | OUTPATIENT
Start: 2021-02-19 | End: 2021-10-20

## 2021-02-19 NOTE — TELEPHONE ENCOUNTER
Requested Prescriptions     Pending Prescriptions Disp Refills   • tiZANidine (ZANAFLEX) 4 MG tablet [Pharmacy Med Name: TIZANIDINE   TAB 4MG TABLET] 30 tablet 0     Sig: TAKE ONE TABLET THREE TIMES DAILY AS NEEDED FOR MUSCLE SPASMS GENERIC FOR ZANAFLEX

## 2021-03-03 ENCOUNTER — OFFICE VISIT (OUTPATIENT)
Dept: GASTROENTEROLOGY | Facility: CLINIC | Age: 66
End: 2021-03-03

## 2021-03-03 VITALS
WEIGHT: 179 LBS | OXYGEN SATURATION: 98 % | BODY MASS INDEX: 29.82 KG/M2 | SYSTOLIC BLOOD PRESSURE: 130 MMHG | HEART RATE: 95 BPM | HEIGHT: 65 IN | TEMPERATURE: 98 F | DIASTOLIC BLOOD PRESSURE: 78 MMHG

## 2021-03-03 DIAGNOSIS — R19.5 DARK STOOLS: ICD-10-CM

## 2021-03-03 DIAGNOSIS — Z78.9 NONSMOKER: ICD-10-CM

## 2021-03-03 DIAGNOSIS — R10.13 EPIGASTRIC PAIN: Primary | ICD-10-CM

## 2021-03-03 DIAGNOSIS — R13.19 ESOPHAGEAL DYSPHAGIA: ICD-10-CM

## 2021-03-03 DIAGNOSIS — E66.9 OBESITY, UNSPECIFIED OBESITY SEVERITY, UNSPECIFIED OBESITY TYPE: ICD-10-CM

## 2021-03-03 PROCEDURE — 99212 OFFICE O/P EST SF 10 MIN: CPT | Performed by: CLINICAL NURSE SPECIALIST

## 2021-03-03 NOTE — PROGRESS NOTES
Sedrick Leahy  1955      3/3/2021  Chief Complaint   Patient presents with   • GI Problem     Discuss epigastric pain         HPI    Sedrick Leahy is a  65 y.o. female here for a follow up visit for dysphagia and epigastric pain. She had endoscopy and is better after dilatation. On 1/2021. She is overall much better and has no current GI related issues.     Past Medical History:   Diagnosis Date   • Arthritis    • Diabetes mellitus (CMS/HCC)     diet controlled   • Hx of colonic polyp    • Hypertension    • Lower back pain    • Neck pain    • PONV (postoperative nausea and vomiting)      Past Surgical History:   Procedure Laterality Date   • ANTERIOR CERVICAL DISCECTOMY W/ FUSION N/A 11/20/2020    Procedure: EXPLORATION OF FUSION C5-6, ANTERIOR CERVICAL DISCECTOMY FUSION C4-5, C6-7 REVISON ANTERIOR FUSION C5-6 WITH INSTRUMENTATION C4-7;  Surgeon: KEN Gilbert MD;  Location:  [facility] OR;  Service: Orthopedic Spine;  Laterality: N/A;   • APPENDECTOMY     • COLONOSCOPY  06/06/2016    Normal exam repeat in 5 years   • COLONOSCOPY N/A 1/14/2020    Diverticulosis repeat exam in 5 years   • COLONOSCOPY W/ POLYPECTOMY  04/04/2012    Hyperplastic polyp cecum repeat exam in 1 year   • ENDOSCOPY  08/14/2014    Very tortuous distal esophagus dilated 50 Fr, HH    • ENDOSCOPY N/A 1/18/2021    A fundoplication was found, dilated   • HARDWARE REMOVAL N/A 11/20/2020    Procedure: REMOVAL OF INSTRUMENTATION;  Surgeon: KEN Gilbert MD;  Location:  [facility] OR;  Service: Orthopedic Spine;  Laterality: N/A;   • HYSTERECTOMY     • NECK SURGERY     • NISSEN FUNDOPLICATION     • POSTERIOR CERVICAL FUSION N/A 12/3/2020    Procedure: POSTERIOR SPINAL FUSION WITH INSTRUMENTATION C4-7;  Surgeon: KEN Gilbert MD;  Location:  [facility] OR;  Service: Orthopedic Spine;  Laterality: N/A;   • US GUIDED LYMPH NODE BIOPSY  8/3/2020       Outpatient Medications Marked as Taking for the 3/3/21 encounter (Office Visit) with Ten  HECTOR Faria   Medication Sig Dispense Refill   • amitriptyline (ELAVIL) 100 MG tablet One to two tablets by mouth at night as needed for sleep 180 tablet 0   • Cholecalciferol (Vitamin D3) 10 MCG (400 UNIT) chewable tablet Chew Daily.     • methylPREDNISolone (MEDROL) 4 MG dose pack Take as directed on package instructions. 1 each 0   • ondansetron (Zofran) 4 MG tablet Take 1 tablet by mouth Every 8 (Eight) Hours As Needed for Nausea or Vomiting. 6 tablet 0   • ondansetron ODT (ZOFRAN ODT) 4 MG disintegrating tablet Place 1 tablet under the tongue Every 8 (Eight) Hours As Needed for Nausea or Vomiting. 10 tablet 0   • oxyCODONE-acetaminophen (PERCOCET) 7.5-325 MG per tablet Take 1 tablet by mouth Every 6 (Six) Hours As Needed.     • pantoprazole (PROTONIX) 40 MG EC tablet Take 1 tablet by mouth Daily. 30 tablet 11   • sucralfate (CARAFATE) 1 g tablet Take 1 tablet by mouth 4 (Four) Times a Day. 40 tablet 0   • tiZANidine (ZANAFLEX) 4 MG tablet TAKE ONE TABLET THREE TIMES DAILY AS NEEDED FOR MUSCLE SPASMS GENERIC FOR ZANAFLEX 30 tablet 0       Allergies   Allergen Reactions   • Penicillins Swelling and Rash       Social History     Socioeconomic History   • Marital status:      Spouse name: Jason   • Number of children: 2   • Years of education: 12   • Highest education level: Not on file   Occupational History   • Occupation: retired     Comment: [employer]   Tobacco Use   • Smoking status: Former Smoker     Packs/day: 2.00     Types: Cigarettes     Start date: 1955     Quit date: 7/10/1990     Years since quittin.6   • Smokeless tobacco: Never Used   Substance and Sexual Activity   • Alcohol use: No   • Drug use: No   • Sexual activity: Defer       Family History   Problem Relation Age of Onset   • Colon cancer Mother    • Colon polyps Mother    • No Known Problems Father    • Diabetes Brother    • Diabetes Son    • Breast cancer Maternal Aunt    • Heart attack Paternal Grandmother     "  • Lymphoma Maternal Aunt    • Lung cancer Maternal Aunt    • Cancer Maternal Aunt    • Diabetes Maternal Aunt    • Diabetes Son    • Diabetes Brother        Review of Systems   Constitutional: Negative for activity change, appetite change, chills, diaphoresis, fatigue, fever and unexpected weight change.   HENT: Negative for ear pain, hearing loss, mouth sores, sore throat, trouble swallowing and voice change.    Eyes: Negative.    Respiratory: Negative for cough, choking, shortness of breath and wheezing.    Cardiovascular: Negative for chest pain and palpitations.   Gastrointestinal: Negative for abdominal pain, blood in stool, constipation, diarrhea, nausea and vomiting.   Endocrine: Negative for cold intolerance and heat intolerance.   Genitourinary: Negative for decreased urine volume, dysuria, frequency, hematuria and urgency.   Musculoskeletal: Negative for back pain, gait problem and myalgias.   Skin: Negative for color change, pallor and rash.   Allergic/Immunologic: Negative for food allergies and immunocompromised state.   Neurological: Negative for dizziness, tremors, seizures, syncope, weakness, light-headedness, numbness and headaches.   Hematological: Negative for adenopathy. Does not bruise/bleed easily.   Psychiatric/Behavioral: Negative for agitation and confusion. The patient is not nervous/anxious.    All other systems reviewed and are negative.      /78   Pulse 95   Temp 98 °F (36.7 °C)   Ht 163.8 cm (64.5\")   Wt 81.2 kg (179 lb)   SpO2 98%   Breastfeeding No   BMI 30.25 kg/m²   Body mass index is 30.25 kg/m².    Physical Exam  Constitutional:       Appearance: She is well-developed.   HENT:      Head: Normocephalic and atraumatic.   Eyes:      Pupils: Pupils are equal, round, and reactive to light.   Neck:      Musculoskeletal: Normal range of motion and neck supple.      Trachea: No tracheal deviation.   Cardiovascular:      Rate and Rhythm: Normal rate and regular rhythm.      " Heart sounds: Normal heart sounds. No murmur. No friction rub. No gallop.    Pulmonary:      Effort: Pulmonary effort is normal. No respiratory distress.      Breath sounds: Normal breath sounds. No wheezing or rales.   Chest:      Chest wall: No tenderness.   Abdominal:      General: Bowel sounds are normal. There is no distension.      Palpations: Abdomen is soft. Abdomen is not rigid.      Tenderness: There is no abdominal tenderness. There is no guarding or rebound.   Musculoskeletal: Normal range of motion.         General: No tenderness or deformity.   Skin:     General: Skin is warm and dry.      Coloration: Skin is not pale.      Findings: No rash.   Neurological:      Mental Status: She is alert and oriented to person, place, and time.      Deep Tendon Reflexes: Reflexes are normal and symmetric.   Psychiatric:         Behavior: Behavior normal.         Thought Content: Thought content normal.         Judgment: Judgment normal.         ASSESSMENT AND PLAN    Patient's Body mass index is 30.25 kg/m². BMI is above normal parameters. Recommendations include: nutrition counseling.    Diagnoses and all orders for this visit:    1. Epigastric pain (Primary)    2. Dark stools    3. Nonsmoker    4. Obesity, unspecified obesity severity, unspecified obesity type    5. Esophageal dysphagia    I discussed non pharmaceutical treatment of gerd.  This includes gradually losing weight to achieve ideal body wt., elevation of the head of bed by 4-6 inches, nothing to eat or drink 3 hours prior to lying down, avoiding tight clothing, stress reduction, tobacco cessation, reduction of alcohol intake, and dietary restrictions (avoiding caffeine, coffee, fatty foods, mints, chocolate, spicy foods and tomato based sauces as much as possible).    Continue on PPI or antiacid call with any difficulty with recurrent dysphagia. This is better at this time.       There are no Patient Instructions on file for this visit.  Marva Vaca  HECTOR Caal  13:02 CST  3/3/2021    Obesity, Adult  Obesity is the condition of having too much total body fat. Being overweight or obese means that your weight is greater than what is considered healthy for your body size. Obesity is determined by a measurement called BMI. BMI is an estimate of body fat and is calculated from height and weight. For adults, a BMI of 30 or higher is considered obese.  Obesity can eventually lead to other health concerns and major illnesses, including:  · Stroke.  · Coronary artery disease (CAD).  · Type 2 diabetes.  · Some types of cancer, including cancers of the colon, breast, uterus, and gallbladder.  · Osteoarthritis.  · High blood pressure (hypertension).  · High cholesterol.  · Sleep apnea.  · Gallbladder stones.  · Infertility problems.  What are the causes?  The main cause of obesity is taking in (consuming) more calories than your body uses for energy. Other factors that contribute to this condition may include:  · Being born with genes that make you more likely to become obese.  · Having a medical condition that causes obesity. These conditions include:  ¨ Hypothyroidism.  ¨ Polycystic ovarian syndrome (PCOS).  ¨ Binge-eating disorder.  ¨ Cushing syndrome.  · Taking certain medicines, such as steroids, antidepressants, and seizure medicines.  · Not being physically active (sedentary lifestyle).  · Living where there are limited places to exercise safely or buy healthy foods.  · Not getting enough sleep.  What increases the risk?  The following factors may increase your risk of this condition:  · Having a family history of obesity.  · Being a woman of -American descent.  · Being a man of  descent.  What are the signs or symptoms?  Having excessive body fat is the main symptom of this condition.  How is this diagnosed?  This condition may be diagnosed based on:  · Your symptoms.  · Your medical history.  · A physical exam. Your health care provider may  measure:  ¨ Your BMI. If you are an adult with a BMI between 25 and less than 30, you are considered overweight. If you are an adult with a BMI of 30 or higher, you are considered obese.  ¨ The distances around your hips and your waist (circumferences). These may be compared to each other to help diagnose your condition.  ¨ Your skinfold thickness. Your health care provider may gently pinch a fold of your skin and measure it.  How is this treated?  Treatment for this condition often includes changing your lifestyle. Treatment may include some or all of the following:  · Dietary changes. Work with your health care provider and a dietitian to set a weight-loss goal that is healthy and reasonable for you. Dietary changes may include eating:  ¨ Smaller portions. A portion size is the amount of a particular food that is healthy for you to eat at one time. This varies from person to person.  ¨ Low-calorie or low-fat options.  ¨ More whole grains, fruits, and vegetables.  · Regular physical activity. This may include aerobic activity (cardio) and strength training.  · Medicine to help you lose weight. Your health care provider may prescribe medicine if you are unable to lose 1 pound a week after 6 weeks of eating more healthily and doing more physical activity.  · Surgery. Surgical options may include gastric banding and gastric bypass. Surgery may be done if:  ¨ Other treatments have not helped to improve your condition.  ¨ You have a BMI of 40 or higher.  ¨ You have life-threatening health problems related to obesity.  Follow these instructions at home:     Eating and drinking     · Follow recommendations from your health care provider about what you eat and drink. Your health care provider may advise you to:  ¨ Limit fast foods, sweets, and processed snack foods.  ¨ Choose low-fat options, such as low-fat milk instead of whole milk.  ¨ Eat 5 or more servings of fruits or vegetables every day.  ¨ Eat at home more often.  This gives you more control over what you eat.  ¨ Choose healthy foods when you eat out.  ¨ Learn what a healthy portion size is.  ¨ Keep low-fat snacks on hand.  ¨ Avoid sugary drinks, such as soda, fruit juice, iced tea sweetened with sugar, and flavored milk.  ¨ Eat a healthy breakfast.  · Drink enough water to keep your urine clear or pale yellow.  · Do not go without eating for long periods of time (do not fast) or follow a fad diet. Fasting and fad diets can be unhealthy and even dangerous.  Physical Activity   · Exercise regularly, as told by your health care provider. Ask your health care provider what types of exercise are safe for you and how often you should exercise.  · Warm up and stretch before being active.  · Cool down and stretch after being active.  · Rest between periods of activity.  Lifestyle   · Limit the time that you spend in front of your TV, computer, or video game system.  · Find ways to reward yourself that do not involve food.  · Limit alcohol intake to no more than 1 drink a day for nonpregnant women and 2 drinks a day for men. One drink equals 12 oz of beer, 5 oz of wine, or 1½ oz of hard liquor.  General instructions   · Keep a weight loss journal to keep track of the food you eat and how much you exercise you get.  · Take over-the-counter and prescription medicines only as told by your health care provider.  · Take vitamins and supplements only as told by your health care provider.  · Consider joining a support group. Your health care provider may be able to recommend a support group.  · Keep all follow-up visits as told by your health care provider. This is important.  Contact a health care provider if:  · You are unable to meet your weight loss goal after 6 weeks of dietary and lifestyle changes.  This information is not intended to replace advice given to you by your health care provider. Make sure you discuss any questions you have with your health care provider.  Document  Released: 01/25/2006 Document Revised: 05/22/2017 Document Reviewed: 10/05/2016  [employer] Interactive Patient Education © 2017 [employer]      IF YOU SMOKE OR USE TOBACCO PLEASE READ THE FOLLOWING:    Why is smoking bad for me?  Smoking increases the risk of heart disease, lung disease, vascular disease, stroke, and cancer.     If you smoke, STOP!    If you would like more information on quitting smoking, please visit the [employer] website: [email or web address]   This link will provide additional resources including the QUIT line and the Beat the Pack support groups.     For more information:    Quit Now [place]  1-800-QUIT-NOW  https://kentrexy.quitlogix.org/en-US/

## 2021-03-30 RX ORDER — AMITRIPTYLINE HYDROCHLORIDE 100 MG/1
TABLET, FILM COATED ORAL
Qty: 180 TABLET | Refills: 0 | Status: SHIPPED | OUTPATIENT
Start: 2021-03-30 | End: 2021-07-29 | Stop reason: SDUPTHER

## 2021-03-30 NOTE — TELEPHONE ENCOUNTER
Caller: Sedrick Leahy    Relationship: Self    Best call back number: [phone]    Medication needed:   Requested Prescriptions     Pending Prescriptions Disp Refills   • amitriptyline (ELAVIL) 100 MG tablet 180 tablet 0     Sig: One to two tablets by mouth at night as needed for sleep       What is the patient's preferred pharmacy: [facility] - [place], [place] - [address] [facility] [phone] [facility] [phone] FX

## 2021-04-05 ENCOUNTER — TELEPHONE (OUTPATIENT)
Dept: FAMILY MEDICINE CLINIC | Facility: CLINIC | Age: 66
End: 2021-04-05

## 2021-04-05 NOTE — TELEPHONE ENCOUNTER
Regarding: Complaint  Contact: [phone]  ----- Message from Belle Perez LPN sent at 4/5/2021 11:21 AM CDT -----       ----- Message from Sedrick Leahy to Kiran Madden MD sent at 4/5/2021 10:02 AM -----   My neck on the grant side of my neck a littlar   This happen before I went you where not there. I saw another person thst day. I can't   Remember his name. Can you call me something to help.

## 2021-04-08 ENCOUNTER — APPOINTMENT (OUTPATIENT)
Dept: MRI IMAGING | Facility: HOSPITAL | Age: 66
End: 2021-04-08

## 2021-04-08 ENCOUNTER — HOSPITAL ENCOUNTER (EMERGENCY)
Facility: HOSPITAL | Age: 66
Discharge: HOME | End: 2021-04-08
Attending: FAMILY MEDICINE | Admitting: FAMILY MEDICINE

## 2021-04-08 VITALS
SYSTOLIC BLOOD PRESSURE: 134 MMHG | HEART RATE: 84 BPM | OXYGEN SATURATION: 100 % | DIASTOLIC BLOOD PRESSURE: 71 MMHG | WEIGHT: 186 LBS | RESPIRATION RATE: 20 BRPM | HEIGHT: 65 IN | TEMPERATURE: 98.2 F | BODY MASS INDEX: 30.99 KG/M2

## 2021-04-08 DIAGNOSIS — M50.121 CERVICAL DISC DISORDER AT C4-C5 LEVEL WITH RADICULOPATHY: Primary | ICD-10-CM

## 2021-04-08 LAB
ALBUMIN SERPL-MCNC: 3.9 G/DL (ref 3.5–5.2)
ALBUMIN/GLOB SERPL: 1.1 G/DL
ALP SERPL-CCNC: 114 U/L (ref 39–117)
ALT SERPL W P-5'-P-CCNC: 12 U/L (ref 1–33)
ANION GAP SERPL CALCULATED.3IONS-SCNC: 10 MMOL/L (ref 5–15)
APTT PPP: 31.3 SECONDS (ref 24.1–35)
AST SERPL-CCNC: 27 U/L (ref 1–32)
BASOPHILS # BLD AUTO: 0.04 10*3/MM3 (ref 0–0.2)
BASOPHILS NFR BLD AUTO: 0.8 % (ref 0–1.5)
BILIRUB SERPL-MCNC: 0.2 MG/DL (ref 0–1.2)
BUN SERPL-MCNC: 13 MG/DL (ref 8–23)
BUN/CREAT SERPL: 16.5 (ref 7–25)
CALCIUM SPEC-SCNC: 9.4 MG/DL (ref 8.6–10.5)
CHLORIDE SERPL-SCNC: 100 MMOL/L (ref 98–107)
CO2 SERPL-SCNC: 30 MMOL/L (ref 22–29)
CREAT SERPL-MCNC: 0.79 MG/DL (ref 0.57–1)
DEPRECATED RDW RBC AUTO: 43.6 FL (ref 37–54)
EOSINOPHIL # BLD AUTO: 0.24 10*3/MM3 (ref 0–0.4)
EOSINOPHIL NFR BLD AUTO: 4.6 % (ref 0.3–6.2)
ERYTHROCYTE [DISTWIDTH] IN BLOOD BY AUTOMATED COUNT: 13.1 % (ref 12.3–15.4)
GFR SERPL CREATININE-BSD FRML MDRD: 73 ML/MIN/1.73
GLOBULIN UR ELPH-MCNC: 3.5 GM/DL
GLUCOSE SERPL-MCNC: 108 MG/DL (ref 65–99)
HCT VFR BLD AUTO: 44 % (ref 34–46.6)
HGB BLD-MCNC: 13.8 G/DL (ref 12–15.9)
IMM GRANULOCYTES # BLD AUTO: 0 10*3/MM3 (ref 0–0.05)
IMM GRANULOCYTES NFR BLD AUTO: 0 % (ref 0–0.5)
INR PPP: 1 (ref 0.91–1.09)
LYMPHOCYTES # BLD AUTO: 2.21 10*3/MM3 (ref 0.7–3.1)
LYMPHOCYTES NFR BLD AUTO: 42.1 % (ref 19.6–45.3)
MCH RBC QN AUTO: 28.7 PG (ref 26.6–33)
MCHC RBC AUTO-ENTMCNC: 31.4 G/DL (ref 31.5–35.7)
MCV RBC AUTO: 91.5 FL (ref 79–97)
MONOCYTES # BLD AUTO: 0.62 10*3/MM3 (ref 0.1–0.9)
MONOCYTES NFR BLD AUTO: 11.8 % (ref 5–12)
NEUTROPHILS NFR BLD AUTO: 2.14 10*3/MM3 (ref 1.7–7)
NEUTROPHILS NFR BLD AUTO: 40.7 % (ref 42.7–76)
NRBC BLD AUTO-RTO: 0 /100 WBC (ref 0–0.2)
PLATELET # BLD AUTO: 276 10*3/MM3 (ref 140–450)
PMV BLD AUTO: 10 FL (ref 6–12)
POTASSIUM SERPL-SCNC: 4.3 MMOL/L (ref 3.5–5.2)
PROT SERPL-MCNC: 7.4 G/DL (ref 6–8.5)
PROTHROMBIN TIME: 12.8 SECONDS (ref 11.9–14.6)
RBC # BLD AUTO: 4.81 10*6/MM3 (ref 3.77–5.28)
SODIUM SERPL-SCNC: 140 MMOL/L (ref 136–145)
WBC # BLD AUTO: 5.25 10*3/MM3 (ref 3.4–10.8)

## 2021-04-08 PROCEDURE — 96374 THER/PROPH/DIAG INJ IV PUSH: CPT

## 2021-04-08 PROCEDURE — 72141 MRI NECK SPINE W/O DYE: CPT

## 2021-04-08 PROCEDURE — 25010000002 DEXAMETHASONE PER 1 MG: Performed by: FAMILY MEDICINE

## 2021-04-08 PROCEDURE — 99283 EMERGENCY DEPT VISIT LOW MDM: CPT

## 2021-04-08 PROCEDURE — 25010000003 HYDROMORPHONE 1 MG/ML SOLUTION: Performed by: FAMILY MEDICINE

## 2021-04-08 PROCEDURE — 85610 PROTHROMBIN TIME: CPT | Performed by: FAMILY MEDICINE

## 2021-04-08 PROCEDURE — 25010000002 FENTANYL CITRATE (PF) 100 MCG/2ML SOLUTION: Performed by: FAMILY MEDICINE

## 2021-04-08 PROCEDURE — 85025 COMPLETE CBC W/AUTO DIFF WBC: CPT | Performed by: FAMILY MEDICINE

## 2021-04-08 PROCEDURE — 96375 TX/PRO/DX INJ NEW DRUG ADDON: CPT

## 2021-04-08 PROCEDURE — 85730 THROMBOPLASTIN TIME PARTIAL: CPT | Performed by: FAMILY MEDICINE

## 2021-04-08 PROCEDURE — 80053 COMPREHEN METABOLIC PANEL: CPT | Performed by: FAMILY MEDICINE

## 2021-04-08 PROCEDURE — 96376 TX/PRO/DX INJ SAME DRUG ADON: CPT

## 2021-04-08 PROCEDURE — 25010000002 MORPHINE PER 10 MG: Performed by: FAMILY MEDICINE

## 2021-04-08 PROCEDURE — 25010000002 ONDANSETRON PER 1 MG: Performed by: FAMILY MEDICINE

## 2021-04-08 RX ORDER — PREDNISONE 20 MG/1
40 TABLET ORAL DAILY
Qty: 8 TABLET | Refills: 0 | Status: SHIPPED | OUTPATIENT
Start: 2021-04-08 | End: 2021-10-20

## 2021-04-08 RX ORDER — FENTANYL CITRATE 50 UG/ML
1 INJECTION, SOLUTION INTRAMUSCULAR; INTRAVENOUS ONCE
Status: COMPLETED | OUTPATIENT
Start: 2021-04-08 | End: 2021-04-08

## 2021-04-08 RX ORDER — DEXAMETHASONE SODIUM PHOSPHATE 10 MG/ML
10 INJECTION INTRAMUSCULAR; INTRAVENOUS ONCE
Status: COMPLETED | OUTPATIENT
Start: 2021-04-08 | End: 2021-04-08

## 2021-04-08 RX ORDER — HYDROMORPHONE HYDROCHLORIDE 2 MG/1
2 TABLET ORAL EVERY 4 HOURS PRN
Qty: 20 TABLET | Refills: 0 | Status: SHIPPED | OUTPATIENT
Start: 2021-04-08 | End: 2021-10-20

## 2021-04-08 RX ORDER — ONDANSETRON 2 MG/ML
4 INJECTION INTRAMUSCULAR; INTRAVENOUS ONCE
Status: COMPLETED | OUTPATIENT
Start: 2021-04-08 | End: 2021-04-08

## 2021-04-08 RX ORDER — ONDANSETRON 4 MG/1
4 TABLET, FILM COATED ORAL EVERY 6 HOURS PRN
Qty: 20 TABLET | Refills: 0 | Status: SHIPPED | OUTPATIENT
Start: 2021-04-08 | End: 2021-07-29 | Stop reason: SDUPTHER

## 2021-04-08 RX ADMIN — ONDANSETRON HYDROCHLORIDE 4 MG: 2 SOLUTION INTRAMUSCULAR; INTRAVENOUS at 11:29

## 2021-04-08 RX ADMIN — ONDANSETRON HYDROCHLORIDE 4 MG: 2 SOLUTION INTRAMUSCULAR; INTRAVENOUS at 13:34

## 2021-04-08 RX ADMIN — DEXAMETHASONE SODIUM PHOSPHATE 10 MG: 10 INJECTION INTRAMUSCULAR; INTRAVENOUS at 13:18

## 2021-04-08 RX ADMIN — MORPHINE SULFATE 4 MG: 4 INJECTION, SOLUTION INTRAMUSCULAR; INTRAVENOUS at 11:29

## 2021-04-08 RX ADMIN — FENTANYL CITRATE 84.5 MCG: 50 INJECTION, SOLUTION INTRAMUSCULAR; INTRAVENOUS at 13:23

## 2021-04-08 RX ADMIN — HYDROMORPHONE HYDROCHLORIDE 1 MG: 1 INJECTION, SOLUTION INTRAMUSCULAR; INTRAVENOUS; SUBCUTANEOUS at 14:28

## 2021-06-03 RX ORDER — AMITRIPTYLINE HYDROCHLORIDE 100 MG/1
TABLET, FILM COATED ORAL
Qty: 180 TABLET | Refills: 0 | OUTPATIENT
Start: 2021-06-03

## 2021-06-03 NOTE — TELEPHONE ENCOUNTER
Caller: Sedrick Leahy    Relationship: Self    Best call back number: [phone]    Medication needed:   Requested Prescriptions     Pending Prescriptions Disp Refills   • amitriptyline (ELAVIL) 100 MG tablet 180 tablet 0     Sig: One to two tablets by mouth at night as needed for sleep       When do you need the refill by: PATIENT HAS 2 PILLS LEFT        Does the patient have less than a 3 day supply:  [x] Yes  [] No    What is the patient's preferred pharmacy: [facility] - [place], [place] - [address] [place], [place] [phone] [place] [phone] [phone]

## 2021-07-29 RX ORDER — AMITRIPTYLINE HYDROCHLORIDE 100 MG/1
TABLET, FILM COATED ORAL
Qty: 180 TABLET | Refills: 0 | Status: SHIPPED | OUTPATIENT
Start: 2021-07-29 | End: 2021-11-29

## 2021-07-29 RX ORDER — ONDANSETRON 4 MG/1
4 TABLET, FILM COATED ORAL EVERY 6 HOURS PRN
Qty: 20 TABLET | Refills: 0 | Status: SHIPPED | OUTPATIENT
Start: 2021-07-29 | End: 2021-10-20

## 2021-07-29 NOTE — TELEPHONE ENCOUNTER
Caller: Sedrick Leahy    Relationship: Self    Best call back number:  [phone]      Medication needed:   Requested Prescriptions     Pending Prescriptions Disp Refills   • ondansetron (ZOFRAN) 4 MG tablet 20 tablet 0     Sig: Take 1 tablet by mouth Every 6 (Six) Hours As Needed for Nausea or Vomiting.       When do you need the refill by: ASAP     What additional details did the patient provide when requesting the medication: Completely out     Does the patient have less than a 3 day supply:  [x] Yes  [] No    What is the patient's preferred pharmacy: [facility] - [place] [address] [facility] [phone] [facility] [phone] FX        Pt would also like to  Know why amitriptyline (ELAVIL) 100 MG tablet was denied on 6/3/21

## 2021-07-29 NOTE — TELEPHONE ENCOUNTER
Is ok for a refill of zofran? Was written first by ER on 4-8-21    Did send in rx for elavil that was not signed 6-3-21

## 2021-08-11 ENCOUNTER — TELEPHONE (OUTPATIENT)
Dept: FAMILY MEDICINE CLINIC | Facility: CLINIC | Age: 66
End: 2021-08-11

## 2021-08-11 DIAGNOSIS — M54.2 NECK PAIN: Primary | ICD-10-CM

## 2021-08-11 NOTE — TELEPHONE ENCOUNTER
Patient called requesting a referral to dr justice orona in [place] for neurosurgery to follow up after neck surgery and is currently having pain. She was seen in at [facility] ER and they did an MRI and they stated there is a disk pushing on a nerve.

## 2021-09-30 ENCOUNTER — TELEPHONE (OUTPATIENT)
Dept: FAMILY MEDICINE CLINIC | Facility: CLINIC | Age: 66
End: 2021-09-30

## 2021-09-30 DIAGNOSIS — M79.671 PAIN OF RIGHT HEEL: Primary | ICD-10-CM

## 2021-09-30 NOTE — TELEPHONE ENCOUNTER
Okay to have an x-ray but figure out when she is told about the x-rays and whether there is been any trauma

## 2021-09-30 NOTE — TELEPHONE ENCOUNTER
Caller: Sedrick Leahy    Relationship: Self    Best call back number: [phone]    What orders are you requesting (i.e. lab or imaging): XRAY OF RIGHT HEEL     In what timeframe would the patient need to come in: ASAP    Where will you receive your lab/imaging services: [facility]

## 2021-10-06 ENCOUNTER — OFFICE VISIT (OUTPATIENT)
Dept: FAMILY MEDICINE CLINIC | Facility: CLINIC | Age: 66
End: 2021-10-06

## 2021-10-06 VITALS
BODY MASS INDEX: 30.96 KG/M2 | SYSTOLIC BLOOD PRESSURE: 148 MMHG | TEMPERATURE: 96.6 F | OXYGEN SATURATION: 98 % | HEIGHT: 65 IN | HEART RATE: 97 BPM | DIASTOLIC BLOOD PRESSURE: 96 MMHG | WEIGHT: 185.8 LBS

## 2021-10-06 DIAGNOSIS — M79.671 PAIN OF RIGHT HEEL: Primary | ICD-10-CM

## 2021-10-06 DIAGNOSIS — M79.671 PAIN OF RIGHT HEEL: ICD-10-CM

## 2021-10-06 DIAGNOSIS — M77.50 BONE SPUR OF FOOT: ICD-10-CM

## 2021-10-06 PROCEDURE — 20551 NJX 1 TENDON ORIGIN/INSJ: CPT | Performed by: NURSE PRACTITIONER

## 2021-10-06 PROCEDURE — 99213 OFFICE O/P EST LOW 20 MIN: CPT | Performed by: NURSE PRACTITIONER

## 2021-10-06 NOTE — PATIENT INSTRUCTIONS
Heel Spur    A heel spur is a bony growth that forms on the bottom of the heel bone (calcaneus). Heel spurs are common. They often cause inflammation in the plantar fascia, which is the band of tissue that connects the toe bones to the heel bone. When the plantar fascia is inflamed, it is called plantar fasciitis. This may cause pain on the bottom of the foot, near the heel. Many people with plantar fasciitis also have heel spurs. However, spurs are not the cause of plantar fasciitis pain.  What are the causes?  The exact cause of heel spurs is not known. They may be caused by:  · Pressure on the heel bone.  · Bands of tissues that connect muscle to bone (tendons) pulling on the heel bone.  What increases the risk?  You are more likely to develop this condition if you:  · Are older than age 40.  · Are overweight.  · Have wear-and-tear arthritis (osteoarthritis).  · Have plantar fascia inflammation.  · Participate in sports or activities that include a lot of running or jumping.  · Wear poorly fitted shoes.  What are the signs or symptoms?  Some people have no symptoms. If you do have symptoms, they may include:  · Pain in the bottom of your heel.  · Pain that is worse when you first get out of bed.  · Pain that gets worse after walking or standing.  How is this diagnosed?  This condition may be diagnosed based on:  · Your symptoms and medical history.  · A physical exam.  · A foot X-ray.  How is this treated?  Treatment for this condition depends on how much pain you have. Treatment options may include:  · Doing stretching exercises and losing weight, if necessary.  · Wearing specific shoes or inserts inside of shoes (orthotics) for comfort and support.  · Wearing splints on your feet while you sleep. Splints keep your feet in a position (usually a 90-degree angle) that should prevent and relieve the pain you feel when you first get out of bed. They also make stretching easier in the morning.  · Taking  over-the-counter medicine to relieve pain, such as NSAIDs.  · Using high-intensity sound waves to break up the heel spur (extracorporeal shock wave therapy).  · Getting steroid injections in your heel to reduce inflammation.  · Having surgery, if your heel spur causes long-term (chronic) pain.  Follow these instructions at home:    Activity  · Avoid activities that cause pain until you recover, or for as long as told by your health care provider.  · Do stretching exercises as told. Stretch before exercising or being physically active.  Managing pain, stiffness, and swelling  · If directed, put ice on your foot. To do this:  ? Put ice in a plastic bag.  ? Place a towel between your skin and the bag.  ? Leave the ice on for 20 minutes, 2-3 times a day.  ? Remove the ice if your skin turns bright red. This is very important. If you cannot feel pain, heat, or cold, you have a greater risk of damage to the area.  · Move your toes often to reduce stiffness and swelling.  · When possible, raise (elevate) your foot above the level of your heart while you are sitting or lying down.  General instructions  · Take over-the-counter and prescription medicines only as told by your health care provider.  · Wear supportive shoes that fit well. Wear splints, inserts, or orthotics as told by your health care provider.  · If recommended, work with your health care provider to lose weight. This can relieve pressure on your foot.  · Do not use any products that contain nicotine or tobacco, such as cigarettes, e-cigarettes, and chewing tobacco. These can delay bone healing. If you need help quitting, ask your health care provider.  · Keep all follow-up visits. This is important.  Where to find more information  · American Academy of Orthopaedic [employer]geons: www.orthoinfo.aaos.org  Contact a health care provider if:  · Your pain does not go away with treatment.  · Your pain gets worse.  Summary  · A heel spur is a bony growth that forms on  the bottom of the heel bone (calcaneus).  · Heel spurs often cause inflammation in the plantar fascia, which is the band of tissue that connects the toes to the heel bone. This may cause pain on the bottom of the foot, near the heel.  · Doing stretching exercises, losing weight, wearing specific shoes or shoe inserts, wearing splints while you sleep, and taking pain medicine may ease the pain and stiffness.  · Other treatment options may include high-intensity sound waves to break up the heel spur, steroid injections, or surgery.  This information is not intended to replace advice given to you by your health care provider. Make sure you discuss any questions you have with your health care provider.  Document Revised: 04/13/2021 Document Reviewed: 04/13/2021  Elsevier Patient Education © 2021 [employer].

## 2021-10-06 NOTE — PROGRESS NOTES
Subjective   Chief Complaint:  Right heel pain    History of Present Illness:  This 66 y.o. female was seen in the office today.  She presents with continued right heel pain.  Reports this is been going on several weeks with difficulty walking.  She had an x-ray at [facility] showing heel spurs.  She is already tried anti-inflammatories and stretching.    Allergies   Allergen Reactions   • Penicillins Swelling and Rash      Current Outpatient Medications on File Prior to Visit   Medication Sig   • amitriptyline (ELAVIL) 100 MG tablet One to two tablets by mouth at night as needed for sleep   • Cholecalciferol (Vitamin D3) 10 MCG (400 UNIT) chewable tablet Chew Daily.   • HYDROmorphone (DILAUDID) 2 MG tablet Take 1 tablet by mouth Every 4 (Four) Hours As Needed for Moderate Pain .   • ondansetron (ZOFRAN) 4 MG tablet Take 1 tablet by mouth Every 6 (Six) Hours As Needed for Nausea or Vomiting.   • oxyCODONE-acetaminophen (PERCOCET) 7.5-325 MG per tablet Take 1 tablet by mouth Every 6 (Six) Hours As Needed.   • pantoprazole (PROTONIX) 40 MG EC tablet Take 1 tablet by mouth Daily.   • predniSONE (DELTASONE) 20 MG tablet Take 2 tablets by mouth Daily.   • sucralfate (CARAFATE) 1 g tablet Take 1 tablet by mouth 4 (Four) Times a Day.   • tiZANidine (ZANAFLEX) 4 MG tablet TAKE ONE TABLET THREE TIMES DAILY AS NEEDED FOR MUSCLE SPASMS GENERIC FOR ZANAFLEX     No current facility-administered medications on file prior to visit.      Past Medical, Surgical, Social, and Family History:  Past Medical History:   Diagnosis Date   • Arthritis    • Diabetes mellitus (HCC)     diet controlled   • Hx of colonic polyp    • Hypertension    • Lower back pain    • Neck pain    • PONV (postoperative nausea and vomiting)      Past Surgical History:   Procedure Laterality Date   • ANTERIOR CERVICAL DISCECTOMY W/ FUSION N/A 11/20/2020    Procedure: EXPLORATION OF FUSION C5-6, ANTERIOR CERVICAL DISCECTOMY FUSION C4-5, C6-7 REVISON ANTERIOR FUSION  C5-6 WITH INSTRUMENTATION C4-7;  Surgeon: KEN Gilbert MD;  Location:  [facility] OR;  Service: Orthopedic Spine;  Laterality: N/A;   • APPENDECTOMY     • COLONOSCOPY  2016    Normal exam repeat in 5 years   • COLONOSCOPY N/A 2020    Diverticulosis repeat exam in 5 years   • COLONOSCOPY W/ POLYPECTOMY  2012    Hyperplastic polyp cecum repeat exam in 1 year   • ENDOSCOPY  2014    Very tortuous distal esophagus dilated 50 Fr, HH    • ENDOSCOPY N/A 2021    A fundoplication was found, dilated   • HARDWARE REMOVAL N/A 2020    Procedure: REMOVAL OF INSTRUMENTATION;  Surgeon: KEN Gilbert MD;  Location:  [facility] OR;  Service: Orthopedic Spine;  Laterality: N/A;   • HYSTERECTOMY     • NECK SURGERY     • NISSEN FUNDOPLICATION     • POSTERIOR CERVICAL FUSION N/A 12/3/2020    Procedure: POSTERIOR SPINAL FUSION WITH INSTRUMENTATION C4-7;  Surgeon: KEN Gilbert MD;  Location:  [facility] OR;  Service: Orthopedic Spine;  Laterality: N/A;   • US GUIDED LYMPH NODE BIOPSY  8/3/2020     Social History     Socioeconomic History   • Marital status:      Spouse name: Jason   • Number of children: 2   • Years of education: 12   • Highest education level: Not on file   Tobacco Use   • Smoking status: Former Smoker     Packs/day: 2.00     Types: Cigarettes     Start date: 1954     Quit date: 7/10/1990     Years since quittin.2   • Smokeless tobacco: Never Used   Vaping Use   • Vaping Use: Never used   Substance and Sexual Activity   • Alcohol use: No   • Drug use: No   • Sexual activity: Defer     Family History   Problem Relation Age of Onset   • Colon cancer Mother    • Colon polyps Mother    • No Known Problems Father    • Diabetes Brother    • Diabetes Son    • Breast cancer Maternal Aunt    • Heart attack Paternal Grandmother    • Lymphoma Maternal Aunt    • Lung cancer Maternal Aunt    • Cancer Maternal Aunt    • Diabetes Maternal Aunt    • Diabetes Son    • Diabetes  "Brother      Objective   Physical Exam  Vitals reviewed.   Constitutional:       General: She is not in acute distress.     Appearance: Normal appearance.   Cardiovascular:      Rate and Rhythm: Normal rate and regular rhythm.   Pulmonary:      Effort: Pulmonary effort is normal.      Breath sounds: Normal breath sounds.   Musculoskeletal:         General: Tenderness (right heel) present. No swelling or deformity.     /96 (BP Location: Left arm, Patient Position: Sitting, Cuff Size: Adult)   Pulse 97   Temp 96.6 °F (35.9 °C)   Ht 163.8 cm (64.5\")   Wt 84.3 kg (185 lb 12.8 oz)   SpO2 98%   BMI 31.40 kg/m²     Assessment/Plan   Diagnoses and all orders for this visit:    1. Pain of right heel (Primary)    2. Bone spur of foot    Discussion:  Advised and educated plan of care.      Procedure:  The patient was placed in a supine position and the  heel was cleansed with alcohol and prepped with Betadine.     Using a sterile technique the point of maximum tenderness of the right heel was plapated and local anesthesia was obtained using 1 ml of Lidocaine 2%.  When adequate anesthesia was achieved, the plantar fascia  was injected with a preparation of 1 ml of Lidocaine 2% and 1 ml Kenalog 40mg/ml  using a 25g 1 1/2 inch needle.  The solution flowed smoothly into the space.  The needle was withdrawn and a sterile adhesive bandage was applied.     Post procedure the patient was encouraged to bear weight on the affected foot to distribute the steroid solution.  The foot was reexamined after 5 minutes to confirm pain relief.     The patient tolerated the procedure well and was advised to use ice/heat as indicated and to perform heel cord stretching exercises four times a day.  Advised to immediately report fever, increasing pain or drainage from the injection site.      Follow-up:  Return in 2 weeks (on 10/20/2021) for AWV - Fasting Labs.    Electronically signed by HECTOR Montoya, 10/06/21, 2:46 PM CDT.  "

## 2021-10-11 DIAGNOSIS — M79.671 PAIN OF RIGHT HEEL: ICD-10-CM

## 2021-10-11 DIAGNOSIS — M77.50 BONE SPUR OF FOOT: Primary | ICD-10-CM

## 2021-10-20 ENCOUNTER — OFFICE VISIT (OUTPATIENT)
Dept: FAMILY MEDICINE CLINIC | Facility: CLINIC | Age: 66
End: 2021-10-20

## 2021-10-20 VITALS
WEIGHT: 185 LBS | HEART RATE: 94 BPM | RESPIRATION RATE: 18 BRPM | BODY MASS INDEX: 30.82 KG/M2 | TEMPERATURE: 97.8 F | DIASTOLIC BLOOD PRESSURE: 82 MMHG | SYSTOLIC BLOOD PRESSURE: 126 MMHG | OXYGEN SATURATION: 98 % | HEIGHT: 65 IN

## 2021-10-20 DIAGNOSIS — E55.9 VITAMIN D DEFICIENCY: ICD-10-CM

## 2021-10-20 DIAGNOSIS — Z71.89 ADVANCED CARE PLANNING/COUNSELING DISCUSSION: ICD-10-CM

## 2021-10-20 DIAGNOSIS — Z23 IMMUNIZATION DUE: ICD-10-CM

## 2021-10-20 DIAGNOSIS — E78.5 HYPERLIPIDEMIA, UNSPECIFIED HYPERLIPIDEMIA TYPE: ICD-10-CM

## 2021-10-20 DIAGNOSIS — I10 ESSENTIAL HYPERTENSION: ICD-10-CM

## 2021-10-20 DIAGNOSIS — M79.671 PAIN OF RIGHT HEEL: ICD-10-CM

## 2021-10-20 DIAGNOSIS — Z00.00 WELLNESS EXAMINATION: ICD-10-CM

## 2021-10-20 DIAGNOSIS — Z12.31 ENCOUNTER FOR SCREENING MAMMOGRAM FOR MALIGNANT NEOPLASM OF BREAST: Primary | ICD-10-CM

## 2021-10-20 DIAGNOSIS — R73.09 ELEVATED GLUCOSE: ICD-10-CM

## 2021-10-20 DIAGNOSIS — M77.50 BONE SPUR OF FOOT: ICD-10-CM

## 2021-10-20 PROCEDURE — 1170F FXNL STATUS ASSESSED: CPT | Performed by: NURSE PRACTITIONER

## 2021-10-20 PROCEDURE — 1125F AMNT PAIN NOTED PAIN PRSNT: CPT | Performed by: NURSE PRACTITIONER

## 2021-10-20 PROCEDURE — 90662 IIV NO PRSV INCREASED AG IM: CPT | Performed by: NURSE PRACTITIONER

## 2021-10-20 PROCEDURE — 90715 TDAP VACCINE 7 YRS/> IM: CPT | Performed by: NURSE PRACTITIONER

## 2021-10-20 PROCEDURE — G0008 ADMIN INFLUENZA VIRUS VAC: HCPCS | Performed by: NURSE PRACTITIONER

## 2021-10-20 PROCEDURE — 1159F MED LIST DOCD IN RCRD: CPT | Performed by: NURSE PRACTITIONER

## 2021-10-20 PROCEDURE — G0439 PPPS, SUBSEQ VISIT: HCPCS | Performed by: NURSE PRACTITIONER

## 2021-10-20 PROCEDURE — 99497 ADVNCD CARE PLAN 30 MIN: CPT | Performed by: NURSE PRACTITIONER

## 2021-10-20 RX ORDER — OXYCODONE HYDROCHLORIDE AND ACETAMINOPHEN 5; 325 MG/1; MG/1
1 TABLET ORAL EVERY 6 HOURS PRN
Qty: 20 TABLET | Refills: 0 | Status: SHIPPED | OUTPATIENT
Start: 2021-10-20 | End: 2022-04-06

## 2021-10-20 RX ORDER — FLUOXETINE 10 MG/1
10 CAPSULE ORAL DAILY
Qty: 30 CAPSULE | Refills: 5 | Status: SHIPPED | OUTPATIENT
Start: 2021-10-20 | End: 2022-04-06

## 2021-10-20 NOTE — PATIENT INSTRUCTIONS
Advance Care Planning and Advance Directives     You make decisions on a daily basis - decisions about where you want to live, your career, your home, your life. Perhaps one of the most important decisions you face is your choice for future medical care. Take time to talk with your family and your healthcare team and start planning today.  Advance Care Planning is a process that can help you:  · Understand possible future healthcare decisions in light of your own experiences  · Reflect on those decision in light of your goals and values  · Discuss your decisions with those closest to you and the healthcare professionals that care for you  · Make a plan by creating a document that reflects your wishes    Surrogate Decision Maker  In the event of a medical emergency, which has left you unable to communicate or to make your own decisions, you would need someone to make decisions for you.  It is important to discuss your preferences for medical treatment with this person while you are in good health.     Qualities of a surrogate decision maker:  • Willing to take on this role and responsibility  • Knows what you want for future medical care  • Willing to follow your wishes even if they don't agree with them  • Able to make difficult medical decisions under stressful circumstances    Advance Directives  These are legal documents you can create that will guide your healthcare team and decision maker(s) when needed. These documents can be stored in the electronic medical record.    · Living Will - a legal document to guide your care if you have a terminal condition or a serious illness and are unable to communicate. States vary by statute in document names/types, but most forms may include one or more of the following:        -  Directions regarding life-prolonging treatments        -  Directions regarding artificially provided nutrition/hydration        -  Choosing a healthcare decision maker        -  Direction  regarding organ/tissue donation    · Durable Power of  for Healthcare - this document names an -in-fact to make medical decisions for you, but it may also allow this person to make personal and financial decisions for you. Please seek the advice of an  if you need this type of document.    **Advance Directives are not required and no one may discriminate against you if you do not sign one.    Medical Orders  Many states allow specific forms/orders signed by your physician to record your wishes for medical treatment in your current state of health. This form, signed in personal communication with your physician, addresses resuscitation and other medical interventions that you may or may not want.      For more information or to schedule a time with a [facility] Advance Care Planning Facilitator contact: [email or web address] or call [phone] and someone will contact you directly.

## 2021-10-20 NOTE — PROGRESS NOTES
The ABCs of the Annual Wellness Visit  Subsequent Medicare Wellness Visit    Chief Complaint   Patient presents with   • Medicare Wellness-subsequent      Subjective    History of Present Illness:  Sedrick Leahy is a 66 y.o. female who presents for a Subsequent Medicare Wellness Visit.    The following portions of the patient's history were reviewed and   updated as appropriate: exercise counseling, nutrition counseling and Exercise will be difficult until she gets work on her heel.    She reports increased situational stress and tearfulness.  She takes amitriptyline at night mainly for sleep.  Reports does have some dysphoria during the day.  She is having quite a bit of pain and difficulty with mobility-steroid injection did not take to the heel.  History of bone spurs.  She is willing to go to podiatry.    Compared to one year ago, the patient feels her physical   health is better.    Compared to one year ago, the patient feels her mental   health is the same.    Recent Hospitalizations:  This patient has had a [facility] admission record on file within the last 365 days.    Current Medical Providers:  Patient Care Team:  Kiran Madden MD as PCP - General  Abdullahi Howell MD as Consulting Physician (Gastroenterology)    Outpatient Medications Prior to Visit   Medication Sig Dispense Refill   • amitriptyline (ELAVIL) 100 MG tablet One to two tablets by mouth at night as needed for sleep 180 tablet 0   • Cholecalciferol (Vitamin D3) 10 MCG (400 UNIT) chewable tablet Chew Daily.     • HYDROmorphone (DILAUDID) 2 MG tablet Take 1 tablet by mouth Every 4 (Four) Hours As Needed for Moderate Pain . 20 tablet 0   • ondansetron (ZOFRAN) 4 MG tablet Take 1 tablet by mouth Every 6 (Six) Hours As Needed for Nausea or Vomiting. 20 tablet 0   • oxyCODONE-acetaminophen (PERCOCET) 7.5-325 MG per tablet Take 1 tablet by mouth Every 6 (Six) Hours As Needed.     • pantoprazole (PROTONIX) 40 MG EC tablet Take 1 tablet  by mouth Daily. 30 tablet 11   • predniSONE (DELTASONE) 20 MG tablet Take 2 tablets by mouth Daily. 8 tablet 0   • sucralfate (CARAFATE) 1 g tablet Take 1 tablet by mouth 4 (Four) Times a Day. 40 tablet 0   • tiZANidine (ZANAFLEX) 4 MG tablet TAKE ONE TABLET THREE TIMES DAILY AS NEEDED FOR MUSCLE SPASMS GENERIC FOR ZANAFLEX 30 tablet 0     No facility-administered medications prior to visit.       Opioid medication/s are on active medication list.  and I have evaluated her active treatment plan and pain score trends (see table).  Vitals:    10/20/21 0832   PainSc:   4   PainLoc: Foot  Comment: right     I have reviewed the chart for potential of high risk medication and harmful drug interactions in the elderly.            Aspirin is not on active medication list.  Aspirin use is not indicated based on review of current medical condition/s. Risk of harm outweighs potential benefits.  .    Patient Active Problem List   Diagnosis   • Wellness examination-done   • Abnormal x-ray-resolved   • Anemia: iron,   • Chronic neck pain   • Chronic back pain-luca   • Depression   • Anxiety   • History of kidney stones   • Vitamin D deficiency   • Hypertension   • Hyperlipidemia-diet   • Hematuria-ayan   • Elevated fasting glucose   • Nausea-problem with nissen   • History of Nissen fundoplication   • Radiculopathy   • Urinary incontinence   • Laboratory test*   • Chronic narcotic use-Luca   • Menopause: see osteopenia   • Palpitations: holter benign   • Rash   • Hyperplastic colonic polyp   • Positive colorectal cancer screening using Cologuard test   • Family history of colon cancer in mother   • Epigastric pain   • Dark stools   • Type 2 diabetes mellitus without complication, without long-term current use of insulin (HCC)   • OsteopeniaL 2020 ? 2y   • Stenosis, cervical spine   • Gastrointestinal hemorrhage     Advance Care Planning  Advance Directive is not on file.  ACP discussion was held with the patient during  "this visit. Patient does not have an advance directive, information provided.    Review of Systems   Constitutional: Negative for chills, fatigue and fever.   HENT: Negative for congestion, ear discharge and ear pain.    Eyes: Negative for pain and visual disturbance.   Respiratory: Negative for cough and shortness of breath.    Cardiovascular: Negative for chest pain, palpitations and leg swelling.   Gastrointestinal: Negative for abdominal pain and constipation.   Endocrine: Negative for cold intolerance and heat intolerance.   Genitourinary: Negative for difficulty urinating and dysuria.   Musculoskeletal: Negative for arthralgias and myalgias.        Positive for heel pain   Skin: Negative for color change and rash.   Neurological: Negative for speech difficulty.   Hematological: Negative for adenopathy. Does not bruise/bleed easily.   Psychiatric/Behavioral: Positive for dysphoric mood. Negative for agitation and behavioral problems.        Objective    Vitals:    10/20/21 0832   BP: 126/82   Pulse: 94   Resp: 18   Temp: 97.8 °F (36.6 °C)   TempSrc: Infrared   SpO2: 98%   Weight: 83.9 kg (185 lb)   Height: 163.8 cm (64.5\")   PainSc:   4   PainLoc: Foot  Comment: right     BMI Readings from Last 1 Encounters:   10/20/21 31.26 kg/m²   BMI is above normal parameters. Recommendations include: exercise counseling, nutrition counseling and Exercise will be difficult until she gets work done on her heel    Does the patient have evidence of cognitive impairment? No    Physical Exam  Vitals and nursing note reviewed.   Constitutional:       General: She is not in acute distress.     Appearance: She is not diaphoretic.   HENT:      Head: Normocephalic and atraumatic.      Nose: Nose normal.   Eyes:      Conjunctiva/sclera: Conjunctivae normal.      Pupils: Pupils are equal, round, and reactive to light.   Neck:      Thyroid: No thyromegaly.      Vascular: No JVD.      Trachea: No tracheal deviation.   Cardiovascular:     "  Rate and Rhythm: Normal rate and regular rhythm.      Heart sounds: Normal heart sounds. No murmur heard.  No friction rub. No gallop.    Pulmonary:      Effort: Pulmonary effort is normal. No respiratory distress.      Breath sounds: Normal breath sounds. No wheezing.   Abdominal:      General: Bowel sounds are normal.      Palpations: Abdomen is soft.      Tenderness: There is no abdominal tenderness. There is no guarding.   Musculoskeletal:         General: Tenderness present. No deformity. Normal range of motion.      Cervical back: Normal range of motion and neck supple.   Lymphadenopathy:      Cervical: No cervical adenopathy.   Skin:     General: Skin is warm and dry.      Capillary Refill: Capillary refill takes less than 2 seconds.   Neurological:      Mental Status: She is alert and oriented to person, place, and time.   Psychiatric:         Behavior: Behavior normal.         Thought Content: Thought content normal.         Judgment: Judgment normal.      Comments: Openly voices feelings concerning mood                 HEALTH RISK ASSESSMENT    Smoking Status:  Social History     Tobacco Use   Smoking Status Former Smoker   • Packs/day: 2.00   • Types: Cigarettes   • Start date: 1954   • Quit date: 7/10/1990   • Years since quittin.3   Smokeless Tobacco Never Used     Alcohol Consumption:  Social History     Substance and Sexual Activity   Alcohol Use No     Fall Risk Screen:    [facility].    Depression Screening:  PHQ-2/PHQ-9 Depression Screening 10/20/2021   Little interest or pleasure in doing things 0   Feeling down, depressed, or hopeless 0   Trouble falling or staying asleep, or sleeping too much 0   Feeling tired or having little energy 0   Poor appetite or overeating 0   Feeling bad about yourself - or that you are a failure or have let yourself or your family down 0   Trouble concentrating on things, such as reading the newspaper or watching  television 0   Moving or speaking so slowly that other people could have noticed. Or the opposite - being so fidgety or restless that you have been moving around a lot more than usual 0   Thoughts that you would be better off dead, or of hurting yourself in some way 0   Total Score 0   If you checked off any problems, how difficult have these problems made it for you to do your work, take care of things at home, or get along with other people? Not difficult at all       Health Habits and Functional and Cognitive Screening:  Functional & Cognitive Status 10/20/2021   Do you have difficulty preparing food and eating? No   Do you have difficulty bathing yourself, getting dressed or grooming yourself? No   Do you have difficulty using the toilet? No   Do you have difficulty moving around from place to place? No   Do you have trouble with steps or getting out of a bed or a chair? No   Current Diet Well Balanced Diet   Dental Exam Up to date   Eye Exam Up to date   Exercise (times per week) 0 times per week   Current Exercises Include No Regular Exercise   Do you need help using the phone?  No   Are you deaf or do you have serious difficulty hearing?  No   Do you need help with transportation? No   Do you need help shopping? No   Do you need help preparing meals?  No   Do you need help with housework?  No   Do you need help with laundry? No   Do you need help taking your medications? No   Do you need help managing money? No   Do you ever drive or ride in a car without wearing a seat belt? No   Have you felt unusual stress, anger or loneliness in the last month? No   Who do you live with? Spouse   If you need help, do you have trouble finding someone available to you? No   Have you been bothered in the last four weeks by sexual problems? No   Do you have difficulty concentrating, remembering or making decisions? No       Age-appropriate Screening Schedule:  Refer to the list below for future screening recommendations based  on patient's age, sex and/or medical conditions. Orders for these recommended tests are listed in the plan section. The patient has been provided with a written plan.    Health Maintenance   Topic Date Due   • TDAP/TD VACCINES (1 - Tdap) Never done   • ZOSTER VACCINE (1 of 2) Never done   • PAP SMEAR  Never done   • LIPID PANEL  05/28/2020   • URINE MICROALBUMIN  05/28/2020   • HEMOGLOBIN A1C  01/20/2021   • INFLUENZA VACCINE  08/01/2021   • MAMMOGRAM  01/10/2022   • DXA SCAN  01/10/2022   • DIABETIC EYE EXAM  05/11/2022   • DIABETIC FOOT EXAM  10/20/2022              Assessment/Plan   CMS Preventative Services Quick Reference  Risk Factors Identified During Encounter  Chronic Pain   Depression/Dysphoria  Immunizations Discussed/Encouraged (specific Immunizations; Tdap and Influenza  Obesity/Overweight   The above risks/problems have been discussed with the patient.  Follow up actions/plans if indicated are seen below in the Assessment/Plan Section.  Pertinent information has been shared with the patient in the After Visit Summary.    Diagnoses and all orders for this visit:    1. Encounter for screening mammogram for malignant neoplasm of breast (Primary)  -     Cancel: Mammo Screening Bilateral With CAD; Future  -     Vitamin D 25 Hydroxy  -     Mammo Screening Bilateral With CAD; Future    2. Bone spur of foot  -     oxyCODONE-acetaminophen (Percocet) 5-325 MG per tablet; Take 1 tablet by mouth Every 6 (Six) Hours As Needed for Severe Pain .  Dispense: 20 tablet; Refill: 0    3. Pain of right heel  -     oxyCODONE-acetaminophen (Percocet) 5-325 MG per tablet; Take 1 tablet by mouth Every 6 (Six) Hours As Needed for Severe Pain .  Dispense: 20 tablet; Refill: 0    4. Vitamin D deficiency  -     Vitamin D 25 Hydroxy    5. Wellness examination  -     Hemoglobin A1c  -     CBC & Differential  -     Comprehensive Metabolic Panel  -     Lipid Panel  -     TSH  -     Vitamin D 25 Hydroxy    6. Elevated glucose  -      Hemoglobin A1c    7. Essential hypertension  -     Hemoglobin A1c  -     CBC & Differential  -     Comprehensive Metabolic Panel  -     Lipid Panel  -     TSH  -     Vitamin D 25 Hydroxy    8. Hyperlipidemia, unspecified hyperlipidemia type  -     Hemoglobin A1c  -     CBC & Differential  -     Comprehensive Metabolic Panel  -     Lipid Panel  -     TSH  -     Vitamin D 25 Hydroxy    9. Advanced care planning/counseling discussion    Other orders  -     FLUoxetine (PROzac) 10 MG capsule; Take 1 capsule by mouth Daily.  Dispense: 30 capsule; Refill: 5      Plan:  -Treat acute pain for heel-unsuccessful steroid injection-referral done to podiatry  -Start low-dose Prozac-advised risk with multiple antidepressants-advised we want to keep the Prozac dosage low to avoid interaction but this may help the situational stress going on.  -Lipid screening/monitoring-updated wellness labs  -Mammogram advised-order provided    Patient has been erroneously marked as diabetic. Based on the available clinical information, she does not have diabetes and should therefore be excluded from diabetic health maintenance and quality measures for the remainder of the reporting period.  A1c is ordered this visit to make sure still okay but reports has not been fully diagnosed as diabetic but has had A1c done in the past.  We will add diabetes to active problem list if in fact the A1c comes back and at her back to quality measure monitoring.    5 minutes spent discussing advanced directives-information packet given.  Discussed need for living will, power of  with advancing age greater than 65 this puts patient at high risk for being unable to voice health care needs and concerns and make decisions.  At this point she is cognitively intact and able to make own decisions but we did discuss that this may not always be the case and is very important to look at getting legal documents that will assist her in the future.    Follow Up:    Return for 3 Months - HECTOR Irvin.     An After Visit Summary and PPPS were made available to the patient.

## 2021-10-20 NOTE — PROGRESS NOTES
Tdap and flu vaccine given as ordered during office visit. Tdap given in right deltoid and flu vaccine given in left deltoid. PT tolerated both well.

## 2021-10-21 LAB
25(OH)D3+25(OH)D2 SERPL-MCNC: 32.1 NG/ML (ref 30–100)
ALBUMIN SERPL-MCNC: 4.2 G/DL (ref 3.5–5.2)
ALBUMIN/GLOB SERPL: 1.8 G/DL
ALP SERPL-CCNC: 123 U/L (ref 39–117)
ALT SERPL-CCNC: 8 U/L (ref 1–33)
AST SERPL-CCNC: 14 U/L (ref 1–32)
BASOPHILS # BLD AUTO: 0.03 10*3/MM3 (ref 0–0.2)
BASOPHILS NFR BLD AUTO: 0.4 % (ref 0–1.5)
BILIRUB SERPL-MCNC: 0.3 MG/DL (ref 0–1.2)
BUN SERPL-MCNC: 17 MG/DL (ref 8–23)
BUN/CREAT SERPL: 17.3 (ref 7–25)
CALCIUM SERPL-MCNC: 9.4 MG/DL (ref 8.6–10.5)
CHLORIDE SERPL-SCNC: 106 MMOL/L (ref 98–107)
CHOLEST SERPL-MCNC: 191 MG/DL (ref 0–200)
CO2 SERPL-SCNC: 28.5 MMOL/L (ref 22–29)
CREAT SERPL-MCNC: 0.98 MG/DL (ref 0.57–1)
EOSINOPHIL # BLD AUTO: 0.18 10*3/MM3 (ref 0–0.4)
EOSINOPHIL NFR BLD AUTO: 2.5 % (ref 0.3–6.2)
ERYTHROCYTE [DISTWIDTH] IN BLOOD BY AUTOMATED COUNT: 12.8 % (ref 12.3–15.4)
GLOBULIN SER CALC-MCNC: 2.3 GM/DL
GLUCOSE SERPL-MCNC: 81 MG/DL (ref 65–99)
HBA1C MFR BLD: 5.45 % (ref 4.8–5.6)
HCT VFR BLD AUTO: 38 % (ref 34–46.6)
HDLC SERPL-MCNC: 65 MG/DL (ref 40–60)
HGB BLD-MCNC: 12.6 G/DL (ref 12–15.9)
IMM GRANULOCYTES # BLD AUTO: 0.01 10*3/MM3 (ref 0–0.05)
IMM GRANULOCYTES NFR BLD AUTO: 0.1 % (ref 0–0.5)
LDLC SERPL CALC-MCNC: 112 MG/DL (ref 0–100)
LYMPHOCYTES # BLD AUTO: 2.61 10*3/MM3 (ref 0.7–3.1)
LYMPHOCYTES NFR BLD AUTO: 36.4 % (ref 19.6–45.3)
MCH RBC QN AUTO: 29.9 PG (ref 26.6–33)
MCHC RBC AUTO-ENTMCNC: 33.2 G/DL (ref 31.5–35.7)
MCV RBC AUTO: 90 FL (ref 79–97)
MONOCYTES # BLD AUTO: 0.48 10*3/MM3 (ref 0.1–0.9)
MONOCYTES NFR BLD AUTO: 6.7 % (ref 5–12)
NEUTROPHILS # BLD AUTO: 3.87 10*3/MM3 (ref 1.7–7)
NEUTROPHILS NFR BLD AUTO: 53.9 % (ref 42.7–76)
NRBC BLD AUTO-RTO: 0 /100 WBC (ref 0–0.2)
PLATELET # BLD AUTO: 283 10*3/MM3 (ref 140–450)
POTASSIUM SERPL-SCNC: 4.7 MMOL/L (ref 3.5–5.2)
PROT SERPL-MCNC: 6.5 G/DL (ref 6–8.5)
RBC # BLD AUTO: 4.22 10*6/MM3 (ref 3.77–5.28)
SODIUM SERPL-SCNC: 143 MMOL/L (ref 136–145)
TRIGL SERPL-MCNC: 74 MG/DL (ref 0–150)
TSH SERPL DL<=0.005 MIU/L-ACNC: 1.96 UIU/ML (ref 0.27–4.2)
VLDLC SERPL CALC-MCNC: 14 MG/DL (ref 5–40)
WBC # BLD AUTO: 7.18 10*3/MM3 (ref 3.4–10.8)

## 2021-10-21 NOTE — PROGRESS NOTES
Please call the patient - labs are stable.  A1C and Vit d better this set.  Needs to stay hydrated.    Electronically signed by HECTOR Montoya, 10/21/21, 6:50 AM CDT.

## 2021-11-01 NOTE — PROGRESS NOTES
[facility] - PODIATRY    Today's Date: 11/04/21    Patient Name: Sedrick Leahy  MRN: [id number]  CSN: [id number]  PCP: Kiran Madden MD  Referring Provider: Kiran Madden MD    SUBJECTIVE     Chief Complaint   Patient presents with   • Establish Care     pcp07/20/2020 NEW PATIENT- BONE SPUR OF RIGHT FOOT AND PAIN OF RIGHT HEEL- pt states started about 3 months ago, brought xrays- pt pain 4/10- pt present with dry skin, no visible issues     HPI: Sedrick Leahy, a 66 y.o.female, comes to clinic as a(n) new patient complaining of foot pain. Patient has h/o Arthritis, diabetes, hypertension, low back pain, neck pain. Patient presents with 3-month history of right plantar heel pain.  Patient states that she saw her primary care physician and x-rays were ordered and revealed posterior and plantar calcaneal spurring.  She states that she has very sensitive area on the plantar aspect of the heel.  Notes that pain is present immediately upon standing but also is present when standing for long periods of time or throughout the day.  Notes that even light touch/brushing up against the area such as when putting on shoes causes moderate amount of pain.  She does note some burning sensation in the area.  Denies any known injury, trauma, or other event in this area.  Has been wearing over-the-counter inserts and purchased a new pair of Duncan tennis shoes which she states helped somewhat the pain initially. Admits pain at 4/10 level and described as burning, aching and nagging. Denies previous treatment. Denies any constitutional symptoms. No other pedal complaints at this time.    Past Medical History:   Diagnosis Date   • Arthritis    • Diabetes mellitus (HCC)     diet controlled   • Hx of colonic polyp    • Hypertension    • Lower back pain    • Neck pain    • PONV (postoperative nausea and vomiting)      Past Surgical History:   Procedure Laterality Date   • ANTERIOR CERVICAL DISCECTOMY W/  FUSION N/A 2020    Procedure: EXPLORATION OF FUSION C5-6, ANTERIOR CERVICAL DISCECTOMY FUSION C4-5, C6-7 REVISON ANTERIOR FUSION C5-6 WITH INSTRUMENTATION C4-7;  Surgeon: KEN Gilbert MD;  Location:  [facility] OR;  Service: Orthopedic Spine;  Laterality: N/A;   • APPENDECTOMY     • COLONOSCOPY  2016    Normal exam repeat in 5 years   • COLONOSCOPY N/A 2020    Diverticulosis repeat exam in 5 years   • COLONOSCOPY W/ POLYPECTOMY  2012    Hyperplastic polyp cecum repeat exam in 1 year   • ENDOSCOPY  2014    Very tortuous distal esophagus dilated 50 Fr, HH    • ENDOSCOPY N/A 2021    A fundoplication was found, dilated   • HARDWARE REMOVAL N/A 2020    Procedure: REMOVAL OF INSTRUMENTATION;  Surgeon: KEN Gilbert MD;  Location: [facility] OR;  Service: Orthopedic Spine;  Laterality: N/A;   • HYSTERECTOMY     • NECK SURGERY     • NISSEN FUNDOPLICATION     • POSTERIOR CERVICAL FUSION N/A 12/3/2020    Procedure: POSTERIOR SPINAL FUSION WITH INSTRUMENTATION C4-7;  Surgeon: KEN Gilbert MD;  Location:  [facility] OR;  Service: Orthopedic Spine;  Laterality: N/A;   • US GUIDED LYMPH NODE BIOPSY  8/3/2020     Family History   Problem Relation Age of Onset   • Colon cancer Mother    • Colon polyps Mother    • No Known Problems Father    • Diabetes Brother    • Diabetes Son    • Breast cancer Maternal Aunt    • Heart attack Paternal Grandmother    • Lymphoma Maternal Aunt    • Lung cancer Maternal Aunt    • Cancer Maternal Aunt    • Diabetes Maternal Aunt    • Diabetes Son    • Diabetes Brother      Social History     Socioeconomic History   • Marital status:      Spouse name: Jason   • Number of children: 2   • Years of education: 12   Tobacco Use   • Smoking status: Former Smoker     Packs/day: 2.00     Types: Cigarettes     Start date: 1954     Quit date: 7/10/1990     Years since quittin.3   • Smokeless tobacco: Never Used   Vaping Use   • Vaping Use: Never used    Substance and Sexual Activity   • Alcohol use: No   • Drug use: No   • Sexual activity: Defer     Allergies   Allergen Reactions   • Penicillins Swelling and Rash     Current Outpatient Medications   Medication Sig Dispense Refill   • amitriptyline (ELAVIL) 100 MG tablet One to two tablets by mouth at night as needed for sleep 180 tablet 0   • Cholecalciferol (Vitamin D3) 10 MCG (400 UNIT) chewable tablet Chew Daily.     • FLUoxetine (PROzac) 10 MG capsule Take 1 capsule by mouth Daily. 30 capsule 5   • oxyCODONE-acetaminophen (Percocet) 5-325 MG per tablet Take 1 tablet by mouth Every 6 (Six) Hours As Needed for Severe Pain . 20 tablet 0     No current facility-administered medications for this visit.     Review of Systems   Constitutional: Negative for chills and fever.   HENT: Negative for congestion.    Respiratory: Negative for shortness of breath.    Cardiovascular: Negative for chest pain and leg swelling.   Gastrointestinal: Negative for constipation, diarrhea, nausea and vomiting.   Musculoskeletal: Positive for arthralgias. Negative for myalgias.   Skin: Negative for wound.   Neurological: Negative for numbness.       OBJECTIVE     Vitals:    11/04/21 1343   BP: 124/62   Pulse: 74   SpO2: 99%       PHYSICAL EXAM  GEN:   Accompanied by none.     Foot/Ankle Exam:       General:   Appearance: appears stated age and healthy    Orientation: AAOx3    Affect: appropriate    Gait: unimpaired    Assistance: independent    Shoe Gear:  Casual shoes (with OTC inserts)    VASCULAR      Right Foot Vascularity   Dorsalis pedis:  2+  Posterior tibial:  2+  Skin Temperature: warm    Edema Grading:  None  CFT:  3  Pedal Hair Growth:  Present  Varicosities: none       Left Foot Vascularity   Dorsalis pedis:  2+  Posterior tibial:  2+  Skin Temperature: warm    Edema Grading:  None  CFT:  3  Pedal Hair Growth:  Present  Varicosities: none        NEUROLOGIC     Right Foot Neurologic   Normal sensation    Light touch  sensation:  Normal  Vibratory sensation:  Normal  Hot/Cold sensation: normal    Protective Sensation using Benson-Manuel Monofilament:  10     Left Foot Neurologic   Normal sensation    Light touch sensation:  Normal  Vibratory sensation:  Normal  Hot/cold sensation: normal    Protective Sensation using Benson-Manuel Monofilament:  10     MUSCULOSKELETAL      Right Foot Musculoskeletal   Ecchymosis:  None  Tenderness: posterior heel and plantar heel    Arch:  Normal     Left Foot Musculoskeletal   Ecchymosis:  None  Tenderness: none    Arch:  Normal     MUSCLE STRENGTH     Right Foot Muscle Strength   Foot dorsiflexion:  5  Foot plantar flexion:  5  Foot inversion:  5  Foot eversion:  5     Left Foot Muscle Strength   Foot dorsiflexion:  5  Foot plantar flexion:  5  Foot inversion:  5  Foot eversion:  5     RANGE OF MOTION      Right Foot Range of Motion   Foot and ankle ROM within normal limits       Left Foot Range of Motion   Foot and ankle ROM within normal limits       DERMATOLOGIC     Right Foot Dermatologic   Skin: skin intact    Nails: normal       Left Foot Dermatologic   Skin: skin intact    Nails: normal       Image:       RADIOLOGY/NUCLEAR:  No results found.    LABORATORY/CULTURE RESULTS:      PATHOLOGY RESULTS:       ASSESSMENT/PLAN     Diagnoses and all orders for this visit:    1. Foot pain, right (Primary)  -     MRI Foot Right Without Contrast; Future    2. Mass of soft tissue of foot  -     MRI Foot Right Without Contrast; Future    3. Plantar fasciitis, right      Comprehensive lower extremity examination and evaluation was performed.  Discussed findings and treatment plan including risks, benefits, and treatment options with patient in detail. Patient agreed with treatment plan.  Outside x-rays reviewed. Moderate sized posterior calcaneal spur and smaller plantar calcaneal spur.  Exam findings inconclusive as there is a palpable nodule in the area of plantar fascial insertion site that  does not correlate directly with fibromatosis or typical plantar fascial pain.  Patient does have some degree of plantar fasciitis given description of symptoms and exam findings, however, the area of most sensitivity is where this nodular area is in the skin.  Will obtain further imaging studies to assess and rule out any tearing of the plantar fascia/scar tissue/fibromatosis.   An After Visit Summary was printed and given to the patient at discharge, including (if requested) any available informative/educational handouts regarding diagnosis, treatment, or medications. All questions were answered to patient/family satisfaction. Should symptoms fail to improve or worsen they agree to call or return to clinic or to go to the Emergency Department. Discussed the importance of following up with any needed screening tests/labs/specialist appointments and any requested follow-up recommended by me today. Importance of maintaining follow-up discussed and patient accepts that missed appointments can delay diagnosis and potentially lead to worsening of conditions.  Return in about 3 weeks (around 11/25/2021)., or sooner if acute issues arise.    Lab Frequency Next Occurrence   Follow Anesthesia Guidelines / Standing Orders Once 12/04/2019   Obtain Informed Consent Once 12/09/2019   Follow Anesthesia Guidelines / Standing Orders Once 01/04/2021   Obtain Informed Consent Once 01/09/2021   Diet: Once 01/18/2021   Advance Diet as Tolerated Once 01/18/2021   Mammo Screening Bilateral With CAD Once 10/20/2022       This document has been electronically signed by HECTOR Sood on November 4, 2021 17:07 CDT

## 2021-11-03 ENCOUNTER — TELEPHONE (OUTPATIENT)
Dept: PODIATRY | Facility: CLINIC | Age: 66
End: 2021-11-03

## 2021-11-03 NOTE — TELEPHONE ENCOUNTER
Spoke with pt regarding appt on 11/04/2021. informed pt she would be seeing natalia instead of dr millan

## 2021-11-04 ENCOUNTER — PATIENT ROUNDING (BHMG ONLY) (OUTPATIENT)
Dept: PODIATRY | Facility: CLINIC | Age: 66
End: 2021-11-04

## 2021-11-04 ENCOUNTER — OFFICE VISIT (OUTPATIENT)
Dept: PODIATRY | Facility: CLINIC | Age: 66
End: 2021-11-04

## 2021-11-04 VITALS
SYSTOLIC BLOOD PRESSURE: 124 MMHG | HEIGHT: 65 IN | WEIGHT: 187.4 LBS | BODY MASS INDEX: 31.22 KG/M2 | HEART RATE: 74 BPM | OXYGEN SATURATION: 99 % | DIASTOLIC BLOOD PRESSURE: 62 MMHG

## 2021-11-04 DIAGNOSIS — M79.89 MASS OF SOFT TISSUE OF FOOT: ICD-10-CM

## 2021-11-04 DIAGNOSIS — M79.671 FOOT PAIN, RIGHT: Primary | ICD-10-CM

## 2021-11-04 DIAGNOSIS — M72.2 PLANTAR FASCIITIS, RIGHT: ICD-10-CM

## 2021-11-04 PROCEDURE — 99213 OFFICE O/P EST LOW 20 MIN: CPT | Performed by: NURSE PRACTITIONER

## 2021-11-04 NOTE — PROGRESS NOTES
November 4, 2021    Hello, may I speak with Sedrick Leahy?    My name is AUGUSTINA COWART      I am  with [facility] [place] [facility] POD[facility]  [address], [address]  [place] [facility] [address] [phone].    Before we get started may I verify your date of birth? 1955    I am calling to officially welcome you to our practice and ask about your recent visit. Is this a good time to talk? yes    Tell me about your visit with us. What things went well?  EVERYTHING WENT WELL       We're always looking for ways to make our patients' experiences even better. Do you have recommendations on ways we may improve?  no    Overall were you satisfied with your first visit to our practice? yes       I appreciate you taking the time to speak with me today. Is there anything else I can do for you? no      Thank you, and have a great day.

## 2021-11-17 ENCOUNTER — HOSPITAL ENCOUNTER (OUTPATIENT)
Dept: MRI IMAGING | Facility: HOSPITAL | Age: 66
Discharge: HOME | End: 2021-11-17
Admitting: NURSE PRACTITIONER

## 2021-11-17 DIAGNOSIS — M79.671 FOOT PAIN, RIGHT: ICD-10-CM

## 2021-11-17 DIAGNOSIS — M79.89 MASS OF SOFT TISSUE OF FOOT: ICD-10-CM

## 2021-11-17 PROCEDURE — 73718 MRI LOWER EXTREMITY W/O DYE: CPT

## 2021-11-22 ENCOUNTER — TELEPHONE (OUTPATIENT)
Dept: PODIATRY | Facility: CLINIC | Age: 66
End: 2021-11-22

## 2021-11-22 NOTE — PROGRESS NOTES
[facility] - PODIATRY    Today's Date: 11/23/21    Patient Name: Sedrick Leahy  MRN: [id number]  CSN: [id number]  PCP: Kiran Madden MD  Referring Provider: No ref. provider found    SUBJECTIVE     Chief Complaint   Patient presents with   • Follow-up     3 wk f/u plantar fasciitis and foot pain of the right foot.  Pt states that her pain level is a 8-9/10, when walking.  PCP-7/20/20. (Unable to get pulse ox reading)     HPI: Sedrick eLahy, a 66 y.o.female, comes to clinic as a(n) established patient complaining of foot pain. Patient has h/o Arthritis, diabetes, hypertension, low back pain, neck pain. Patient presents with 3-month history of right plantar heel pain.  Patient states that she saw her primary care physician and x-rays were ordered and revealed posterior and plantar calcaneal spurring.  She states that she has very sensitive area on the plantar aspect of the heel.  Notes that pain is present immediately upon standing but also is present when standing for long periods of time or throughout the day.  Notes that even light touch/brushing up against the area such as when putting on shoes causes moderate amount of pain.  She does note some burning sensation in the area.  Denies any known injury, trauma, or other event in this area.  Has been wearing over-the-counter inserts and purchased a new pair of Duncan tennis shoes which she states helped somewhat the pain initially. Has not noticed much improvement since last appt. Had MRI performed.  Admits pain at 8-9/10 level and described as burning, aching, nagging and sharp. Relates previous treatment(s) including steroid injection, icing, OTC inserts. Denies any constitutional symptoms. No other pedal complaints at this time.    Past Medical History:   Diagnosis Date   • Arthritis    • Diabetes mellitus (HCC)     diet controlled   • Hx of colonic polyp    • Hypertension    • Lower back pain    • Neck pain    • PONV (postoperative  nausea and vomiting)      Past Surgical History:   Procedure Laterality Date   • ANTERIOR CERVICAL DISCECTOMY W/ FUSION N/A 11/20/2020    Procedure: EXPLORATION OF FUSION C5-6, ANTERIOR CERVICAL DISCECTOMY FUSION C4-5, C6-7 REVISON ANTERIOR FUSION C5-6 WITH INSTRUMENTATION C4-7;  Surgeon: KEN Gilbert MD;  Location:  [facility] OR;  Service: Orthopedic Spine;  Laterality: N/A;   • APPENDECTOMY     • COLONOSCOPY  06/06/2016    Normal exam repeat in 5 years   • COLONOSCOPY N/A 1/14/2020    Diverticulosis repeat exam in 5 years   • COLONOSCOPY W/ POLYPECTOMY  04/04/2012    Hyperplastic polyp cecum repeat exam in 1 year   • ENDOSCOPY  08/14/2014    Very tortuous distal esophagus dilated 50 Fr, HH    • ENDOSCOPY N/A 1/18/2021    A fundoplication was found, dilated   • HARDWARE REMOVAL N/A 11/20/2020    Procedure: REMOVAL OF INSTRUMENTATION;  Surgeon: KEN Gilbert MD;  Location:  [facility] OR;  Service: Orthopedic Spine;  Laterality: N/A;   • HYSTERECTOMY     • NECK SURGERY     • NISSEN FUNDOPLICATION     • POSTERIOR CERVICAL FUSION N/A 12/3/2020    Procedure: POSTERIOR SPINAL FUSION WITH INSTRUMENTATION C4-7;  Surgeon: KEN Gilbert MD;  Location:  [facility] OR;  Service: Orthopedic Spine;  Laterality: N/A;   • US GUIDED LYMPH NODE BIOPSY  8/3/2020     Family History   Problem Relation Age of Onset   • Colon cancer Mother    • Colon polyps Mother    • No Known Problems Father    • Diabetes Brother    • Diabetes Son    • Breast cancer Maternal Aunt    • Heart attack Paternal Grandmother    • Lymphoma Maternal Aunt    • Lung cancer Maternal Aunt    • Cancer Maternal Aunt    • Diabetes Maternal Aunt    • Diabetes Son    • Diabetes Brother      Social History     Socioeconomic History   • Marital status:      Spouse name: Jason   • Number of children: 2   • Years of education: 12   Tobacco Use   • Smoking status: Former Smoker     Packs/day: 2.00     Types: Cigarettes     Start date: 1955     Quit date:  7/10/1990     Years since quittin.3   • Smokeless tobacco: Never Used   Vaping Use   • Vaping Use: Never used   Substance and Sexual Activity   • Alcohol use: No   • Drug use: No   • Sexual activity: Defer     Allergies   Allergen Reactions   • Penicillins Swelling and Rash     Current Outpatient Medications   Medication Sig Dispense Refill   • amitriptyline (ELAVIL) 100 MG tablet One to two tablets by mouth at night as needed for sleep 180 tablet 0   • FLUoxetine (PROzac) 10 MG capsule Take 1 capsule by mouth Daily. 30 capsule 5   • Cholecalciferol (Vitamin D3) 10 MCG (400 UNIT) chewable tablet Chew Daily.     • oxyCODONE-acetaminophen (Percocet) 5-325 MG per tablet Take 1 tablet by mouth Every 6 (Six) Hours As Needed for Severe Pain . 20 tablet 0     No current facility-administered medications for this visit.     Review of Systems   Constitutional: Negative for chills and fever.   HENT: Negative for congestion.    Respiratory: Negative for shortness of breath.    Cardiovascular: Negative for chest pain and leg swelling.   Gastrointestinal: Negative for constipation, diarrhea, nausea and vomiting.   Musculoskeletal: Positive for arthralgias. Negative for myalgias.   Skin: Negative for wound.   Neurological: Negative for numbness.       OBJECTIVE     Vitals:    21 0938   BP: 124/88       PHYSICAL EXAM  GEN:   Accompanied by none.     Foot/Ankle Exam:       General:   Appearance: appears stated age and healthy    Orientation: AAOx3    Affect: appropriate    Gait: unimpaired    Assistance: independent    Shoe Gear:  Casual shoes (with OTC inserts)    VASCULAR      Right Foot Vascularity   Dorsalis pedis:  2+  Posterior tibial:  2+  Skin Temperature: warm    Edema Grading:  None  CFT:  3  Pedal Hair Growth:  Present  Varicosities: none       Left Foot Vascularity   Dorsalis pedis:  2+  Posterior tibial:  2+  Skin Temperature: warm    Edema Grading:  None  CFT:  3  Pedal Hair Growth:  Present  Varicosities:  none        NEUROLOGIC     Right Foot Neurologic   Normal sensation    Light touch sensation:  Normal  Vibratory sensation:  Normal  Hot/Cold sensation: normal    Protective Sensation using Mertzon-Manuel Monofilament:  10     Left Foot Neurologic   Normal sensation    Light touch sensation:  Normal  Vibratory sensation:  Normal  Hot/cold sensation: normal    Protective Sensation using Mertzon-Manuel Monofilament:  10     MUSCULOSKELETAL      Right Foot Musculoskeletal   Ecchymosis:  None  Tenderness: plantar fascia, posterior heel and plantar heel    Arch:  Normal     Left Foot Musculoskeletal   Ecchymosis:  None  Tenderness: none    Arch:  Normal     MUSCLE STRENGTH     Right Foot Muscle Strength   Foot dorsiflexion:  5  Foot plantar flexion:  5  Foot inversion:  5  Foot eversion:  5     Left Foot Muscle Strength   Foot dorsiflexion:  5  Foot plantar flexion:  5  Foot inversion:  5  Foot eversion:  5     RANGE OF MOTION      Right Foot Range of Motion   Foot and ankle ROM within normal limits    Ankle dorsiflexion:  5     Left Foot Range of Motion   Foot and ankle ROM within normal limits       DERMATOLOGIC     Right Foot Dermatologic   Skin: skin intact    Nails: normal       Left Foot Dermatologic   Skin: skin intact    Nails: normal       Image:       RADIOLOGY/NUCLEAR:  MRI Foot Right Without Contrast    Result Date: 11/18/2021  Narrative: EXAMINATION: MRI FOOT RIGHT WO CONTRAST- 11/18/2021 8:05 AM CST  HISTORY: Painful hindfoot plantar soft tissue mass, plantar fasciitis.  REPORT: Multiplanar multisequence MR imaging of the right foot was performed without contrast.  COMPARISON: There are no correlative imaging studies for comparison.  There a small posterior calcaneal spurs at the insertion of the Achilles tendon, the visualized Achilles tendon appears normal. There is moderate enthesopathic spurring at the inferior calcaneus and plantar fascial insertion, with thickening of the plantar fascia and mildly  increased T1 signal. There is mild marrow edema within the inferior calcaneus and no fracture is identified. The plantar fascia is primarily involves the medial cord of the plantar fascia. Within the subcutaneous fat over the heel pad there is is a vague nodular area of decreased T1 and increased T2 signal, measuring approximately 14 x 11 mm. This does not appear to represent a discrete mass and may be related to localized inflammation, no abscess is identified. There moderate arthritic changes at the articulation between the navicular and medial cuneiform, with subchondral cystic changes and joint space narrowing. The subtalar joint is preserved. No gross abnormality of the visualized flexor or extensor tendons is identified.      Impression: 1. Moderate changes of plantar fasciitis, including mild bone marrow edema at the inferior calcaneus with enthesopathy and spurring, no disruption of the plantar fascia is identified. The inflammation primarily involves the medial cord of the plantar fascia. There are subcutaneous inflammatory changes superficial to the heel spur with nodular signal alteration. The area of nodularity measures about 14 x 11 mm. An inflammatory process is favored, however if the pain and a palpable nodule persists after treatment for plantar fasciitis, consider repeat MRI with and without contrast. This report was finalized on 11/18/2021 08:46 by Dr. Cayetano Pina MD.      LABORATORY/CULTURE RESULTS:      PATHOLOGY RESULTS:       ASSESSMENT/PLAN     Diagnoses and all orders for this visit:    1. Foot pain, right (Primary)  -     Ambulatory Referral to Physical Therapy Evaluate and treat, Ortho; Strengthening, Stretching    2. Plantar fasciitis, right  -     Ambulatory Referral to Physical Therapy Evaluate and treat, Ortho; Strengthening, Stretching    3. Retrocalcaneal exostosis  -     Ambulatory Referral to Physical Therapy Evaluate and treat, Ortho; Strengthening, Stretching    4. Mass of  soft tissue of foot      Comprehensive lower extremity examination and evaluation was performed.  Discussed findings and treatment plan including risks, benefits, and treatment options with patient in detail. Patient agreed with treatment plan.  Reviewed MRI.  Patient defers additional steroid injection.   Given options of continued conservative therapy with immobilization in CAM, icing, and PT vs surgical intervention with plantar fascial release and retrocalcaneal exostectomy. Patient opts for continued conservative treatment for now.  Referral to PT.  Dispensed CAM.   An After Visit Summary was printed and given to the patient at discharge, including (if requested) any available informative/educational handouts regarding diagnosis, treatment, or medications. All questions were answered to patient/family satisfaction. Should symptoms fail to improve or worsen they agree to call or return to clinic or to go to the Emergency Department. Discussed the importance of following up with any needed screening tests/labs/specialist appointments and any requested follow-up recommended by me today. Importance of maintaining follow-up discussed and patient accepts that missed appointments can delay diagnosis and potentially lead to worsening of conditions.  Return in about 2 months (around 1/23/2022) for Follow-up with Podiatry Provider., or sooner if acute issues arise.    Lab Frequency Next Occurrence   Follow Anesthesia Guidelines / Standing Orders Once 12/04/2019   Obtain Informed Consent Once 12/09/2019   Follow Anesthesia Guidelines / Standing Orders Once 01/04/2021   Obtain Informed Consent Once 01/09/2021   Diet: Once 01/18/2021   Advance Diet as Tolerated Once 01/18/2021   Mammo Screening Bilateral With CAD Once 10/20/2022       This document has been electronically signed by Sai Mendieta DPM on November 23, 2021 10:04 CST

## 2021-11-22 NOTE — TELEPHONE ENCOUNTER
Called patient regarding appt on 11/23/2021. Left message for patient to return call if any questions or concerns arise.

## 2021-11-23 ENCOUNTER — OFFICE VISIT (OUTPATIENT)
Dept: PODIATRY | Facility: CLINIC | Age: 66
End: 2021-11-23

## 2021-11-23 VITALS — BODY MASS INDEX: 32.17 KG/M2 | HEIGHT: 64 IN | DIASTOLIC BLOOD PRESSURE: 88 MMHG | SYSTOLIC BLOOD PRESSURE: 124 MMHG

## 2021-11-23 DIAGNOSIS — M89.8X7 RETROCALCANEAL EXOSTOSIS: ICD-10-CM

## 2021-11-23 DIAGNOSIS — M79.671 FOOT PAIN, RIGHT: Primary | ICD-10-CM

## 2021-11-23 DIAGNOSIS — M79.89 MASS OF SOFT TISSUE OF FOOT: ICD-10-CM

## 2021-11-23 DIAGNOSIS — M72.2 PLANTAR FASCIITIS, RIGHT: ICD-10-CM

## 2021-11-23 PROCEDURE — 99213 OFFICE O/P EST LOW 20 MIN: CPT | Performed by: PODIATRIST

## 2021-11-29 RX ORDER — AMITRIPTYLINE HYDROCHLORIDE 100 MG/1
TABLET, FILM COATED ORAL
Qty: 180 TABLET | Refills: 0 | Status: SHIPPED | OUTPATIENT
Start: 2021-11-29 | End: 2022-02-16

## 2021-11-29 NOTE — TELEPHONE ENCOUNTER
Rx Refill Note  Requested Prescriptions     Pending Prescriptions Disp Refills   • amitriptyline (ELAVIL) 100 MG tablet [Pharmacy Med Name: AMITRIPTYLINE HYDROCHLORIDE 100MG TABLET] 180 tablet 0     Sig: TAKE ONE (1) TO TWO (2) TABLETS AT BEDTIME AS NEEDED SLEEP      Last office visit with prescribing clinician: 7/20/2020      Next office visit with prescribing clinician: Visit date not found            Lory Bradshaw MA  11/29/21, 16:34 CST  PT saw VITOR Arango 10/20/2021

## 2021-12-14 ENCOUNTER — TREATMENT (OUTPATIENT)
Dept: PHYSICAL THERAPY | Facility: CLINIC | Age: 66
End: 2021-12-14

## 2021-12-14 DIAGNOSIS — M89.8X7 RETROCALCANEAL EXOSTOSIS: ICD-10-CM

## 2021-12-14 DIAGNOSIS — M79.671 FOOT PAIN, RIGHT: ICD-10-CM

## 2021-12-14 DIAGNOSIS — M72.2 PLANTAR FASCIITIS, RIGHT: ICD-10-CM

## 2021-12-14 PROCEDURE — 97162 PT EVAL MOD COMPLEX 30 MIN: CPT | Performed by: PHYSICAL THERAPIST

## 2021-12-14 NOTE — PROGRESS NOTES
Physical Therapy Initial Evaluation and Plan of Care    Patient: Sedrick Leahy               : 1955  Visit Date: 2021  Referring practitioner: Sai Mendieta DPM  Date of Initial Visit: 2021  Patient seen for 1 sessions    Visit Diagnoses:    ICD-10-CM ICD-9-CM   1. Foot pain, right  M79.671 729.5   2. Plantar fasciitis, right  M72.2 728.71   3. Retrocalcaneal exostosis  M89.8X7 726.91     Past Medical History:   Diagnosis Date   • Arthritis    • Diabetes mellitus (HCC)     diet controlled   • Hx of colonic polyp    • Hypertension    • Lower back pain    • Neck pain    • PONV (postoperative nausea and vomiting)      Past Surgical History:   Procedure Laterality Date   • ANTERIOR CERVICAL DISCECTOMY W/ FUSION N/A 2020    Procedure: EXPLORATION OF FUSION C5-6, ANTERIOR CERVICAL DISCECTOMY FUSION C4-5, C6-7 REVISON ANTERIOR FUSION C5-6 WITH INSTRUMENTATION C4-7;  Surgeon: KEN Gilbert MD;  Location: [facility] OR;  Service: Orthopedic Spine;  Laterality: N/A;   • APPENDECTOMY     • COLONOSCOPY  2016    Normal exam repeat in 5 years   • COLONOSCOPY N/A 2020    Diverticulosis repeat exam in 5 years   • COLONOSCOPY W/ POLYPECTOMY  2012    Hyperplastic polyp cecum repeat exam in 1 year   • ENDOSCOPY  2014    Very tortuous distal esophagus dilated 50 Fr, HH    • ENDOSCOPY N/A 2021    A fundoplication was found, dilated   • HARDWARE REMOVAL N/A 2020    Procedure: REMOVAL OF INSTRUMENTATION;  Surgeon: KEN Gilbert MD;  Location: [facility] OR;  Service: Orthopedic Spine;  Laterality: N/A;   • HYSTERECTOMY     • NECK SURGERY     • NISSEN FUNDOPLICATION     • POSTERIOR CERVICAL FUSION N/A 12/3/2020    Procedure: POSTERIOR SPINAL FUSION WITH INSTRUMENTATION C4-7;  Surgeon: KEN Gilbert MD;  Location: [facility] OR;  Service: Orthopedic Spine;  Laterality: N/A;   • US GUIDED LYMPH NODE BIOPSY  8/3/2020          SUBJECTIVE     Subjective Evaluation    History of Present Illness  Mechanism of injury: She was diagnosed with spurs in her heel and has a nodule that is very sore and has grown. It hurts to put pressure on it. She got an injection and she had a reaction to it that made her skin get red and peel and it is just now looking better.     Subjective comment: It feels like a stone bruise in her heel.   Patient Occupation: retired Quality of life: good    Pain  Current pain ratin  At best pain ratin  At worst pain ratin  Location: R heel  Quality: sharp    Social Support  Lives with: spouse    Patient Goals  Patient goals for therapy: decreased pain         Outcome Measure:   PT G-Codes  Outcome Measure Options: FAAM40/84= 84%      OBJECTIVE     Objective          Active Range of Motion   Left Ankle/Foot   Dorsiflexion (ke): 15 degrees   Great toe extension: 120 degrees     Right Ankle/Foot   Dorsiflexion (ke): 20 degrees   Great toe extension: 70 degrees     Additional Active Range of Motion Details  L foot seems to supinate more than R. Great toe extension when compared to line of lower leg is excessive but there is extra forefoot and midfoot motion. The L lacks true DF compared to the R.    Joint Play   Left Ankle/Foot  Joints within functional limits are the forefoot.     Right Ankle/Foot  Hypermobile in the midfoot.      Ambulation     Comments   Vaults over R and trendelenberg on L      Therapy Education/Self Care [id number]   Details: Don't put right foot down without a shoe and arch support. Put shoes next to bed. Stop dropping her hip when she walks. Practice SLS on LLE   Given Home Exercise Program   Progress: New   Education provided to:  Patient   Level of understanding Verbalized   Timed Minutes          Total Timed Treatment:        mins  Total Time of Visit:            60   mins    ASSESSMENT/PLAN     GOALS:           Goals                                              Progress Note due by  1/13/2022  Recert Due: 3/11/2022    STG by: 4 weeks Comments Status Date   Improve passive DF equal bilaterally.        Decrease pain by 50%        Reduce trendelenberg in gait.                 LTG by: 6 weeks        Able to walk with normal footwear community distances without flare of pain        Improve FAAM score by 20 points.         SLS B LE x 15 sec with stability        Independent with HEP for flexibility and stability        Able to perform daily activities without exacerbation of pain more than 1-2/10        Assessment & Plan     Assessment  Impairments: abnormal gait, lacks appropriate home exercise program and pain with function  Functional Limitations: walking  Assessment details: Mrs. Leahy has pain in the R heel that is isolated to a nodule that is very sore. She is walking with abnormal vaulting over the R foot to push off the toes and avoid the heel contact. She also has L hip weakness and the hip drops with each stance phase over the L. We will work on hip stability and normalizing gait mechanics to reduce stress through plantar surface of R foot.  Prognosis: good    Plan  Therapy options: will be seen for skilled therapy services  Planned modality interventions: low level laser therapy and dry needling  Planned therapy interventions: manual therapy, soft tissue mobilization, strengthening, stretching, therapeutic activities, transfer training, joint mobilization, home exercise program, functional ROM exercises and balance/weight-bearing training  Frequency: 2x week  Duration in weeks: 6  Treatment plan discussed with: patient  Plan details: PT to treat for R heel pain and abnormal gait.         SIGNATURE: Erin Hernandes, PT, DPT, CLT-DAVID, License #: [id number]  Electronically Signed on 12/17/2021    Initial Certification  Certification Period: 12/17/2021 through 3/16/2022  I certify that the therapy services are furnished while this patient is under my care.  The services outlined above are  required by this patient, and will be reviewed every 90 days.     PHYSICIAN:     Sai Mendieta DPM______________________________________DATE: _________     Please sign and return via fax to [phone].   Thank you so much for letting us work with Sedrick. I appreciate your letting us work with your patients. If you have any questions or concerns, please don't hesitate to contact me.          [address] [address]  [phone]

## 2021-12-23 NOTE — ANESTHESIA POSTPROCEDURE EVALUATION
"Patient: Sedrick Leahy    Procedure Summary     Date: 12/03/20 Room / [facility]:  [facility] OR  /  [facility]    Anesthesia Start: 0846 Anesthesia Stop: 1023    Procedure: POSTERIOR SPINAL FUSION WITH INSTRUMENTATION C4-7 (N/A ) Diagnosis: (M54.12)    Surgeon: KEN Gilbert MD Provider: Cayetano Fortune CRNA    Anesthesia Type: general ASA Status: 2          Anesthesia Type: general    Vitals  Vitals Value Taken Time   /79 12/03/20 1146   Temp 96.9 °F (36.1 °C) 12/03/20 1143   Pulse 94 12/03/20 1148   Resp 16 12/03/20 1143   SpO2 89 % 12/03/20 1148   Vitals shown include unvalidated device data.        Post Anesthesia Care and Evaluation    Patient location during evaluation: PACU  Patient participation: complete - patient participated  Level of consciousness: awake and alert  Pain management: adequate  Airway patency: patent  Anesthetic complications: No anesthetic complications    Cardiovascular status: acceptable  Respiratory status: acceptable  Hydration status: acceptable    Comments: Blood pressure 136/83, pulse 90, temperature 96.9 °F (36.1 °C), temperature source Temporal, resp. rate 20, height 163 cm (64.17\"), weight 80.3 kg (177 lb 0.5 oz), SpO2 99 %, not currently breastfeeding.    Pt discharged from PACU based on selina score >8  No anesthesia care post op    " NIBP STAT measurement started.

## 2022-02-16 RX ORDER — AMITRIPTYLINE HYDROCHLORIDE 100 MG/1
TABLET, FILM COATED ORAL
Qty: 180 TABLET | Refills: 0 | Status: SHIPPED | OUTPATIENT
Start: 2022-02-16 | End: 2022-07-14

## 2022-02-16 NOTE — TELEPHONE ENCOUNTER
Rx Refill Note  Requested Prescriptions     Pending Prescriptions Disp Refills   • amitriptyline (ELAVIL) 100 MG tablet [Pharmacy Med Name: AMITRIPTYLINE HYDROCHLORIDE 100MG TABLET] 180 tablet 0     Sig: TAKE ONE (1) TO TWO (2) TABLETS AT BEDTIME AS NEEDED SLEEP      Last office visit with prescribing clinician: 7/20/2020      Next office visit with prescribing clinician: Visit date not found            Joy Liang MA  02/16/22, 13:23 CST

## 2022-03-07 ENCOUNTER — OFFICE VISIT (OUTPATIENT)
Dept: FAMILY MEDICINE CLINIC | Facility: CLINIC | Age: 67
End: 2022-03-07

## 2022-03-07 VITALS
BODY MASS INDEX: 31.92 KG/M2 | RESPIRATION RATE: 18 BRPM | WEIGHT: 187 LBS | OXYGEN SATURATION: 99 % | HEART RATE: 97 BPM | TEMPERATURE: 97.4 F | HEIGHT: 64 IN

## 2022-03-07 DIAGNOSIS — R68.89 FLU-LIKE SYMPTOMS: Primary | ICD-10-CM

## 2022-03-07 DIAGNOSIS — Z20.822 SUSPECTED COVID-19 VIRUS INFECTION: ICD-10-CM

## 2022-03-07 DIAGNOSIS — J40 BRONCHITIS: ICD-10-CM

## 2022-03-07 LAB
EXPIRATION DATE: NORMAL
FLUAV AG NPH QL: NEGATIVE
FLUBV AG NPH QL: NEGATIVE
INTERNAL CONTROL: NORMAL
Lab: NORMAL

## 2022-03-07 PROCEDURE — 87804 INFLUENZA ASSAY W/OPTIC: CPT | Performed by: NURSE PRACTITIONER

## 2022-03-07 PROCEDURE — 99213 OFFICE O/P EST LOW 20 MIN: CPT | Performed by: NURSE PRACTITIONER

## 2022-03-07 RX ORDER — AZITHROMYCIN 250 MG/1
TABLET, FILM COATED ORAL
Qty: 6 TABLET | Refills: 0 | Status: SHIPPED | OUTPATIENT
Start: 2022-03-07 | End: 2022-03-12

## 2022-03-07 NOTE — PROGRESS NOTES
Subjective   Chief Complaint:  Evaluation of Respiratory Symptoms    History of Present Illness:  This 66 y.o. female was seen in the office today in the drive through.  Reports has been experiencing cough, sore throat, nasal congestion, runny nose, chest congestion, body aches, arms and legs aching with an acute onset starting 2-3 days ago.    Pertinent negatives include no report of loss of taste or smell, headaches.    Patient denies  recent exposure to active Covid-19 cases.          Covid-19/Influenza Vaccinations Reviewed:    Covid-19 Moderna x2, Moderna boosted  Influenza this season    Allergies   Allergen Reactions   • Penicillins Swelling and Rash      Current Outpatient Medications on File Prior to Visit   Medication Sig   • amitriptyline (ELAVIL) 100 MG tablet TAKE ONE (1) TO TWO (2) TABLETS AT BEDTIME AS NEEDED SLEEP   • Cholecalciferol (Vitamin D3) 10 MCG (400 UNIT) chewable tablet Chew Daily.   • FLUoxetine (PROzac) 10 MG capsule Take 1 capsule by mouth Daily.   • oxyCODONE-acetaminophen (Percocet) 5-325 MG per tablet Take 1 tablet by mouth Every 6 (Six) Hours As Needed for Severe Pain .     No current facility-administered medications on file prior to visit.      Past Medical, Surgical, Social, and Family History:  Past Medical History:   Diagnosis Date   • Arthritis    • Diabetes mellitus (HCC)     diet controlled   • Hx of colonic polyp    • Hypertension    • Lower back pain    • Neck pain    • PONV (postoperative nausea and vomiting)      Past Surgical History:   Procedure Laterality Date   • ANTERIOR CERVICAL DISCECTOMY W/ FUSION N/A 11/20/2020    Procedure: EXPLORATION OF FUSION C5-6, ANTERIOR CERVICAL DISCECTOMY FUSION C4-5, C6-7 REVISON ANTERIOR FUSION C5-6 WITH INSTRUMENTATION C4-7;  Surgeon: KEN Gilbert MD;  Location: [facility];  Service: Orthopedic Spine;  Laterality: N/A;   • APPENDECTOMY     • COLONOSCOPY  06/06/2016    Normal exam repeat in 5 years   • COLONOSCOPY N/A 1/14/2020     Diverticulosis repeat exam in 5 years   • COLONOSCOPY W/ POLYPECTOMY  2012    Hyperplastic polyp cecum repeat exam in 1 year   • ENDOSCOPY  2014    Very tortuous distal esophagus dilated 50 Fr, HH    • ENDOSCOPY N/A 2021    A fundoplication was found, dilated   • HARDWARE REMOVAL N/A 2020    Procedure: REMOVAL OF INSTRUMENTATION;  Surgeon: KEN Gilbert MD;  Location:  [facility];  Service: Orthopedic Spine;  Laterality: N/A;   • HYSTERECTOMY     • NECK SURGERY     • NISSEN FUNDOPLICATION     • POSTERIOR CERVICAL FUSION N/A 12/3/2020    Procedure: POSTERIOR SPINAL FUSION WITH INSTRUMENTATION C4-7;  Surgeon: KEN Gilbert MD;  Location:  [facility] OR;  Service: Orthopedic Spine;  Laterality: N/A;   • US GUIDED LYMPH NODE BIOPSY  8/3/2020     Social History     Socioeconomic History   • Marital status:      Spouse name: Jason   • Number of children: 2   • Years of education: 12   Tobacco Use   • Smoking status: Former Smoker     Packs/day: 2.00     Types: Cigarettes     Start date: 1954     Quit date: 7/10/1990     Years since quittin.6   • Smokeless tobacco: Never Used   Vaping Use   • Vaping Use: Never used   Substance and Sexual Activity   • Alcohol use: No   • Drug use: No   • Sexual activity: Defer     Family History   Problem Relation Age of Onset   • Colon cancer Mother    • Colon polyps Mother    • No Known Problems Father    • Diabetes Brother    • Diabetes Son    • Breast cancer Maternal Aunt    • Heart attack Paternal Grandmother    • Lymphoma Maternal Aunt    • Lung cancer Maternal Aunt    • Cancer Maternal Aunt    • Diabetes Maternal Aunt    • Diabetes Son    • Diabetes Brother      Objective   Covid Precautions Maintained  Physical Exam  Constitutional:       General: She is not in acute distress.     Appearance: She is ill-appearing.   Pulmonary:      Effort: Pulmonary effort is normal. No respiratory distress.   Neurological:      Mental Status: She is  "alert.     Pulse 97   Temp 97.4 °F (36.3 °C)   Resp 18   Ht 162.6 cm (64\")   Wt 84.8 kg (187 lb)   SpO2 99%   BMI 32.10 kg/m²     Lab, Imaging, and Diagnostic Results Review:  POC Influenza A Negative  POC Influenza B Negative    Assessment/Plan   Diagnoses and all orders for this visit:    1. Flu-like symptoms (Primary)  -     POC Influenza A / B    2. Suspected COVID-19 virus infection  -     COVID-19,[facility],NP/OP Swab in Transport Media or ESwab 72 HR TAT - Swab, Nasopharynx; Future    3. Bronchitis  -     azithromycin (Zithromax Z-Derick) 250 MG tablet; Take 2 tablets the first day, then 1 tablet daily for 4 days.  Dispense: 6 tablet; Refill: 0    Discussion:  Advised and educated plan of care.      Follow-up:  No follow-ups on file.    Electronically signed by HECTOR Montoya, 03/07/22, 1:28 PM CST.  "

## 2022-03-10 ENCOUNTER — TELEPHONE (OUTPATIENT)
Dept: FAMILY MEDICINE CLINIC | Facility: CLINIC | Age: 67
End: 2022-03-10

## 2022-03-10 RX ORDER — METHYLPREDNISOLONE 4 MG/1
TABLET ORAL
Qty: 1 EACH | Refills: 0 | Status: SHIPPED | OUTPATIENT
Start: 2022-03-10 | End: 2022-04-06

## 2022-03-10 NOTE — TELEPHONE ENCOUNTER
Pt was seen on 3/7/22 w/ cough, sore throat, runny nose, head/chest congestion and body aches.  Tested negative for covid.  She says that the azithromycin is not helping her symptoms and req to have something else sent in to the pharmacy

## 2022-03-10 NOTE — TELEPHONE ENCOUNTER
Finish Z-pack - only on day 3,   Add MDP    Electronically signed by HECTOR Montoya, 03/10/22, 9:58 AM CST.

## 2022-03-10 NOTE — TELEPHONE ENCOUNTER
Caller: Sedrick Leahy    Relationship: Self    Best call back number: [phone]    What medication are you requesting: PCP recommendation    What are your current symptoms:   n/a    How long have you been experiencing symptoms: since yesterday    Have you had these symptoms before:    [x] Yes  [] No    Have you been treated for these symptoms before:   [x] Yes  [] No    If a prescription is needed, what is your preferred pharmacy and phone number: [facility] - [place], [place] - [address] [place] [address] [phone] [facility] [phone] FX     Additional notes:  Patient advised medication given at office visit is not working. Patient requested to have another Rx called to pharmacy.

## 2022-04-06 ENCOUNTER — OFFICE VISIT (OUTPATIENT)
Dept: FAMILY MEDICINE CLINIC | Facility: CLINIC | Age: 67
End: 2022-04-06

## 2022-04-06 ENCOUNTER — TELEPHONE (OUTPATIENT)
Dept: PODIATRY | Facility: CLINIC | Age: 67
End: 2022-04-06

## 2022-04-06 VITALS
OXYGEN SATURATION: 99 % | BODY MASS INDEX: 31.92 KG/M2 | HEIGHT: 64 IN | SYSTOLIC BLOOD PRESSURE: 142 MMHG | HEART RATE: 98 BPM | WEIGHT: 187 LBS | DIASTOLIC BLOOD PRESSURE: 85 MMHG | TEMPERATURE: 97.5 F | RESPIRATION RATE: 18 BRPM

## 2022-04-06 DIAGNOSIS — M54.42 ACUTE LEFT-SIDED LOW BACK PAIN WITH LEFT-SIDED SCIATICA: Primary | ICD-10-CM

## 2022-04-06 DIAGNOSIS — E66.09 CLASS 1 OBESITY DUE TO EXCESS CALORIES WITH SERIOUS COMORBIDITY AND BODY MASS INDEX (BMI) OF 32.0 TO 32.9 IN ADULT: ICD-10-CM

## 2022-04-06 PROCEDURE — 99213 OFFICE O/P EST LOW 20 MIN: CPT | Performed by: NURSE PRACTITIONER

## 2022-04-06 RX ORDER — METHYLPREDNISOLONE 4 MG/1
TABLET ORAL
Qty: 1 EACH | Refills: 0 | Status: ON HOLD | OUTPATIENT
Start: 2022-04-06 | End: 2022-06-30

## 2022-04-06 NOTE — PROGRESS NOTES
Subjective   Chief Complaint:  Low back pain    History of Present Illness:  This 66 y.o. female was seen in the office today.  Back pain x3 days ago.  Reports had a fever 3-4 days ago up to 101 degrees.  Reports pain shooting down into the leg.    Allergies   Allergen Reactions   • Penicillins Swelling and Rash      Current Outpatient Medications on File Prior to Visit   Medication Sig   • amitriptyline (ELAVIL) 100 MG tablet TAKE ONE (1) TO TWO (2) TABLETS AT BEDTIME AS NEEDED SLEEP   • Cholecalciferol (Vitamin D3) 10 MCG (400 UNIT) chewable tablet Chew Daily.   • NON FORMULARY OTC weight loss medication     No current facility-administered medications on file prior to visit.      Past Medical, Surgical, Social, and Family History:  Past Medical History:   Diagnosis Date   • Arthritis    • Diabetes mellitus (HCC)     diet controlled   • Hx of colonic polyp    • Hypertension    • Lower back pain    • Neck pain    • PONV (postoperative nausea and vomiting)      Past Surgical History:   Procedure Laterality Date   • ANTERIOR CERVICAL DISCECTOMY W/ FUSION N/A 11/20/2020    Procedure: EXPLORATION OF FUSION C5-6, ANTERIOR CERVICAL DISCECTOMY FUSION C4-5, C6-7 REVISON ANTERIOR FUSION C5-6 WITH INSTRUMENTATION C4-7;  Surgeon: KEN Gilbert MD;  Location: [facility] OR;  Service: Orthopedic Spine;  Laterality: N/A;   • APPENDECTOMY     • COLONOSCOPY  06/06/2016    Normal exam repeat in 5 years   • COLONOSCOPY N/A 1/14/2020    Diverticulosis repeat exam in 5 years   • COLONOSCOPY W/ POLYPECTOMY  04/04/2012    Hyperplastic polyp cecum repeat exam in 1 year   • ENDOSCOPY  08/14/2014    Very tortuous distal esophagus dilated 50 Fr, HH    • ENDOSCOPY N/A 1/18/2021    A fundoplication was found, dilated   • HARDWARE REMOVAL N/A 11/20/2020    Procedure: REMOVAL OF INSTRUMENTATION;  Surgeon: KEN Gilbert MD;  Location:  [facility];  Service: Orthopedic Spine;  Laterality: N/A;   • HYSTERECTOMY     • NECK SURGERY     •  "NISSEN FUNDOPLICATION     • POSTERIOR CERVICAL FUSION N/A 12/3/2020    Procedure: POSTERIOR SPINAL FUSION WITH INSTRUMENTATION C4-7;  Surgeon: KEN Gilbert MD;  Location: [facility];  Service: Orthopedic Spine;  Laterality: N/A;   • US GUIDED LYMPH NODE BIOPSY  8/3/2020     Social History     Socioeconomic History   • Marital status:      Spouse name: Jason   • Number of children: 2   • Years of education: 12   Tobacco Use   • Smoking status: Former Smoker     Packs/day: 2.00     Types: Cigarettes     Start date: 1954     Quit date: 7/10/1990     Years since quittin.7   • Smokeless tobacco: Never Used   Vaping Use   • Vaping Use: Never used   Substance and Sexual Activity   • Alcohol use: No   • Drug use: No   • Sexual activity: Defer     Family History   Problem Relation Age of Onset   • Colon cancer Mother    • Colon polyps Mother    • No Known Problems Father    • Diabetes Brother    • Diabetes Son    • Breast cancer Maternal Aunt    • Heart attack Paternal Grandmother    • Lymphoma Maternal Aunt    • Lung cancer Maternal Aunt    • Cancer Maternal Aunt    • Diabetes Maternal Aunt    • Diabetes Son    • Diabetes Brother      Objective   Physical ExamBP 142/85 (BP Location: Right arm)   Pulse 98   Temp 97.5 °F (36.4 °C)   Resp 18   Ht 162.6 cm (64\")   Wt 84.8 kg (187 lb)   SpO2 99%   BMI 32.10 kg/m²     Assessment/Plan   Diagnoses and all orders for this visit:    1. Acute left-sided low back pain with left-sided sciatica (Primary)  -     UA / M With / Rflx Culture([facility] ONLY) - Urine, Clean Catch  -     methylPREDNISolone (MEDROL) 4 MG dose pack; Take as directed on package instructions.  Dispense: 1 each; Refill: 0    2. Class 1 obesity due to excess calories with serious comorbidity and body mass index (BMI) of 32.0 to 32.9 in adult    Other orders  -     Microscopic Examination -  -     Urine Culture, Routine -    Discussion:  Advised and educated plan of care.      Patient's " Body mass index is 32.1 kg/m². indicating that she is obese (BMI >30). Obesity-related health conditions include the following: hypertension, diabetes mellitus and dyslipidemias. Obesity is unchanged. BMI is is above average; BMI management plan is completed. We discussed portion control and increasing exercise..    Follow-up:  Return if symptoms worsen or fail to improve.    Electronically signed by HECTOR Montoya, 04/06/22, 10:32 AM CDT.

## 2022-04-08 LAB
APPEARANCE UR: CLEAR
BACTERIA #/AREA URNS HPF: ABNORMAL /HPF
BACTERIA UR CULT: NORMAL
BACTERIA UR CULT: NORMAL
BILIRUB UR QL STRIP: NEGATIVE
CASTS URNS QL MICRO: ABNORMAL /LPF
COLOR UR: YELLOW
EPI CELLS #/AREA URNS HPF: >10 /HPF (ref 0–10)
GLUCOSE UR QL STRIP: NEGATIVE
HGB UR QL STRIP: NEGATIVE
KETONES UR QL STRIP: NEGATIVE
LEUKOCYTE ESTERASE UR QL STRIP: ABNORMAL
MICRO URNS: ABNORMAL
NITRITE UR QL STRIP: NEGATIVE
PH UR STRIP: 7 [PH] (ref 5–7.5)
PROT UR QL STRIP: ABNORMAL
RBC #/AREA URNS HPF: ABNORMAL /HPF (ref 0–2)
SP GR UR STRIP: 1.02 (ref 1–1.03)
URINALYSIS REFLEX: ABNORMAL
UROBILINOGEN UR STRIP-MCNC: 0.2 MG/DL (ref 0.2–1)
WBC #/AREA URNS HPF: ABNORMAL /HPF (ref 0–5)

## 2022-04-08 NOTE — PROGRESS NOTES
Please call the patient -no pathogenic organisms-hopefully feeling better and no further fever.  Hopefully back pain is better with steroids.  Follow-up Monday if no better.    Electronically signed by HECTOR Montoya, 04/08/22, 6:50 AM CDT.

## 2022-04-25 ENCOUNTER — TELEPHONE (OUTPATIENT)
Dept: FAMILY MEDICINE CLINIC | Facility: CLINIC | Age: 67
End: 2022-04-25

## 2022-04-25 DIAGNOSIS — G89.29 CHRONIC LEFT-SIDED LOW BACK PAIN WITH LEFT-SIDED SCIATICA: Primary | ICD-10-CM

## 2022-04-25 DIAGNOSIS — M54.42 CHRONIC LEFT-SIDED LOW BACK PAIN WITH LEFT-SIDED SCIATICA: Primary | ICD-10-CM

## 2022-04-25 DIAGNOSIS — M25.552 LEFT HIP PAIN: ICD-10-CM

## 2022-04-25 NOTE — TELEPHONE ENCOUNTER
"Vm from patient \"I need to get an order for MRI of my low back and left hip. It is hurting worse than when I came I April 6th, I would like it to be done at [facility], or an xray\"  "

## 2022-04-25 NOTE — TELEPHONE ENCOUNTER
Ivonne is needing an order for the MRI. Also, they need to know if its with contrast or without. Patient would like a call when completed.

## 2022-05-05 DIAGNOSIS — M54.42 CHRONIC LEFT-SIDED LOW BACK PAIN WITH LEFT-SIDED SCIATICA: ICD-10-CM

## 2022-05-05 DIAGNOSIS — G89.29 CHRONIC LEFT-SIDED LOW BACK PAIN WITH LEFT-SIDED SCIATICA: ICD-10-CM

## 2022-05-05 NOTE — PROGRESS NOTES
Please call the patient -results as listed-mild-moderate degeneration, bulging disc, a steroid pack not helping, would recommend physical therapy as a next step.    Electronically signed by HECTOR Montoya, 05/05/22, 1:49 PM CDT.

## 2022-05-09 ENCOUNTER — TELEPHONE (OUTPATIENT)
Dept: FAMILY MEDICINE CLINIC | Facility: CLINIC | Age: 67
End: 2022-05-09

## 2022-05-09 NOTE — TELEPHONE ENCOUNTER
Caller: Sedrick Leahy    Relationship: Self    Best call back number: [phone]    What is the best time to reach you:     Who are you requesting to speak with (clinical staff, provider,  specific staff member): CLINICAL    Do you know the name of the person who called:     What was the call regarding: PATIENT REQUESTING A CALL WHEN OTHER RESULTS HAVE CAME IN    Do you require a callback: YES

## 2022-05-09 NOTE — TELEPHONE ENCOUNTER
"Delfina Gann MA   5/6/2022  8:54 AM CDT         Patient notified of results and will let us know if she wants to do physical therapy     HECTOR Montoya   5/5/2022  1:49 PM CDT         Please call the patient -results as listed-mild-moderate degeneration, bulging disc, a steroid pack not helping, would recommend physical therapy as a next step.     Electronically signed by HECTOR Montoya, 05/05/22, 1:49 PM CDT.         Spoke to patient about her hip, and maybe physical therapy at [facility]? Will call back    But wanted to know if the table inverter will help her back? \"my  swears by it'   Advised will check with provider and let her know  "

## 2022-05-09 NOTE — TELEPHONE ENCOUNTER
Pilo Arango, HECTOR   5/6/2022  3:19 PM CDT         Please call the patient -has an inflamed left greater trochanter-would she be willing to go to orthopedics?.  This is a hallmark sign of greater trochanteric pain syndrome.  Many times, this required a steroid injection directly into the joint which with the hip is specialty territory.     Electronically signed by HECTOR Montoya, 05/06/22, 3:19 PM CDT.     Notified pt and does not want to go ortho, will think about possible [facility] for joint injection? And call back

## 2022-05-09 NOTE — TELEPHONE ENCOUNTER
An inverted table is medically contraindicated and not recommended.       Electronically signed by HECTOR Montoya, 05/09/22, 10:58 AM CDT.

## 2022-06-01 DIAGNOSIS — M25.552 LEFT HIP PAIN: Primary | ICD-10-CM

## 2022-06-01 NOTE — PROGRESS NOTES
"Telephone:  Patient reports still having Left hip pain.  Does not want \"shots\" but is wanting an opinion of what to do next.  Agreeable for ortho referral.  Advised incidental findings of fibroids - patient reports no pain or bleeding but is willing to discuss at next gyn visit.    Electronically signed by HECTOR Montoya, 06/01/22, 1:26 PM CDT.    "

## 2022-06-16 ENCOUNTER — TELEPHONE (OUTPATIENT)
Dept: FAMILY MEDICINE CLINIC | Facility: CLINIC | Age: 67
End: 2022-06-16

## 2022-06-16 DIAGNOSIS — Z01.419 ENCOUNTER FOR GYNECOLOGICAL EXAMINATION: Primary | ICD-10-CM

## 2022-06-16 NOTE — TELEPHONE ENCOUNTER
Caller: Sedrick Leahy    Relationship: Self    Best call back number: [phone]    What is the medical concern/diagnosis: FIBROIDS FOUND IN MRI     What specialty or service is being requested: GYNECOLOGY     What is the provider, practice or medical service name:  PROVIDER PREFERENCE PREFERABLY FEMALE

## 2022-06-23 ENCOUNTER — TRANSCRIBE ORDERS (OUTPATIENT)
Dept: LAB | Facility: HOSPITAL | Age: 67
End: 2022-06-23

## 2022-06-23 DIAGNOSIS — Z11.59 SCREENING FOR VIRAL DISEASE: Primary | ICD-10-CM

## 2022-06-28 ENCOUNTER — LAB (OUTPATIENT)
Dept: LAB | Facility: HOSPITAL | Age: 67
End: 2022-06-28

## 2022-06-28 LAB — SARS-COV-2 ORF1AB RESP QL NAA+PROBE: NOT DETECTED

## 2022-06-28 PROCEDURE — U0005 INFEC AGEN DETEC AMPLI PROBE: HCPCS | Performed by: ORTHOPAEDIC SURGERY

## 2022-06-28 PROCEDURE — U0004 COV-19 TEST NON-CDC HGH THRU: HCPCS | Performed by: ORTHOPAEDIC SURGERY

## 2022-06-30 ENCOUNTER — APPOINTMENT (OUTPATIENT)
Dept: GENERAL RADIOLOGY | Facility: HOSPITAL | Age: 67
End: 2022-06-30

## 2022-06-30 ENCOUNTER — HOSPITAL ENCOUNTER (OUTPATIENT)
Facility: HOSPITAL | Age: 67
Setting detail: HOSPITAL OUTPATIENT SURGERY
Discharge: HOME | End: 2022-06-30
Attending: ORTHOPAEDIC SURGERY | Admitting: ORTHOPAEDIC SURGERY

## 2022-06-30 ENCOUNTER — ANESTHESIA (OUTPATIENT)
Dept: PERIOP | Facility: HOSPITAL | Age: 67
End: 2022-06-30

## 2022-06-30 ENCOUNTER — ANESTHESIA EVENT (OUTPATIENT)
Dept: PERIOP | Facility: HOSPITAL | Age: 67
End: 2022-06-30

## 2022-06-30 VITALS
DIASTOLIC BLOOD PRESSURE: 68 MMHG | BODY MASS INDEX: 31.54 KG/M2 | RESPIRATION RATE: 16 BRPM | OXYGEN SATURATION: 94 % | WEIGHT: 184.75 LBS | TEMPERATURE: 96.5 F | HEIGHT: 64 IN | HEART RATE: 78 BPM | SYSTOLIC BLOOD PRESSURE: 140 MMHG

## 2022-06-30 LAB
GLUCOSE BLDC GLUCOMTR-MCNC: 115 MG/DL (ref 70–130)
GLUCOSE BLDC GLUCOMTR-MCNC: 67 MG/DL (ref 70–130)

## 2022-06-30 PROCEDURE — 0 IOPAMIDOL PER 1 ML: Performed by: ORTHOPAEDIC SURGERY

## 2022-06-30 PROCEDURE — 25010000002 ONDANSETRON PER 1 MG: Performed by: ANESTHESIOLOGY

## 2022-06-30 PROCEDURE — 25010000002 MIDAZOLAM PER 1 MG: Performed by: NURSE ANESTHETIST, CERTIFIED REGISTERED

## 2022-06-30 PROCEDURE — 73502 X-RAY EXAM HIP UNI 2-3 VIEWS: CPT

## 2022-06-30 PROCEDURE — 82962 GLUCOSE BLOOD TEST: CPT

## 2022-06-30 PROCEDURE — 25010000002 DEXAMETHASONE PER 1 MG: Performed by: ANESTHESIOLOGY

## 2022-06-30 PROCEDURE — 25010000002 PROPOFOL 10 MG/ML EMULSION: Performed by: NURSE ANESTHETIST, CERTIFIED REGISTERED

## 2022-06-30 PROCEDURE — 76000 FLUOROSCOPY <1 HR PHYS/QHP: CPT

## 2022-06-30 PROCEDURE — 25010000002 METHYLPREDNISOLONE PER 80 MG: Performed by: ORTHOPAEDIC SURGERY

## 2022-06-30 PROCEDURE — 25010000002 ROPIVACAINE PER 1 MG: Performed by: ORTHOPAEDIC SURGERY

## 2022-06-30 RX ORDER — SODIUM CHLORIDE 0.9 % (FLUSH) 0.9 %
3 SYRINGE (ML) INJECTION AS NEEDED
Status: DISCONTINUED | OUTPATIENT
Start: 2022-06-30 | End: 2022-06-30 | Stop reason: HOSPADM

## 2022-06-30 RX ORDER — PROPOFOL 10 MG/ML
VIAL (ML) INTRAVENOUS AS NEEDED
Status: DISCONTINUED | OUTPATIENT
Start: 2022-06-30 | End: 2022-06-30 | Stop reason: SURG

## 2022-06-30 RX ORDER — DEXAMETHASONE SODIUM PHOSPHATE 4 MG/ML
4 INJECTION, SOLUTION INTRA-ARTICULAR; INTRALESIONAL; INTRAMUSCULAR; INTRAVENOUS; SOFT TISSUE ONCE AS NEEDED
Status: COMPLETED | OUTPATIENT
Start: 2022-06-30 | End: 2022-06-30

## 2022-06-30 RX ORDER — SODIUM CHLORIDE, SODIUM LACTATE, POTASSIUM CHLORIDE, CALCIUM CHLORIDE 600; 310; 30; 20 MG/100ML; MG/100ML; MG/100ML; MG/100ML
100 INJECTION, SOLUTION INTRAVENOUS CONTINUOUS
Status: DISCONTINUED | OUTPATIENT
Start: 2022-06-30 | End: 2022-06-30 | Stop reason: HOSPADM

## 2022-06-30 RX ORDER — ONDANSETRON 2 MG/ML
4 INJECTION INTRAMUSCULAR; INTRAVENOUS ONCE AS NEEDED
Status: COMPLETED | OUTPATIENT
Start: 2022-06-30 | End: 2022-06-30

## 2022-06-30 RX ORDER — NALOXONE HCL 0.4 MG/ML
0.4 VIAL (ML) INJECTION AS NEEDED
Status: DISCONTINUED | OUTPATIENT
Start: 2022-06-30 | End: 2022-06-30 | Stop reason: HOSPADM

## 2022-06-30 RX ORDER — LIDOCAINE HYDROCHLORIDE 10 MG/ML
0.5 INJECTION, SOLUTION EPIDURAL; INFILTRATION; INTRACAUDAL; PERINEURAL ONCE AS NEEDED
Status: DISCONTINUED | OUTPATIENT
Start: 2022-06-30 | End: 2022-06-30 | Stop reason: HOSPADM

## 2022-06-30 RX ORDER — MIDAZOLAM HYDROCHLORIDE 1 MG/ML
INJECTION INTRAMUSCULAR; INTRAVENOUS AS NEEDED
Status: DISCONTINUED | OUTPATIENT
Start: 2022-06-30 | End: 2022-06-30 | Stop reason: SURG

## 2022-06-30 RX ORDER — HYDROCODONE BITARTRATE AND ACETAMINOPHEN 5; 325 MG/1; MG/1
1 TABLET ORAL ONCE AS NEEDED
Status: DISCONTINUED | OUTPATIENT
Start: 2022-06-30 | End: 2022-06-30 | Stop reason: HOSPADM

## 2022-06-30 RX ORDER — FENTANYL CITRATE 50 UG/ML
25 INJECTION, SOLUTION INTRAMUSCULAR; INTRAVENOUS
Status: DISCONTINUED | OUTPATIENT
Start: 2022-06-30 | End: 2022-06-30 | Stop reason: HOSPADM

## 2022-06-30 RX ORDER — IBUPROFEN 600 MG/1
600 TABLET ORAL ONCE AS NEEDED
Status: DISCONTINUED | OUTPATIENT
Start: 2022-06-30 | End: 2022-06-30 | Stop reason: HOSPADM

## 2022-06-30 RX ORDER — FLUMAZENIL 0.1 MG/ML
0.2 INJECTION INTRAVENOUS AS NEEDED
Status: DISCONTINUED | OUTPATIENT
Start: 2022-06-30 | End: 2022-06-30 | Stop reason: HOSPADM

## 2022-06-30 RX ORDER — DROPERIDOL 2.5 MG/ML
0.62 INJECTION, SOLUTION INTRAMUSCULAR; INTRAVENOUS ONCE AS NEEDED
Status: DISCONTINUED | OUTPATIENT
Start: 2022-06-30 | End: 2022-06-30 | Stop reason: HOSPADM

## 2022-06-30 RX ORDER — FAMOTIDINE 10 MG/ML
20 INJECTION, SOLUTION INTRAVENOUS
Status: COMPLETED | OUTPATIENT
Start: 2022-06-30 | End: 2022-06-30

## 2022-06-30 RX ORDER — ROPIVACAINE HYDROCHLORIDE 5 MG/ML
INJECTION, SOLUTION EPIDURAL; INFILTRATION; PERINEURAL AS NEEDED
Status: DISCONTINUED | OUTPATIENT
Start: 2022-06-30 | End: 2022-06-30 | Stop reason: HOSPADM

## 2022-06-30 RX ORDER — MELOXICAM 7.5 MG/1
7.5 TABLET ORAL ONCE AS NEEDED
Status: DISCONTINUED | OUTPATIENT
Start: 2022-06-30 | End: 2022-06-30 | Stop reason: HOSPADM

## 2022-06-30 RX ORDER — LABETALOL HYDROCHLORIDE 5 MG/ML
5 INJECTION, SOLUTION INTRAVENOUS
Status: DISCONTINUED | OUTPATIENT
Start: 2022-06-30 | End: 2022-06-30 | Stop reason: HOSPADM

## 2022-06-30 RX ORDER — SODIUM CHLORIDE 0.9 % (FLUSH) 0.9 %
3-10 SYRINGE (ML) INJECTION AS NEEDED
Status: DISCONTINUED | OUTPATIENT
Start: 2022-06-30 | End: 2022-06-30 | Stop reason: HOSPADM

## 2022-06-30 RX ORDER — CLINDAMYCIN PHOSPHATE 900 MG/50ML
900 INJECTION INTRAVENOUS ONCE
Status: COMPLETED | OUTPATIENT
Start: 2022-06-30 | End: 2022-06-30

## 2022-06-30 RX ORDER — OXYCODONE AND ACETAMINOPHEN 10; 325 MG/1; MG/1
1 TABLET ORAL ONCE AS NEEDED
Status: COMPLETED | OUTPATIENT
Start: 2022-06-30 | End: 2022-06-30

## 2022-06-30 RX ORDER — ONDANSETRON 4 MG/1
4 TABLET, FILM COATED ORAL ONCE AS NEEDED
Status: DISCONTINUED | OUTPATIENT
Start: 2022-06-30 | End: 2022-06-30 | Stop reason: HOSPADM

## 2022-06-30 RX ORDER — METHYLPREDNISOLONE ACETATE 80 MG/ML
INJECTION, SUSPENSION INTRA-ARTICULAR; INTRALESIONAL; INTRAMUSCULAR; SOFT TISSUE AS NEEDED
Status: DISCONTINUED | OUTPATIENT
Start: 2022-06-30 | End: 2022-06-30 | Stop reason: HOSPADM

## 2022-06-30 RX ORDER — OXYCODONE AND ACETAMINOPHEN 7.5; 325 MG/1; MG/1
2 TABLET ORAL EVERY 4 HOURS PRN
Status: DISCONTINUED | OUTPATIENT
Start: 2022-06-30 | End: 2022-06-30 | Stop reason: HOSPADM

## 2022-06-30 RX ORDER — SODIUM CHLORIDE, SODIUM LACTATE, POTASSIUM CHLORIDE, CALCIUM CHLORIDE 600; 310; 30; 20 MG/100ML; MG/100ML; MG/100ML; MG/100ML
1000 INJECTION, SOLUTION INTRAVENOUS CONTINUOUS
Status: DISCONTINUED | OUTPATIENT
Start: 2022-06-30 | End: 2022-06-30 | Stop reason: HOSPADM

## 2022-06-30 RX ORDER — SODIUM CHLORIDE 0.9 % (FLUSH) 0.9 %
3 SYRINGE (ML) INJECTION EVERY 12 HOURS SCHEDULED
Status: DISCONTINUED | OUTPATIENT
Start: 2022-06-30 | End: 2022-06-30 | Stop reason: HOSPADM

## 2022-06-30 RX ADMIN — PROPOFOL 100 MG: 10 INJECTION, EMULSION INTRAVENOUS at 12:35

## 2022-06-30 RX ADMIN — DEXAMETHASONE SODIUM PHOSPHATE 4 MG: 4 INJECTION INTRA-ARTICULAR; INTRALESIONAL; INTRAMUSCULAR; INTRAVENOUS; SOFT TISSUE at 11:39

## 2022-06-30 RX ADMIN — MIDAZOLAM 2 MG: 1 INJECTION INTRAMUSCULAR; INTRAVENOUS at 12:35

## 2022-06-30 RX ADMIN — FAMOTIDINE 20 MG: 10 INJECTION, SOLUTION INTRAVENOUS at 11:39

## 2022-06-30 RX ADMIN — SODIUM CHLORIDE, POTASSIUM CHLORIDE, SODIUM LACTATE AND CALCIUM CHLORIDE 1000 ML: 600; 310; 30; 20 INJECTION, SOLUTION INTRAVENOUS at 09:33

## 2022-06-30 RX ADMIN — CLINDAMYCIN IN 5 PERCENT DEXTROSE 900 MG: 18 INJECTION, SOLUTION INTRAVENOUS at 12:37

## 2022-06-30 RX ADMIN — OXYCODONE AND ACETAMINOPHEN 1 TABLET: 325; 10 TABLET ORAL at 13:19

## 2022-06-30 RX ADMIN — ONDANSETRON 4 MG: 2 INJECTION INTRAMUSCULAR; INTRAVENOUS at 14:22

## 2022-06-30 NOTE — ANESTHESIA PREPROCEDURE EVALUATION
Anesthesia Evaluation     Patient summary reviewed and Nursing notes reviewed   history of anesthetic complications: PONV  NPO Solid Status: > 8 hours  NPO Liquid Status: > 8 hours           Airway   Mallampati: I  TM distance: >3 FB  Neck ROM: full  No difficulty expected  Dental      Pulmonary    (+) a smoker Former,   (-) sleep apnea  Cardiovascular   Exercise tolerance: good (4-7 METS)    ECG reviewed        Neuro/Psych  (+) psychiatric history Anxiety and Depression,    (-) seizures, TIA, CVA  GI/Hepatic/Renal/Endo    (+) obesity,  GERD (s/p nissen),  diabetes mellitus (diet controlled),   (-) liver disease, no renal disease    Musculoskeletal     (+) neck pain,   Abdominal    Substance History      OB/GYN          Other   arthritis,                      Anesthesia Plan    ASA 2     general     intravenous induction     Anesthetic plan, risks, benefits, and alternatives have been provided, discussed and informed consent has been obtained with: patient.        CODE STATUS:

## 2022-06-30 NOTE — ANESTHESIA POSTPROCEDURE EVALUATION
"Patient: Sedrick Leahy    Procedure Summary     Date: 06/30/22 Room / Location: [facility] OR  /  [facility] OR    Anesthesia Start: 1233 Anesthesia Stop: 1252    Procedure: LEFT HIP FLUROSCOPIC GUIDED CORTICOSTEROID INJECTION (Left Hip) Diagnosis: (LEFT HIP PAIN)    Surgeons: Herberth Skelton MD Provider: Pedro Lucero CRNA    Anesthesia Type: general ASA Status: 2          Anesthesia Type: general    Vitals  Vitals Value Taken Time   BP     Temp     Pulse 74 06/30/22 1252   Resp     SpO2 99 % 06/30/22 1252   Vitals shown include unvalidated device data.        Post Anesthesia Care and Evaluation    Patient location during evaluation: PHASE II  Patient participation: complete - patient participated  Level of consciousness: awake and alert  Pain management: adequate    Airway patency: patent  Anesthetic complications: No anesthetic complications    Cardiovascular status: acceptable  Respiratory status: acceptable  Hydration status: acceptable    Comments: Blood pressure 142/77, pulse 79, temperature 97.2 °F (36.2 °C), temperature source Temporal, resp. rate 18, height 162 cm (63.78\"), weight 83.8 kg (184 lb 11.9 oz), SpO2 95 %, not currently breastfeeding.    Pt discharged from PACU based on selina score >8      "

## 2022-07-14 RX ORDER — AMITRIPTYLINE HYDROCHLORIDE 100 MG/1
TABLET, FILM COATED ORAL
Qty: 180 TABLET | Refills: 0 | Status: SHIPPED | OUTPATIENT
Start: 2022-07-14 | End: 2022-09-22

## 2022-07-14 NOTE — TELEPHONE ENCOUNTER
Rx Refill Note  Requested Prescriptions     Pending Prescriptions Disp Refills   • amitriptyline (ELAVIL) 100 MG tablet [Pharmacy Med Name: AMITRIPTYLINE HYDROCHLORIDE 100MG TABLET] 180 tablet 0     Sig: TAKE ONE (1) TO TWO (2) TABLETS AT BEDTIME AS NEEDED SLEEP      Last office visit with prescribing clinician: 7/20/2020      Next office visit with prescribing clinician: Visit date not found            Belle Perez LPN  07/14/22, 08:42 CDT

## 2022-08-30 ENCOUNTER — OFFICE VISIT (OUTPATIENT)
Dept: OBSTETRICS AND GYNECOLOGY | Facility: CLINIC | Age: 67
End: 2022-08-30
Payer: MEDICARE

## 2022-08-30 VITALS
HEIGHT: 63 IN | DIASTOLIC BLOOD PRESSURE: 70 MMHG | SYSTOLIC BLOOD PRESSURE: 136 MMHG | WEIGHT: 178 LBS | BODY MASS INDEX: 31.54 KG/M2

## 2022-08-30 DIAGNOSIS — N32.89 MASS OF URINARY BLADDER DETERMINED BY ULTRASOUND: ICD-10-CM

## 2022-08-30 DIAGNOSIS — R19.00 PELVIC MASS IN FEMALE: ICD-10-CM

## 2022-08-30 DIAGNOSIS — R10.9 ABDOMINAL PAIN, UNSPECIFIED ABDOMINAL LOCATION: Primary | ICD-10-CM

## 2022-08-30 PROCEDURE — 99203 OFFICE O/P NEW LOW 30 MIN: CPT | Performed by: OBSTETRICS & GYNECOLOGY

## 2022-08-30 RX ORDER — MELATONIN
1000 DAILY
COMMUNITY

## 2022-08-30 NOTE — PROGRESS NOTES
Subjective   No chief complaint on file.    Sedrick Leahy is a 67 y.o. year old .  No LMP recorded. Patient has had a hysterectomy.  Hysterectomy was done for DUB.  She presents to be seen because she is s/p hysterectomy and her orthopedic surgeon noted a mass in the suprapubic region on imaging.  Patient does admit to recently having some intermittent pain in that region, but she has simply been attributing it to age - although she does feel that this has only started happening in recent months.  Patient has not noted any changes to bladder or bowel function.  No satiety.    The following portions of the patient's history were reviewed and updated as appropriate:current medications and allergies    Social History    Tobacco Use      Smoking status: Former Smoker        Packs/day: 2.00        Types: Cigarettes        Start date: 1954        Quit date: 7/10/1990        Years since quittin.1      Smokeless tobacco: Never Used    Review of Systems   Constitutional: Negative for activity change and unexpected weight change.   Respiratory: Negative for shortness of breath.    Cardiovascular: Negative for chest pain.   Gastrointestinal: Positive for constipation (takes Miralax every night). Negative for diarrhea.   Genitourinary: Positive for difficulty urinating (only occasional hesitancy) and pelvic pain (intermittently for past couple of months). Negative for dysuria.         Objective   /70   Ht 160 cm (63\")   Wt 80.7 kg (178 lb)   BMI 31.53 kg/m²     Physical Exam  Vitals and nursing note reviewed.   Constitutional:       General: She is not in acute distress.     Appearance: She is well-developed.   HENT:      Head: Normocephalic and atraumatic.   Neck:      Thyroid: No thyromegaly.   Pulmonary:      Effort: Pulmonary effort is normal.   Abdominal:      General: There is no distension.      Palpations: Abdomen is soft. There is no mass.      Tenderness: There is no abdominal tenderness.    Musculoskeletal:         General: Normal range of motion.      Cervical back: Normal range of motion.   Skin:     General: Skin is warm and dry.   Neurological:      Mental Status: She is alert and oriented to person, place, and time.   Psychiatric:         Behavior: Behavior normal.         Judgment: Judgment normal.         Lab Review   No data reviewed    Imaging   Pelvic ultrasound report: s/p hysterectomy.  6 cm midline area that does not appear consistent with bowel.    Assessment & Plan    Diagnoses and all orders for this visit:    1. Abdominal pain, unspecified abdominal location (Primary): Mass noted on recent imaging by orthopedic surgeon.  Pelvic u/s done today, and area appears possibly associated with bladder.  LUCRETIA being drawn and CT of abdomen/pelvis ordered.  Future referrals as indicated by imaging and labs.  -     CT Abdomen Pelvis With Contrast; Future    2. Mass of urinary bladder determined by ultrasound  -     CT Abdomen Pelvis With Contrast; Future    3. Pelvic mass in female  -     Ovarian Malignancy Risk-LUCRETIA  -     Comprehensive Metabolic Panel    Other orders  -     Premenopausal Interp: HIGH  -     Postmenopausal Interp: LOW      This note was electronically signed.    Emily Fernández MD  August 30, 2022  10:51 CDT    Total time spent today with Sedrick  was 20-29 minutes (level 3).  Greater than 50% of the time was spent coordinating care, answering her questions and counseling patient.

## 2022-08-31 LAB
ALBUMIN SERPL-MCNC: 4.1 G/DL (ref 3.5–5.2)
ALBUMIN/GLOB SERPL: 2.1 G/DL
ALP SERPL-CCNC: 115 U/L (ref 39–117)
ALT SERPL-CCNC: 12 U/L (ref 1–33)
AST SERPL-CCNC: 16 U/L (ref 1–32)
BILIRUB SERPL-MCNC: 0.2 MG/DL (ref 0–1.2)
BUN SERPL-MCNC: 16 MG/DL (ref 8–23)
BUN/CREAT SERPL: 18.4 (ref 7–25)
CALCIUM SERPL-MCNC: 9.3 MG/DL (ref 8.6–10.5)
CANCER AG125 SERPL-ACNC: 16.8 U/ML (ref 0–38.1)
CHLORIDE SERPL-SCNC: 104 MMOL/L (ref 98–107)
CO2 SERPL-SCNC: 29.2 MMOL/L (ref 22–29)
CREAT SERPL-MCNC: 0.87 MG/DL (ref 0.57–1)
EGFRCR-CYS SERPLBLD CKD-EPI 2021: 72.7 ML/MIN/1.73
GLOBULIN SER CALC-MCNC: 2 GM/DL
GLUCOSE SERPL-MCNC: 97 MG/DL (ref 65–99)
HE4 SERPL-SCNC: 71.8 PMOL/L (ref 0–96.5)
LABORATORY COMMENT REPORT: ABNORMAL
POSTMENOPAUSAL INTERP: LOW: NORMAL
POTASSIUM SERPL-SCNC: 4.5 MMOL/L (ref 3.5–5.2)
PREMENOPAUSAL INTERP: HIGH: NORMAL
PROT SERPL-MCNC: 6.1 G/DL (ref 6–8.5)
ROMA SCORE POSTMEN SERPL: 1.71
ROMA SCORE PREMEN SERPL: 1.61
SODIUM SERPL-SCNC: 143 MMOL/L (ref 136–145)

## 2022-09-06 NOTE — PROGRESS NOTES
Please make sure patient understands that her tumor markers for ovarian cancer were low risk for a postmenopausal woman.  Thanks

## 2022-09-09 ENCOUNTER — HOSPITAL ENCOUNTER (OUTPATIENT)
Dept: CT IMAGING | Facility: HOSPITAL | Age: 67
Discharge: HOME | End: 2022-09-09
Admitting: OBSTETRICS & GYNECOLOGY

## 2022-09-09 DIAGNOSIS — N32.89 MASS OF URINARY BLADDER DETERMINED BY ULTRASOUND: ICD-10-CM

## 2022-09-09 DIAGNOSIS — R10.9 ABDOMINAL PAIN, UNSPECIFIED ABDOMINAL LOCATION: ICD-10-CM

## 2022-09-09 PROCEDURE — 0 IOPAMIDOL PER 1 ML: Performed by: OBSTETRICS & GYNECOLOGY

## 2022-09-09 PROCEDURE — 74177 CT ABD & PELVIS W/CONTRAST: CPT

## 2022-09-09 RX ADMIN — IOPAMIDOL 100 ML: 755 INJECTION, SOLUTION INTRAVENOUS at 09:10

## 2022-09-22 RX ORDER — AMITRIPTYLINE HYDROCHLORIDE 100 MG/1
TABLET, FILM COATED ORAL
Qty: 180 TABLET | Refills: 0 | Status: SHIPPED | OUTPATIENT
Start: 2022-09-22 | End: 2023-02-10

## 2022-09-22 NOTE — TELEPHONE ENCOUNTER
Rx Refill Note  Requested Prescriptions     Pending Prescriptions Disp Refills   • amitriptyline (ELAVIL) 100 MG tablet [Pharmacy Med Name: AMITRIPTYLINE HYDROCHLORIDE 100MG TABLET] 180 tablet 1     Sig: TAKE ONE (1) TO TWO (2) TABLETS AT BEDTIME AS NEEDED SLEEP      Last office visit with prescribing clinician: Visit date not found      Next office visit with prescribing clinician: Visit date not found            Belle Perez LPN  09/22/22, 14:14 CDT

## 2022-11-11 ENCOUNTER — TELEPHONE (OUTPATIENT)
Dept: FAMILY MEDICINE CLINIC | Facility: CLINIC | Age: 67
End: 2022-11-11

## 2022-11-11 RX ORDER — AZITHROMYCIN 250 MG/1
TABLET, FILM COATED ORAL
Qty: 6 TABLET | Refills: 0 | Status: SHIPPED | OUTPATIENT
Start: 2022-11-11 | End: 2022-11-11

## 2022-11-11 RX ORDER — AZITHROMYCIN 250 MG/1
TABLET, FILM COATED ORAL
Qty: 6 TABLET | Refills: 0 | Status: SHIPPED | OUTPATIENT
Start: 2022-11-11 | End: 2022-11-16

## 2022-11-11 NOTE — TELEPHONE ENCOUNTER
ABT sent - ok since outside testing windows - advise f/u next week if no better    Electronically signed by Pilo Arango, HECTOR, 11/11/22, 12:26 PM CST.

## 2022-11-11 NOTE — TELEPHONE ENCOUNTER
Pt is not able to come in today, she is requesting an antibiotic sent to her pharmacy, she states she has a pretty consistent cough, little to no drainage, no fever. This has been going on for about a week she says

## 2022-11-11 NOTE — TELEPHONE ENCOUNTER
Called pt and wanted rx to go to pharmacy near where she is at with her son, and resubmitted to [facility], called jagdeep and canceled rx

## 2022-12-21 ENCOUNTER — HOSPITAL ENCOUNTER (OUTPATIENT)
Dept: GENERAL RADIOLOGY | Facility: HOSPITAL | Age: 67
Discharge: HOME | End: 2022-12-21
Admitting: NURSE PRACTITIONER

## 2022-12-21 ENCOUNTER — OFFICE VISIT (OUTPATIENT)
Dept: FAMILY MEDICINE CLINIC | Facility: CLINIC | Age: 67
End: 2022-12-21

## 2022-12-21 VITALS
WEIGHT: 178.6 LBS | BODY MASS INDEX: 31.64 KG/M2 | TEMPERATURE: 96.8 F | OXYGEN SATURATION: 98 % | DIASTOLIC BLOOD PRESSURE: 90 MMHG | HEART RATE: 100 BPM | RESPIRATION RATE: 18 BRPM | HEIGHT: 63 IN | SYSTOLIC BLOOD PRESSURE: 126 MMHG

## 2022-12-21 DIAGNOSIS — M54.42 ACUTE LEFT-SIDED LOW BACK PAIN WITH LEFT-SIDED SCIATICA: ICD-10-CM

## 2022-12-21 DIAGNOSIS — M25.552 PELVIC JOINT PAIN, LEFT: ICD-10-CM

## 2022-12-21 DIAGNOSIS — M54.42 ACUTE LEFT-SIDED LOW BACK PAIN WITH LEFT-SIDED SCIATICA: Primary | ICD-10-CM

## 2022-12-21 PROCEDURE — 72110 X-RAY EXAM L-2 SPINE 4/>VWS: CPT

## 2022-12-21 PROCEDURE — 73502 X-RAY EXAM HIP UNI 2-3 VIEWS: CPT

## 2022-12-21 PROCEDURE — 99213 OFFICE O/P EST LOW 20 MIN: CPT | Performed by: NURSE PRACTITIONER

## 2022-12-21 RX ORDER — HYDROCODONE BITARTRATE AND ACETAMINOPHEN 5; 325 MG/1; MG/1
1 TABLET ORAL EVERY 6 HOURS PRN
Qty: 20 TABLET | Refills: 0 | Status: SHIPPED | OUTPATIENT
Start: 2022-12-21 | End: 2023-01-05 | Stop reason: SDUPTHER

## 2022-12-21 NOTE — PROGRESS NOTES
Subjective   Chief Complaint:  Post fall    History of Present Illness:  This 67 y.o. female was seen in the office today.  She presents today with pelvic/groin pain on the left and low back pain.  She sustained a fall over a week ago and reports had a bruise on her back originally.  Reports is still quite sore and having difficulty with mobility.    Allergies   Allergen Reactions   • Penicillins Swelling and Rash      Current Outpatient Medications on File Prior to Visit   Medication Sig   • amitriptyline (ELAVIL) 100 MG tablet TAKE ONE (1) TO TWO (2) TABLETS AT BEDTIME AS NEEDED SLEEP   • cholecalciferol (VITAMIN D3) 25 MCG (1000 UT) tablet Take 1,000 Units by mouth Daily.   • NON FORMULARY OTC weight loss medication keto     No current facility-administered medications on file prior to visit.      Past Medical, Surgical, Social, and Family History:  Past Medical History:   Diagnosis Date   • Arthritis    • Diabetes mellitus (HCC)     diet controlled   • Hx of colonic polyp    • Hypertension    • Joint pain     Hip   • Lower back pain    • Neck pain    • PONV (postoperative nausea and vomiting)      Past Surgical History:   Procedure Laterality Date   • ANTERIOR CERVICAL DISCECTOMY W/ FUSION N/A 11/20/2020    Procedure: EXPLORATION OF FUSION C5-6, ANTERIOR CERVICAL DISCECTOMY FUSION C4-5, C6-7 REVISON ANTERIOR FUSION C5-6 WITH INSTRUMENTATION C4-7;  Surgeon: KEN Gilbert MD;  Location: [facility];  Service: Orthopedic Spine;  Laterality: N/A;   • APPENDECTOMY     • COLONOSCOPY  06/06/2016    Normal exam repeat in 5 years   • COLONOSCOPY N/A 01/14/2020    Diverticulosis repeat exam in 5 years   • COLONOSCOPY W/ POLYPECTOMY  04/04/2012    Hyperplastic polyp cecum repeat exam in 1 year   • ENDOSCOPY  08/14/2014    Very tortuous distal esophagus dilated 50 Fr, HH    • ENDOSCOPY N/A 01/18/2021    A fundoplication was found, dilated   • HARDWARE REMOVAL N/A 11/20/2020    Procedure: REMOVAL OF INSTRUMENTATION;   Surgeon: KEN Gilbert MD;  Location:  [facility];  Service: Orthopedic Spine;  Laterality: N/A;   • HYSTERECTOMY      Laparoscopic Hysterectomy bilateral salpingectomy for bleeding   • NECK SURGERY     • NISSEN FUNDOPLICATION     • POSTERIOR CERVICAL FUSION N/A 2020    Procedure: POSTERIOR SPINAL FUSION WITH INSTRUMENTATION C4-7;  Surgeon: KEN Gilbert MD;  Location:  [facility];  Service: Orthopedic Spine;  Laterality: N/A;   • STEROID INJECTION Left 2022    Procedure: LEFT HIP FLUROSCOPIC GUIDED CORTICOSTEROID INJECTION;  Surgeon: Herberth Skelton MD;  Location:  [facility];  Service: Orthopedics;  Laterality: Left;   • US GUIDED LYMPH NODE BIOPSY  2020     Social History     Socioeconomic History   • Marital status:      Spouse name: Jason   • Number of children: 2   • Years of education: 12   Tobacco Use   • Smoking status: Former     Packs/day: 2.00     Types: Cigarettes     Start date: 1954     Quit date: 7/10/1990     Years since quittin.4   • Smokeless tobacco: Never   Vaping Use   • Vaping Use: Never used   Substance and Sexual Activity   • Alcohol use: Yes     Comment: rare   • Drug use: No   • Sexual activity: Yes     Partners: Male     Family History   Problem Relation Age of Onset   • No Known Problems Father    • Colon cancer Mother    • Colon polyps Mother    • Diabetes Brother    • Diabetes Brother    • Diabetes Son    • Diabetes Son    • Heart attack Paternal Grandmother    • Breast cancer Maternal Aunt    • Lymphoma Maternal Aunt    • Breast cancer Maternal Aunt    • Lung cancer Maternal Aunt    • Diabetes Maternal Aunt    • Breast cancer Maternal Aunt        Prior Visit Notes/Records, Lab, Imaging, and Diagnostic Results Reviewed:  A1C:  Lab Results - Last 18 Months   Lab Units 10/20/21  0804   HEMOGLOBIN A1C % 5.45     GLUCOSE:  Lab Results - Last 18 Months   Lab Units 22  1045 10/20/21  0804   GLUCOSE mg/dL 97 81     LIPID:  Lab Results - Last 18  "Months   Lab Units 10/20/21  0804   CHOLESTEROL mg/dL 191   LDL CHOL mg/dL 112*   HDL CHOL mg/dL 65*   TRIGLYCERIDES mg/dL 74     PSA:No results for input(s): PSA in the last 19223 hours.  CBC:  Lab Results - Last 18 Months   Lab Units 10/20/21  0804   WBC 10*3/mm3 7.18   HEMOGLOBIN g/dL 12.6   HEMATOCRIT % 38.0   PLATELETS 10*3/mm3 283      BMP/CMP:  Lab Results - Last 18 Months   Lab Units 08/30/22  1045 10/20/21  0804   SODIUM mmol/L 143 143   POTASSIUM mmol/L 4.5 4.7   CHLORIDE mmol/L 104 106   TOTAL CO2 mmol/L 29.2* 28.5   GLUCOSE mg/dL 97 81   BUN mg/dL 16 17   CREATININE mg/dL 0.87 0.98   EGFR IF NONAFRICN AM mL/min/1.73  --  57*   EGFR IF AFRICN AM mL/min/1.73  --  69   EGFR RESULT mL/min/1.73 72.7  --    CALCIUM mg/dL 9.3 9.4     HEPATIC:  Lab Results - Last 18 Months   Lab Units 08/30/22  1045 10/20/21  0804   ALT (SGPT) U/L 12 8   AST (SGOT) U/L 16 14   ALK PHOS U/L 115 123*     Vit D:  Lab Results - Last 18 Months   Lab Units 10/20/21  0804   VIT D 25 HYDROXY ng/ml 32.1     THYROID:  Lab Results - Last 18 Months   Lab Units 10/20/21  0804   TSH uIU/mL 1.960       Objective   Physical Exam  Vitals reviewed.   Constitutional:       General: She is not in acute distress.     Appearance: Normal appearance.   Cardiovascular:      Rate and Rhythm: Normal rate and regular rhythm.   Pulmonary:      Effort: Pulmonary effort is normal.      Breath sounds: Normal breath sounds.   Musculoskeletal:         General: Tenderness (*Midline tenderness L3-L4) present.      Comments: Able to move all extremities-sensation intact     /90 (BP Location: Left arm, Patient Position: Sitting, Cuff Size: Adult)   Pulse 100   Temp 96.8 °F (36 °C) (Infrared)   Resp 18   Ht 160 cm (63\")   Wt 81 kg (178 lb 9.6 oz)   SpO2 98%   BMI 31.64 kg/m²     Assessment & Plan   Diagnoses and all orders for this visit:    1. Acute left-sided low back pain with left-sided sciatica (Primary)  -     XR Spine Lumbar 4+ View; Future  -     " HYDROcodone-acetaminophen (Norco) 5-325 MG per tablet; Take 1 tablet by mouth Every 6 (Six) Hours As Needed for Severe Pain.  Dispense: 20 tablet; Refill: 0    2. Pelvic joint pain, left  -     XR Hip With or Without Pelvis 2 - 3 View Left; Future  -     HYDROcodone-acetaminophen (Norco) 5-325 MG per tablet; Take 1 tablet by mouth Every 6 (Six) Hours As Needed for Severe Pain.  Dispense: 20 tablet; Refill: 0    Discussion:  Advised and educated plan of care.  X-rays and comfort meds to follow.    Follow-up:  Return for As Needed - Depending on Test Results - Will Call.    Electronically signed by HECTOR Montoya, 12/21/22, 10:20 AM CARLOS.   Skyrizi Counseling: I discussed with the patient the risks of risankizumab-rzaa including but not limited to immunosuppression, and serious infections.  The patient understands that monitoring is required including a PPD at baseline and must alert us or the primary physician if symptoms of infection or other concerning signs are noted.

## 2022-12-21 NOTE — PROGRESS NOTES
Please call the patient -chronic degenerative changes-nothing acute-please advise pain medicine through the weekend as already ordered-rest, ice as needed, follow-up if no better next week-the neck steps would need to be discussed.    Electronically signed by HECTOR Montoya, 12/21/22, 12:44 PM CST.

## 2023-01-05 ENCOUNTER — OFFICE VISIT (OUTPATIENT)
Dept: FAMILY MEDICINE CLINIC | Facility: CLINIC | Age: 68
End: 2023-01-05
Payer: MEDICARE

## 2023-01-05 VITALS
WEIGHT: 177 LBS | HEART RATE: 80 BPM | DIASTOLIC BLOOD PRESSURE: 84 MMHG | BODY MASS INDEX: 31.36 KG/M2 | SYSTOLIC BLOOD PRESSURE: 130 MMHG | TEMPERATURE: 97.1 F | HEIGHT: 63 IN | OXYGEN SATURATION: 97 % | RESPIRATION RATE: 18 BRPM

## 2023-01-05 DIAGNOSIS — M54.42 ACUTE LEFT-SIDED LOW BACK PAIN WITH LEFT-SIDED SCIATICA: ICD-10-CM

## 2023-01-05 DIAGNOSIS — M47.816 LUMBAR ARTHROPATHY: Primary | ICD-10-CM

## 2023-01-05 DIAGNOSIS — M25.552 PELVIC JOINT PAIN, LEFT: ICD-10-CM

## 2023-01-05 PROCEDURE — 99213 OFFICE O/P EST LOW 20 MIN: CPT | Performed by: NURSE PRACTITIONER

## 2023-01-05 RX ORDER — HYDROCODONE BITARTRATE AND ACETAMINOPHEN 5; 325 MG/1; MG/1
1 TABLET ORAL EVERY 6 HOURS PRN
Qty: 20 TABLET | Refills: 0 | Status: SHIPPED | OUTPATIENT
Start: 2023-01-05 | End: 2023-03-02 | Stop reason: SDUPTHER

## 2023-01-05 RX ORDER — MELOXICAM 7.5 MG/1
7.5 TABLET ORAL DAILY
Qty: 30 TABLET | Refills: 1 | Status: SHIPPED | OUTPATIENT
Start: 2023-01-05 | End: 2023-04-03

## 2023-01-05 NOTE — PROGRESS NOTES
Subjective   Chief Complaint:  Back pain    History of Present Illness:  This 67 y.o. female was seen in the office today. The patient presents today for evaluation of continued back pain. She reports that she has been doing physical therapy. She has also received injections in the past, which provided temporary relief. The patient states that she has pain with sitting. She denies any muscle spasms. She reports that the pain wakes her up at night. The patient states that she has tried gabapentin in the past, but it made her nauseous. She does not recall if she has taken Lyrica previously.    The patient reports that she has run out of her arthritis medication. She states that the arthritis medication provides mild relief.    Allergies   Allergen Reactions   • Penicillins Swelling and Rash      Current Outpatient Medications on File Prior to Visit   Medication Sig   • amitriptyline (ELAVIL) 100 MG tablet TAKE ONE (1) TO TWO (2) TABLETS AT BEDTIME AS NEEDED SLEEP   • cholecalciferol (VITAMIN D3) 25 MCG (1000 UT) tablet Take 1,000 Units by mouth Daily.   • NON FORMULARY OTC weight loss medication keto     No current facility-administered medications on file prior to visit.      Past Medical, Surgical, Social, and Family History:  Past Medical History:   Diagnosis Date   • Arthritis    • Diabetes mellitus (HCC)     diet controlled   • Hx of colonic polyp    • Hypertension    • Joint pain     Hip   • Lower back pain    • Neck pain    • PONV (postoperative nausea and vomiting)      Past Surgical History:   Procedure Laterality Date   • ANTERIOR CERVICAL DISCECTOMY W/ FUSION N/A 11/20/2020    Procedure: EXPLORATION OF FUSION C5-6, ANTERIOR CERVICAL DISCECTOMY FUSION C4-5, C6-7 REVISON ANTERIOR FUSION C5-6 WITH INSTRUMENTATION C4-7;  Surgeon: KEN Gilbert MD;  Location: [facility];  Service: Orthopedic Spine;  Laterality: N/A;   • APPENDECTOMY     • COLONOSCOPY  06/06/2016    Normal exam repeat in 5 years   •  COLONOSCOPY N/A 2020    Diverticulosis repeat exam in 5 years   • COLONOSCOPY W/ POLYPECTOMY  2012    Hyperplastic polyp cecum repeat exam in 1 year   • ENDOSCOPY  2014    Very tortuous distal esophagus dilated 50 Fr, HH    • ENDOSCOPY N/A 2021    A fundoplication was found, dilated   • HARDWARE REMOVAL N/A 2020    Procedure: REMOVAL OF INSTRUMENTATION;  Surgeon: KEN Gilbert MD;  Location:  [facility];  Service: Orthopedic Spine;  Laterality: N/A;   • HYSTERECTOMY      Laparoscopic Hysterectomy bilateral salpingectomy for bleeding   • NECK SURGERY     • NISSEN FUNDOPLICATION     • POSTERIOR CERVICAL FUSION N/A 2020    Procedure: POSTERIOR SPINAL FUSION WITH INSTRUMENTATION C4-7;  Surgeon: KEN Gilbert MD;  Location:  [facility];  Service: Orthopedic Spine;  Laterality: N/A;   • STEROID INJECTION Left 2022    Procedure: LEFT HIP FLUROSCOPIC GUIDED CORTICOSTEROID INJECTION;  Surgeon: Herberth Skelton MD;  Location:  [facility];  Service: Orthopedics;  Laterality: Left;   • US GUIDED LYMPH NODE BIOPSY  2020     Social History     Socioeconomic History   • Marital status:      Spouse name: Jason   • Number of children: 2   • Years of education: 12   Tobacco Use   • Smoking status: Former     Packs/day: 2.00     Types: Cigarettes     Start date: 1954     Quit date: 7/10/1990     Years since quittin.5   • Smokeless tobacco: Never   Vaping Use   • Vaping Use: Never used   Substance and Sexual Activity   • Alcohol use: Yes     Comment: rare   • Drug use: No   • Sexual activity: Yes     Partners: Male     Family History   Problem Relation Age of Onset   • No Known Problems Father    • Colon cancer Mother    • Colon polyps Mother    • Diabetes Brother    • Diabetes Brother    • Diabetes Son    • Diabetes Son    • Heart attack Paternal Grandmother    • Breast cancer Maternal Aunt    • Lymphoma Maternal Aunt    • Breast cancer Maternal Aunt    • Lung cancer  Maternal Aunt    • Diabetes Maternal Aunt    • Breast cancer Maternal Aunt        Prior Visit Notes/Records, Lab, Imaging, and Diagnostic Results Reviewed:  A1C:  Lab Results - Last 18 Months   Lab Units 10/20/21  0804   HEMOGLOBIN A1C % 5.45     GLUCOSE:  Lab Results - Last 18 Months   Lab Units 08/30/22  1045 10/20/21  0804   GLUCOSE mg/dL 97 81     LIPID:  Lab Results - Last 18 Months   Lab Units 10/20/21  0804   CHOLESTEROL mg/dL 191   LDL CHOL mg/dL 112*   HDL CHOL mg/dL 65*   TRIGLYCERIDES mg/dL 74     PSA:No results for input(s): PSA in the last 00962 hours.  CBC:  Lab Results - Last 18 Months   Lab Units 10/20/21  0804   WBC 10*3/mm3 7.18   HEMOGLOBIN g/dL 12.6   HEMATOCRIT % 38.0   PLATELETS 10*3/mm3 283      BMP/CMP:  Lab Results - Last 18 Months   Lab Units 08/30/22  1045 10/20/21  0804   SODIUM mmol/L 143 143   POTASSIUM mmol/L 4.5 4.7   CHLORIDE mmol/L 104 106   TOTAL CO2 mmol/L 29.2* 28.5   GLUCOSE mg/dL 97 81   BUN mg/dL 16 17   CREATININE mg/dL 0.87 0.98   EGFR IF NONAFRICN AM mL/min/1.73  --  57*   EGFR IF AFRICN AM mL/min/1.73  --  69   EGFR RESULT mL/min/1.73 72.7  --    CALCIUM mg/dL 9.3 9.4     HEPATIC:  Lab Results - Last 18 Months   Lab Units 08/30/22  1045 10/20/21  0804   ALT (SGPT) U/L 12 8   AST (SGOT) U/L 16 14   ALK PHOS U/L 115 123*     Vit D:  Lab Results - Last 18 Months   Lab Units 10/20/21  0804   VIT D 25 HYDROXY ng/ml 32.1     THYROID:  Lab Results - Last 18 Months   Lab Units 10/20/21  0804   TSH uIU/mL 1.960       Objective   Physical Exam  Vitals reviewed.   Constitutional:       General: She is not in acute distress.     Appearance: Normal appearance.      Comments: Gait - slow and steady.   Cardiovascular:      Rate and Rhythm: Normal rate and regular rhythm.   Pulmonary:      Effort: Pulmonary effort is normal.      Breath sounds: Normal breath sounds.   Musculoskeletal:      Comments: Bilateral muscles soft, nontender. Tenderness over the spinous processes in the low back.      /84 (BP Location: Left arm, Patient Position: Sitting, Cuff Size: Adult)   Pulse 80   Temp 97.1 °F (36.2 °C) (Infrared)   Resp 18   Ht 160 cm (63\")   Wt 80.3 kg (177 lb)   SpO2 97%   BMI 31.35 kg/m²     X-ray of the left hip shows advanced arthropathy or arthritis at L5, L4, L5, and S1. No acute findings.    MRI of the lumbar spine from 2021 showed a bulging disc, but no advanced arthropathy.    Assessment & Plan   Diagnoses and all orders for this visit:    1. Lumbar arthropathy (Primary)  -     Ambulatory Referral to Neurosurgery  -     HYDROcodone-acetaminophen (Norco) 5-325 MG per tablet; Take 1 tablet by mouth Every 6 (Six) Hours As Needed for Severe Pain.  Dispense: 20 tablet; Refill: 0    2. Acute left-sided low back pain with left-sided sciatica    3. Pelvic joint pain, left    Other orders  -     meloxicam (Mobic) 7.5 MG tablet; Take 1 tablet by mouth Daily.  Dispense: 30 tablet; Refill: 1    Discussion:  Advised and educated plan of care. The patient will be referred to neurosurgery for further evaluation and treatment. She will be prescribed meloxicam. She wants to discuss with neurosurgeon before getting prescribed Lyrica.     Follow-up:  Return for follow-up as needed.    Electronically signed by Pilo Arango, 01/05/23, 4:38 PM CST.\

## 2023-02-10 RX ORDER — AMITRIPTYLINE HYDROCHLORIDE 100 MG/1
TABLET, FILM COATED ORAL
Qty: 180 TABLET | Refills: 0 | Status: SHIPPED | OUTPATIENT
Start: 2023-02-10

## 2023-02-10 NOTE — TELEPHONE ENCOUNTER
Rx Refill Note  Requested Prescriptions     Pending Prescriptions Disp Refills   • amitriptyline (ELAVIL) 100 MG tablet [Pharmacy Med Name: AMITRIPTYLINE HCL 100MG TABLET] 180 tablet 0     Sig: TAKE ONE (1) TO TWO (2) TABLETS AT BEDTIME AS NEEDED SLEEP      Last office visit with prescribing clinician: Visit date not found      Next office visit with prescribing clinician: Visit date not found            Belle Perez LPN  02/10/23, 09:38 CST

## 2023-02-24 ENCOUNTER — TELEPHONE (OUTPATIENT)
Dept: FAMILY MEDICINE CLINIC | Facility: CLINIC | Age: 68
End: 2023-02-24

## 2023-02-24 NOTE — TELEPHONE ENCOUNTER
Caller: Sedrick Leahy    Relationship: Self    Best call back number:  [phone]    What is the best time to reach you:  ANYTIME    Who are you requesting to speak with (clinical staff, provider,  specific staff member):  CLINICAL     What was the call regarding:  PATIENT REPORTED THAT SHE KEEPS FALLING AND GETTING BRUISES, AND DOESN'T KNOW WHY SHE'S FALLING.  PATIENT WOULD LIKE TO SPEAK WITH A NURSE ABOUT WHY SHE'S FALLING.  PATIENT HAS APPT ON 3/17/23.    Do you require a callback:  YES

## 2023-02-25 NOTE — TELEPHONE ENCOUNTER
See if can move up appointment    Electronically signed by HECTOR Montoya, 02/25/23, 2:07 PM CST.

## 2023-03-01 ENCOUNTER — OFFICE VISIT (OUTPATIENT)
Dept: FAMILY MEDICINE CLINIC | Facility: CLINIC | Age: 68
End: 2023-03-01
Payer: MEDICARE

## 2023-03-01 VITALS
BODY MASS INDEX: 32.57 KG/M2 | RESPIRATION RATE: 18 BRPM | SYSTOLIC BLOOD PRESSURE: 138 MMHG | DIASTOLIC BLOOD PRESSURE: 76 MMHG | HEIGHT: 63 IN | WEIGHT: 183.8 LBS | TEMPERATURE: 97.3 F | HEART RATE: 83 BPM | OXYGEN SATURATION: 94 %

## 2023-03-01 DIAGNOSIS — I10 ESSENTIAL HYPERTENSION: ICD-10-CM

## 2023-03-01 DIAGNOSIS — R73.09 ELEVATED GLUCOSE: ICD-10-CM

## 2023-03-01 DIAGNOSIS — E55.9 VITAMIN D DEFICIENCY: Primary | ICD-10-CM

## 2023-03-01 DIAGNOSIS — R29.6 FALLS FREQUENTLY: ICD-10-CM

## 2023-03-01 DIAGNOSIS — R55 POSTURAL DIZZINESS WITH PRESYNCOPE: ICD-10-CM

## 2023-03-01 DIAGNOSIS — R42 POSTURAL DIZZINESS WITH PRESYNCOPE: ICD-10-CM

## 2023-03-01 DIAGNOSIS — E78.5 HYPERLIPIDEMIA, UNSPECIFIED HYPERLIPIDEMIA TYPE: ICD-10-CM

## 2023-03-01 PROCEDURE — 99214 OFFICE O/P EST MOD 30 MIN: CPT | Performed by: NURSE PRACTITIONER

## 2023-03-01 NOTE — PROGRESS NOTES
Subjective   Chief Complaint:  Increased falls.    History of Present Illness:  This 67 y.o. female was seen in the office today.      The patient presents today with increased falls. She reports approximately once a week she will fall. She reports no dizziness or strength issues. She reports she is unsure what is causing the falls. The patient reports she does not lose consciousness or has not yet. She inquires if this is possibly age related.     She does have underlying hypertension, hyperlipidemia, and elevated glucose. She is due for laboratory studies as well. Her blood pressure is satisfactory today. A standing blood pressure was repeated and it was 140/80 mmHg, which is essentially the same as the prior blood pressure with no pulse changes as well.    Allergies   Allergen Reactions   • Penicillins Swelling and Rash      Current Outpatient Medications on File Prior to Visit   Medication Sig   • amitriptyline (ELAVIL) 100 MG tablet TAKE ONE (1) TO TWO (2) TABLETS AT BEDTIME AS NEEDED SLEEP   • cholecalciferol (VITAMIN D3) 25 MCG (1000 UT) tablet Take 1 tablet by mouth Daily.   • meloxicam (Mobic) 7.5 MG tablet Take 1 tablet by mouth Daily.   • NON FORMULARY OTC weight loss medication keto     No current facility-administered medications on file prior to visit.      Past Medical, Surgical, Social, and Family History:  Past Medical History:   Diagnosis Date   • Arthritis    • Diabetes mellitus (HCC)     diet controlled   • Hx of colonic polyp    • Hypertension    • Joint pain     Hip   • Lower back pain    • Neck pain    • PONV (postoperative nausea and vomiting)      Past Surgical History:   Procedure Laterality Date   • ANTERIOR CERVICAL DISCECTOMY W/ FUSION N/A 11/20/2020    Procedure: EXPLORATION OF FUSION C5-6, ANTERIOR CERVICAL DISCECTOMY FUSION C4-5, C6-7 REVISON ANTERIOR FUSION C5-6 WITH INSTRUMENTATION C4-7;  Surgeon: KEN Gilbert MD;  Location: [facility];  Service: Orthopedic Spine;  Laterality:  N/A;   • APPENDECTOMY     • COLONOSCOPY  2016    Normal exam repeat in 5 years   • COLONOSCOPY N/A 2020    Diverticulosis repeat exam in 5 years   • COLONOSCOPY W/ POLYPECTOMY  2012    Hyperplastic polyp cecum repeat exam in 1 year   • ENDOSCOPY  2014    Very tortuous distal esophagus dilated 50 Fr, HH    • ENDOSCOPY N/A 2021    A fundoplication was found, dilated   • HARDWARE REMOVAL N/A 2020    Procedure: REMOVAL OF INSTRUMENTATION;  Surgeon: KEN Gilbert MD;  Location:  [facility];  Service: Orthopedic Spine;  Laterality: N/A;   • HYSTERECTOMY      Laparoscopic Hysterectomy bilateral salpingectomy for bleeding   • NECK SURGERY     • NISSEN FUNDOPLICATION     • POSTERIOR CERVICAL FUSION N/A 2020    Procedure: POSTERIOR SPINAL FUSION WITH INSTRUMENTATION C4-7;  Surgeon: KEN Gilbert MD;  Location:  [facility];  Service: Orthopedic Spine;  Laterality: N/A;   • STEROID INJECTION Left 2022    Procedure: LEFT HIP FLUROSCOPIC GUIDED CORTICOSTEROID INJECTION;  Surgeon: Herberth Skelton MD;  Location:  [facility];  Service: Orthopedics;  Laterality: Left;   • US GUIDED LYMPH NODE BIOPSY  2020     Social History     Socioeconomic History   • Marital status:      Spouse name: Jason   • Number of children: 2   • Years of education: 12   Tobacco Use   • Smoking status: Former     Packs/day: 2.00     Types: Cigarettes     Start date: 1954     Quit date: 7/10/1990     Years since quittin.6   • Smokeless tobacco: Never   Vaping Use   • Vaping Use: Never used   Substance and Sexual Activity   • Alcohol use: Yes     Comment: rare   • Drug use: No   • Sexual activity: Yes     Partners: Male     Family History   Problem Relation Age of Onset   • No Known Problems Father    • Colon cancer Mother    • Colon polyps Mother    • Diabetes Brother    • Diabetes Brother    • Diabetes Son    • Diabetes Son    • Heart attack Paternal Grandmother    • Breast cancer  "Maternal Aunt    • Lymphoma Maternal Aunt    • Breast cancer Maternal Aunt    • Lung cancer Maternal Aunt    • Diabetes Maternal Aunt    • Breast cancer Maternal Aunt        Objective   Physical Exam  Vitals and nursing note reviewed.   Constitutional:       General: She is not in acute distress.     Appearance: Normal appearance. She is not toxic-appearing.   Cardiovascular:      Rate and Rhythm: Normal rate and regular rhythm.      Pulses: Normal pulses.      Heart sounds: Normal heart sounds.   Pulmonary:      Effort: Pulmonary effort is normal.      Breath sounds: Normal breath sounds.   Musculoskeletal:      Comments: Moves all extremities, 5+ strength bilateral upper and lower extremities.   Skin:     General: Skin is warm and dry.      Findings: No rash.   Neurological:      Mental Status: She is alert. Mental status is at baseline.      Comments: PERRLA     /76 (BP Location: Left arm, Patient Position: Sitting, Cuff Size: Adult)   Pulse 83   Temp 97.3 °F (36.3 °C) (Infrared)   Resp 18   Ht 160 cm (63\")   Wt 83.4 kg (183 lb 12.8 oz)   SpO2 94%   BMI 32.56 kg/m²     Prior Visit Notes/Records, Lab, Imaging, and Diagnostic Results Reviewed:  CBC:  Lab Results - Last 18 Months   Lab Units 03/01/23  0922 10/20/21  0804   WBC 10*3/mm3 6.53 7.18   HEMOGLOBIN g/dL 11.8* 12.6   HEMATOCRIT % 37.4 38.0   PLATELETS 10*3/mm3 289 283      Chemistry:  Lab Results - Last 18 Months   Lab Units 03/01/23 0922 08/30/22  1045 10/20/21  0804   SODIUM mmol/L 142 143 143   POTASSIUM mmol/L 4.6 4.5 4.7   CHLORIDE mmol/L 105 104 106   TOTAL CO2 mmol/L 29.1* 29.2* 28.5   GLUCOSE mg/dL 79 97 81   BUN mg/dL 14 16 17   CREATININE mg/dL 0.74 0.87 0.98   EGFR IF NONAFRICN AM mL/min/1.73  --   --  57*   EGFR IF AFRICN AM mL/min/1.73  --   --  69   EGFR RESULT mL/min/1.73 88.3 72.7  --    CALCIUM mg/dL 9.3 9.3 9.4     Lab Results - Last 18 Months   Lab Units 03/01/23 0922 08/30/22  1045 10/20/21  0804   ALT (SGPT) U/L 11 12 8 "   AST (SGOT) U/L 17 16 14   ALK PHOS U/L 106 115 123*       Assessment & Plan   Diagnoses and all orders for this visit:    1. Vitamin D deficiency (Primary)  -     Vitamin D,25-Hydroxy    2. Falls frequently    3. Essential hypertension  -     CBC & Differential  -     Comprehensive Metabolic Panel  -     TSH    4. Hyperlipidemia, unspecified hyperlipidemia type  -     Lipid Panel  -     TSH    5. Elevated glucose  -     Hemoglobin A1c  -     MicroAlbumin, Urine, Random - Urine, Clean Catch  -     UA / M With / Rflx Culture([facility] ONLY) - Urine, Clean Catch    6. Postural dizziness with presyncope  -     Holter Monitor - 72 Hour Up To 15 Days; Future    Other orders  -     Microscopic Examination -  -     Urine Culture, Routine -    Discussion:  Advised and educated plan of care. Advised laboratory studies and a Holter monitor to follow. Will follow up as-needed depending on the results as they become available.      BMI is >= 30 and <35. (Class 1 Obesity). The following options were offered after discussion;: none (medical contraindication)    Follow-up:  Return for follow-up as needed pending results - we will call.    Transcribed from ambient dictation for HECTOR Montoya by Lola Rich.  03/01/23   11:49 CST    I have personally performed the services described in this document as transcribed by the above individual, and it is both accurate and complete.    Electronically signed by Pilo Arango, 03/01/23, 11:49 AM CST.

## 2023-03-02 DIAGNOSIS — M47.816 LUMBAR ARTHROPATHY: ICD-10-CM

## 2023-03-02 RX ORDER — HYDROCODONE BITARTRATE AND ACETAMINOPHEN 5; 325 MG/1; MG/1
1 TABLET ORAL EVERY 6 HOURS PRN
Qty: 20 TABLET | Refills: 0 | Status: SHIPPED | OUTPATIENT
Start: 2023-03-02

## 2023-03-02 NOTE — TELEPHONE ENCOUNTER
Caller: KraigSedrick beckford    Relationship: Self    Best call back number: [phone]    Requested Prescriptions:   Requested Prescriptions     Pending Prescriptions Disp Refills   • HYDROcodone-acetaminophen (Norco) 5-325 MG per tablet 20 tablet 0     Sig: Take 1 tablet by mouth Every 6 (Six) Hours As Needed for Severe Pain.        Pharmacy where request should be sent: [facility] - [place], [place] - [address] [facility] [phone] [facility] [phone] FX     Additional details provided by patient: PATIENT ONLY HAS 2 LEFT    Does the patient have less than a 3 day supply:  [x] Yes  [] No    Would you like a call back once the refill request has been completed: [x] Yes [] No    If the office needs to give you a call back, can they leave a voicemail: [x] Yes [] No    Divine Goldstein Rep   03/02/23 10:43 CST

## 2023-03-02 NOTE — TELEPHONE ENCOUNTER
[facility] WNL  Per Protocol Last Refill 01/05/2023      Rx Refill Note  Requested Prescriptions     Pending Prescriptions Disp Refills   • HYDROcodone-acetaminophen (Norco) 5-325 MG per tablet 20 tablet 0     Sig: Take 1 tablet by mouth Every 6 (Six) Hours As Needed for Severe Pain.      Last office visit with prescribing clinician: 3/1/2023      Next office visit with prescribing clinician: Visit date not found            Galo Fox MA  03/02/23, 11:44 CST

## 2023-03-03 LAB
25(OH)D3+25(OH)D2 SERPL-MCNC: 26.1 NG/ML (ref 30–100)
ALBUMIN SERPL-MCNC: 4 G/DL (ref 3.5–5.2)
ALBUMIN/GLOB SERPL: 1.6 G/DL
ALP SERPL-CCNC: 106 U/L (ref 39–117)
ALT SERPL-CCNC: 11 U/L (ref 1–33)
APPEARANCE UR: CLEAR
AST SERPL-CCNC: 17 U/L (ref 1–32)
BACTERIA #/AREA URNS HPF: ABNORMAL /HPF
BACTERIA UR CULT: NO GROWTH
BACTERIA UR CULT: NORMAL
BASOPHILS # BLD AUTO: 0.05 10*3/MM3 (ref 0–0.2)
BASOPHILS NFR BLD AUTO: 0.8 % (ref 0–1.5)
BILIRUB SERPL-MCNC: 0.2 MG/DL (ref 0–1.2)
BILIRUB UR QL STRIP: NEGATIVE
BUN SERPL-MCNC: 14 MG/DL (ref 8–23)
BUN/CREAT SERPL: 18.9 (ref 7–25)
CALCIUM SERPL-MCNC: 9.3 MG/DL (ref 8.6–10.5)
CASTS URNS QL MICRO: ABNORMAL /LPF
CHLORIDE SERPL-SCNC: 105 MMOL/L (ref 98–107)
CHOLEST SERPL-MCNC: 201 MG/DL (ref 0–200)
CO2 SERPL-SCNC: 29.1 MMOL/L (ref 22–29)
COLOR UR: YELLOW
CREAT SERPL-MCNC: 0.74 MG/DL (ref 0.57–1)
EGFRCR SERPLBLD CKD-EPI 2021: 88.3 ML/MIN/1.73
EOSINOPHIL # BLD AUTO: 0.14 10*3/MM3 (ref 0–0.4)
EOSINOPHIL NFR BLD AUTO: 2.1 % (ref 0.3–6.2)
EPI CELLS #/AREA URNS HPF: >10 /HPF (ref 0–10)
ERYTHROCYTE [DISTWIDTH] IN BLOOD BY AUTOMATED COUNT: 12.9 % (ref 12.3–15.4)
GLOBULIN SER CALC-MCNC: 2.5 GM/DL
GLUCOSE SERPL-MCNC: 79 MG/DL (ref 65–99)
GLUCOSE UR QL STRIP: NEGATIVE
HBA1C MFR BLD: 5.4 % (ref 4.8–5.6)
HCT VFR BLD AUTO: 37.4 % (ref 34–46.6)
HDLC SERPL-MCNC: 70 MG/DL (ref 40–60)
HGB BLD-MCNC: 11.8 G/DL (ref 12–15.9)
HGB UR QL STRIP: NEGATIVE
IMM GRANULOCYTES # BLD AUTO: 0.02 10*3/MM3 (ref 0–0.05)
IMM GRANULOCYTES NFR BLD AUTO: 0.3 % (ref 0–0.5)
KETONES UR QL STRIP: NEGATIVE
LDLC SERPL CALC-MCNC: 120 MG/DL (ref 0–100)
LEUKOCYTE ESTERASE UR QL STRIP: ABNORMAL
LYMPHOCYTES # BLD AUTO: 2.82 10*3/MM3 (ref 0.7–3.1)
LYMPHOCYTES NFR BLD AUTO: 43.2 % (ref 19.6–45.3)
MCH RBC QN AUTO: 28.9 PG (ref 26.6–33)
MCHC RBC AUTO-ENTMCNC: 31.6 G/DL (ref 31.5–35.7)
MCV RBC AUTO: 91.4 FL (ref 79–97)
MICRO URNS: ABNORMAL
MICROALBUMIN UR-MCNC: <3 UG/ML
MONOCYTES # BLD AUTO: 0.59 10*3/MM3 (ref 0.1–0.9)
MONOCYTES NFR BLD AUTO: 9 % (ref 5–12)
NEUTROPHILS # BLD AUTO: 2.91 10*3/MM3 (ref 1.7–7)
NEUTROPHILS NFR BLD AUTO: 44.6 % (ref 42.7–76)
NITRITE UR QL STRIP: NEGATIVE
NRBC BLD AUTO-RTO: 0 /100 WBC (ref 0–0.2)
PH UR STRIP: 7.5 [PH] (ref 5–7.5)
PLATELET # BLD AUTO: 289 10*3/MM3 (ref 140–450)
POTASSIUM SERPL-SCNC: 4.6 MMOL/L (ref 3.5–5.2)
PROT SERPL-MCNC: 6.5 G/DL (ref 6–8.5)
PROT UR QL STRIP: NEGATIVE
RBC # BLD AUTO: 4.09 10*6/MM3 (ref 3.77–5.28)
RBC #/AREA URNS HPF: ABNORMAL /HPF (ref 0–2)
SODIUM SERPL-SCNC: 142 MMOL/L (ref 136–145)
SP GR UR STRIP: 1.01 (ref 1–1.03)
TRIGL SERPL-MCNC: 61 MG/DL (ref 0–150)
TSH SERPL DL<=0.005 MIU/L-ACNC: 2.3 UIU/ML (ref 0.27–4.2)
URINALYSIS REFLEX: ABNORMAL
UROBILINOGEN UR STRIP-MCNC: 0.2 MG/DL (ref 0.2–1)
VLDLC SERPL CALC-MCNC: 11 MG/DL (ref 5–40)
WBC # BLD AUTO: 6.53 10*3/MM3 (ref 3.4–10.8)
WBC #/AREA URNS HPF: ABNORMAL /HPF (ref 0–5)

## 2023-03-03 NOTE — PROGRESS NOTES
Please call result -overall labs look good but the hemoglobin has dropped just slightly.  See if she would be willing to do a set of 3 occult blood stools.    Electronically signed by HECTOR Montoya, 03/03/23, 7:43 AM CST.

## 2023-03-09 ENCOUNTER — HOSPITAL ENCOUNTER (OUTPATIENT)
Dept: CARDIOLOGY | Facility: HOSPITAL | Age: 68
Discharge: HOME | End: 2023-03-09
Admitting: NURSE PRACTITIONER
Payer: MEDICARE

## 2023-03-09 DIAGNOSIS — R55 POSTURAL DIZZINESS WITH PRESYNCOPE: ICD-10-CM

## 2023-03-09 DIAGNOSIS — R42 POSTURAL DIZZINESS WITH PRESYNCOPE: ICD-10-CM

## 2023-03-09 PROCEDURE — 93242 EXT ECG>48HR<7D RECORDING: CPT

## 2023-03-24 ENCOUNTER — CLINICAL SUPPORT (OUTPATIENT)
Dept: FAMILY MEDICINE CLINIC | Facility: CLINIC | Age: 68
End: 2023-03-24
Payer: MEDICARE

## 2023-03-24 DIAGNOSIS — D64.9 ANEMIA, UNSPECIFIED TYPE: Primary | ICD-10-CM

## 2023-03-24 LAB
HEMOCCULT STL QL IA: NEGATIVE
HEMOCCULT STL QL IA: NEGATIVE
HEMOCCULT STL QL IA: POSITIVE

## 2023-03-27 LAB
MAXIMAL PREDICTED HEART RATE: 152 BPM
STRESS TARGET HR: 129 BPM

## 2023-03-27 PROCEDURE — 93248 EXT ECG>7D<15D REV&INTERPJ: CPT | Performed by: INTERNAL MEDICINE

## 2023-03-27 NOTE — PROGRESS NOTES
Reviewed results - [employer] message sent.  If not seen in 3 days (3 day alert set), will send to pool to call the message.      Electronically signed by HECTOR Montoya, 03/27/23, 9:25 AM CDT.

## 2023-03-27 NOTE — PROGRESS NOTES
See if willing to follow-up with GI for positive OB stool?  3/21 is last GI appointment - still established.    Electronically signed by HECTOR Montoya, 03/27/23, 11:41 AM CDT.

## 2023-04-03 ENCOUNTER — OFFICE VISIT (OUTPATIENT)
Dept: GASTROENTEROLOGY | Facility: CLINIC | Age: 68
End: 2023-04-03
Payer: MEDICARE

## 2023-04-03 VITALS
HEART RATE: 81 BPM | DIASTOLIC BLOOD PRESSURE: 82 MMHG | TEMPERATURE: 97.1 F | WEIGHT: 183 LBS | BODY MASS INDEX: 32.43 KG/M2 | OXYGEN SATURATION: 95 % | HEIGHT: 63 IN | SYSTOLIC BLOOD PRESSURE: 124 MMHG

## 2023-04-03 DIAGNOSIS — R19.5 OCCULT BLOOD IN STOOLS: ICD-10-CM

## 2023-04-03 DIAGNOSIS — R10.13 EPIGASTRIC PAIN: Primary | ICD-10-CM

## 2023-04-03 PROCEDURE — 99213 OFFICE O/P EST LOW 20 MIN: CPT | Performed by: INTERNAL MEDICINE

## 2023-04-03 PROCEDURE — 1159F MED LIST DOCD IN RCRD: CPT | Performed by: INTERNAL MEDICINE

## 2023-04-03 PROCEDURE — 1160F RVW MEDS BY RX/DR IN RCRD: CPT | Performed by: INTERNAL MEDICINE

## 2023-04-03 PROCEDURE — 3079F DIAST BP 80-89 MM HG: CPT | Performed by: INTERNAL MEDICINE

## 2023-04-03 PROCEDURE — 3074F SYST BP LT 130 MM HG: CPT | Performed by: INTERNAL MEDICINE

## 2023-04-03 NOTE — PROGRESS NOTES
[facility] - Office note  4/3/2023    Sedrick Leahy 1955     Chief Complaint   Patient presents with   • GI Problem     Heme + stools also complains of upper abdominal pain       HISTORY OF PRESENT ILLNESS    Sedrick Leahy is a 67 y.o. female who presents for follow-up.  Was found to be heme positive.  She takes Mobic daily.  Also post Nissen and having some midepigastric pain.  Dysphagia to pills only.  She has not seen any blood.  There is been no change in bowel habits.  She takes MiraLAX daily for constipation    Past Medical History:   Diagnosis Date   • Arthritis    • Diabetes mellitus     diet controlled   • Hx of colonic polyp    • Hypertension    • Joint pain     Hip   • Lower back pain    • Neck pain    • PONV (postoperative nausea and vomiting)        Past Surgical History:   Procedure Laterality Date   • ANTERIOR CERVICAL DISCECTOMY W/ FUSION N/A 11/20/2020    Procedure: EXPLORATION OF FUSION C5-6, ANTERIOR CERVICAL DISCECTOMY FUSION C4-5, C6-7 REVISON ANTERIOR FUSION C5-6 WITH INSTRUMENTATION C4-7;  Surgeon: KEN Gilbert MD;  Location:  [facility] OR;  Service: Orthopedic Spine;  Laterality: N/A;   • APPENDECTOMY     • COLONOSCOPY  06/06/2016    Normal exam repeat in 5 years   • COLONOSCOPY N/A 01/14/2020    Diverticulosis repeat exam in 5 years   • COLONOSCOPY W/ POLYPECTOMY  04/04/2012    Hyperplastic polyp cecum repeat exam in 1 year   • ENDOSCOPY  08/14/2014    Very tortuous distal esophagus dilated 50 Fr, HH    • ENDOSCOPY N/A 01/18/2021    A fundoplication was found, dilated   • HARDWARE REMOVAL N/A 11/20/2020    Procedure: REMOVAL OF INSTRUMENTATION;  Surgeon: KEN Gilbert MD;  Location:  [facility] OR;  Service: Orthopedic Spine;  Laterality: N/A;   • HYSTERECTOMY      Laparoscopic Hysterectomy bilateral salpingectomy for bleeding   • NECK SURGERY     • NISSEN FUNDOPLICATION     • POSTERIOR CERVICAL FUSION N/A 12/03/2020    Procedure: POSTERIOR SPINAL FUSION WITH  INSTRUMENTATION C4-7;  Surgeon: KEN Gilbert MD;  Location:  [facility] OR;  Service: Orthopedic Spine;  Laterality: N/A;   • STEROID INJECTION Left 2022    Procedure: LEFT HIP FLUROSCOPIC GUIDED CORTICOSTEROID INJECTION;  Surgeon: Herberth Skelton MD;  Location:  [facility] OR;  Service: Orthopedics;  Laterality: Left;   • US GUIDED LYMPH NODE BIOPSY  2020         Current Outpatient Medications:   •  amitriptyline (ELAVIL) 100 MG tablet, TAKE ONE (1) TO TWO (2) TABLETS AT BEDTIME AS NEEDED SLEEP, Disp: 180 tablet, Rfl: 0  •  cholecalciferol (VITAMIN D3) 25 MCG (1000 UT) tablet, Take 1 tablet by mouth Daily., Disp: , Rfl:   •  HYDROcodone-acetaminophen (Norco) 5-325 MG per tablet, Take 1 tablet by mouth Every 6 (Six) Hours As Needed for Severe Pain., Disp: 20 tablet, Rfl: 0  •  NON FORMULARY, OTC weight loss medication keto, Disp: , Rfl:     Allergies   Allergen Reactions   • Penicillins Swelling and Rash       Social History     Socioeconomic History   • Marital status:      Spouse name: Jason   • Number of children: 2   • Years of education: 12   Tobacco Use   • Smoking status: Former     Packs/day: 2.00     Types: Cigarettes     Start date: 1955     Quit date: 7/10/1990     Years since quittin.7   • Smokeless tobacco: Never   Vaping Use   • Vaping Use: Never used   Substance and Sexual Activity   • Alcohol use: Yes     Comment: rare   • Drug use: No   • Sexual activity: Yes     Partners: Male       Family History   Problem Relation Age of Onset   • No Known Problems Father    • Colon cancer Mother    • Colon polyps Mother    • Diabetes Brother    • Diabetes Brother    • Diabetes Son    • Diabetes Son    • Heart attack Paternal Grandmother    • Breast cancer Maternal Aunt    • Lymphoma Maternal Aunt    • Breast cancer Maternal Aunt    • Lung cancer Maternal Aunt    • Diabetes Maternal Aunt    • Breast cancer Maternal Aunt        Review of Systems   Respiratory: Negative.    Cardiovascular:  "Negative.    Gastrointestinal: Negative.        Vitals:    04/03/23 1319   BP: 124/82   Pulse: 81   Temp: 97.1 °F (36.2 °C)   TempSrc: Temporal   SpO2: 95%   Weight: 83 kg (183 lb)   Height: 160 cm (63\")    Body mass index is 32.42 kg/m².    Physical Exam  Neurological:      General: No focal deficit present.      Mental Status: She is alert.   Psychiatric:         Mood and Affect: Mood normal.         Lab Results - Last 18 Months   Lab Units 03/01/23  0922   WBC 10*3/mm3 6.53   HEMOGLOBIN g/dL 11.8*   HEMATOCRIT % 37.4   MCV fL 91.4   PLATELETS 10*3/mm3 289   GLUCOSE mg/dL 79   BUN mg/dL 14   CREATININE mg/dL 0.74   SODIUM mmol/L 142   POTASSIUM mmol/L 4.6   CHLORIDE mmol/L 105   TOTAL CO2 mmol/L 29.1*   ALBUMIN g/dL 4.0   ALT (SGPT) U/L 11   AST (SGOT) U/L 17   ALK PHOS U/L 106   BILIRUBIN mg/dL 0.2         ASSESSMENT AND PLAN      1. Epigastric pain  Proceed with endoscopy    2. Occult blood in stools  We will schedule colonoscopy       EMR Dragon/transcription disclaimer: Much of this encounter note is an electronic transcription/translation of spoken language to printed text.  The electronic translation of spoken language may permit erroneous, or at times, nonsensical words or phrases to be inadvertently transcribed.  Although I have reviewed the note for such errors, some may still exist.    Abdullahi Howell MD  4/3/2023  13:28 CDT  "

## 2023-04-10 ENCOUNTER — OUTSIDE FACILITY SERVICE (OUTPATIENT)
Dept: GASTROENTEROLOGY | Facility: CLINIC | Age: 68
End: 2023-04-10
Payer: MEDICARE

## 2023-04-14 ENCOUNTER — TRANSCRIBE ORDERS (OUTPATIENT)
Dept: ADMINISTRATIVE | Facility: HOSPITAL | Age: 68
End: 2023-04-14
Payer: MEDICARE

## 2023-04-14 DIAGNOSIS — M79.662 PAIN OF LEFT LOWER LEG: ICD-10-CM

## 2023-04-14 DIAGNOSIS — M25.562 LEFT KNEE PAIN, UNSPECIFIED CHRONICITY: Primary | ICD-10-CM

## 2023-04-18 ENCOUNTER — TELEPHONE (OUTPATIENT)
Dept: GASTROENTEROLOGY | Facility: CLINIC | Age: 68
End: 2023-04-18
Payer: MEDICARE

## 2023-04-28 ENCOUNTER — HOSPITAL ENCOUNTER (OUTPATIENT)
Dept: ULTRASOUND IMAGING | Facility: HOSPITAL | Age: 68
Discharge: HOME | End: 2023-04-28
Payer: MEDICARE

## 2023-04-28 ENCOUNTER — HOSPITAL ENCOUNTER (OUTPATIENT)
Dept: MRI IMAGING | Facility: HOSPITAL | Age: 68
Discharge: HOME | End: 2023-04-28
Payer: MEDICARE

## 2023-04-28 DIAGNOSIS — M79.662 PAIN OF LEFT LOWER LEG: ICD-10-CM

## 2023-04-28 DIAGNOSIS — M25.562 LEFT KNEE PAIN, UNSPECIFIED CHRONICITY: ICD-10-CM

## 2023-04-28 PROCEDURE — 93971 EXTREMITY STUDY: CPT

## 2023-04-28 PROCEDURE — 73721 MRI JNT OF LWR EXTRE W/O DYE: CPT

## 2023-05-03 ENCOUNTER — TELEPHONE (OUTPATIENT)
Dept: NEUROSURGERY | Facility: CLINIC | Age: 68
End: 2023-05-03
Payer: MEDICARE

## 2023-05-03 NOTE — TELEPHONE ENCOUNTER
Called to confirm patient's appt with Dr Tariq on 5/11/2023  - no answer so left a message to call me back      *I want to confirm that she has rec'd her new patient pwork and has her imaging CD for this appt    harjeet falk CMA      IT IS OKAY FOR THE HUB TO DELIVER THIS INFORMATION TO THE PATIENT IF THEY RECEIVE THIS CALL BACK

## 2023-05-04 NOTE — TELEPHONE ENCOUNTER
Patient called and left a VM so I called her back but again had to leave a VM.  I asked her to call back and she could leave a VM letting me know if she plans on keeping the appt for 5/11/23.    YESY DERAS [facility]  PHYSICIAN LEAD  DR JAYLON FERGUSON  [facility]

## 2023-05-08 NOTE — TELEPHONE ENCOUNTER
Patient called and LVM stating she would be here for her appt on 5/11/23 but she did not receive any paperwork.  I have called her back and she will arrive @ 12:45 pm on Thursday to complete her paperwork.    YESY DERAS [facility]  PHYSICIAN LEAD  DR JAYLON FERGUSON  [facility]

## 2023-05-11 ENCOUNTER — OFFICE VISIT (OUTPATIENT)
Dept: NEUROSURGERY | Facility: CLINIC | Age: 68
End: 2023-05-11
Payer: MEDICARE

## 2023-05-11 ENCOUNTER — PATIENT ROUNDING (BHMG ONLY) (OUTPATIENT)
Dept: NEUROSURGERY | Facility: CLINIC | Age: 68
End: 2023-05-11
Payer: MEDICARE

## 2023-05-11 ENCOUNTER — TELEPHONE (OUTPATIENT)
Dept: FAMILY MEDICINE CLINIC | Facility: CLINIC | Age: 68
End: 2023-05-11
Payer: MEDICARE

## 2023-05-11 VITALS — BODY MASS INDEX: 32.43 KG/M2 | HEIGHT: 63 IN | WEIGHT: 183 LBS

## 2023-05-11 DIAGNOSIS — M54.42 CHRONIC LEFT-SIDED LOW BACK PAIN WITH LEFT-SIDED SCIATICA: ICD-10-CM

## 2023-05-11 DIAGNOSIS — G89.29 CHRONIC LEFT-SIDED LOW BACK PAIN WITH LEFT-SIDED SCIATICA: ICD-10-CM

## 2023-05-11 DIAGNOSIS — E66.09 CLASS 1 OBESITY DUE TO EXCESS CALORIES WITH SERIOUS COMORBIDITY AND BODY MASS INDEX (BMI) OF 32.0 TO 32.9 IN ADULT: ICD-10-CM

## 2023-05-11 DIAGNOSIS — M51.37 DEGENERATION OF LUMBAR OR LUMBOSACRAL INTERVERTEBRAL DISC: Primary | ICD-10-CM

## 2023-05-11 DIAGNOSIS — Z87.891 FORMER SMOKER: ICD-10-CM

## 2023-05-11 PROBLEM — E66.811 CLASS 1 OBESITY DUE TO EXCESS CALORIES WITH SERIOUS COMORBIDITY AND BODY MASS INDEX (BMI) OF 32.0 TO 32.9 IN ADULT: Status: ACTIVE | Noted: 2023-05-11

## 2023-05-11 PROBLEM — M51.379 DEGENERATION OF LUMBAR OR LUMBOSACRAL INTERVERTEBRAL DISC: Status: ACTIVE | Noted: 2023-05-11

## 2023-05-11 PROCEDURE — 99204 OFFICE O/P NEW MOD 45 MIN: CPT | Performed by: NEUROLOGICAL SURGERY

## 2023-05-11 PROCEDURE — 1159F MED LIST DOCD IN RCRD: CPT | Performed by: NEUROLOGICAL SURGERY

## 2023-05-11 PROCEDURE — 1160F RVW MEDS BY RX/DR IN RCRD: CPT | Performed by: NEUROLOGICAL SURGERY

## 2023-05-11 NOTE — PATIENT INSTRUCTIONS
"PATIENT TO CONTINUE TO FOLLOW UP WITH HER PRIMARY CARE PROVIDER FOR YEARLY PHYSICAL EXAMS TO ENSURE COMPLETE HEALTH MAINTENANCE      BMI for Adults  What is BMI?  Body mass index (BMI) is a number that is calculated from a person's weight and height. BMI can help estimate how much of a person's weight is composed of fat. BMI does not measure body fat directly. Rather, it is an alternative to procedures that directly measure body fat, which can be difficult and expensive.  BMI can help identify people who may be at higher risk for certain medical problems.  What are BMI measurements used for?  BMI is used as a screening tool to identify possible weight problems. It helps determine whether a person is obese, overweight, a healthy weight, or underweight.  BMI is useful for:  Identifying a weight problem that may be related to a medical condition or may increase the risk for medical problems.  Promoting changes, such as changes in diet and exercise, to help reach a healthy weight. BMI screening can be repeated to see if these changes are working.  How is BMI calculated?  BMI involves measuring your weight in relation to your height. Both height and weight are measured, and the BMI is calculated from those numbers. This can be done either in English (U.S[place] or metric measurements. Note that charts and online BMI calculators are available to help you find your BMI quickly and easily without having to do these calculations yourself.  To calculate your BMI in English (U.S.) measurements:    Measure your weight in pounds (lb).  Multiply the number of pounds by 703.  For example, for a person who weighs 180 lb, multiply that number by 703, which equals 126,540.  Measure your height in inches. Then multiply that number by itself to get a measurement called \"inches squared.\"  For example, for a person who is 70 inches tall, the \"inches squared\" measurement is 70 inches x 70 inches, which equals 4,900 inches squared.  Divide the " "total from step 2 (number of lb x 703) by the total from step 3 (inches squared): 126,540 ÷ 4,900 = 25.8. This is your BMI.  To calculate your BMI in metric measurements:  Measure your weight in kilograms (kg).  Measure your height in meters (m). Then multiply that number by itself to get a measurement called \"meters squared.\"  For example, for a person who is 1.75 m tall, the \"meters squared\" measurement is 1.75 m x 1.75 m, which is equal to 3.1 meters squared.  Divide the number of kilograms (your weight) by the meters squared number. In this example: 70 ÷ 3.1 = 22.6. This is your BMI.  What do the results mean?  BMI charts are used to identify whether you are underweight, normal weight, overweight, or obese. The following guidelines will be used:  Underweight: BMI less than 18.5.  Normal weight: BMI between 18.5 and 24.9.  Overweight: BMI between 25 and 29.9.  Obese: BMI of 30 or above.  Keep these notes in mind:  Weight includes both fat and muscle, so someone with a muscular build, such as an athlete, may have a BMI that is higher than 24.9. In cases like these, BMI is not an accurate measure of body fat.  To determine if excess body fat is the cause of a BMI of 25 or higher, further assessments may need to be done by a health care provider.  BMI is usually interpreted in the same way for men and women.  Where to find more information  For more information about BMI, including tools to quickly calculate your BMI, go to these websites:  Centers for Disease Control and Prevention: www.cdc.gov  American Heart Association: www.heart.org  National Heart, Lung, and Blood [place]: www.nhlbi.nih.gov  Summary  Body mass index (BMI) is a number that is calculated from a person's weight and height.  BMI may help estimate how much of a person's weight is composed of fat. BMI can help identify those who may be at higher risk for certain medical problems.  BMI can be measured using English measurements or metric " "measurements.  BMI charts are used to identify whether you are underweight, normal weight, overweight, or obese.  This information is not intended to replace advice given to you by your health care provider. Make sure you discuss any questions you have with your health care provider.  Document Revised: 09/09/2020 Document Reviewed: 07/17/2020  [employer] Patient Education © 2022 [employer]  DASH Eating Plan  DASH stands for Dietary Approaches to Stop Hypertension. The DASH eating plan is a healthy eating plan that has been shown to:  Reduce high blood pressure (hypertension).  Reduce your risk for type 2 diabetes, heart disease, and stroke.  Help with weight loss.  What are tips for following this plan?  Reading food labels  Check food labels for the amount of salt (sodium) per serving. Choose foods with less than 5 percent of the Daily Value of sodium. Generally, foods with less than 300 milligrams (mg) of sodium per serving fit into this eating plan.  To find whole grains, look for the word \"whole\" as the first word in the ingredient list.  Shopping  Buy products labeled as \"low-sodium\" or \"no salt added.\"  Buy fresh foods. Avoid canned foods and pre-made or frozen meals.  Cooking  Avoid adding salt when cooking. Use salt-free seasonings or herbs instead of table salt or sea salt. Check with your health care provider or pharmacist before using salt substitutes.  Do not maxwell foods. Cook foods using healthy methods such as baking, boiling, grilling, roasting, and broiling instead.  Cook with heart-healthy oils, such as olive, canola, avocado, soybean, or sunflower oil.  Meal planning    Eat a balanced diet that includes:  4 or more servings of fruits and 4 or more servings of vegetables each day. Try to fill one-half of your plate with fruits and vegetables.  6-8 servings of whole grains each day.  Less than 6 oz (170 g) of lean meat, poultry, or fish each day. A 3-oz (85-g) serving of meat is about the same size as " a deck of cards. One egg equals 1 oz (28 g).  2-3 servings of low-fat dairy each day. One serving is 1 cup (237 mL).  1 serving of nuts, seeds, or beans 5 times each week.  2-3 servings of heart-healthy fats. Healthy fats called omega-3 fatty acids are found in foods such as walnuts, flaxseeds, fortified milks, and eggs. These fats are also found in cold-water fish, such as sardines, salmon, and mackerel.  Limit how much you eat of:  Canned or prepackaged foods.  Food that is high in trans fat, such as some fried foods.  Food that is high in saturated fat, such as fatty meat.  Desserts and other sweets, sugary drinks, and other foods with added sugar.  Full-fat dairy products.  Do not salt foods before eating.  Do not eat more than 4 egg yolks a week.  Try to eat at least 2 vegetarian meals a week.  Eat more home-cooked food and less restaurant, buffet, and fast food.  Lifestyle  When eating at a restaurant, ask that your food be prepared with less salt or no salt, if possible.  If you drink alcohol:  Limit how much you use to:  0-1 drink a day for women who are not pregnant.  0-2 drinks a day for men.  Be aware of how much alcohol is in your drink. In the [place] one drink equals one 12 oz bottle of beer (355 mL), one 5 oz glass of wine (148 mL), or one 1½ oz glass of hard liquor (44 mL).  General information  Avoid eating more than 2,300 mg of salt a day. If you have hypertension, you may need to reduce your sodium intake to 1,500 mg a day.  Work with your health care provider to maintain a healthy body weight or to lose weight. Ask what an ideal weight is for you.  Get at least 30 minutes of exercise that causes your heart to beat faster (aerobic exercise) most days of the week. Activities may include walking, swimming, or biking.  Work with your health care provider or dietitian to adjust your eating plan to your individual calorie needs.  What foods should I eat?  Fruits  All fresh, dried, or frozen fruit. Canned  fruit in natural juice (without added sugar).  Vegetables  Fresh or frozen vegetables (raw, steamed, roasted, or grilled). Low-sodium or reduced-sodium tomato and vegetable juice. Low-sodium or reduced-sodium tomato sauce and tomato paste. Low-sodium or reduced-sodium canned vegetables.  Grains  Whole-grain or whole-wheat bread. Whole-grain or whole-wheat pasta. Brown rice. Oatmeal. Quinoa. Bulgur. Whole-grain and low-sodium cereals. Sienna bread. Low-fat, low-sodium crackers. Whole-wheat flour tortillas.  Meats and other proteins  Skinless chicken or turkey. Ground chicken or turkey. Pork with fat trimmed off. Fish and seafood. Egg whites. Dried beans, peas, or lentils. Unsalted nuts, nut butters, and seeds. Unsalted canned beans. Lean cuts of beef with fat trimmed off. Low-sodium, lean precooked or cured meat, such as sausages or meat loaves.  Dairy  Low-fat (1%) or fat-free (skim) milk. Reduced-fat, low-fat, or fat-free cheeses. Nonfat, low-sodium ricotta or cottage cheese. Low-fat or nonfat yogurt. Low-fat, low-sodium cheese.  Fats and oils  Soft margarine without trans fats. Vegetable oil. Reduced-fat, low-fat, or light mayonnaise and salad dressings (reduced-sodium). Canola, safflower, olive, avocado, soybean, and sunflower oils. Avocado.  Seasonings and condiments  Herbs. Spices. Seasoning mixes without salt.  Other foods  Unsalted popcorn and pretzels. Fat-free sweets.  The items listed above may not be a complete list of foods and beverages you can eat. Contact a dietitian for more information.  What foods should I avoid?  Fruits  Canned fruit in a light or heavy syrup. Fried fruit. Fruit in cream or butter sauce.  Vegetables  Creamed or fried vegetables. Vegetables in a cheese sauce. Regular canned vegetables (not low-sodium or reduced-sodium). Regular canned tomato sauce and paste (not low-sodium or reduced-sodium). Regular tomato and vegetable juice (not low-sodium or reduced-sodium). Pickles.  Olives.  Grains  Baked goods made with fat, such as croissants, muffins, or some breads. Dry pasta or rice meal packs.  Meats and other proteins  Fatty cuts of meat. Ribs. Fried meat. Hartman. Bologna, salami, and other precooked or cured meats, such as sausages or meat loaves. Fat from the back of a pig (fatback). Bratwurst. Salted nuts and seeds. Canned beans with added salt. Canned or smoked fish. Whole eggs or egg yolks. Chicken or turkey with skin.  Dairy  Whole or 2% milk, cream, and half-and-half. Whole or full-fat cream cheese. Whole-fat or sweetened yogurt. Full-fat cheese. Nondairy creamers. Whipped toppings. Processed cheese and cheese spreads.  Fats and oils  Butter. Stick margarine. Lard. Shortening. Ghee. Hartman fat. Tropical oils, such as coconut, palm kernel, or palm oil.  Seasonings and condiments  Onion salt, garlic salt, seasoned salt, table salt, and sea salt. [place] Region[place] sauce. Tartar sauce. Barbecue sauce. Teriyaki sauce. Soy sauce, including reduced-sodium. Steak sauce. Canned and packaged gravies. Fish sauce. Oyster sauce. Cocktail sauce. Store-bought horseradish. Ketchup. Mustard. Meat flavorings and tenderizers. Bouillon cubes. Hot sauces. Pre-made or packaged marinades. Pre-made or packaged taco seasonings. Relishes. Regular salad dressings.  Other foods  Salted popcorn and pretzels.  The items listed above may not be a complete list of foods and beverages you should avoid. Contact a dietitian for more information.  Where to find more information  National Heart, Lung, and Blood [place]: www.nhlbi.nih.gov  American Heart Association: www.heart.org  Academy of Nutrition and Dietetics: www.eatright.org  National Kidney Foundation: www.kidney.org  Summary  The DASH eating plan is a healthy eating plan that has been shown to reduce high blood pressure (hypertension). It may also reduce your risk for type 2 diabetes, heart disease, and stroke.  When on the DASH eating plan, aim to eat more  fresh fruits and vegetables, whole grains, lean proteins, low-fat dairy, and heart-healthy fats.  With the DASH eating plan, you should limit salt (sodium) intake to 2,300 mg a day. If you have hypertension, you may need to reduce your sodium intake to 1,500 mg a day.  Work with your health care provider or dietitian to adjust your eating plan to your individual calorie needs.  This information is not intended to replace advice given to you by your health care provider. Make sure you discuss any questions you have with your health care provider.  Document Revised: 11/20/2020 Document Reviewed: 11/20/2020  [employer] Patient Education © 2022 [employer].

## 2023-05-11 NOTE — PROGRESS NOTES
A My-Chart message has been sent to the patient for PATIENT ROUNDING with [facility].

## 2023-05-11 NOTE — PROGRESS NOTES
Patient: Sedrick Leahy  : 1955    Primary Care Provider: Kiran Madden MD    Requesting Provider: Pilo Arango APRN        History    Chief Complaint: back pain   Chief Complaint   Patient presents with   • BACK & LEG PAIN     NEW PATIENT/DR MADDEN: patient with back & LLE pain.  She had previous surgery by Dr Gilbert in  (neck surgery).  Her imaging is @ [facility] (uploaded) and xrays @ [facility].       History of Present Illness: 59-year-old female long history of low back pain and chronic neck pain.  She had a anterior cervical fusion by Dr. Espitia and then had a posterior facet distraction surgery from Dr. Gilbert.  She still has chronic neck pain.  She also complains of low back pain.  It is left greater than right paraspinal back pain.  She gets occasional leg pain but she is also diagnosed with a left trochanteric pain syndrome.  She is followed by Dr. Skelton and has had injections in the left hip.  She also has a torn meniscus currently and she is limping because of it.  Patient denies any positional or temporal modifying factors for the low back pain.  She said no physical therapy for the back pain, no chiropractic care for the back pain, no pain management injections for the back pain.  She is over-the-counter pain medication.  Review of Systems   Musculoskeletal: Positive for arthralgias, back pain and neck pain.   All other systems reviewed and are negative.      Past Medical History:   Past Medical History:   Diagnosis Date   • Arthritis    • Diabetes mellitus     diet controlled   • Hx of colonic polyp    • Hypertension    • Joint pain     Hip   • Lower back pain    • Neck pain    • PONV (postoperative nausea and vomiting)        Past Surgical History:   Past Surgical History:   Procedure Laterality Date   • ANTERIOR CERVICAL DISCECTOMY W/ FUSION N/A 2020    Procedure: EXPLORATION OF FUSION C5-6, ANTERIOR CERVICAL DISCECTOMY FUSION C4-5, C6-7 REVISON ANTERIOR FUSION C5-6 WITH  INSTRUMENTATION C4-7;  Surgeon: KEN Gilbert MD;  Location:  [facility] OR;  Service: Orthopedic Spine;  Laterality: N/A;   • APPENDECTOMY     • COLONOSCOPY  06/06/2016    Normal exam repeat in 5 years   • COLONOSCOPY N/A 01/14/2020    Diverticulosis repeat exam in 5 years   • COLONOSCOPY W/ POLYPECTOMY  04/04/2012    Hyperplastic polyp cecum repeat exam in 1 year   • ENDOSCOPY  08/14/2014    Very tortuous distal esophagus dilated 50 Fr, HH    • ENDOSCOPY N/A 01/18/2021    A fundoplication was found, dilated   • HARDWARE REMOVAL N/A 11/20/2020    Procedure: REMOVAL OF INSTRUMENTATION;  Surgeon: KEN Gilbert MD;  Location:  [facility] OR;  Service: Orthopedic Spine;  Laterality: N/A;   • HYSTERECTOMY      Laparoscopic Hysterectomy bilateral salpingectomy for bleeding   • NECK SURGERY     • NISSEN FUNDOPLICATION     • POSTERIOR CERVICAL FUSION N/A 12/03/2020    Procedure: POSTERIOR SPINAL FUSION WITH INSTRUMENTATION C4-7;  Surgeon: KEN Gilbert MD;  Location:  [facility] OR;  Service: Orthopedic Spine;  Laterality: N/A;   • STEROID INJECTION Left 06/30/2022    Procedure: LEFT HIP FLUROSCOPIC GUIDED CORTICOSTEROID INJECTION;  Surgeon: Herberth Skelton MD;  Location:  [facility] OR;  Service: Orthopedics;  Laterality: Left;   • US GUIDED LYMPH NODE BIOPSY  08/03/2020       Family History: family history includes Breast cancer in her maternal aunt, maternal aunt, and maternal aunt; Colon cancer in her mother; Colon polyps in her mother; Diabetes in her brother, brother, maternal aunt, son, and son; Heart attack in her paternal grandmother; Lung cancer in her maternal aunt; Lymphoma in her maternal aunt; No Known Problems in her father.    Social History:  reports that she quit smoking about 32 years ago. Her smoking use included cigarettes. She started smoking about 68 years ago. She smoked an average of 2 packs per day. She has never used smokeless tobacco. She reports current alcohol use. She reports that she does not use  drugs.    Medications:  (Not in a hospital admission)      Allergies:  Penicillins    Physical Exam:     Physical Exam  Eyes:      Extraocular Movements: EOM normal.      Pupils: Pupils are equal, round, and reactive to light.   Neurological:      Mental Status: She is oriented to person, place, and time.      Motor: Motor strength is normal.      Coordination: Finger-Nose-Finger Test normal.      Gait: Gait is intact.   Psychiatric:         Speech: Speech normal.         Neurologic Exam     Mental Status   Oriented to person, place, and time.   Attention: normal.   Speech: speech is normal   Level of consciousness: alert  Knowledge: good.     Cranial Nerves     CN II   Visual fields full to confrontation.     CN III, IV, VI   Pupils are equal, round, and reactive to light.  Extraocular motions are normal.     CN V   Facial sensation intact.     CN VII   Facial expression full, symmetric.     CN VIII   CN VIII normal.     CN IX, X   CN IX normal.   CN X normal.     CN XI   CN XI normal.     CN XII   CN XII normal.     Motor Exam   Muscle bulk: normal  Overall muscle tone: normal  Right arm pronator drift: absent  Left arm pronator drift: absent    Strength   Strength 5/5 throughout.     Sensory Exam   Light touch normal.   Pinprick normal.     Gait, Coordination, and Reflexes     Gait  Gait: normal    Coordination   Finger to nose coordination: normal    Tremor   Resting tremor: absent  Intention tremor: absent  Action tremor: absent    Reflexes   Reflexes 2+ except as noted.         Independent Review of Radiographic Studies:   MRI of the lumbar spine and plain x-rays show no significant degenerative changes.  There is no significant instabilities.  There is no neuroforaminal compromise or compression.    ASSESSMENT/PLAN: The patient complains of a low back pain.  This is also only a myofascial pain syndrome.  She does not require any surgical intervention.  We discussed this at length.  I recommend she pursue  aggressive nonsurgical care such as chiropractic care, physical therapy, injection treatments and medication management.  1. Degeneration of lumbar or lumbosacral intervertebral disc    2. Chronic left-sided low back pain with left-sided sciatica    3. Class 1 obesity due to excess calories with serious comorbidity and body mass index (BMI) of 32.0 to 32.9 in adult    4. Former smoker          Return if symptoms worsen or fail to improve.      Ten Tariq MD

## 2023-06-12 ENCOUNTER — HOSPITAL ENCOUNTER (OUTPATIENT)
Dept: ULTRASOUND IMAGING | Facility: HOSPITAL | Age: 68
Discharge: HOME | End: 2023-06-12
Admitting: ORTHOPAEDIC SURGERY
Payer: MEDICARE

## 2023-06-12 ENCOUNTER — TRANSCRIBE ORDERS (OUTPATIENT)
Dept: ADMINISTRATIVE | Facility: HOSPITAL | Age: 68
End: 2023-06-12
Payer: MEDICARE

## 2023-06-12 DIAGNOSIS — M79.662 PAIN IN LEFT LOWER LEG: Primary | ICD-10-CM

## 2023-06-12 DIAGNOSIS — M79.662 PAIN IN LEFT LOWER LEG: ICD-10-CM

## 2023-06-12 PROCEDURE — 93971 EXTREMITY STUDY: CPT

## 2023-06-14 ENCOUNTER — TELEPHONE (OUTPATIENT)
Dept: FAMILY MEDICINE CLINIC | Facility: CLINIC | Age: 68
End: 2023-06-14

## 2023-06-14 DIAGNOSIS — R01.1 MURMUR: Primary | ICD-10-CM

## 2023-06-14 NOTE — TELEPHONE ENCOUNTER
Caller: Sedrick Leahy    Relationship: Self    Best call back number: [phone]     What is the best time to reach you: ANY    Who are you requesting to speak with (clinical staff, provider,  specific staff member):  CLINICAL    What was the call regarding: THE  PATIENT STATS THAT A HEART MURMUR WAS FOUND DURING HER KNEE SURGERY AND STATES THAT SHE WOULD LIKE TO KNOW IF THERE IS ANYTHING THAT SHE NEEDS TO DO.  PLEASE ADVISE.

## 2023-06-16 RX ORDER — AMITRIPTYLINE HYDROCHLORIDE 100 MG/1
TABLET, FILM COATED ORAL
Qty: 180 TABLET | Refills: 0 | Status: SHIPPED | OUTPATIENT
Start: 2023-06-16

## 2023-08-07 ENCOUNTER — TELEPHONE (OUTPATIENT)
Dept: FAMILY MEDICINE CLINIC | Facility: CLINIC | Age: 68
End: 2023-08-07
Payer: MEDICARE

## 2023-08-07 NOTE — TELEPHONE ENCOUNTER
Caller: BELLE PARRA    Relationship: Other [employer]    Best call back number: [phone]     What is the best time to reach you: ANY    Who are you requesting to speak with (clinical staff, provider,  specific staff member):  CLINICAL    What was the call regarding: THE PATIENT AND HER  STATE THAT SHE IS HAVING TROUBLE WITH HER LIFE INSURANCE BECAUSE OF A PREVIOUS LIVER TEST. THE PATIENT AND HER  STATE THAT THEY WOULD LIKE A CALL BACK TO REGARDING THE PATIENT'S LIVER TEST.

## 2023-08-07 NOTE — TELEPHONE ENCOUNTER
Spoke with patient and she is aware of this, I also spoke with param and he is getting us a release of information form and going to fax to office

## 2023-08-24 ENCOUNTER — OFFICE VISIT (OUTPATIENT)
Dept: FAMILY MEDICINE CLINIC | Facility: CLINIC | Age: 68
End: 2023-08-24
Payer: MEDICARE

## 2023-08-24 VITALS
HEART RATE: 100 BPM | DIASTOLIC BLOOD PRESSURE: 78 MMHG | HEIGHT: 63 IN | OXYGEN SATURATION: 98 % | WEIGHT: 181 LBS | SYSTOLIC BLOOD PRESSURE: 122 MMHG | TEMPERATURE: 97.7 F | RESPIRATION RATE: 18 BRPM | BODY MASS INDEX: 32.07 KG/M2

## 2023-08-24 DIAGNOSIS — R05.1 ACUTE COUGH: ICD-10-CM

## 2023-08-24 DIAGNOSIS — R19.7 DIARRHEA, UNSPECIFIED TYPE: ICD-10-CM

## 2023-08-24 DIAGNOSIS — M47.816 LUMBAR ARTHROPATHY: ICD-10-CM

## 2023-08-24 DIAGNOSIS — R50.9 FEBRILE ILLNESS: ICD-10-CM

## 2023-08-24 DIAGNOSIS — R52 BODY ACHES: ICD-10-CM

## 2023-08-24 RX ORDER — CIPROFLOXACIN 500 MG/1
500 TABLET, FILM COATED ORAL 2 TIMES DAILY
Qty: 14 TABLET | Refills: 0 | Status: SHIPPED | OUTPATIENT
Start: 2023-08-24

## 2023-08-24 RX ORDER — HYDROCODONE BITARTRATE AND ACETAMINOPHEN 5; 325 MG/1; MG/1
1 TABLET ORAL EVERY 6 HOURS PRN
Qty: 12 TABLET | Refills: 0 | Status: SHIPPED | OUTPATIENT
Start: 2023-08-24

## 2023-08-24 NOTE — PROGRESS NOTES
Subjective   Sedrick Leahy is a 68 y.o. female presenting with chief complaint of:   Chief Complaint   Patient presents with    URI     Fever, cough,congestion, bodyaches     AWV NA  Last Completed Annual Wellness Visit       This patient has no relevant Health Maintenance data.             History of Present Illness :  With .  Here for primarily an acute issue today; .3 day illness.  3 days onset of diffuse headache, malaise, dry cough, frequent loose stools and body aches.  Particularly denies dysuria open sores hemoptysis sputum production hematuria confusion vomiting recent tick bites.  Has had mosquito bites.  COVID-negative here today.  This mimics recent events. Has felt feverish without taking temp.     Has multiple chronic problems to consider that might have a bearing on today's issues; not an interval appointment.       Chronic/acute problems reviewed today:   1. Body aches    2. Diarrhea, unspecified type    3. Febrile illness    4. Lumbar arthropathy    5. Acute cough      Has an/another acute issue today: above.    The following portions of the patient's history were reviewed and updated as appropriate: allergies, current medications, past family history, past medical history, past social history, past surgical history, and problem list.      Current Outpatient Medications:     amitriptyline (ELAVIL) 100 MG tablet, TAKE ONE (1) TO TWO (2) TABLETS AT BEDTIME AS NEEDED SLEEP, Disp: 180 tablet, Rfl: 0    cholecalciferol (VITAMIN D3) 25 MCG (1000 UT) tablet, Take 1 tablet by mouth Daily., Disp: , Rfl:     doxycycline (MONODOX) 100 MG capsule, Take 1 capsule by mouth 2 (Two) Times a Day., Disp: 30 capsule, Rfl: 0    HYDROcodone-acetaminophen (Norco) 5-325 MG per tablet, Take 1 tablet by mouth Every 6 (Six) Hours As Needed for Severe Pain., Disp: 20 tablet, Rfl: 0    nystatin (MYCOSTATIN) 100,000 unit/mL suspension, Swish and swallow 5 mL 4 (Four) Times a Day., Disp: 473 mL, Rfl: 0    No problems  with medications.    Allergies   Allergen Reactions    Penicillins Swelling and Rash       Review of Systems   Constitutional:  Positive for activity change, appetite change, fatigue and fever. Negative for chills.   Respiratory:  Positive for cough. Negative for wheezing.    Cardiovascular:  Negative for chest pain and palpitations.   Gastrointestinal:  Positive for diarrhea. Negative for abdominal pain, nausea and vomiting.   Genitourinary:  Negative for dysuria.   Musculoskeletal:  Positive for myalgias. Negative for neck stiffness.   Skin:  Negative for rash.   Neurological:  Positive for headaches.     Screening:  Last Completed Mammogram            MAMMOGRAM (Every 2 Years) Next due on 7/12/2025 07/12/2023  Outside Claim: [facility] MAMMOGRAM SCREENING BILAT DIGITAL W CAD,CHG SCREENING DIGITAL BREAST TOMOSYNTHESIS BI    11/02/2021  SCANNED - MAMMO    01/10/2020  Mammo Screening Digital Tomosynthesis Bilateral With CAD    12/22/2017  SCANNED - MAMMO    05/07/2015  Outside Claim:  MAMMOGRAM SCREENING BILAT DIGITAL,CHG COMPUTER-AIDED DETECTION SCREENING MAMMOGRAPHY                   Screening:  No gyne BRIONNA/[facility]/1988   Mammogram: 7.12.23-see claims above  Bone density: 1.10.20 Bone d-deca/Ivonne/T L3 -1.1 hip neck -2.0/to follow   Low dose CT chest:Tobacco-smoker/age 14/2 ppd/dc 1982 (9y)  NA  GI:   Colonoscopy/[facility]/6.6.16/5y  Cologuard +/11.12.19  Colon-nl/[facility]/4.10.23/5y  EGD-irregular z/[facility]/4.10.23  Prostate: NA  Usual lab order  6m BMP, A1c  12m CBC, CMP, A1c, LIPID, TSH, Vit D    Copy/paste function used for ROS/exam AND each area of these were reviewed, updated, confirmed and supplemented as needed.  Data reviewed:   Recent admit/ER/MD visits: 6.28.23    1. Liver enzyme elevation    2. Acute pain of left knee    3. Febrile illness    4. Abnormal urine          Data review above:   Discussions/medical decisions/reviews:  BP ok  Other vitals ok  Repeat labs today  Had to leave to keep [facility]  apt; gave her copy of recent labs  Asked to decide if in/out cath here or ? [facility] outpt/gyne/etc; to return     Data review above:   Rx: reviewed and decisions:   Rx new/changes: none  No orders of the defined types were placed in this encounter.       Orders placed:   LAB/Testing/Referrals: reviewed/orders:   Today:       Orders Placed This Encounter   Procedures    Comprehensive Metabolic Panel    CBC & Differential       Last cardiac testing:   Echo:   Results for orders placed during the hospital encounter of 07/20/23    Adult Transthoracic Echo Complete W/ Cont if Necessary Per Protocol    Interpretation Summary    Left ventricular systolic function is normal. Left ventricular ejection fraction appears to be 56 - 60%.    Normal right ventricular cavity size and systolic function noted.    No significant valvular abnormalities identified on this study.    Radiology considered:   US Venous Doppler Lower Extremity Left (duplex)    Result Date: 6/12/2023  1. No evidence of left lower extremity DVT. 2. Complex elongated fluid collection within the popliteal fossa measuring approximately 3.0 x 4.7 x 0.9 cm. A Baker's cyst is most likely.  This report was finalized on 06/12/2023 12:14 by Dr. Cayetano Pina MD.     Lab Results:  A1C:  Lab Results - Last 18 Months   Lab Units 03/01/23  0922   HEMOGLOBIN A1C % 5.40     GLUCOSE:  Lab Results - Last 18 Months   Lab Units 06/28/23  0847 06/20/23  1529 03/01/23  0922 08/30/22  1045   GLUCOSE mg/dL 101* 122* 79 97     LIPID:  Lab Results - Last 18 Months   Lab Units 03/01/23  0922   CHOLESTEROL mg/dL 201*   LDL CHOL mg/dL 120*   HDL CHOL mg/dL 70*   TRIGLYCERIDES mg/dL 61     PSA:No results found for: PSA    CBC:  Lab Results - Last 18 Months   Lab Units 06/28/23  0847 06/20/23  1529 03/01/23  0922   WBC 10*3/mm3 7.33 9.35 6.53   HEMOGLOBIN g/dL 11.1* 11.8* 11.8*   HEMATOCRIT % 34.0 36.3 37.4   PLATELETS 10*3/mm3 406 329 289     BMP/CMP:  Lab Results - Last 18 Months   Lab  "Units 06/28/23  0847 06/20/23  1529 03/01/23  0922 08/30/22  1045   SODIUM mmol/L 142 139 142 143   POTASSIUM mmol/L 4.8 4.0 4.6 4.5   CHLORIDE mmol/L 106 103 105 104   CO2 mmol/L 27.0 22.5 29.1* 29.2*   GLUCOSE mg/dL 101* 122* 79 97   BUN mg/dL 15 10 14 16   CREATININE mg/dL 0.82 0.76 0.74 0.87   EGFR RESULT mL/min/1.73 77.5 84.9 88.3 72.7   CALCIUM mg/dL 9.3 9.0 9.3 9.3       HEPATIC:  Lab Results - Last 18 Months   Lab Units 06/28/23  0847 06/20/23  1529 03/01/23 0922 08/30/22  1045   ALT (SGPT) U/L 14 113* 11 12   AST (SGOT) U/L 17 127* 17 16   ALK PHOS U/L 118* 159* 106 115     HEPATITIS C ANTIBODY:   Lab Results   Component Value Date/Time    HEPCVIRUSABY <0.1 05/28/2019 07:09 AM     Vit D:  Lab Results - Last 18 Months   Lab Units 03/01/23  0922   VIT D 25 HYDROXY ng/ml 26.1*     THYROID:  Lab Results - Last 18 Months   Lab Units 03/01/23 0922   TSH uIU/mL 2.300       Objective   /78   Pulse 100   Temp 97.7 øF (36.5 øC) (Temporal)   Resp 18   Ht 160 cm (63\")   Wt 82.1 kg (181 lb)   SpO2 98%   BMI 32.06 kg/mý   Body mass index is 32.06 kg/mý.    Recent Vitals         6/28/2023 7/20/2023 8/24/2023       BP: 132/76 132/76 122/78     Pulse: 100 -- 100     Temp: 97.9 øF (36.6 øC) -- 97.7 øF (36.5 øC)     Weight: 82.1 kg (181 lb) 82.1 kg (181 lb) 82.1 kg (181 lb)     BMI (Calculated): 32.1 32.1 32.1           Wt Readings from Last 15 Encounters:   08/24/23 1601 82.1 kg (181 lb)   07/20/23 1227 82.1 kg (181 lb)   06/28/23 0901 82.1 kg (181 lb)   06/20/23 1600 80.7 kg (178 lb)   05/11/23 1310 83 kg (183 lb)   04/03/23 1319 83 kg (183 lb)   03/01/23 0950 83.4 kg (183 lb 12.8 oz)   01/05/23 1543 80.3 kg (177 lb)   12/21/22 0959 81 kg (178 lb 9.6 oz)   08/30/22 1005 80.7 kg (178 lb)   06/30/22 0916 83.8 kg (184 lb 11.9 oz)   04/06/22 1012 84.8 kg (187 lb)   03/07/22 1331 84.8 kg (187 lb)   11/04/21 1343 85 kg (187 lb 6.4 oz)   10/20/21 0832 83.9 kg (185 lb)       Physical Exam  Constitutional:       " General: She is not in acute distress.     Comments: Fatigued appearing   HENT:      Right Ear: Tympanic membrane, ear canal and external ear normal.      Left Ear: Tympanic membrane, ear canal and external ear normal.      Nose: No congestion or rhinorrhea.      Mouth/Throat:      Mouth: Mucous membranes are dry.      Pharynx: No oropharyngeal exudate or posterior oropharyngeal erythema.   Eyes:      General: No scleral icterus.     Extraocular Movements: Extraocular movements intact.      Conjunctiva/sclera: Conjunctivae normal.      Pupils: Pupils are equal, round, and reactive to light.   Cardiovascular:      Rate and Rhythm: Normal rate and regular rhythm.      Heart sounds: Normal heart sounds.   Pulmonary:      Effort: Pulmonary effort is normal.      Breath sounds: Normal breath sounds. No wheezing.   Abdominal:      General: There is no distension.      Palpations: Abdomen is soft.      Tenderness: There is no abdominal tenderness. There is no right CVA tenderness, left CVA tenderness or guarding.   Musculoskeletal:      Cervical back: Neck supple.      Right lower leg: No edema.      Left lower leg: No edema.   Lymphadenopathy:      Cervical: No cervical adenopathy.   Skin:     General: Skin is warm and dry.      Findings: No rash.   Neurological:      Mental Status: She is alert and oriented to person, place, and time. Mental status is at baseline.       Assessment & Plan     1. Body aches    2. Diarrhea, unspecified type    3. Febrile illness    4. Lumbar arthropathy    5. Acute cough        Data review above:   Discussions/medical decisions/reviews:  BP ok  Other vitals ok  Recent Vitals         6/28/2023 7/20/2023 8/24/2023       BP: 132/76 132/76 122/78     Pulse: 100 -- 100     Temp: 97.9 øF (36.6 øC) -- 97.7 øF (36.5 øC)     Weight: 82.1 kg (181 lb) 82.1 kg (181 lb) 82.1 kg (181 lb)     BMI (Calculated): 32.1 32.1 32.1           DM/A1c 5.4 3.1.23  DM/ 6.28.23  Lipid  3.1.23;  "lifestyle  PSA NA  CBC 11.1 6.28.23  Renal ok 6.28.23  Liver alk p 118 6.28.23  Vit D 26 3.1.23  Thyroid TSH ok 3.1.23    Labs/CXR (she got to [facility] and they were closed; will go in AM)  Cipro 500 bid based on risk for UTI  Sounds viral; ? Even misquito carried?     Data review above:   Rx: reviewed and decisions:   Rx new/changes: new  New Medications Ordered This Visit   Medications    ciprofloxacin (Cipro) 500 MG tablet     Sig: Take 1 tablet by mouth 2 (Two) Times a Day.     Dispense:  14 tablet     Refill:  0    HYDROcodone-acetaminophen (Norco) 5-325 MG per tablet     Sig: Take 1 tablet by mouth Every 6 (Six) Hours As Needed for Severe Pain.     Dispense:  12 tablet     Refill:  0       Orders placed:   LAB/Testing/Referrals: reviewed/orders:   Today:   Orders Placed This Encounter   Procedures    Urine Culture - Urine, Urine, Clean Catch    XR Chest PA & Lateral    Comprehensive Metabolic Panel    Sedimentation Rate    CBC & Differential    Urinalysis With Microscopic - Urine, Clean Catch     Chronic/recurrent labs above or change to:   Same     Immunization History   Administered Date(s) Administered    COVID-19 (MODERNA) 1st,2nd,3rd Dose Monovalent 06/07/2021, 07/05/2021    Fluzone High Dose =>65 Years ([employer] ONLY) 11/08/2019    Fluzone High-Dose 65+yrs 09/09/2020, 10/20/2021    Pneumococcal Conjugate 13-Valent (PCV13) 10/22/2019, 11/08/2019    Tdap 10/20/2021     We advised/reaffirmed our support/suggestion for staying complete with covid- covid boosters, seasonal flu/yearly and any missing vaccine from list we supplied; we suggest contact with local health department office to review missing/needed vaccines and then bring nursing documentation for these vaccines to this office or call this information in. Shingles became \"free\" 1.1.23 for medicare insurance.    Health maintenance:   Body mass index is 32.06 kg/mý.         Tobacco use reviewed:   Sedrick Cornellanaly  reports that she quit smoking " about 33 years ago. Her smoking use included cigarettes. She started smoking about 69 years ago. She has a 40.00 pack-year smoking history. She has never used smokeless tobacco..   There are no Patient Instructions on file for this visit.    Follow up: Return for as planned.  No future appointments.

## 2023-08-25 ENCOUNTER — LAB (OUTPATIENT)
Dept: LAB | Facility: HOSPITAL | Age: 68
End: 2023-08-25
Payer: MEDICARE

## 2023-08-25 ENCOUNTER — TELEPHONE (OUTPATIENT)
Dept: FAMILY MEDICINE CLINIC | Facility: CLINIC | Age: 68
End: 2023-08-25
Payer: MEDICARE

## 2023-08-25 ENCOUNTER — HOSPITAL ENCOUNTER (OUTPATIENT)
Dept: GENERAL RADIOLOGY | Facility: HOSPITAL | Age: 68
Discharge: HOME | End: 2023-08-25
Payer: MEDICARE

## 2023-08-25 DIAGNOSIS — R50.9 FEBRILE ILLNESS: ICD-10-CM

## 2023-08-25 DIAGNOSIS — R52 BODY ACHES: ICD-10-CM

## 2023-08-25 DIAGNOSIS — R19.7 DIARRHEA, UNSPECIFIED TYPE: ICD-10-CM

## 2023-08-25 LAB
ALBUMIN SERPL-MCNC: 3.6 G/DL (ref 3.5–5)
ALBUMIN/GLOB SERPL: 0.9 G/DL (ref 1.1–2.5)
ALP SERPL-CCNC: 114 U/L (ref 24–120)
ALT SERPL W P-5'-P-CCNC: 23 U/L (ref 0–35)
ANION GAP SERPL CALCULATED.3IONS-SCNC: 9 MMOL/L (ref 4–13)
AST SERPL-CCNC: 29 U/L (ref 7–45)
AUTO MIXED CELLS #: 1 10*3/MM3 (ref 0.1–2.6)
AUTO MIXED CELLS %: 13.4 % (ref 0.1–24)
BILIRUB SERPL-MCNC: 0.6 MG/DL (ref 0.1–1)
BUN SERPL-MCNC: 11 MG/DL (ref 5–21)
BUN/CREAT SERPL: 14.1
CALCIUM SPEC-SCNC: 9.1 MG/DL (ref 8.4–10.4)
CHLORIDE SERPL-SCNC: 100 MMOL/L (ref 98–110)
CO2 SERPL-SCNC: 29 MMOL/L (ref 24–31)
CREAT SERPL-MCNC: 0.78 MG/DL (ref 0.5–1.4)
EGFRCR SERPLBLD CKD-EPI 2021: 82.3 ML/MIN/1.73
ERYTHROCYTE [DISTWIDTH] IN BLOOD BY AUTOMATED COUNT: 13.1 % (ref 12.3–15.4)
ERYTHROCYTE [SEDIMENTATION RATE] IN BLOOD: 90 MM/HR (ref 0–30)
EXPIRATION DATE: NORMAL
GLOBULIN UR ELPH-MCNC: 4.1 GM/DL
GLUCOSE SERPL-MCNC: 132 MG/DL (ref 70–100)
HCT VFR BLD AUTO: 34.2 % (ref 34–46.6)
HGB BLD-MCNC: 10.8 G/DL (ref 12–15.9)
INTERNAL CONTROL: NORMAL
LYMPHOCYTES # BLD AUTO: 1.4 10*3/MM3 (ref 0.7–3.1)
LYMPHOCYTES NFR BLD AUTO: 18.4 % (ref 19.6–45.3)
Lab: NORMAL
MCH RBC QN AUTO: 27.3 PG (ref 26.6–33)
MCHC RBC AUTO-ENTMCNC: 31.6 G/DL (ref 31.5–35.7)
MCV RBC AUTO: 86.4 FL (ref 79–97)
NEUTROPHILS NFR BLD AUTO: 5.3 10*3/MM3 (ref 1.7–7)
NEUTROPHILS NFR BLD AUTO: 68.2 % (ref 42.7–76)
PLATELET # BLD AUTO: 334 10*3/MM3 (ref 140–450)
PMV BLD AUTO: 8.7 FL (ref 6–12)
POTASSIUM SERPL-SCNC: 3.4 MMOL/L (ref 3.5–5.3)
PROT SERPL-MCNC: 7.7 G/DL (ref 6.3–8.7)
RBC # BLD AUTO: 3.96 10*6/MM3 (ref 3.77–5.28)
SARS-COV-2 AG UPPER RESP QL IA.RAPID: NOT DETECTED
SODIUM SERPL-SCNC: 138 MMOL/L (ref 135–145)
WBC NRBC COR # BLD: 7.7 10*3/MM3 (ref 3.4–10.8)

## 2023-08-25 PROCEDURE — 81001 URINALYSIS AUTO W/SCOPE: CPT

## 2023-08-25 PROCEDURE — 85025 COMPLETE CBC W/AUTO DIFF WBC: CPT

## 2023-08-25 PROCEDURE — 71046 X-RAY EXAM CHEST 2 VIEWS: CPT

## 2023-08-25 PROCEDURE — 85652 RBC SED RATE AUTOMATED: CPT

## 2023-08-25 PROCEDURE — 80053 COMPREHEN METABOLIC PANEL: CPT

## 2023-08-25 PROCEDURE — 87086 URINE CULTURE/COLONY COUNT: CPT

## 2023-08-25 PROCEDURE — 36415 COLL VENOUS BLD VENIPUNCTURE: CPT

## 2023-08-25 NOTE — TELEPHONE ENCOUNTER
Spoke with Giselle-patient has not completed the urine yet so we will not see a result by the end of today.  She is still planning on doing the test.  I asked further about the fast heart rate it was reported that she is not in any distress and this has been an intermittent issue ongoing.  She knows to go to ER if needed.    Electronically signed by HECTOR Montoya, 08/25/23, 3:56 PM CDT.

## 2023-08-25 NOTE — TELEPHONE ENCOUNTER
Can we call the lab at [facility], [place], operated by [facility] and check on the status of UA-Dr. Madden is wanting an update for the weekend.  Also, can we call the patient and inquire about clinical status-fever, blood in stool, any other symptom that is getting worse or not better before the weekend.    Electronically signed by HECTOR Montoya, 08/25/23, 3:44 PM CDT.

## 2023-08-26 ENCOUNTER — TRANSCRIBE ORDERS (OUTPATIENT)
Dept: ADMINISTRATIVE | Facility: HOSPITAL | Age: 68
End: 2023-08-26
Payer: MEDICARE

## 2023-08-26 ENCOUNTER — LAB (OUTPATIENT)
Dept: LAB | Facility: HOSPITAL | Age: 68
End: 2023-08-26
Payer: MEDICARE

## 2023-08-26 DIAGNOSIS — R19.7 DIARRHEA, UNSPECIFIED TYPE: ICD-10-CM

## 2023-08-26 DIAGNOSIS — R52 BODY ACHES: ICD-10-CM

## 2023-08-26 DIAGNOSIS — R50.9 FEBRILE ILLNESS: ICD-10-CM

## 2023-08-26 DIAGNOSIS — R52 BODY ACHES: Primary | ICD-10-CM

## 2023-08-26 LAB
BACTERIA UR QL AUTO: NORMAL /HPF
BILIRUB UR QL STRIP: NEGATIVE
CLARITY UR: CLEAR
COLOR UR: YELLOW
GLUCOSE UR STRIP-MCNC: NEGATIVE MG/DL
HGB UR QL STRIP.AUTO: NEGATIVE
KETONES UR QL STRIP: NEGATIVE
LEUKOCYTE ESTERASE UR QL STRIP.AUTO: NEGATIVE
NITRITE UR QL STRIP: NEGATIVE
PH UR STRIP.AUTO: 6 [PH] (ref 5–8)
PROT UR QL STRIP: NEGATIVE
RBC # UR STRIP: NORMAL /HPF
REF LAB TEST METHOD: NORMAL
SP GR UR STRIP: <=1.005 (ref 1–1.03)
SQUAMOUS #/AREA URNS HPF: NORMAL /HPF
UROBILINOGEN UR QL STRIP: NORMAL
WBC # UR STRIP: NORMAL /HPF

## 2023-08-27 LAB — BACTERIA SPEC AEROBE CULT: NORMAL

## 2023-09-13 ENCOUNTER — TELEPHONE (OUTPATIENT)
Dept: FAMILY MEDICINE CLINIC | Facility: CLINIC | Age: 68
End: 2023-09-13
Payer: MEDICARE

## 2023-09-13 NOTE — TELEPHONE ENCOUNTER
Caller: Sedrick Leahy    Relationship: Self    Best call back number: [phone]        Who are you requesting to speak with (clinical staff, provider,  specific staff member): CLINICAL STAFF    Do you know the name of the person who called: PATIENT    What was the call regarding: FALLING, STAGGERING, LOSE HER BALANCE AND FALLS AND DOESN'T REALIZE SHE FELL, FELL ON HER HEAD

## 2023-09-14 ENCOUNTER — OFFICE VISIT (OUTPATIENT)
Dept: FAMILY MEDICINE CLINIC | Facility: CLINIC | Age: 68
End: 2023-09-14
Payer: MEDICARE

## 2023-09-14 VITALS
TEMPERATURE: 97.1 F | BODY MASS INDEX: 32.07 KG/M2 | WEIGHT: 181 LBS | HEART RATE: 92 BPM | OXYGEN SATURATION: 99 % | DIASTOLIC BLOOD PRESSURE: 78 MMHG | HEIGHT: 63 IN | SYSTOLIC BLOOD PRESSURE: 122 MMHG | RESPIRATION RATE: 18 BRPM

## 2023-09-14 DIAGNOSIS — I10 PRIMARY HYPERTENSION: ICD-10-CM

## 2023-09-14 DIAGNOSIS — R27.0 ATAXIA: ICD-10-CM

## 2023-09-14 DIAGNOSIS — R29.6 FALLING: ICD-10-CM

## 2023-09-14 DIAGNOSIS — D64.9 ANEMIA, UNSPECIFIED TYPE: ICD-10-CM

## 2023-09-14 DIAGNOSIS — R70.0 ELEVATED SED RATE: ICD-10-CM

## 2023-09-14 DIAGNOSIS — S51.011A SKIN TEAR OF RIGHT ELBOW WITHOUT COMPLICATION, INITIAL ENCOUNTER: ICD-10-CM

## 2023-09-14 PROBLEM — R26.9 GAIT DIFFICULTY: Status: ACTIVE | Noted: 2023-09-14

## 2023-09-14 NOTE — PROGRESS NOTES
ARAMIS”.   Subjective   Sedrick Leahy is a 68 y.o. female presenting with chief complaint of:   Chief Complaint   Patient presents with    Fall     AWV 10.20.21  Last Completed Annual Wellness Visit       This patient has no relevant Health Maintenance data.             History of Present Illness :  Alone.  Here for primarily concern with unstable gait.   Has led to occ fall without injury.  Denies numbness LE, stubbing toe, LOC, near syncope, alcohol, head injury though occ/new headache diffuse. A lot of spinal disease    Has multiple chronic problems to consider that might have a bearing on today's issues; not an interval appointment.       Chronic/acute problems reviewed today:   1. Ataxia: balance seems off; more frequent over last year.     2. Falling: infrequent more often from losing balance; most recent this week with skin tear to R arm.    3. Primary hypertension: Chronic/stable. Stable here past/no recent home blood pressures.  No significant chest pain, SOB, LE edema, orthopnea, near syncope, dizziness/light headness.   Recent Vitals         7/20/2023 8/24/2023 9/14/2023       BP: 132/76 122/78 122/78     Pulse: -- 100 92     Temp: -- 97.7 °F (36.5 °C) 97.1 °F (36.2 °C)     Weight: 82.1 kg (181 lb) 82.1 kg (181 lb) 82.1 kg (181 lb)     BMI (Calculated): 32.1 32.1 32.1              4. Anemia, unspecified type: history of; denies melena, hematochezia, abdominal pain   5 Skin tear to R elbow this week.     6 Elevated sed rate 90 below     Has an/another acute issue today: none.    The following portions of the patient's history were reviewed and updated as appropriate: allergies, current medications, past family history, past medical history, past social history, past surgical history, and problem list.      Current Outpatient Medications:     amitriptyline (ELAVIL) 100 MG tablet, TAKE ONE (1) TO TWO (2) TABLETS AT BEDTIME AS NEEDED SLEEP, Disp: 180 tablet, Rfl: 0    cholecalciferol (VITAMIN D3) 25 MCG  (1000 UT) tablet, Take 1 tablet by mouth Daily., Disp: , Rfl:     doxycycline (MONODOX) 100 MG capsule, Take 1 capsule by mouth 2 (Two) Times a Day., Disp: 30 capsule, Rfl: 0    HYDROcodone-acetaminophen (Norco) 5-325 MG per tablet, Take 1 tablet by mouth Every 6 (Six) Hours As Needed for Severe Pain., Disp: 12 tablet, Rfl: 0    nystatin (MYCOSTATIN) 100,000 unit/mL suspension, Swish and swallow 5 mL 4 (Four) Times a Day., Disp: 473 mL, Rfl: 0    No problems with medications.    Allergies   Allergen Reactions    Penicillins Swelling and Rash       Review of Systems   Constitutional:  Negative for activity change, appetite change, chills, fatigue and fever.   HENT: Negative.     Respiratory:  Negative for cough and shortness of breath.    Cardiovascular:  Negative for chest pain, palpitations and leg swelling.   Gastrointestinal: Negative.    Genitourinary:  Negative for dysuria.   Musculoskeletal:  Positive for back pain. Negative for arthralgias and myalgias.   Neurological:  Positive for dizziness and light-headedness. Negative for tremors, seizures, syncope, speech difficulty, weakness and numbness.       Screening:  No gyne BRIONNA/[facility]/1988   Mammogram: 7.12.23-see claims above  Bone density: 1.10.20 Bone d-deca/Ivonne/T L3 -1.1 hip neck -2.0/to follow   Low dose CT chest:Tobacco-smoker/age 14/2 ppd/dc 1982 (9y)  NA  GI:   Colonoscopy//MM/6.6.16/5y  Cologuard +/11.12.19  Colon-nl//MM/4.10.23/5y  EGD-irregular z//MM/4.10.23  Prostate: NA  Usual lab order  6m BMP, A1c  12m CBC, CMP, A1c, LIPID, TSH, Vit D    Copy/paste function used for ROS/exam AND each area of these were reviewed, updated, confirmed and supplemented as needed.  Data reviewed:   Recent admit/ER/MD visits: 8.24.23    1. Body aches    2. Diarrhea, unspecified type    3. Febrile illness    4. Lumbar arthropathy    5. Acute cough          Data review above:   Discussions/medical decisions/reviews:  BP ok  Other vitals ok  Recent  Vitals           6/28/2023 7/20/2023 8/24/2023          BP: 132/76 132/76 122/78       Pulse: 100 -- 100       Temp: 97.9 °F (36.6 °C) -- 97.7 °F (36.5 °C)       Weight: 82.1 kg (181 lb) 82.1 kg (181 lb) 82.1 kg (181 lb)       BMI (Calculated): 32.1 32.1 32.1               DM/A1c 5.4 3.1.23  DM/ 6.28.23  Lipid  3.1.23; lifestyle  PSA NA  CBC 11.1 6.28.23  Renal ok 6.28.23  Liver alk p 118 6.28.23  Vit D 26 3.1.23  Thyroid TSH ok 3.1.23     Labs/CXR (she got to [facility] and they were closed; will go in AM)  Cipro 500 bid based on risk for UTI  Sounds viral; ? Even misquito carried?      Data review above:   Rx: reviewed and decisions:   Rx new/changes: new       New Medications Ordered This Visit   Medications    ciprofloxacin (Cipro) 500 MG tablet       Sig: Take 1 tablet by mouth 2 (Two) Times a Day.       Dispense:  14 tablet       Refill:  0    HYDROcodone-acetaminophen (Norco) 5-325 MG per tablet       Sig: Take 1 tablet by mouth Every 6 (Six) Hours As Needed for Severe Pain.       Dispense:  12 tablet       Refill:  0         Orders placed:   LAB/Testing/Referrals: reviewed/orders:   Today:       Orders Placed This Encounter   Procedures    Urine Culture - Urine, Urine, Clean Catch    XR Chest PA & Lateral    Comprehensive Metabolic Panel    Sedimentation Rate    CBC & Differential    Urinalysis With Microscopic - Urine, Clean Catch       Last cardiac testing:   Echo:   Results for orders placed during the hospital encounter of 07/20/23    Adult Transthoracic Echo Complete W/ Cont if Necessary Per Protocol    Interpretation Summary    Left ventricular systolic function is normal. Left ventricular ejection fraction appears to be 56 - 60%.    Normal right ventricular cavity size and systolic function noted.    No significant valvular abnormalities identified on this study.      Radiology considered:   XR Chest PA & Lateral    Result Date: 8/25/2023   No acute cardiopulmonary abnormality.  This  report was finalized on 08/25/2023 10:02 by  Juwan Roca DO.     Lab Results:  Results for orders placed or performed in visit on 08/25/23   Urine Culture - Urine, Urine, Clean Catch    Specimen: Urine, Clean Catch   Result Value Ref Range    Urine Culture 50,000 CFU/mL Mixed Nyla Isolated    Sedimentation Rate    Specimen: Blood   Result Value Ref Range    Sed Rate 90 (H) 0 - 30 mm/hr   Comprehensive Metabolic Panel    Specimen: Blood   Result Value Ref Range    Glucose 132 (H) 70 - 100 mg/dL    BUN 11 5 - 21 mg/dL    Creatinine 0.78 0.50 - 1.40 mg/dL    Sodium 138 135 - 145 mmol/L    Potassium 3.4 (L) 3.5 - 5.3 mmol/L    Chloride 100 98 - 110 mmol/L    CO2 29.0 24.0 - 31.0 mmol/L    Calcium 9.1 8.4 - 10.4 mg/dL    Total Protein 7.7 6.3 - 8.7 g/dL    Albumin 3.6 3.5 - 5.0 g/dL    ALT (SGPT) 23 0 - 35 U/L    AST (SGOT) 29 7 - 45 U/L    Alkaline Phosphatase 114 24 - 120 U/L    Total Bilirubin 0.6 0.1 - 1.0 mg/dL    Globulin 4.1 gm/dL    A/G Ratio 0.9 (L) 1.1 - 2.5 g/dL    BUN/Creatinine Ratio 14.1      Anion Gap 9.0 4.0 - 13.0 mmol/L    eGFR 82.3 >60.0 mL/min/1.73   CBC Auto Differential    Specimen: Blood   Result Value Ref Range    WBC 7.70 3.40 - 10.80 10*3/mm3    RBC 3.96 3.77 - 5.28 10*6/mm3    Hemoglobin 10.8 (L) 12.0 - 15.9 g/dL    Hematocrit 34.2 34.0 - 46.6 %    MCV 86.4 79.0 - 97.0 fL    MCH 27.3 26.6 - 33.0 pg    MCHC 31.6 31.5 - 35.7 g/dL    RDW 13.1 12.3 - 15.4 %    MPV 8.7 6.0 - 12.0 fL    Platelets 334 140 - 450 10*3/mm3    Neutrophil % 68.2 42.7 - 76.0 %    Lymphocyte % 18.4 (L) 19.6 - 45.3 %    Auto Mixed Cells % 13.4 0.1 - 24.0 %    Neutrophils, Absolute 5.30 1.70 - 7.00 10*3/mm3    Lymphocytes, Absolute 1.40 0.70 - 3.10 10*3/mm3    Auto Mixed Cells # 1.00 0.10 - 2.60 10*3/mm3   Urinalysis without microscopic (no culture) - Urine, Clean Catch    Specimen: Urine, Clean Catch   Result Value Ref Range    Color, UA Yellow Yellow, Straw    Appearance, UA Clear Clear    pH, UA 6.0 5.0 - 8.0     Specific Gravity, UA <=1.005 1.005 - 1.030    Glucose, UA Negative Negative    Ketones, UA Negative Negative    Bilirubin, UA Negative Negative    Blood, UA Negative Negative    Protein, UA Negative Negative    Leuk Esterase, UA Negative Negative    Nitrite, UA Negative Negative    Urobilinogen, UA 0.2 E.U./dL 0.2 - 1.0 E.U./dL   Urinalysis, Microscopic Only - Urine, Clean Catch    Specimen: Urine, Clean Catch   Result Value Ref Range    RBC, UA None Seen None Seen /HPF    WBC, UA None Seen None Seen /HPF    Bacteria, UA None Seen None Seen /HPF    Squamous Epithelial Cells, UA None Seen None Seen, 0-2 /HPF    Methodology Manual Light Microscopy        A1C:  Lab Results - Last 18 Months   Lab Units 03/01/23  0922   HEMOGLOBIN A1C % 5.40     GLUCOSE:  Lab Results - Last 18 Months   Lab Units 08/25/23  0928 06/28/23  0847 06/20/23  1529 03/01/23  0922 08/30/22  1045   GLUCOSE mg/dL 132* 101* 122* 79 97     LIPID:  Lab Results - Last 18 Months   Lab Units 03/01/23  0922   CHOLESTEROL mg/dL 201*   LDL CHOL mg/dL 120*   HDL CHOL mg/dL 70*   TRIGLYCERIDES mg/dL 61     PSA:No results found for: PSA    CBC:  Lab Results - Last 18 Months   Lab Units 08/25/23  0928 06/28/23  0847 06/20/23  1529 03/01/23  0922   WBC 10*3/mm3 7.70 7.33 9.35 6.53   HEMOGLOBIN g/dL 10.8* 11.1* 11.8* 11.8*   HEMATOCRIT % 34.2 34.0 36.3 37.4   PLATELETS 10*3/mm3 334 406 329 289     BMP/CMP:  Lab Results - Last 18 Months   Lab Units 08/25/23  0928 06/28/23  0847 06/20/23  1529 03/01/23  0922 08/30/22  1045   SODIUM mmol/L 138 142 139 142 143   POTASSIUM mmol/L 3.4* 4.8 4.0 4.6 4.5   CHLORIDE mmol/L 100 106 103 105 104   CO2 mmol/L 29.0 27.0 22.5 29.1* 29.2*   GLUCOSE mg/dL 132* 101* 122* 79 97   BUN mg/dL 11 15 10 14 16   CREATININE mg/dL 0.78 0.82 0.76 0.74 0.87   EGFR RESULT mL/min/1.73  --  77.5 84.9 88.3 72.7   CALCIUM mg/dL 9.1 9.3 9.0 9.3 9.3       HEPATIC:  Lab Results - Last 18 Months   Lab Units 08/25/23  0928 06/28/23  0847 06/20/23  1529  "03/01/23  0922 08/30/22  1045   ALT (SGPT) U/L 23 14 113* 11 12   AST (SGOT) U/L 29 17 127* 17 16   ALK PHOS U/L 114 118* 159* 106 115     HEPATITIS C ANTIBODY:   Lab Results   Component Value Date/Time    HEPCVIRUSABY <0.1 05/28/2019 07:09 AM     Vit D:  Lab Results - Last 18 Months   Lab Units 03/01/23 0922   VIT D 25 HYDROXY ng/ml 26.1*     THYROID:  Lab Results - Last 18 Months   Lab Units 03/01/23 0922   TSH uIU/mL 2.300       Objective   /78   Pulse 92   Temp 97.1 °F (36.2 °C) (Temporal)   Resp 18   Ht 160 cm (63\")   Wt 82.1 kg (181 lb)   SpO2 99%   BMI 32.06 kg/m²   Body mass index is 32.06 kg/m².    Recent Vitals         7/20/2023 8/24/2023 9/14/2023       BP: 132/76 122/78 122/78     Pulse: -- 100 92     Temp: -- 97.7 °F (36.5 °C) 97.1 °F (36.2 °C)     Weight: 82.1 kg (181 lb) 82.1 kg (181 lb) 82.1 kg (181 lb)     BMI (Calculated): 32.1 32.1 32.1           Wt Readings from Last 15 Encounters:   09/14/23 1405 82.1 kg (181 lb)   08/24/23 1601 82.1 kg (181 lb)   07/20/23 1227 82.1 kg (181 lb)   06/28/23 0901 82.1 kg (181 lb)   06/20/23 1600 80.7 kg (178 lb)   05/11/23 1310 83 kg (183 lb)   04/03/23 1319 83 kg (183 lb)   03/01/23 0950 83.4 kg (183 lb 12.8 oz)   01/05/23 1543 80.3 kg (177 lb)   12/21/22 0959 81 kg (178 lb 9.6 oz)   08/30/22 1005 80.7 kg (178 lb)   06/30/22 0916 83.8 kg (184 lb 11.9 oz)   04/06/22 1012 84.8 kg (187 lb)   03/07/22 1331 84.8 kg (187 lb)   11/04/21 1343 85 kg (187 lb 6.4 oz)       Physical Exam  GENERAL:  Well nourished/developed in no acute distress.   SKIN: Turgor excellent, without wound, rash, lesion beyond PHOTO  HEENT: Normal cephalic without trauma.  Pupils equal round reactive to light. Extraocular motions full without nystagmus.   External canals nonobstructive nontender without reddness. Tymphatic membranes bronw with tyler structures intact.   Oral cavity without growths, exudates, and moist.  Posterior pharynx without mass, obstruction, redness.  No " thyromegaly, mass, tenderness, lymphadenopathy and supple.  CV: Regular rhythm.  No murmur, gallop,  edema. Posterior pulses intact.  No carotid bruits.  CHEST: No chest wall tenderness or mass.   LUNGS: Symmetric motion with clear to auscultation.   ABD: Soft, nontender without mass.   ORTHO: Symmetric extremities without swelling/point tenderness.  Full gross range of motion.  NEURO: CN 2-12 grossly intact.  Symmetric facies and UE/LE. 2-3/5 strength throughout. 1/4 x bicep knee equal reflexes.  Nonfocal use extremities. Speech clear. Intact light touch with monofilament, vibratory sensation with tuning fork; equal toes/distal feet.  Finger nose intact.  Tandom walk unsteady.   PSYCH: Oriented x 3.  Pleasant calm, well kept.  Purposeful/directed conservation with intact short/long gross memory.          Assessment & Plan     1. Ataxia    2. Falling    3. Primary hypertension    4. Anemia, unspecified type        Data review above:   Discussions/medical decisions/reviews:  BP ok  Other vitals ok  Recent Vitals         7/20/2023 8/24/2023 9/14/2023       BP: 132/76 122/78 122/78     Pulse: -- 100 92     Temp: -- 97.7 °F (36.5 °C) 97.1 °F (36.2 °C)     Weight: 82.1 kg (181 lb) 82.1 kg (181 lb) 82.1 kg (181 lb)     BMI (Calculated): 32.1 32.1 32.1           DM/A1c 5.4 3.1.23  DM/ 8.25.23  Lipid  3.1.23; lifestyle  PSA NA  CBC 10.8 8.25.23  Substrates; none  Sed rate 90 8.25.23-with acute illness  Renal ok 8.25.23  K 3.4 8.25.23  Liver ok 8.25.23; previous up 6.20.23  Vit D 26 3.1.23  Thyroid TSH 3.1.23    Screening reviewed/updated   Vaccines discussed (see below) Tdap up to date.   Soap/water clean bid prn  Careful with walking/like.   MRI head  Neuro review  Have to see if sed rate recent, anemia, others connect    Follow up: Return for lab today then ob x 2 stool then has handicap form she has left here; return at 4-8w.  Future Appointments   Date Time Provider Department Center   10/31/2023  3:30 PM  "Kiran Madden MD [facility]       Data review above:   Rx: reviewed and decisions:   Rx new/changes: none  No orders of the defined types were placed in this encounter.      Orders placed:   LAB/Testing/Referrals: reviewed/orders:   Today:   Orders Placed This Encounter   Procedures    MRI Brain With & Without Contrast    Ferritin    Vitamin B12    Folate    Iron Profile    Reticulocytes    Ambulatory Referral to Neurology    CBC & Differential     Chronic/recurrent labs above or change to:   Same     Immunization History   Administered Date(s) Administered    COVID-19 (MODERNA) 1st,2nd,3rd Dose Monovalent 06/07/2021, 07/05/2021    Fluzone High Dose =>65 Years ([employer] ONLY) 11/08/2019    Fluzone High-Dose 65+yrs 09/09/2020, 10/20/2021    Pneumococcal Conjugate 13-Valent (PCV13) 10/22/2019, 11/08/2019    Tdap 10/20/2021     We advised/reaffirmed our support/suggestion for staying complete with covid- covid boosters, seasonal flu/yearly and any missing vaccine from list we supplied; when cannot be given here we suggest contact with local health department office or pharmacy to review missing/needed vaccines and then bring nursing documentation for these vaccines to this office or call this information in. Shingles became \"free\" 1.1.23 for medicare insurance.    Health maintenance:   Body mass index is 32.06 kg/m².         Tobacco use reviewed:   Sedrick Leahy  reports that she quit smoking about 33 years ago. Her smoking use included cigarettes. She started smoking about 69 years ago. She has a 40.00 pack-year smoking history. She has never used smokeless tobacco..    There are no Patient Instructions on file for this visit.          "

## 2023-09-15 DIAGNOSIS — D64.9 ANEMIA, UNSPECIFIED TYPE: ICD-10-CM

## 2023-09-15 DIAGNOSIS — D64.9 ANEMIA, UNSPECIFIED TYPE: Primary | ICD-10-CM

## 2023-09-15 LAB
BASOPHILS # BLD AUTO: 0.07 10*3/MM3 (ref 0–0.2)
BASOPHILS NFR BLD AUTO: 1.2 % (ref 0–1.5)
EOSINOPHIL # BLD AUTO: 0.16 10*3/MM3 (ref 0–0.4)
EOSINOPHIL NFR BLD AUTO: 2.6 % (ref 0.3–6.2)
ERYTHROCYTE [DISTWIDTH] IN BLOOD BY AUTOMATED COUNT: 13.7 % (ref 12.3–15.4)
FERRITIN SERPL-MCNC: 16.3 NG/ML (ref 13–150)
FOLATE SERPL-MCNC: 12.6 NG/ML (ref 4.78–24.2)
HCT VFR BLD AUTO: 34 % (ref 34–46.6)
HGB BLD-MCNC: 10.7 G/DL (ref 12–15.9)
IMM GRANULOCYTES # BLD AUTO: 0.01 10*3/MM3 (ref 0–0.05)
IMM GRANULOCYTES NFR BLD AUTO: 0.2 % (ref 0–0.5)
IRON SATN MFR SERPL: 14 % (ref 20–50)
IRON SERPL-MCNC: 68 MCG/DL (ref 37–145)
LYMPHOCYTES # BLD AUTO: 1.84 10*3/MM3 (ref 0.7–3.1)
LYMPHOCYTES NFR BLD AUTO: 30.3 % (ref 19.6–45.3)
MCH RBC QN AUTO: 26.6 PG (ref 26.6–33)
MCHC RBC AUTO-ENTMCNC: 31.5 G/DL (ref 31.5–35.7)
MCV RBC AUTO: 84.6 FL (ref 79–97)
MONOCYTES # BLD AUTO: 0.53 10*3/MM3 (ref 0.1–0.9)
MONOCYTES NFR BLD AUTO: 8.7 % (ref 5–12)
NEUTROPHILS # BLD AUTO: 3.47 10*3/MM3 (ref 1.7–7)
NEUTROPHILS NFR BLD AUTO: 57 % (ref 42.7–76)
NRBC BLD AUTO-RTO: 0 /100 WBC (ref 0–0.2)
PLATELET # BLD AUTO: 345 10*3/MM3 (ref 140–450)
RBC # BLD AUTO: 4.02 10*6/MM3 (ref 3.77–5.28)
RETICS/RBC NFR AUTO: 1.49 % (ref 0.7–1.9)
TIBC SERPL-MCNC: 501 MCG/DL
UIBC SERPL-MCNC: 433 MCG/DL (ref 112–346)
VIT B12 SERPL-MCNC: 394 PG/ML (ref 211–946)
WBC # BLD AUTO: 6.08 10*3/MM3 (ref 3.4–10.8)

## 2023-09-18 LAB
ANA SER QL: NEGATIVE
CCP IGA+IGG SERPL IA-ACNC: 1 UNITS (ref 0–19)
CRP SERPL-MCNC: 3 MG/L (ref 0–10)
ERYTHROCYTE [SEDIMENTATION RATE] IN BLOOD BY WESTERGREN METHOD: 23 MM/HR (ref 0–40)
RHEUMATOID FACT SERPL-ACNC: <10 IU/ML

## 2023-09-25 RX ORDER — AMITRIPTYLINE HYDROCHLORIDE 100 MG/1
TABLET ORAL
Qty: 180 TABLET | Refills: 0 | Status: SHIPPED | OUTPATIENT
Start: 2023-09-25

## 2023-10-06 ENCOUNTER — HOSPITAL ENCOUNTER (OUTPATIENT)
Dept: MRI IMAGING | Facility: HOSPITAL | Age: 68
Discharge: HOME | End: 2023-10-06
Admitting: FAMILY MEDICINE
Payer: MEDICARE

## 2023-10-06 ENCOUNTER — TELEPHONE (OUTPATIENT)
Dept: FAMILY MEDICINE CLINIC | Facility: CLINIC | Age: 68
End: 2023-10-06
Payer: MEDICARE

## 2023-10-06 DIAGNOSIS — R27.0 ATAXIA: ICD-10-CM

## 2023-10-06 DIAGNOSIS — R29.6 FALLING: ICD-10-CM

## 2023-10-06 LAB — CREAT BLDA-MCNC: 0.8 MG/DL (ref 0.6–1.3)

## 2023-10-06 PROCEDURE — 82565 ASSAY OF CREATININE: CPT

## 2023-10-06 PROCEDURE — 70553 MRI BRAIN STEM W/O & W/DYE: CPT

## 2023-10-06 PROCEDURE — A9577 INJ MULTIHANCE: HCPCS | Performed by: FAMILY MEDICINE

## 2023-10-06 PROCEDURE — 0 GADOBENATE DIMEGLUMINE 529 MG/ML SOLUTION: Performed by: FAMILY MEDICINE

## 2023-10-06 RX ADMIN — GADOBENATE DIMEGLUMINE 17 ML: 529 INJECTION, SOLUTION INTRAVENOUS at 10:58

## 2023-10-07 NOTE — TELEPHONE ENCOUNTER
----- Message from HECTOR Montoya sent at 10/6/2023 11:32 AM CDT -----  Save for Dr. Madden  Electronically signed by HECTOR Montoya, 10/06/23, 11:31 AM CDT.

## 2023-10-07 NOTE — TELEPHONE ENCOUNTER
Various changes on MRI  Needs to see neurology; which should be already in the plans  Confirm neuro referral in progress and alert patient that visit with neuro these issues on MRI need to also be discussed

## 2023-10-10 ENCOUNTER — TELEPHONE (OUTPATIENT)
Dept: FAMILY MEDICINE CLINIC | Facility: CLINIC | Age: 68
End: 2023-10-10
Payer: MEDICARE

## 2023-10-10 ENCOUNTER — PATIENT MESSAGE (OUTPATIENT)
Dept: FAMILY MEDICINE CLINIC | Facility: CLINIC | Age: 68
End: 2023-10-10
Payer: MEDICARE

## 2023-10-10 NOTE — TELEPHONE ENCOUNTER
----- Message from Delfina Gann MA sent at 10/10/2023  9:41 AM CDT -----  Regarding: FW: Test  Contact: [phone]    ----- Message -----  From: Sedrick Leahy  Sent: 10/10/2023   9:39 AM CDT  To: [facility] Clinical Pool  Subject: Test                                             Can you tell me about my    MRI on brain.  Could you answer a few quistions ? What is the cause of  small vessel diease. Should I be conserned.

## 2023-10-10 NOTE — TELEPHONE ENCOUNTER
Thanks so much    /alejandro  Let patient know ro willing to see 10.11.23 or 10.12.23 OR after she sees neuro (and they are trying to move up her apt)

## 2023-10-10 NOTE — TELEPHONE ENCOUNTER
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

## 2023-10-10 NOTE — TELEPHONE ENCOUNTER
----- Message from Delfina Gann MA sent at 10/10/2023  1:05 PM CDT -----  Regarding: FW: Test  Contact: [phone]    ----- Message -----  From: Sedrick Leahy  Sent: 10/10/2023  11:26 AM CDT  To: [facility] Clinical Pool  Subject: Test                                             Can this be cured?  If not how long ? can I do something to help.

## 2023-10-11 ENCOUNTER — TELEPHONE (OUTPATIENT)
Dept: NEUROLOGY | Facility: CLINIC | Age: 68
End: 2023-10-11
Payer: MEDICARE

## 2023-10-11 NOTE — TELEPHONE ENCOUNTER
CALLED PATIENT YESTERDAY TO GET HER AN EARLIER APPOINTMENT. PATIENT DID NOT ANSWER BUT I DID LEAVE A DETAILED VOICEMAIL WITH A REQUEST FOR A RETURN CALL.

## 2023-10-12 ENCOUNTER — OFFICE VISIT (OUTPATIENT)
Dept: FAMILY MEDICINE CLINIC | Facility: CLINIC | Age: 68
End: 2023-10-12
Payer: MEDICARE

## 2023-10-12 VITALS
SYSTOLIC BLOOD PRESSURE: 126 MMHG | WEIGHT: 181 LBS | RESPIRATION RATE: 18 BRPM | DIASTOLIC BLOOD PRESSURE: 84 MMHG | OXYGEN SATURATION: 99 % | HEART RATE: 96 BPM | TEMPERATURE: 97.3 F | BODY MASS INDEX: 32.07 KG/M2 | HEIGHT: 63 IN

## 2023-10-12 DIAGNOSIS — G89.29 CHRONIC NECK PAIN: ICD-10-CM

## 2023-10-12 DIAGNOSIS — G89.29 CHRONIC BACK PAIN, UNSPECIFIED BACK LOCATION, UNSPECIFIED BACK PAIN LATERALITY: ICD-10-CM

## 2023-10-12 DIAGNOSIS — R26.9 GAIT DIFFICULTY: ICD-10-CM

## 2023-10-12 DIAGNOSIS — R89.9 ABNORMAL LABORATORY TEST: ICD-10-CM

## 2023-10-12 DIAGNOSIS — E11.9 TYPE 2 DIABETES MELLITUS WITHOUT COMPLICATION, WITHOUT LONG-TERM CURRENT USE OF INSULIN: ICD-10-CM

## 2023-10-12 DIAGNOSIS — M54.9 CHRONIC BACK PAIN, UNSPECIFIED BACK LOCATION, UNSPECIFIED BACK PAIN LATERALITY: ICD-10-CM

## 2023-10-12 DIAGNOSIS — R93.0 ABNORMAL MRI OF HEAD: ICD-10-CM

## 2023-10-12 DIAGNOSIS — E78.5 HYPERLIPIDEMIA, UNSPECIFIED HYPERLIPIDEMIA TYPE: ICD-10-CM

## 2023-10-12 DIAGNOSIS — R41.3 MEMORY LOSS: ICD-10-CM

## 2023-10-12 DIAGNOSIS — M54.2 CHRONIC NECK PAIN: ICD-10-CM

## 2023-10-12 DIAGNOSIS — R73.01 ELEVATED FASTING GLUCOSE: ICD-10-CM

## 2023-10-12 DIAGNOSIS — F32.A DEPRESSION, UNSPECIFIED DEPRESSION TYPE: ICD-10-CM

## 2023-10-12 RX ORDER — ATORVASTATIN CALCIUM 20 MG/1
20 TABLET, FILM COATED ORAL DAILY
Qty: 90 TABLET | Refills: 5 | Status: SHIPPED | OUTPATIENT
Start: 2023-10-12

## 2023-10-12 NOTE — PROGRESS NOTES
"ARAMIS".   Subjective   Sedrick Leahy is a 68 y.o. female presenting with chief complaint of:   Chief Complaint   Patient presents with    Follow-up     AWV 10.20.21  Last Completed Annual Wellness Visit       This patient has no relevant Health Maintenance data.             History of Present Illness :  Alone.  Here for primarily her desire to review her MRI (we wanted her to see neuro first and see her after that).       Has multiple chronic problems to consider that might have a bearing on today's issues; not an interval appointment.       Chronic/acute problems reviewed today:   1. Chronic neck pain see back pain   2. Chronic back pain, unspecified back location, unspecified back pain laterality : chronic/variable spinal (upper/mid/lower) 0-2/10 pain with infrequent/rare radiation to UE/LE.  No change LE numbness.  Has bladder/bowel control. No desire to change approach of care.      3. Hyperlipidemia, unspecified hyperlipidemia type Chronic/stable: prefers no statin to this point and no real strong reason to be on till now.    4. Gait difficulty Chronic/stable:.  Ongoing issues with difficulties it results in difficulty with walking or gait.  Since seen no falls.  Past fall/ injuries.  Uses to help gait: slower walking.     5. Memory loss chronic at least over a year.  She has not wanted to bring this up and is actually in the from us in the past.  She her  and family noticed that she is a little more forgetful; but he has had no significant effect on ADLs.   6. Abnormal laboratory test -twice this year \" febrile\" illness with very high inflammatory markers; and then quickly came back to normal.  One ? UTI, one ? Bronchitis/pneumonia (though never proven).   Not persistant enough to consider collagen vascular for sure.    7. Elevated fasting glucose actually blood sugar issues for her or more in the past as with dietary and weight changes she has improved her metabolic status for these numbers no " longer are reflected   8. Type 2 diabetes mellitus without complication, without long-term current use of insulin  Chronic/improved from past.  No problem/pattern hypoglycemia/hyperglycemia manifest by poly- dypsia, phagia, uria, or sweats, diaphoretic episodes, syncope/near.     9. Depression, unspecified depression type past significant  mood swings, down moods, nervousness, difficulty with concentration to function home/work.  Others close have not been concerned.  No suicide ideation/intent.  Rx helps      10. Abnormal MRI of head acute new finding of recent MRI having several abnormalities; see below.     Has an/another acute issue today: none.    The following portions of the patient's history were reviewed and updated as appropriate: allergies, current medications, past family history, past medical history, past social history, past surgical history, and problem list.      Current Outpatient Medications:     amitriptyline (ELAVIL) 100 MG tablet, TAKE ONE (1) TO TWO (2) TABLETS AT BEDTIME AS NEEDED SLEEP, Disp: 180 tablet, Rfl: 0    cholecalciferol (VITAMIN D3) 25 MCG (1000 UT) tablet, Take 1 tablet by mouth Daily., Disp: , Rfl:     HYDROcodone-acetaminophen (Norco) 5-325 MG per tablet, Take 1 tablet by mouth Every 6 (Six) Hours As Needed for Severe Pain., Disp: 12 tablet, Rfl: 0    nystatin (MYCOSTATIN) 100,000 unit/mL suspension, Swish and swallow 5 mL 4 (Four) Times a Day., Disp: 473 mL, Rfl: 0    No problems with medications.    Allergies   Allergen Reactions    Penicillins Swelling and Rash       Review of Systems  GENERAL:  Inactive/slower with limits, speed, stamina for age and spinal pain. Sleep is ok. No fever now/recent.  SKIN: No rash/skin lesion of concern.   ENDO:  No syncope, near or diaphoretic sweaty spells.  HEENT: No recent head injury; or change occ headache.   No vision change.  No significant hearing loss.  Ears without pain/drainage.  No sore throat.  No significant nasal/sinus  congestion/drainage. No epistaxis. Above.   CHEST: No chest wall tenderness or mass. No cough, without wheeze.  No SOB; no hemoptysis.  CV: No chest pain, palpitations, ankle edema.  GI: No heartburn, dysphagia.  No abdominal pain, diarrhea, constipation.  No rectal bleeding, or melena.    :  Voids without dysuria, same urinary incontinence but voids to completion.  ORTHO: No painful/swollen joints but various on /off sore.  Variable sore neck or back.  No acute neck or back pain without recent injury.  NEURO: No dizziness, weakness of extremities or significant UE/LE numbness/paresthesias.   PSYCH: since 2022? some memory loss.  Mood variable as occ anxious, depressed but/and not suicidal.  Tries to tolerate stress .  Screening:  No gyne BRIONNA/[facility]/1988   Mammogram: 7.12.23-see claims above  Bone density: 1.10.20 Bone d-deca/Ivonne/T L3 -1.1 hip neck -2.0/to follow   Low dose CT chest:Tobacco-smoker/age 14/2 ppd/dc 1982 (9y)  NA  GI:   Colonoscopy//[facility]/6.6.16/5y  Cologuard +/11.12.19  Colon-nl/[facility]/4.10.23/5y  EGD-irregular z//[facility]/4.10.23  Prostate: NA  Usual lab order  6m BMP, A1c  12m CBC, CMP, A1c, LIPID, TSH, Vit D    Copy/paste function used for ROS/exam AND each area of these were reviewed, updated, confirmed and supplemented as needed.  Data reviewed:   Recent admit/ER/MD visits: 9.14.23    1. Ataxia    2. Falling    3. Primary hypertension    4. Anemia, unspecified type          Data review above:   Discussions/medical decisions/reviews:  BP ok  Other vitals ok  Recent Vitals           7/20/2023 8/24/2023 9/14/2023          BP: 132/76 122/78 122/78       Pulse: -- 100 92       Temp: -- 97.7 øF (36.5 øC) 97.1 øF (36.2 øC)       Weight: 82.1 kg (181 lb) 82.1 kg (181 lb) 82.1 kg (181 lb)       BMI (Calculated): 32.1 32.1 32.1               DM/A1c 5.4 3.1.23  DM/ 8.25.23  Lipid  3.1.23; lifestyle  PSA NA  CBC 10.8 8.25.23  Substrates; none  Sed rate 90 8.25.23-with acute  illness  Renal ok 8.25.23  K 3.4 8.25.23  Liver ok 8.25.23; previous up 6.20.23  Vit D 26 3.1.23  Thyroid TSH 3.1.23     Screening reviewed/updated   Vaccines discussed (see below) Tdap up to date.   Soap/water clean bid prn  Careful with walking/like.   MRI head  Neuro review  Have to see if sed rate recent, anemia, others connect    ################################  AND 6.20.23  1. Fever, unspecified fever cause    2. Body aches    3. Stomatitis          Data review above:   Discussions/medical decisions/reviews:  BP ok  Other vitals weight loss noted  DM/BS 5.4 3.1.23  Lipid  3.1.23; lifestyle  PSA NA  CBC 11.8 3.1.23  Renal ok 3.1.23  Liver ok 3.1.23  Vit D 26 3.1.23; advised  Thyroid TSH ok 3.1.23     Screening reviewed/updated check on [facility]os/mammogram  Sick labs  Could be tick bite infection; start doxycline     Data review above:   Rx: reviewed and decisions:   Rx new/changes:        New Medications Ordered This Visit   Medications    doxycycline (MONODOX) 100 MG capsule       Sig: Take 1 capsule by mouth 2 (Two) Times a Day.       Dispense:  30 capsule       Refill:  0    nystatin (MYCOSTATIN) 100,000 unit/mL suspension       Sig: Swish and swallow 5 mL 4 (Four) Times a Day.       Dispense:  473 mL       Refill:  0         Orders placed:   LAB/Testing/Referrals: reviewed/orders:   Today:       Orders Placed This Encounter   Procedures    Comprehensive Metabolic Panel    Sedimentation Rate    C-reactive Protein    Urinalysis With Culture If Indicated -enterococcus faecalis    [facility] (IgG / M)    Ehrlichia Profile DNA PCR    CBC & Differential     ###################################  AND 8.24.23  Assessment & Plan  1. Body aches    2. Diarrhea, unspecified type    3. Febrile illness    4. Lumbar arthropathy    5. Acute cough          Data review above:   Discussions/medical decisions/reviews:  BP ok  Other vitals ok  Recent Vitals           6/28/2023 7/20/2023 8/24/2023          BP: 132/76  132/76 122/78       Pulse: 100 -- 100       Temp: 97.9 øF (36.6 øC) -- 97.7 øF (36.5 øC)       Weight: 82.1 kg (181 lb) 82.1 kg (181 lb) 82.1 kg (181 lb)       BMI (Calculated): 32.1 32.1 32.1               DM/A1c 5.4 3.1.23  DM/ 6.28.23  Lipid  3.1.23; lifestyle  PSA NA  CBC 11.1 6.28.23  Renal ok 6.28.23  Liver alk p 118 6.28.23  Vit D 26 3.1.23  Thyroid TSH ok 3.1.23     Labs/CXR (she got to [place] and they were closed; will go in AM)  Cipro 500 bid based on risk for UTI  Sounds viral; ? Even misquito carried?      Data review above:   Rx: reviewed and decisions:   Rx new/changes: new       New Medications Ordered This Visit   Medications    ciprofloxacin (Cipro) 500 MG tablet       Sig: Take 1 tablet by mouth 2 (Two) Times a Day.       Dispense:  14 tablet       Refill:  0    HYDROcodone-acetaminophen (Norco) 5-325 MG per tablet       Sig: Take 1 tablet by mouth Every 6 (Six) Hours As Needed for Severe Pain.       Dispense:  12 tablet       Refill:  0         Orders placed:   LAB/Testing/Referrals: reviewed/orders:   Today:       Orders Placed This Encounter   Procedures    Urine Culture - Urine, Urine, Clean Catch-50K mixed    XR Chest PA & Lateral-came back neg    Comprehensive Metabolic Panel    Sedimentation Rate    CBC & Differential    Urinalysis With Microscopic - Urine, Clean Catch       Last cardiac testing:   Echo:   Results for orders placed during the hospital encounter of 07/20/23    Adult Transthoracic Echo Complete W/ Cont if Necessary Per Protocol    Interpretation Summary    Left ventricular systolic function is normal. Left ventricular ejection fraction appears to be 56 - 60%.    Normal right ventricular cavity size and systolic function noted.    No significant valvular abnormalities identified on this study.      Radiology considered:     MRI Brain With & Without Contrast    Result Date: 10/6/2023  1. Scattered areas of T2 high signal within the hemispheric white matter  "are nonspecific and likely due to chronic small vessel disease. 2. Partially empty appearance of the sella turcica. Fluid in the optic nerve sheaths. Small right transverse sinus. These findings may all be incidental. They may also be seen in patients with idiopathic intracranial hypertension.   This report was signed and finalized on 10/6/2023 11:23 AM CDT by Dr. Wilmer Gómez MD.      XR Chest PA & Lateral    Result Date: 8/25/2023   No acute cardiopulmonary abnormality.  This report was finalized on 08/25/2023 10:02 by  Juwan Roca DO.     Lab Results:  Results for orders placed or performed during the hospital encounter of 10/06/23   POC Creatinine    Specimen: Blood   Result Value Ref Range    Creatinine 0.80 0.60 - 1.30 mg/dL       A1C:  Lab Results - Last 18 Months   Lab Units 03/01/23  0922   HEMOGLOBIN A1C % 5.40     GLUCOSE:  Lab Results - Last 18 Months   Lab Units 08/25/23  0928 06/28/23  0847 06/20/23  1529 03/01/23  0922 08/30/22  1045   GLUCOSE mg/dL 132* 101* 122* 79 97     LIPID:  Lab Results - Last 18 Months   Lab Units 03/01/23  0922   CHOLESTEROL mg/dL 201*   LDL CHOL mg/dL 120*   HDL CHOL mg/dL 70*   TRIGLYCERIDES mg/dL 61     PSA:No results found for: \"PSA\"    CBC:  Lab Results - Last 18 Months   Lab Units 09/14/23  1334 08/25/23  0928 06/28/23  0847 06/20/23  1529 03/01/23  0922   WBC 10*3/mm3 6.08 7.70 7.33 9.35 6.53   HEMOGLOBIN g/dL 10.7* 10.8* 11.1* 11.8* 11.8*   HEMATOCRIT % 34.0 34.2 34.0 36.3 37.4   PLATELETS 10*3/mm3 345 334 406 329 289   IRON mcg/dL 68  --   --   --   --    VITAMIN B 12 pg/mL 394  --   --   --   --    FOLATE ng/mL 12.60  --   --   --   --      BMP/CMP:  Lab Results - Last 18 Months   Lab Units 10/06/23  1022 08/25/23  0928 06/28/23  0847 06/20/23  1529 03/01/23  0922 08/30/22  1045   SODIUM mmol/L  --  138 142 139 142 143   POTASSIUM mmol/L  --  3.4* 4.8 4.0 4.6 4.5   CHLORIDE mmol/L  --  100 106 103 105 104   CO2 mmol/L  --  29.0 27.0 22.5 29.1* 29.2*   GLUCOSE " "mg/dL  --  132* 101* 122* 79 97   BUN mg/dL  --  11 15 10 14 16   CREATININE mg/dL 0.80 0.78 0.82 0.76 0.74 0.87   EGFR RESULT mL/min/1.73  --   --  77.5 84.9 88.3 72.7   CALCIUM mg/dL  --  9.1 9.3 9.0 9.3 9.3       HEPATIC:  Lab Results - Last 18 Months   Lab Units 08/25/23  0928 06/28/23  0847 06/20/23  1529 03/01/23  0922 08/30/22  1045   ALT (SGPT) U/L 23 14 113* 11 12   AST (SGOT) U/L 29 17 127* 17 16   ALK PHOS U/L 114 118* 159* 106 115     HEPATITIS C ANTIBODY:   Lab Results   Component Value Date/Time    HEPCVIRUSABY <0.1 05/28/2019 07:09 AM     Vit D:  Lab Results - Last 18 Months   Lab Units 03/01/23  0922   VIT D 25 HYDROXY ng/ml 26.1*     THYROID:  Lab Results - Last 18 Months   Lab Units 03/01/23  0922   TSH uIU/mL 2.300       Sed rate  Component      Latest Ref Rng 6/20/2023 8/25/2023 9/15/2023   Sed Rate      0 - 40 mm/hr 60 (H)  90 (H)  23       Legend:  (H) High    CR protein  Component      Latest Ref Rng 6/20/2023 9/15/2023   C-Reactive Protein      0 - 10 mg/L 24.86 (H)  3       Legend:  (H) High    Objective   /84   Pulse 96   Temp 97.3 øF (36.3 øC) (Temporal)   Resp 18   Ht 160 cm (63\")   Wt 82.1 kg (181 lb)   SpO2 99%   BMI 32.06 kg/mý   Body mass index is 32.06 kg/mý.    Recent Vitals         8/24/2023 9/14/2023 10/12/2023       BP: 122/78 122/78 126/84     Pulse: 100 92 96     Temp: 97.7 øF (36.5 øC) 97.1 øF (36.2 øC) 97.3 øF (36.3 øC)     Weight: 82.1 kg (181 lb) 82.1 kg (181 lb) 82.1 kg (181 lb)     BMI (Calculated): 32.1 32.1 32.1           Wt Readings from Last 15 Encounters:   10/12/23 0934 82.1 kg (181 lb)   09/14/23 1405 82.1 kg (181 lb)   08/24/23 1601 82.1 kg (181 lb)   07/20/23 1227 82.1 kg (181 lb)   06/28/23 0901 82.1 kg (181 lb)   06/20/23 1600 80.7 kg (178 lb)   05/11/23 1310 83 kg (183 lb)   04/03/23 1319 83 kg (183 lb)   03/01/23 0950 83.4 kg (183 lb 12.8 oz)   01/05/23 1543 80.3 kg (177 lb)   12/21/22 0959 81 kg (178 lb 9.6 oz)   08/30/22 1005 80.7 kg (178 lb) "   06/30/22 0916 83.8 kg (184 lb 11.9 oz)   04/06/22 1012 84.8 kg (187 lb)   03/07/22 1331 84.8 kg (187 lb)       Physical Exam  GENERAL:  Well nourished/developed in no acute distress.   SKIN: Turgor excellent, without wound, rash, lesion.  HEENT: Normal cephalic without trauma.  Pupils equal round reactive to light. Extraocular motions full without nystagmus.   External canals nonobstructive nontender without reddness. Tymphatic membranes brown with tyler structures intact.   Oral cavity without growths, exudates, and moist.  Posterior pharynx without mass, obstruction, redness.  No thyromegaly, mass, tenderness, lymphadenopathy and supple.  CV: Regular rhythm.  No murmur, gallop,  edema. Posterior pulses intact.  No carotid bruits.  CHEST: No chest wall tenderness or mass.   LUNGS: Symmetric motion with clear to auscultation.   ABD: Soft, nontender without mass.   ORTHO: Symmetric extremities without swelling/point tenderness.  Full gross range of motion.  NEURO: CN 2-12 grossly intact.  Symmetric facies and UE/LE. 2-3/5 strength throughout. 1/4 x bicep knee equal reflexes.  Nonfocal use extremities. Speech clear. Intact light touch with monofilament, vibratory sensation with tuning fork; equal toes/distal feet.    PSYCH: Oriented x 3.  Pleasant calm, well kept.  Purposeful/directed conservation with intact short/long gross memory.    Assessment & Plan     1. Chronic neck pain    2. Chronic back pain, unspecified back location, unspecified back pain laterality    3. Hyperlipidemia, unspecified hyperlipidemia type    4. Gait difficulty    5. Memory loss    6. Abnormal laboratory test    7. Elevated fasting glucose    8. Type 2 diabetes mellitus without complication, without long-term current use of insulin    9. Depression, unspecified depression type    10. Abnormal MRI of head        Data review above:   Discussions/medical decisions/reviews:  BP ok  Other vitals ok  Recent Vitals         8/24/2023 9/14/2023  10/12/2023       BP: 122/78 122/78 126/84     Pulse: 100 92 96     Temp: 97.7 øF (36.5 øC) 97.1 øF (36.2 øC) 97.3 øF (36.3 øC)     Weight: 82.1 kg (181 lb) 82.1 kg (181 lb) 82.1 kg (181 lb)     BMI (Calculated): 32.1 32.1 32.1           DM/A1c 5.4 3.1.23  DM/ 8.25.23  Lipid  3.1.23; no Rx (lipitor 20 given 10.12.23)  PSA NA  CBC Hb 10.7 9.14.23  Iron 68N 9.14.23; none  B12 394 9.14.23; none  Folate 12.6 9.14.23; noneRenal ok 10.6.23  Liver ok 8.25.23; has been up before  Vit D 26 3.1.23  Thyroid TSH ok 3.1.23    [id number]  Colonoscopy/[place]/[facility]  Colonoscopy/[place]/[facility]/  Colonoscopy+neg/[facility]//07/BE  EGD+hiatal hernia/[facility]//07  Colonoscopy+yoxomp-tbbv-iyl-div/[facility]//09/3mo  Colonoscopy+polyp/[facility]//4-15-09/3y  Limited colonoscopy+yyvi-hahss-xal/[facility]//10-22-09/[facility]  EGD+hh-biop/[facility]//-10  Colonoscopy+Polyp-div/[facility]//4.4.12/3m-1y pp  Attempted Colonoscopy/[facility]//4.8.13  Colonoscopy+nl/[facility]//4.9.13/3y  EGD+biop/[facility]//4.8.13  EGD+mmm-mx-palplznx/[facility]//8.14.14  Esophagial Mobility Study/[place]/[facility]/30.14  Colonoscopy/unable to complete due to quality of prep5.2.16  Colonoscopy/[facility]/6.6.16/5y  Cologuard+/11.21.19-gi suggested  Colon-div///../5y  EGD-past fundoplication/[facility]/21  Colon-nl//[facility]/4.10.5y  EGD-irregular z//[facility]/4.10.23     SURGERIES:  BRIONNA/[facility]/  Lap gb/[place]/[facility]/  Appendectomy/[place]/  Lap HH+nissen/[place]/12.3.14   C spine/[place]//.  C spine/[place]//12.3.20    Hb 11.8 3..23: 10.7 9.15.23  Iron always normal: 68N 9.15.23  Ferritin always ok; 16.3N 9.15.23  B12 always ok; 394 9.15.23  Folate always ok: 12.6 9.15.23  Retic 1.49 9.14.23  OB +12.28.20: neg 3..23    Screening reviewed/updated   Discussed MRI head abnormals; ? Cerebral HTN, ? Fluid optic nerve and white matter changes: we are not sure if this connects with balance problems, memory problems  Prudent to  "start lipitor 20; as likely some small vessel disease  Awaiting visit with neuro for their opinions    Follow up: No follow-ups on file.  Future Appointments   Date Time Provider Department Center   10/20/2023  1:00 PM Castillo Guzman APRN [facility]   10/31/2023  3:30 PM Kiran Madden MD [facility]       Data review above:   Rx: reviewed and decisions:   Rx new/changes: new  New Medications Ordered This Visit   Medications    atorvastatin (LIPITOR) 20 MG tablet     Sig: Take 1 tablet by mouth Daily.     Dispense:  90 tablet     Refill:  5       Orders placed:   LAB/Testing/Referrals: reviewed/orders:   Today:   No orders of the defined types were placed in this encounter.    Chronic/recurrent labs above or change to:   Same     Immunization History   Administered Date(s) Administered    COVID-19 (MODERNA) 1st,2nd,3rd Dose Monovalent 06/07/2021, 07/05/2021    Fluzone High Dose =>65 Years ([employer] ONLY) 11/08/2019, 10/10/2023    Fluzone High-Dose 65+yrs 09/09/2020, 10/20/2021    Pneumococcal Conjugate 13-Valent (PCV13) 10/22/2019, 11/08/2019    Pneumococcal, Unspecified 10/10/2023    Tdap 10/20/2021     We advised/reaffirmed our support/suggestion for staying complete with covid- covid boosters, seasonal flu/yearly and any missing vaccine from list we supplied; when cannot be given here we suggest contact with local health department office or pharmacy to review missing/needed vaccines and then bring nursing documentation for these vaccines to this office or call this information in. Shingles became \"free\" 1.1.23 for medicare insurance.    Health maintenance:   Body mass index is 32.06 kg/mý.         Tobacco use reviewed:   Sedrick Leahy  reports that she quit smoking about 33 years ago. Her smoking use included cigarettes. She started smoking about 69 years ago. She has a 40.00 pack-year smoking history. She has never used smokeless tobacco..    There are no Patient Instructions on file for this " visit.

## 2023-10-17 RX ORDER — ATORVASTATIN CALCIUM 20 MG/1
20 TABLET, FILM COATED ORAL DAILY
Qty: 90 TABLET | Refills: 5 | Status: SHIPPED | OUTPATIENT
Start: 2023-10-17

## 2023-10-17 NOTE — TELEPHONE ENCOUNTER
Caller:     Sedrick Leahy        Relationship: SELF     Best call back number:     [phone]        Requested Prescriptions:   LIPITOR     STATES NOT SURE OF THE MILLIGRAM AND STATES IT IS FOR THE CHOLESTEROL     Pharmacy where request should be sent:  [facility] - [place], [place] - [address]  - [phone]  - [phone]  [phone]     Last office visit with prescribing clinician: 10/12/2023   Last telemedicine visit with prescribing clinician: Visit date not found   Next office visit with prescribing clinician: 10/31/2023     Additional details provided by patient: STATES SHE WENT ON A CAMPING TRIP AND HAS LOST HER CHOLESTEROL MEDICATION SHE IS ASKING FOR A REFILL     REQUESTING A CALL BACK TO VERIFY WHAT MILLIGRAM SHE SHOULD BE TAKING FOR HER CHOLESTEROL     Does the patient have less than a 3 day supply:  [x] Yes  [] No    Would you like a call back once the refill request has been completed: [x] Yes [] No    If the office needs to give you a call back, can they leave a voicemail: [x] Yes [] No    Divine [employer] Rep   10/17/23 09:18 CDT

## 2023-10-20 ENCOUNTER — OFFICE VISIT (OUTPATIENT)
Dept: NEUROLOGY | Facility: CLINIC | Age: 68
End: 2023-10-20
Payer: MEDICARE

## 2023-10-20 VITALS
SYSTOLIC BLOOD PRESSURE: 138 MMHG | DIASTOLIC BLOOD PRESSURE: 76 MMHG | OXYGEN SATURATION: 97 % | WEIGHT: 181 LBS | HEART RATE: 83 BPM | HEIGHT: 63 IN | BODY MASS INDEX: 32.07 KG/M2

## 2023-10-20 DIAGNOSIS — R26.89 IMBALANCE: Primary | ICD-10-CM

## 2023-10-20 DIAGNOSIS — R20.0 NUMBNESS IN FEET: ICD-10-CM

## 2023-10-20 DIAGNOSIS — M51.37 DEGENERATION OF LUMBAR OR LUMBOSACRAL INTERVERTEBRAL DISC: ICD-10-CM

## 2023-10-20 DIAGNOSIS — E11.9 TYPE 2 DIABETES MELLITUS WITHOUT COMPLICATION, WITHOUT LONG-TERM CURRENT USE OF INSULIN: ICD-10-CM

## 2023-10-20 PROCEDURE — 3078F DIAST BP <80 MM HG: CPT | Performed by: NURSE PRACTITIONER

## 2023-10-20 PROCEDURE — 1160F RVW MEDS BY RX/DR IN RCRD: CPT | Performed by: NURSE PRACTITIONER

## 2023-10-20 PROCEDURE — 99214 OFFICE O/P EST MOD 30 MIN: CPT | Performed by: NURSE PRACTITIONER

## 2023-10-20 PROCEDURE — 3075F SYST BP GE 130 - 139MM HG: CPT | Performed by: NURSE PRACTITIONER

## 2023-10-20 PROCEDURE — 1159F MED LIST DOCD IN RCRD: CPT | Performed by: NURSE PRACTITIONER

## 2023-10-20 NOTE — PROGRESS NOTES
Neurology Consult Note    Referring Provider:   Kiran Madden MD     Reason for Consultation:    Ataxia  Imbalance    Subjective   History of Present Illness:  Sedrick Leahy is a 68 y.o. female who presents today for ataxia and imbalance.  She is routinely followed by Kiran Madden MD for primary care.     She reports having fallen quite a bit over the past few months.  She notes that she will have stumbling from wall to wall.  She reports that she has some numbness in her feet as well.  She does have diabetes mellitus.  She also endorses back pain with a history of lumbar degenerative disease for which she has seen Dr. Tariq in the past.  With this she continues to report lumbar pain and also has reports of hip pain.  She denies ever having gone to  for her balance in the past. MRI was performed showing empty sella and mild fluid in the optic sheaths.  She is very concerned about this.  She notes some intermittent headaches as well at times that are dull and achy in nature.  They are holocephalic.     Allergies:    Penicillins    Medications:  Current Outpatient Medications   Medication Sig Dispense Refill    amitriptyline (ELAVIL) 100 MG tablet TAKE ONE (1) TO TWO (2) TABLETS AT BEDTIME AS NEEDED SLEEP 180 tablet 0    atorvastatin (LIPITOR) 20 MG tablet Take 1 tablet by mouth Daily. 90 tablet 5    cholecalciferol (VITAMIN D3) 25 MCG (1000 UT) tablet Take 1 tablet by mouth Daily.      HYDROcodone-acetaminophen (Norco) 5-325 MG per tablet Take 1 tablet by mouth Every 6 (Six) Hours As Needed for Severe Pain. 12 tablet 0    nystatin (MYCOSTATIN) 100,000 unit/mL suspension Swish and swallow 5 mL 4 (Four) Times a Day. 473 mL 0     No current facility-administered medications for this visit.     Current outpatient and discharge medications have been reconciled for the patient.  Reviewed by: HECTOR Saxena    Past Medical History:  Past Medical History:   Diagnosis Date    Arthritis      Diabetes mellitus     diet controlled    Hx of colonic polyp     Hypertension     Joint pain     Hip    Lower back pain     Neck pain     PONV (postoperative nausea and vomiting)      Past Surgical History:   Procedure Laterality Date    ANTERIOR CERVICAL DISCECTOMY W/ FUSION N/A 11/20/2020    Procedure: EXPLORATION OF FUSION C5-6, ANTERIOR CERVICAL DISCECTOMY FUSION C4-5, C6-7 REVISON ANTERIOR FUSION C5-6 WITH INSTRUMENTATION C4-7;  Surgeon: KEN Gilbert MD;  Location:  [facility];  Service: Orthopedic Spine;  Laterality: N/A;    APPENDECTOMY      COLONOSCOPY  06/06/2016    Normal exam repeat in 5 years    COLONOSCOPY N/A 01/14/2020    Diverticulosis repeat exam in 5 years    COLONOSCOPY W/ POLYPECTOMY  04/04/2012    Hyperplastic polyp cecum repeat exam in 1 year    ENDOSCOPY  08/14/2014    Very tortuous distal esophagus dilated 50 Fr, HH     ENDOSCOPY N/A 01/18/2021    A fundoplication was found, dilated    ENDOSCOPY  06/2023    HARDWARE REMOVAL N/A 11/20/2020    Procedure: REMOVAL OF INSTRUMENTATION;  Surgeon: KEN Gilbert MD;  Location:  [facility];  Service: Orthopedic Spine;  Laterality: N/A;    HYSTERECTOMY      Laparoscopic Hysterectomy bilateral salpingectomy for bleeding    NECK SURGERY      NISSEN FUNDOPLICATION      POSTERIOR CERVICAL FUSION N/A 12/03/2020    Procedure: POSTERIOR SPINAL FUSION WITH INSTRUMENTATION C4-7;  Surgeon: KEN Gilbert MD;  Location:  [facility];  Service: Orthopedic Spine;  Laterality: N/A;    STEROID INJECTION Left 06/30/2022    Procedure: LEFT HIP FLUROSCOPIC GUIDED CORTICOSTEROID INJECTION;  Surgeon: Herberth Skelton MD;  Location:  [facility];  Service: Orthopedics;  Laterality: Left;    US GUIDED LYMPH NODE BIOPSY  08/03/2020     Family History   Problem Relation Age of Onset    No Known Problems Father     Colon cancer Mother     Colon polyps Mother     Diabetes Brother     Diabetes Brother     Diabetes Son     Diabetes Son     Heart attack Paternal Grandmother      "Breast cancer Maternal Aunt     Lymphoma Maternal Aunt     Breast cancer Maternal Aunt     Lung cancer Maternal Aunt     Diabetes Maternal Aunt     Breast cancer Maternal Aunt      Social History     Tobacco Use    Smoking status: Former     Packs/day: 2.00     Years: 20.00     Additional pack years: 0.00     Total pack years: 40.00     Types: Cigarettes     Start date: 1954     Quit date: 7/10/1990     Years since quittin.3    Smokeless tobacco: Never   Vaping Use    Vaping Use: Never used   Substance Use Topics    Alcohol use: Yes     Comment: rare    Drug use: No     Review of Systems   Musculoskeletal:  Positive for gait problem.   Neurological:  Positive for numbness and headache. Negative for tremors, syncope, speech difficulty and weakness.         Objective   Vital Signs:         10/20/23  1305   Weight: 82.1 kg (181 lb)     160 cm (63\")  Body mass index is 32.06 kg/m².    Physical Exam  Vitals reviewed.   Constitutional:       Appearance: Normal appearance.   HENT:      Head: Normocephalic.      Mouth/Throat:      Pharynx: Oropharynx is clear.   Eyes:      General: Lids are normal.      Extraocular Movements: Extraocular movements intact.      Pupils: Pupils are equal, round, and reactive to light.   Cardiovascular:      Rate and Rhythm: Normal rate and regular rhythm.      Pulses: Normal pulses.   Pulmonary:      Effort: Pulmonary effort is normal.   Musculoskeletal:         General: Normal range of motion.      Cervical back: Normal range of motion and neck supple.   Skin:     General: Skin is warm and dry.      Capillary Refill: Capillary refill takes less than 2 seconds.   Neurological:      Coordination: Coordination is intact. Romberg sign negative.      Deep Tendon Reflexes:      Reflex Scores:       Tricep reflexes are 1+ on the right side and 1+ on the left side.       Bicep reflexes are 1+ on the right side and 1+ on the left side.       Brachioradialis reflexes are 1+ on the right side " and 1+ on the left side.       Patellar reflexes are 1+ on the right side and 1+ on the left side.       Achilles reflexes are 1+ on the right side and 1+ on the left side.  Psychiatric:         Mood and Affect: Mood normal.         Speech: Speech normal.       Neurological Exam  Mental Status  Awake, alert and oriented to person, place and time. Speech is normal. Language is fluent with no aphasia.    Cranial Nerves  CN II: Visual acuity is normal. Visual fields full to confrontation.  CN III, IV, VI: Extraocular movements intact bilaterally. Normal lids and orbits bilaterally. Pupils equal round and reactive to light bilaterally.  CN V: Facial sensation is normal.  CN VII: Full and symmetric facial movement.  CN IX, X: Palate elevates symmetrically. Normal gag reflex.  CN XI: Shoulder shrug strength is normal.  CN XII: Tongue midline without atrophy or fasciculations.    Motor   Strength is 5/5 in all four extremities except as noted.                                             Right                     Left   Shoulder abduction               4                          4   Shoulder adduction               4                          4  Elbow flexion                         4                          4  Elbow extension                    4                          4  Hip abduction                         4                          4  Hip adduction                         4                          4  Knee flexion                           4                          4  Knee extension                      4                          4  Plantarflexion                         4                          4  Dorsiflexion                            4                          4    Sensory  Light touch abnormality: Decreased in feet and toes bilaterally. Pinprick abnormality: Decreased in feet and toes bilaterally. Temperature is normal in upper and lower extremities. Vibration is normal in upper and lower extremities.  Proprioception is normal in upper and lower extremities.     Reflexes                                            Right                      Left  Brachioradialis                    1+                         1+  Biceps                                 1+                         1+  Triceps                                1+                         1+  Patellar                                1+                         1+  Achilles                                1+                         1+    Coordination    Finger-to-nose, rapid alternating movements and heel-to-shin normal bilaterally without dysmetria.    Gait  Casual gait: Normal stance. Reduced stride length. Hesitant and antalgic gait. Normal right arm swing. Normal left arm swing. Normal tandem gait. Instability noted. Romberg is absent. Normal pull test. Able to rise from chair without using arms.    Results Review:    Lab Results   Component Value Date    GLUCOSE 132 (H) 08/25/2023    BUN 11 08/25/2023    CREATININE 0.80 10/06/2023    EGFRIFNONA 57 (L) 10/20/2021    EGFRIFAFRI 69 10/20/2021    BCR 14.1 08/25/2023    K 3.4 (L) 08/25/2023    CO2 29.0 08/25/2023    CALCIUM 9.1 08/25/2023    PROTENTOTREF 6.5 06/28/2023    ALBUMIN 3.6 08/25/2023    LABIL2 1.7 06/28/2023    AST 29 08/25/2023    ALT 23 08/25/2023     Lab Results   Component Value Date    WBC 6.08 09/14/2023    HGB 10.7 (L) 09/14/2023    HCT 34.0 09/14/2023    MCV 84.6 09/14/2023     09/14/2023     Lab Results   Component Value Date    CHLPL 201 (H) 03/01/2023    TRIG 61 03/01/2023    HDL 70 (H) 03/01/2023     (H) 03/01/2023     Lab Results   Component Value Date    TSH 2.300 03/01/2023     Lab Results   Component Value Date    HGBA1C 5.40 03/01/2023     Lab Results   Component Value Date    FOLATE 12.60 09/14/2023     Lab Results   Component Value Date    CHJVBUNG12 394 09/14/2023     MRI Brain With & Without Contrast (10/06/2023 11:11)     Chart Review:  Office Visit with [place]  Kiran Szymanski MD (10/12/2023)  Office Visit with Kiran Madden MD (09/14/2023)  Office Visit with Kiran Madden MD (08/24/2023)     Plan .  Impression:  Sedrick Leahy is a 68 y.o. female who presents for imbalance and falls.  She notes that she has been having this ongoing for some time.  She notes that she sometimes will stumble side to side while other times she is fine.  On examination she does have numbness in her feet and toes with decreased pinprick sensation.  Suspect she may have some neuropathy as she had diabetes.  She also has chronic lumbar spine degenerative disease which is also a likely contributor to her symptoms. She reports hip pain as well.  I see no obvious signs of ataxic gait but she does have some hesitancy and antalgic gait which I believe is likely associated to her back pain and reported hip pain. She does have some instability with tandem gait as well. I have reviewed her MRI which shows partially empty sella and some chronic microvascular disease.  No concerning findings regarding her balance noted.  This was discussed with the patient and her .     Plan:  EMG/NCS for evaluation of numbness in feet  PT for gait training  Safety Discussed with emphasis on fall risk reduction  Call with concerns.     The patient and I have discussed the plan of care and she is in full agreement at this time.     Follow-Up:  Return in about 3 months (around 1/20/2024) for Falls, Imbalance.         HECTOR Saxena  10/24/23  10:48 CDT

## 2023-10-31 ENCOUNTER — OFFICE VISIT (OUTPATIENT)
Dept: FAMILY MEDICINE CLINIC | Facility: CLINIC | Age: 68
End: 2023-10-31
Payer: MEDICARE

## 2023-10-31 VITALS
HEART RATE: 92 BPM | RESPIRATION RATE: 18 BRPM | DIASTOLIC BLOOD PRESSURE: 84 MMHG | BODY MASS INDEX: 31.89 KG/M2 | OXYGEN SATURATION: 98 % | WEIGHT: 180 LBS | HEIGHT: 63 IN | TEMPERATURE: 98.5 F | SYSTOLIC BLOOD PRESSURE: 126 MMHG

## 2023-10-31 DIAGNOSIS — R41.3 MEMORY LOSS: ICD-10-CM

## 2023-10-31 DIAGNOSIS — G47.00 INSOMNIA, UNSPECIFIED TYPE: ICD-10-CM

## 2023-10-31 DIAGNOSIS — R26.9 GAIT DIFFICULTY: ICD-10-CM

## 2023-10-31 DIAGNOSIS — I67.89 CEREBRAL MICROVASCULAR DISEASE: ICD-10-CM

## 2023-10-31 NOTE — PROGRESS NOTES
ARAMIS”.   Subjective   Sedrick Leahy is a 68 y.o. female presenting with chief complaint of:   Chief Complaint   Patient presents with    Follow-up     AWV 10.20.21  Last Completed Annual Wellness Visit       This patient has no relevant Health Maintenance data.             History of Present Illness :  Alone.  Here for primarily f/u last visit and her visit with neuro.   Here for review of chronic problems that includes gait difficulty and others.  Also     Has multiple chronic problems to consider that might have a bearing on today's issues;  an interval appointment.       Chronic/acute problems reviewed today:   1. Gait difficulty Chronic/stable:.  Ongoing issues with difficulties it results in difficulty with walking or gait.  No recent (though before) falls.  No recent significant injuries.  Uses to help gait: walking more intentional.     2. Cerebral microvascular disease cerebral microvascular changes found and probably reflects some of her memory loss and gait difficulties   3. Memory loss she still feels that she has some memory loss per her visit with [facility] this was not felt to be a major problem.  Note #2   4. Insomnia, unspecified type she is resorted back to using her amitriptyline and it makes her sleep better and she denies any causes hypersomnolence the next day     Has an/another acute issue today: none.    The following portions of the patient's history were reviewed and updated as appropriate: allergies, current medications, past family history, past medical history, past social history, past surgical history, and problem list.      Current Outpatient Medications:     amitriptyline (ELAVIL) 100 MG tablet, TAKE ONE (1) TO TWO (2) TABLETS AT BEDTIME AS NEEDED SLEEP, Disp: 180 tablet, Rfl: 0    atorvastatin (LIPITOR) 20 MG tablet, Take 1 tablet by mouth Daily., Disp: 90 tablet, Rfl: 5    cholecalciferol (VITAMIN D3) 25 MCG (1000 UT) tablet, Take 1 tablet by mouth Daily., Disp: ,  Rfl:     HYDROcodone-acetaminophen (Norco) 5-325 MG per tablet, Take 1 tablet by mouth Every 6 (Six) Hours As Needed for Severe Pain., Disp: 12 tablet, Rfl: 0    nystatin (MYCOSTATIN) 100,000 unit/mL suspension, Swish and swallow 5 mL 4 (Four) Times a Day., Disp: 473 mL, Rfl: 0    No problems with medications.    Allergies   Allergen Reactions    Penicillins Swelling and Rash       Review of Systems  GENERAL:  Inactive/slower with limits, speed, stamina for age and spinal pain. Sleep is ok with elavil. No fever now/recent.  SKIN: No rash/skin lesion of concern.   ENDO:  No syncope, near or diaphoretic sweaty spells.  HEENT: No recent head injury; or change occ headache.   No vision change.  No significant hearing loss.  Ears without pain/drainage.  No sore throat.  No significant nasal/sinus congestion/drainage. No epistaxis. Above.   CHEST: No chest wall tenderness or mass. No cough, without wheeze.  No SOB; no hemoptysis.  CV: No chest pain, palpitations, ankle edema.  GI: No heartburn, dysphagia.  No abdominal pain, diarrhea, constipation.  No rectal bleeding, or melena.    :  Voids without dysuria, same urinary incontinence but voids to completion.  ORTHO: No painful/swollen joints but various on /off sore.  Variable sore neck or back.  No acute neck or back pain without recent injury.  NEURO: No dizziness, weakness of extremities or significant UE/LE numbness/paresthesias.   PSYCH: since 2022? some memory loss.  Mood variable as occ anxious, depressed but/and not suicidal.  Tries to tolerate stress .  Screening:  No gyne BRIONNA/[facility]/1988   Mammogram: 7.12.23-see claims above  Bone density: 1.10.20 Bone d-deca/Ivonne/T L3 -1.1 hip neck -2.0/to follow   Low dose CT chest:Tobacco-smoker/age 14/2 ppd/dc 1982 (9y)  NA  GI:   Colonoscopy//[facility]/6.6.16/5y  Cologuard +/11.12.19  Colon-nl/[facility]/4.10.23/5y  EGD-irregular z//[facility]/4.10.23  Prostate: NA  Usual lab order  6m BMP, A1c  12m CBC, CMP, A1c, LIPID,  TSH, Vit D       Copy/paste function used for ROS/exam AND each area of these were reviewed, updated, confirmed and supplemented as needed.  Data reviewed:   Recent admit/ER/MD visits: 10.12.23      1. Chronic neck pain    2. Chronic back pain, unspecified back location, unspecified back pain laterality    3. Hyperlipidemia, unspecified hyperlipidemia type    4. Gait difficulty    5. Memory loss    6. Abnormal laboratory test    7. Elevated fasting glucose    8. Type 2 diabetes mellitus without complication, without long-term current use of insulin    9. Depression, unspecified depression type    10. Abnormal MRI of head          Data review above:   Discussions/medical decisions/reviews:  BP ok  Other vitals ok  Recent Vitals           2023 2023 10/12/2023          BP: 122/78 122/78 126/84       Pulse: 100 92 96       Temp: 97.7 °F (36.5 °C) 97.1 °F (36.2 °C) 97.3 °F (36.3 °C)       Weight: 82.1 kg (181 lb) 82.1 kg (181 lb) 82.1 kg (181 lb)       BMI (Calculated): 32.1 32.1 32.1               DM/A1c 5.4 3.1.23  DM/ 8.25.23  Lipid  3.1.23; no Rx (lipitor 20 given 10.12.23)  PSA NA  CBC Hb 10.7 9.14.23  Iron 68N 9.14.23; none  B12 394 9.14.23; none  Folate 12.6 9.14.23; noneRenal ok 10.6.23  Liver ok 8.25.23; has been up before  Vit D 26 3.1.23  Thyroid TSH ok 3.1.23     [id number]  Colonoscopy/[facility]/  Colonoscopy/[facility]rianna/[facility]/  Colonoscopy+neg/[facility]//07/BE  EGD+hiatal hernia/[facility]//07  Colonoscopy+zoytpm-txxi-ksm-div/[facility]//09/3mo  Colonoscopy+polyp/[facility]//4-15-09/3y  Limited colonoscopy+vxgu-esmps-eij/[facility]//10-22-09/[facility]  EGD+hh-biop/[facility]/5-5-10  Colonoscopy+Polyp-div/[facility]/4.4.12/3m-1y pp  Attempted Colonoscopy/[facility]/4.8.13  Colonoscopy+nl/[facility]/4.9.13/3y  EGD+biop/[facility]/4.8.13  EGD+eek-cn-qddkqzmx/[facility]//8.14.14  Esophagial Mobility Study/[place]/[facility]/30.14  Colonoscopy/unable to complete due to quality of  prep5.2.16  Colonoscopy//[facility]/6.6.16/5y  Cologuard+/11.21.19-gi suggested  Colon-div///1.14.2020/5y  EGD-past fundoplication/[facility]/1.18.21  Colon-nl//[facility]/4.10.23/5y  EGD-irregular z//[facility]/4.10.23     SURGERIES:  BRIONNA/[facility]/1988  Lap gb/[facility]/1996  Appendectomy/[place]/1996  Lap HH+nissen/[facility]/12.3.14   C spine/[facility]//11.20.20  C spine/[facility]//12.3.20     Hb 11.8 3.1.23: 10.7 9.15.23  Iron always normal: 68N 9.15.23  Ferritin always ok; 16.3N 9.15.23  B12 always ok; 394 9.15.23  Folate always ok: 12.6 9.15.23  Retic 1.49 9.14.23  OB +12.28.20: neg 3.24.23     Screening reviewed/updated   Discussed MRI head abnormals; ? Cerebral HTN, ? Fluid optic nerve and white matter changes: we are not sure if this connects with balance problems, memory problems  Prudent to start lipitor 20; as likely some small vessel disease  Awaiting visit with neuro for their opinions     Last cardiac testing:   Echo:   Results for orders placed during the hospital encounter of 07/20/23    Adult Transthoracic Echo Complete W/ Cont if Necessary Per Protocol    Interpretation Summary    Left ventricular systolic function is normal. Left ventricular ejection fraction appears to be 56 - 60%.    Normal right ventricular cavity size and systolic function noted.    No significant valvular abnormalities identified on this study.    Radiology considered:   MRI Brain With & Without Contrast    Result Date: 10/6/2023  1. Scattered areas of T2 high signal within the hemispheric white matter are nonspecific and likely due to chronic small vessel disease. 2. Partially empty appearance of the sella turcica. Fluid in the optic nerve sheaths. Small right transverse sinus. These findings may all be incidental. They may also be seen in patients with idiopathic intracranial hypertension.   This report was signed and finalized on 10/6/2023 11:23 AM CDT by Dr. Wilmer Gómez MD.      XR Chest PA & Lateral    Result Date: 8/25/2023    "No acute cardiopulmonary abnormality.  This report was finalized on 08/25/2023 10:02 by  Juwan Roca DO. '    Lab Results:  Results for orders placed or performed during the hospital encounter of 10/06/23   POC Creatinine    Specimen: Blood   Result Value Ref Range    Creatinine 0.80 0.60 - 1.30 mg/dL       A1C:  Lab Results - Last 18 Months   Lab Units 03/01/23  0922   HEMOGLOBIN A1C % 5.40     GLUCOSE:  Lab Results - Last 18 Months   Lab Units 08/25/23  0928 06/28/23  0847 06/20/23  1529 03/01/23  0922 08/30/22  1045   GLUCOSE mg/dL 132* 101* 122* 79 97     LIPID:  Lab Results - Last 18 Months   Lab Units 03/01/23  0922   CHOLESTEROL mg/dL 201*   LDL CHOL mg/dL 120*   HDL CHOL mg/dL 70*   TRIGLYCERIDES mg/dL 61     PSA:No results found for: \"PSA\"    CBC:  Lab Results - Last 18 Months   Lab Units 09/14/23  1334 08/25/23  0928 06/28/23  0847 06/20/23  1529 03/01/23  0922   WBC 10*3/mm3 6.08 7.70 7.33 9.35 6.53   HEMOGLOBIN g/dL 10.7* 10.8* 11.1* 11.8* 11.8*   HEMATOCRIT % 34.0 34.2 34.0 36.3 37.4   PLATELETS 10*3/mm3 345 334 406 329 289   IRON mcg/dL 68  --   --   --   --    VITAMIN B 12 pg/mL 394  --   --   --   --    FOLATE ng/mL 12.60  --   --   --   --      BMP/CMP:  Lab Results - Last 18 Months   Lab Units 10/06/23  1022 08/25/23  0928 06/28/23  0847 06/20/23  1529 03/01/23  0922 08/30/22  1045   SODIUM mmol/L  --  138 142 139 142 143   POTASSIUM mmol/L  --  3.4* 4.8 4.0 4.6 4.5   CHLORIDE mmol/L  --  100 106 103 105 104   CO2 mmol/L  --  29.0 27.0 22.5 29.1* 29.2*   GLUCOSE mg/dL  --  132* 101* 122* 79 97   BUN mg/dL  --  11 15 10 14 16   CREATININE mg/dL 0.80 0.78 0.82 0.76 0.74 0.87   EGFR RESULT mL/min/1.73  --   --  77.5 84.9 88.3 72.7   CALCIUM mg/dL  --  9.1 9.3 9.0 9.3 9.3       HEPATIC:  Lab Results - Last 18 Months   Lab Units 08/25/23  0928 06/28/23  0847 06/20/23  1529 03/01/23  0922 08/30/22  1045   ALT (SGPT) U/L 23 14 113* 11 12   AST (SGOT) U/L 29 17 127* 17 16   ALK PHOS U/L 114 118* 159* " "106 115     HEPATITIS C ANTIBODY:   Lab Results   Component Value Date/Time    HEPCVIRUSABY <0.1 05/28/2019 07:09 AM     Vit D:  Lab Results - Last 18 Months   Lab Units 03/01/23 0922   VIT D 25 HYDROXY ng/ml 26.1*     THYROID:  Lab Results - Last 18 Months   Lab Units 03/01/23 0922   TSH uIU/mL 2.300       Objective   /84   Pulse 92   Temp 98.5 °F (36.9 °C) (Temporal)   Resp 18   Ht 160 cm (63\")   Wt 81.6 kg (180 lb)   SpO2 98%   BMI 31.89 kg/m²   Body mass index is 31.89 kg/m².    Recent Vitals         10/12/2023 10/20/2023 10/31/2023       BP: 126/84 138/76 126/84     Pulse: 96 83 92     Temp: 97.3 °F (36.3 °C) -- 98.5 °F (36.9 °C)     Weight: 82.1 kg (181 lb) 82.1 kg (181 lb) 81.6 kg (180 lb)     BMI (Calculated): 32.1 32.1 31.9           Wt Readings from Last 15 Encounters:   10/31/23 1528 81.6 kg (180 lb)   10/20/23 1305 82.1 kg (181 lb)   10/12/23 0934 82.1 kg (181 lb)   09/14/23 1405 82.1 kg (181 lb)   08/24/23 1601 82.1 kg (181 lb)   07/20/23 1227 82.1 kg (181 lb)   06/28/23 0901 82.1 kg (181 lb)   06/20/23 1600 80.7 kg (178 lb)   05/11/23 1310 83 kg (183 lb)   04/03/23 1319 83 kg (183 lb)   03/01/23 0950 83.4 kg (183 lb 12.8 oz)   01/05/23 1543 80.3 kg (177 lb)   12/21/22 0959 81 kg (178 lb 9.6 oz)   08/30/22 1005 80.7 kg (178 lb)   06/30/22 0916 83.8 kg (184 lb 11.9 oz)       Physical Exam  GENERAL:  Well nourished/developed in no acute distress.   SKIN: Turgor excellent, without wound, rash, lesion.  HEENT: Normal cephalic without trauma.  Pupils equal round reactive to light. Extraocular motions full without nystagmus.   External canals nonobstructive nontender without reddness. Tymphatic membranes brown with tyler structures intact.   Oral cavity without growths, exudates, and moist.  Posterior pharynx without mass, obstruction, redness.  No thyromegaly, mass, tenderness, lymphadenopathy and supple.  CV: Regular rhythm.  No murmur, gallop,  edema. Posterior pulses intact.  No carotid " bruits.  CHEST: No chest wall tenderness or mass.   LUNGS: Symmetric motion with clear to auscultation.   ABD: Soft, nontender without mass.   ORTHO: Symmetric extremities without swelling/point tenderness.  Full gross range of motion.  NEURO: CN 2-12 grossly intact.  Symmetric facies and UE/LE. 2-3/5 strength throughout. 1/4 x bicep knee equal reflexes.  Nonfocal use extremities. Speech clear. Intact light touch with monofilament, vibratory sensation with tuning fork; equal toes/distal feet.    PSYCH: Oriented x 3.  Pleasant calm, well kept.  Purposeful/directed conservation with intact short/long gross memory.    Assessment & Plan     1. Gait difficulty    2. Cerebral microvascular disease    3. Memory loss    4. Insomnia, unspecified type        Data review above:   Discussions/medical decisions/reviews:  BP ok  Other vitals ok  Recent Vitals         10/12/2023 10/20/2023 10/31/2023       BP: 126/84 138/76 126/84     Pulse: 96 83 92     Temp: 97.3 °F (36.3 °C) -- 98.5 °F (36.9 °C)     Weight: 82.1 kg (181 lb) 82.1 kg (181 lb) 81.6 kg (180 lb)     BMI (Calculated): 32.1 32.1 31.9           DM/A1c 5.4 3.1.23  DM/ 8.25.23  Lipid  3.1.23; no Rx (lipitor 20 given 10.12.23)  PSA NA  CBC Hb 10.7 9.14.23  Iron 68N 9.14.23; none  B12 394 9.14.23; none  Folate 12.6 9.14.23; none  Renal ok 10.6.23  Liver ok 8.25.23; has been up before  Vit D 26 3.1.23  Thyroid TSH ok 3.1.23    [id number]  Colonoscopy/[facility]/  Colonoscopy/[facility]/  Colonoscopy+neg/[facility]//07/BE  EGD+hiatal hernia/[facility]//07  Colonoscopy+dkizsb-mmuo-wnt-div/[facility]//-/3mo  Colonoscopy+polyp/[facility]//4-15-09/3y  Limited colonoscopy+migx-gcglz-jsm/[facility]//10-22-09/[facility]  EGD+hh-biop/[facility]//5-5-10  Colonoscopy+Polyp-div/[facility]//4.4.12/3m-1y pp  Attempted Colonoscopy/[facility]/4.8.13  Colonoscopy+nl/[facility]/4.9.13/3y  EGD+biop/[facility]//4.8.13  EGD+pxx-zk-bwjhvyny/[facility]//8.14.14  Esophagial Mobility  "Study/[facility]/9.30.14  Colonoscopy/unable to complete due to quality of prep5.2.16  Colonoscopy//[facility]/6.6.16/5y  Cologuard+/11.21.19-gi suggested  Colon-div///1.14.2020/5y  EGD-past fundoplication/[facility]/1.18.21  Colon-nl//[facility]/4.10.23/5y  EGD-irregular z//[facility]/4.10.23     SURGERIES:  BRIONNA/[facility]/1988  Lap gb/[place]/[facility]/1996  Appendectomy/[facility]/1996  Lap HH+nissen/[facility]/12.3.14   C spine/[facility]//11.20.20  C spine/[facility]//12.3.20    Hb 11.8 3.1.23: 10.7 9.15.23  Iron always normal: 68N 9.15.23  Ferritin always ok; 16.3N 9.15.23  B12 always ok; 394 9.15.23  Folate always ok: 12.6 9.15.23  Retic 1.49 9.14.23  OB +12.28.20: neg 3.24.23    Screening reviewed/updated   Vaccines discussed (see below)   Discussed (for her understanding) neuro felt empty sella not an issue and amount of cerebral microvascular also not an issue.  Felt had some neuropathy and is requesting EMG/NCV  Encouraged to get EMG/NCV; as is likely part of her \"balance\" concerns  Anemia mild; worth watching as recent gi neg    Follow up: Return for lab/Dr Madden 6m.  Future Appointments   Date Time Provider Department Center   1/19/2024  1:00 PM Castillo Guzman APRN [facility]   4/30/2024  1:30 PM Kiran Madden MD [facility]       Data review above:   Rx: reviewed and decisions:   Rx new/changes: none  No orders of the defined types were placed in this encounter.      Orders placed:   LAB/Testing/Referrals: reviewed/orders:   Today:   No orders of the defined types were placed in this encounter.    Chronic/recurrent labs above or change to:   Same     Immunization History   Administered Date(s) Administered    COVID-19 (MODERNA) 1st,2nd,3rd Dose Monovalent 06/07/2021, 07/05/2021    Fluzone High Dose =>65 Years ([employer] ONLY) 11/08/2019, 10/10/2023    Fluzone High-Dose 65+yrs 09/09/2020, 10/20/2021    Pneumococcal Conjugate 13-Valent (PCV13) 10/22/2019, 11/08/2019    Pneumococcal, Unspecified 10/10/2023 " "   Tdap 10/20/2021     We advised/reaffirmed our support/suggestion for staying complete with covid- covid boosters, seasonal flu/yearly and any missing vaccine from list we supplied; when cannot be given here we suggest contact with local health department office or pharmacy to review missing/needed vaccines and then bring nursing documentation for these vaccines to this office or call this information in. Shingles became \"free\" 1.1.23 for medicare insurance.    Health maintenance:   Body mass index is 31.89 kg/m².         Tobacco use reviewed:   Sedrick Leahy  reports that she quit smoking about 33 years ago. Her smoking use included cigarettes. She started smoking about 69 years ago. She has a 40.00 pack-year smoking history. She has never used smokeless tobacco..    There are no Patient Instructions on file for this visit.          "

## 2023-12-05 ENCOUNTER — TELEPHONE (OUTPATIENT)
Dept: FAMILY MEDICINE CLINIC | Facility: CLINIC | Age: 68
End: 2023-12-05
Payer: MEDICARE

## 2023-12-05 ENCOUNTER — HOSPITAL ENCOUNTER (EMERGENCY)
Facility: HOSPITAL | Age: 68
Discharge: HOME | DRG: 460 | End: 2023-12-06
Attending: EMERGENCY MEDICINE
Payer: MEDICARE

## 2023-12-05 ENCOUNTER — APPOINTMENT (OUTPATIENT)
Dept: CT IMAGING | Facility: HOSPITAL | Age: 68
DRG: 460 | End: 2023-12-05
Payer: MEDICARE

## 2023-12-05 DIAGNOSIS — M48.061 NEUROFORAMINAL STENOSIS OF LUMBAR SPINE: ICD-10-CM

## 2023-12-05 DIAGNOSIS — M54.42 BILATERAL LOW BACK PAIN WITH LEFT-SIDED SCIATICA, UNSPECIFIED CHRONICITY: Primary | ICD-10-CM

## 2023-12-05 DIAGNOSIS — M51.37 DEGENERATIVE DISC DISEASE AT L5-S1 LEVEL: ICD-10-CM

## 2023-12-05 LAB
BASOPHILS # BLD AUTO: 0.03 10*3/MM3 (ref 0–0.2)
BASOPHILS NFR BLD AUTO: 0.4 % (ref 0–1.5)
DEPRECATED RDW RBC AUTO: 60.7 FL (ref 37–54)
EOSINOPHIL # BLD AUTO: 0.08 10*3/MM3 (ref 0–0.4)
EOSINOPHIL NFR BLD AUTO: 1.2 % (ref 0.3–6.2)
ERYTHROCYTE [DISTWIDTH] IN BLOOD BY AUTOMATED COUNT: 18.4 % (ref 12.3–15.4)
HCT VFR BLD AUTO: 36 % (ref 34–46.6)
HGB BLD-MCNC: 10.6 G/DL (ref 12–15.9)
IMM GRANULOCYTES # BLD AUTO: 0.01 10*3/MM3 (ref 0–0.05)
IMM GRANULOCYTES NFR BLD AUTO: 0.1 % (ref 0–0.5)
LYMPHOCYTES # BLD AUTO: 2.05 10*3/MM3 (ref 0.7–3.1)
LYMPHOCYTES NFR BLD AUTO: 30.4 % (ref 19.6–45.3)
MCH RBC QN AUTO: 26.1 PG (ref 26.6–33)
MCHC RBC AUTO-ENTMCNC: 29.4 G/DL (ref 31.5–35.7)
MCV RBC AUTO: 88.7 FL (ref 79–97)
MONOCYTES # BLD AUTO: 0.53 10*3/MM3 (ref 0.1–0.9)
MONOCYTES NFR BLD AUTO: 7.9 % (ref 5–12)
NEUTROPHILS NFR BLD AUTO: 4.05 10*3/MM3 (ref 1.7–7)
NEUTROPHILS NFR BLD AUTO: 60 % (ref 42.7–76)
NRBC BLD AUTO-RTO: 0 /100 WBC (ref 0–0.2)
PLATELET # BLD AUTO: 266 10*3/MM3 (ref 140–450)
PMV BLD AUTO: 9.9 FL (ref 6–12)
RBC # BLD AUTO: 4.06 10*6/MM3 (ref 3.77–5.28)
WBC NRBC COR # BLD AUTO: 6.75 10*3/MM3 (ref 3.4–10.8)

## 2023-12-05 PROCEDURE — 86140 C-REACTIVE PROTEIN: CPT | Performed by: EMERGENCY MEDICINE

## 2023-12-05 PROCEDURE — 0 HYDROMORPHONE 1 MG/ML SOLUTION: Performed by: EMERGENCY MEDICINE

## 2023-12-05 PROCEDURE — 25010000002 ONDANSETRON PER 1 MG: Performed by: EMERGENCY MEDICINE

## 2023-12-05 PROCEDURE — 72131 CT LUMBAR SPINE W/O DYE: CPT

## 2023-12-05 PROCEDURE — 25010000002 DEXAMETHASONE PER 1 MG: Performed by: EMERGENCY MEDICINE

## 2023-12-05 PROCEDURE — 96375 TX/PRO/DX INJ NEW DRUG ADDON: CPT

## 2023-12-05 PROCEDURE — 80048 BASIC METABOLIC PNL TOTAL CA: CPT | Performed by: EMERGENCY MEDICINE

## 2023-12-05 PROCEDURE — 85025 COMPLETE CBC W/AUTO DIFF WBC: CPT | Performed by: EMERGENCY MEDICINE

## 2023-12-05 PROCEDURE — 96374 THER/PROPH/DIAG INJ IV PUSH: CPT

## 2023-12-05 PROCEDURE — 99284 EMERGENCY DEPT VISIT MOD MDM: CPT

## 2023-12-05 RX ORDER — SODIUM CHLORIDE 0.9 % (FLUSH) 0.9 %
10 SYRINGE (ML) INJECTION AS NEEDED
Status: DISCONTINUED | OUTPATIENT
Start: 2023-12-05 | End: 2023-12-06 | Stop reason: HOSPADM

## 2023-12-05 RX ORDER — ONDANSETRON 2 MG/ML
4 INJECTION INTRAMUSCULAR; INTRAVENOUS ONCE
Status: COMPLETED | OUTPATIENT
Start: 2023-12-05 | End: 2023-12-05

## 2023-12-05 RX ORDER — DEXAMETHASONE SODIUM PHOSPHATE 10 MG/ML
10 INJECTION INTRAMUSCULAR; INTRAVENOUS ONCE
Status: COMPLETED | OUTPATIENT
Start: 2023-12-05 | End: 2023-12-05

## 2023-12-05 RX ADMIN — HYDROMORPHONE HYDROCHLORIDE 1 MG: 1 INJECTION, SOLUTION INTRAMUSCULAR; INTRAVENOUS; SUBCUTANEOUS at 23:43

## 2023-12-05 RX ADMIN — ONDANSETRON 4 MG: 2 INJECTION INTRAMUSCULAR; INTRAVENOUS at 23:43

## 2023-12-05 RX ADMIN — DEXAMETHASONE SODIUM PHOSPHATE 10 MG: 10 INJECTION INTRAMUSCULAR; INTRAVENOUS at 23:44

## 2023-12-05 NOTE — TELEPHONE ENCOUNTER
Pt called asking if Dr Madden could see her today and order an xray of her back. Was told that she could be seen by [facility], but she decided to go directly to the ER.

## 2023-12-06 VITALS
DIASTOLIC BLOOD PRESSURE: 68 MMHG | BODY MASS INDEX: 31.58 KG/M2 | WEIGHT: 185 LBS | SYSTOLIC BLOOD PRESSURE: 152 MMHG | RESPIRATION RATE: 18 BRPM | OXYGEN SATURATION: 95 % | TEMPERATURE: 97.7 F | HEIGHT: 64 IN | HEART RATE: 80 BPM

## 2023-12-06 LAB
ANION GAP SERPL CALCULATED.3IONS-SCNC: 12 MMOL/L (ref 5–15)
BUN SERPL-MCNC: 18 MG/DL (ref 8–23)
BUN/CREAT SERPL: 33.3 (ref 7–25)
CALCIUM SPEC-SCNC: 9 MG/DL (ref 8.6–10.5)
CHLORIDE SERPL-SCNC: 105 MMOL/L (ref 98–107)
CO2 SERPL-SCNC: 24 MMOL/L (ref 22–29)
CREAT SERPL-MCNC: 0.54 MG/DL (ref 0.57–1)
CRP SERPL-MCNC: <0.3 MG/DL (ref 0–0.5)
EGFRCR SERPLBLD CKD-EPI 2021: 99.8 ML/MIN/1.73
GLUCOSE SERPL-MCNC: 107 MG/DL (ref 65–99)
POTASSIUM SERPL-SCNC: 3.7 MMOL/L (ref 3.5–5.2)
SODIUM SERPL-SCNC: 141 MMOL/L (ref 136–145)

## 2023-12-06 RX ORDER — PREDNISONE 20 MG/1
20 TABLET ORAL 2 TIMES DAILY
Qty: 10 TABLET | Refills: 0 | Status: ON HOLD | OUTPATIENT
Start: 2023-12-06 | End: 2023-12-08

## 2023-12-06 RX ORDER — HYDROCODONE BITARTRATE AND ACETAMINOPHEN 5; 325 MG/1; MG/1
1 TABLET ORAL EVERY 8 HOURS PRN
Qty: 6 TABLET | Refills: 0 | Status: ON HOLD | OUTPATIENT
Start: 2023-12-06 | End: 2023-12-11

## 2023-12-06 NOTE — ED PROVIDER NOTES
Subjective   History of Present Illness  Patient is 68 years old with severe back pain in the lower back.  No history of trauma no history of fall came in the ED crying the pain radiates down both legs.  There is no focal neurological deficit no history any recent falls or injuries.  Prior MRI in 2022 did not show any significant spinal stenosis.  Does not any urinary incontinence.    Back Pain  Location:  Lumbar spine  Quality:  Aching, stiffness and shooting  Stiffness is present:  All day  Radiates to:  L posterior upper leg and R posterior upper leg  Pain severity:  Severe  Pain is:  Same all the time  Onset quality:  Gradual  Duration:  5 days  Timing:  Constant  Progression:  Worsening  Chronicity:  Recurrent  Context: not emotional stress, not falling, not jumping from heights, not occupational injury, not recent illness and not recent injury    Relieved by:  Being still  Worsened by:  Movement  Ineffective treatments:  Muscle relaxants and narcotics  Associated symptoms: leg pain    Associated symptoms: no abdominal pain, no abdominal swelling, no bladder incontinence, no bowel incontinence, no chest pain, no numbness, no paresthesias, no perianal numbness, no tingling, no weakness and no weight loss    Risk factors: no hx of cancer, no hx of osteoporosis, no steroid use and no vascular disease    Leg Pain  Associated symptoms: back pain    Associated symptoms: no neck pain        Review of Systems   Constitutional: Negative.  Negative for weight loss.   HENT: Negative.     Eyes: Negative.    Respiratory: Negative.     Cardiovascular: Negative.  Negative for chest pain.   Gastrointestinal: Negative.  Negative for abdominal pain and bowel incontinence.   Genitourinary:  Negative for bladder incontinence.   Musculoskeletal: Negative.  Positive for back pain. Negative for neck pain.   Skin: Negative.    Neurological: Negative.  Negative for tingling, weakness, numbness and paresthesias.   All other systems  "reviewed and are negative.      Past Medical History:   Diagnosis Date    Arthritis     Diabetes mellitus     diet controlled    Hx of colonic polyp     Hypertension     Joint pain     Hip    Lower back pain     Neck pain     PONV (postoperative nausea and vomiting)        Allergies   Allergen Reactions    Penicillins Swelling and Rash    Morphine GI Intolerance     Pt states morphine makes her sick and has \"had GERD surgery is unable to vomit\"       Past Surgical History:   Procedure Laterality Date    ANTERIOR CERVICAL DISCECTOMY W/ FUSION N/A 11/20/2020    Procedure: EXPLORATION OF FUSION C5-6, ANTERIOR CERVICAL DISCECTOMY FUSION C4-5, C6-7 REVISON ANTERIOR FUSION C5-6 WITH INSTRUMENTATION C4-7;  Surgeon: KEN Gilbert MD;  Location:  [facility];  Service: Orthopedic Spine;  Laterality: N/A;    APPENDECTOMY      COLONOSCOPY  06/06/2016    Normal exam repeat in 5 years    COLONOSCOPY N/A 01/14/2020    Diverticulosis repeat exam in 5 years    COLONOSCOPY W/ POLYPECTOMY  04/04/2012    Hyperplastic polyp cecum repeat exam in 1 year    ENDOSCOPY  08/14/2014    Very tortuous distal esophagus dilated 50 Fr, HH     ENDOSCOPY N/A 01/18/2021    A fundoplication was found, dilated    ENDOSCOPY  06/2023    HARDWARE REMOVAL N/A 11/20/2020    Procedure: REMOVAL OF INSTRUMENTATION;  Surgeon: KEN Gilbert MD;  Location:  [facility];  Service: Orthopedic Spine;  Laterality: N/A;    HYSTERECTOMY      Laparoscopic Hysterectomy bilateral salpingectomy for bleeding    NECK SURGERY      NISSEN FUNDOPLICATION      POSTERIOR CERVICAL FUSION N/A 12/03/2020    Procedure: POSTERIOR SPINAL FUSION WITH INSTRUMENTATION C4-7;  Surgeon: KEN Gilbert MD;  Location:  [facility];  Service: Orthopedic Spine;  Laterality: N/A;    STEROID INJECTION Left 06/30/2022    Procedure: LEFT HIP FLUROSCOPIC GUIDED CORTICOSTEROID INJECTION;  Surgeon: Herberth Skelton MD;  Location:  [facility];  Service: Orthopedics;  Laterality: Left;    US GUIDED " LYMPH NODE BIOPSY  2020       Family History   Problem Relation Age of Onset    No Known Problems Father     Colon cancer Mother     Colon polyps Mother     Diabetes Brother     Diabetes Brother     Diabetes Son     Diabetes Son     Heart attack Paternal Grandmother     Breast cancer Maternal Aunt     Lymphoma Maternal Aunt     Breast cancer Maternal Aunt     Lung cancer Maternal Aunt     Diabetes Maternal Aunt     Breast cancer Maternal Aunt        Social History     Socioeconomic History    Marital status:      Spouse name: Jason    Number of children: 2    Years of education: 12   Tobacco Use    Smoking status: Former     Packs/day: 2.00     Years: 20.00     Additional pack years: 0.00     Total pack years: 40.00     Types: Cigarettes     Start date: 1954     Quit date: 7/10/1990     Years since quittin.4    Smokeless tobacco: Never   Vaping Use    Vaping Use: Never used   Substance and Sexual Activity    Alcohol use: Yes     Comment: rare    Drug use: No    Sexual activity: Yes     Partners: Male           Objective   Physical Exam  Vitals and nursing note reviewed. Exam conducted with a chaperone present.   Constitutional:       General: She is awake.      Appearance: Normal appearance. She is well-developed. She is not ill-appearing.   HENT:      Head: Normocephalic and atraumatic.   Eyes:      General: Lids are normal.      Conjunctiva/sclera: Conjunctivae normal.      Pupils: Pupils are equal, round, and reactive to light.   Cardiovascular:      Rate and Rhythm: Normal rate and regular rhythm.      Chest Wall: PMI is not displaced.      Pulses: Normal pulses.           Dorsalis pedis pulses are 2+ on the right side and 2+ on the left side.        Posterior tibial pulses are 2+ on the right side and 2+ on the left side.      Heart sounds: Normal heart sounds.   Pulmonary:      Effort: Pulmonary effort is normal.      Breath sounds: Normal breath sounds. No decreased breath sounds.    Abdominal:      General: Abdomen is flat. Bowel sounds are normal.      Palpations: Abdomen is soft.      Tenderness: There is no abdominal tenderness.   Musculoskeletal:         General: Normal range of motion.      Cervical back: Normal, full passive range of motion without pain, normal range of motion and neck supple.      Thoracic back: Normal.      Lumbar back: Spasms and tenderness present. No swelling, deformity or lacerations. Decreased range of motion. Negative right straight leg raise test and negative left straight leg raise test. No scoliosis.      Comments: Lower extremity examination negative.  Tenderness distal pulse are palpable.   Skin:     General: Skin is warm and dry.      Capillary Refill: Capillary refill takes less than 2 seconds.   Neurological:      General: No focal deficit present.      Mental Status: She is alert and oriented to person, place, and time.      Motor: Motor function is intact.      Deep Tendon Reflexes: Reflexes are normal and symmetric.   Psychiatric:         Behavior: Behavior is cooperative.         Procedures           ED Course  ED Course as of 12/06/23 0132   Wed Dec 06, 2023   0107 CT of the lumbar spine shows no acute findings and no significant central canal stenosis partial effacement at L3-L4 L4-L5 mild disc bulge mild bilateral foraminal stenosis. [TS]      ED Course User Index  [TS] Thomas Vail MD                                     [facility] reviewed by Thomas Vail MD       Medical Decision Making  Lower back pain no trauma.    Problems Addressed:  Bilateral low back pain with left-sided sciatica, unspecified chronicity: acute illness or injury     Details: Lower back pain degenerative disc disease with radiculopathy.  Degenerative disc disease at L5-S1 level: chronic illness or injury  Neuroforaminal stenosis of lumbar spine: chronic illness or injury    Amount and/or Complexity of Data Reviewed  Labs: ordered.     Details: Labs reviewed no evidence of  acute inflammatory process.  Radiology: ordered.     Details: The scan was read by stat rad.    Risk  Prescription drug management.  Risk Details: Patient presented with back pain.  Presentation not consistent with other acute, emergent causes of back pain at this time.  Low suspicion for dissection there is no hypotension, no pulse deficits, and no tearing back/abdominal pain.  Low suspicion for PE as low risk for Wells criteria and the patient is not tachycardic or tachypneic.  Low suspicion for cauda equina or acute cord compression as there is no evidence of trauma no bowel or urinary incontinence, urinary retention, saddle anesthesia, or distal weakness.  Low suspicion for renal colic or pyelonephritis as the patient is afebrile, has no CVA tenderness, and has no urinary symptoms.  Low concern for fracture as there is no evidence of bony tenderness or recent trauma and no palpable step-offs.  Low concern for malignancy as there is no recent history of weakness, weight loss, malaise, or history of malignancy.  Low suspicion for upper cord abnormalities without any evidence of upper extremity weakness, sensory deficits, or any recent trauma.  There are no red flag signsThe patient endorse NO: fevers, chills, recent spinal procedures, bowel/bladder incontinence, IV drug use, cancer,, recent weight loss, trauma ,weakness numbness, tingling, dysuria, hematuria    Patient examination of the lower extremity reveals intact pulses bilaterally there is no evidence of any arterial ischemia.        Final diagnoses:   Bilateral low back pain with left-sided sciatica, unspecified chronicity   Degenerative disc disease at L5-S1 level   Neuroforaminal stenosis of lumbar spine       ED Disposition  ED Disposition       ED Disposition   Discharge    Condition   Stable    Comment   --               Kiran Madden MD  [address] [place] [address]  [phone]    Schedule an appointment as soon as possible for a  visit in 2 days           Medication List        New Prescriptions      predniSONE 20 MG tablet  Commonly known as: DELTASONE  Take 1 tablet by mouth 2 (Two) Times a Day for 5 days.            Changed      HYDROcodone-acetaminophen 5-325 MG per tablet  Commonly known as: NORCO  Take 1 tablet by mouth Every 8 (Eight) Hours As Needed for Moderate Pain for up to 6 doses.  What changed:   when to take this  reasons to take this               Where to Get Your Medications        These medications were sent to [facility] - [place], [place] - [address] [address] [phone]  - [phone]   [address] [place] [address]      Phone: [phone]   HYDROcodone-acetaminophen 5-325 MG per tablet  predniSONE 20 MG tablet            Thomas Vail MD  12/05/23 7060       Thomas Vail MD  12/06/23 5892

## 2023-12-08 ENCOUNTER — APPOINTMENT (OUTPATIENT)
Dept: GENERAL RADIOLOGY | Facility: HOSPITAL | Age: 68
DRG: 460 | End: 2023-12-08
Payer: MEDICARE

## 2023-12-08 ENCOUNTER — HOSPITAL ENCOUNTER (INPATIENT)
Facility: HOSPITAL | Age: 68
LOS: 7 days | Discharge: ANOTHER HOSPITAL | DRG: 460 | End: 2023-12-15
Attending: STUDENT IN AN ORGANIZED HEALTH CARE EDUCATION/TRAINING PROGRAM | Admitting: INTERNAL MEDICINE
Payer: MEDICARE

## 2023-12-08 ENCOUNTER — APPOINTMENT (OUTPATIENT)
Dept: MRI IMAGING | Facility: HOSPITAL | Age: 68
DRG: 460 | End: 2023-12-08
Payer: MEDICARE

## 2023-12-08 DIAGNOSIS — M21.371 FOOT DROP, RIGHT: Primary | ICD-10-CM

## 2023-12-08 DIAGNOSIS — M54.42 ACUTE LOW BACK PAIN WITH BILATERAL SCIATICA, UNSPECIFIED BACK PAIN LATERALITY: ICD-10-CM

## 2023-12-08 DIAGNOSIS — M51.37 DEGENERATION OF LUMBAR OR LUMBOSACRAL INTERVERTEBRAL DISC: ICD-10-CM

## 2023-12-08 DIAGNOSIS — M48.00 CENTRAL STENOSIS OF SPINAL CANAL: ICD-10-CM

## 2023-12-08 DIAGNOSIS — R20.0 BILATERAL LEG NUMBNESS: ICD-10-CM

## 2023-12-08 DIAGNOSIS — R26.9 GAIT ABNORMALITY: ICD-10-CM

## 2023-12-08 DIAGNOSIS — M54.16 LUMBAR RADICULOPATHY, ACUTE: ICD-10-CM

## 2023-12-08 DIAGNOSIS — R29.898 BILATERAL LEG WEAKNESS: ICD-10-CM

## 2023-12-08 DIAGNOSIS — M54.41 ACUTE LOW BACK PAIN WITH BILATERAL SCIATICA, UNSPECIFIED BACK PAIN LATERALITY: ICD-10-CM

## 2023-12-08 DIAGNOSIS — R26.9 GAIT DIFFICULTY: ICD-10-CM

## 2023-12-08 DIAGNOSIS — N30.01 ACUTE CYSTITIS WITH HEMATURIA: ICD-10-CM

## 2023-12-08 DIAGNOSIS — M43.10 ACQUIRED SPONDYLOLISTHESIS: ICD-10-CM

## 2023-12-08 DIAGNOSIS — M46.1 SACROILIITIS: ICD-10-CM

## 2023-12-08 PROBLEM — M54.59 INTRACTABLE LOW BACK PAIN: Status: ACTIVE | Noted: 2023-05-11

## 2023-12-08 PROBLEM — M54.9 BACK PAIN: Status: ACTIVE | Noted: 2023-12-08

## 2023-12-08 PROBLEM — M54.9 BACK PAIN: Status: RESOLVED | Noted: 2023-12-08 | Resolved: 2023-12-08

## 2023-12-08 LAB
ALBUMIN SERPL-MCNC: 3.8 G/DL (ref 3.5–5.2)
ALBUMIN/GLOB SERPL: 1.4 G/DL
ALP SERPL-CCNC: 105 U/L (ref 39–117)
ALT SERPL W P-5'-P-CCNC: 21 U/L (ref 1–33)
ANION GAP SERPL CALCULATED.3IONS-SCNC: 10 MMOL/L (ref 5–15)
AST SERPL-CCNC: 26 U/L (ref 1–32)
BACTERIA UR QL AUTO: ABNORMAL /HPF
BASOPHILS # BLD AUTO: 0.06 10*3/MM3 (ref 0–0.2)
BASOPHILS NFR BLD AUTO: 0.6 % (ref 0–1.5)
BILIRUB SERPL-MCNC: 0.2 MG/DL (ref 0–1.2)
BILIRUB UR QL STRIP: ABNORMAL
BUN SERPL-MCNC: 21 MG/DL (ref 8–23)
BUN/CREAT SERPL: 30 (ref 7–25)
CALCIUM SPEC-SCNC: 9.4 MG/DL (ref 8.6–10.5)
CHLORIDE SERPL-SCNC: 106 MMOL/L (ref 98–107)
CLARITY UR: ABNORMAL
CO2 SERPL-SCNC: 27 MMOL/L (ref 22–29)
COD CRY URNS QL: ABNORMAL /HPF
COLOR UR: ABNORMAL
CREAT SERPL-MCNC: 0.7 MG/DL (ref 0.57–1)
CRP SERPL-MCNC: <0.3 MG/DL (ref 0–0.5)
DEPRECATED RDW RBC AUTO: 58.5 FL (ref 37–54)
EGFRCR SERPLBLD CKD-EPI 2021: 93.8 ML/MIN/1.73
EOSINOPHIL # BLD AUTO: 0.03 10*3/MM3 (ref 0–0.4)
EOSINOPHIL NFR BLD AUTO: 0.3 % (ref 0.3–6.2)
ERYTHROCYTE [DISTWIDTH] IN BLOOD BY AUTOMATED COUNT: 18.6 % (ref 12.3–15.4)
ERYTHROCYTE [SEDIMENTATION RATE] IN BLOOD: 25 MM/HR (ref 0–30)
GLOBULIN UR ELPH-MCNC: 2.7 GM/DL
GLUCOSE SERPL-MCNC: 106 MG/DL (ref 65–99)
GLUCOSE UR STRIP-MCNC: NEGATIVE MG/DL
HCT VFR BLD AUTO: 35 % (ref 34–46.6)
HGB BLD-MCNC: 10.4 G/DL (ref 12–15.9)
HGB UR QL STRIP.AUTO: ABNORMAL
IMM GRANULOCYTES # BLD AUTO: 0.04 10*3/MM3 (ref 0–0.05)
IMM GRANULOCYTES NFR BLD AUTO: 0.4 % (ref 0–0.5)
INR PPP: 0.88 (ref 0.91–1.09)
KETONES UR QL STRIP: ABNORMAL
LEUKOCYTE ESTERASE UR QL STRIP.AUTO: ABNORMAL
LYMPHOCYTES # BLD AUTO: 4.38 10*3/MM3 (ref 0.7–3.1)
LYMPHOCYTES NFR BLD AUTO: 43.5 % (ref 19.6–45.3)
MCH RBC QN AUTO: 25.7 PG (ref 26.6–33)
MCHC RBC AUTO-ENTMCNC: 29.7 G/DL (ref 31.5–35.7)
MCV RBC AUTO: 86.6 FL (ref 79–97)
MONOCYTES # BLD AUTO: 0.77 10*3/MM3 (ref 0.1–0.9)
MONOCYTES NFR BLD AUTO: 7.6 % (ref 5–12)
MUCOUS THREADS URNS QL MICRO: ABNORMAL /HPF
NEUTROPHILS NFR BLD AUTO: 4.79 10*3/MM3 (ref 1.7–7)
NEUTROPHILS NFR BLD AUTO: 47.6 % (ref 42.7–76)
NITRITE UR QL STRIP: NEGATIVE
NRBC BLD AUTO-RTO: 0 /100 WBC (ref 0–0.2)
PH UR STRIP.AUTO: 5.5 [PH] (ref 5–8)
PLATELET # BLD AUTO: 310 10*3/MM3 (ref 140–450)
PMV BLD AUTO: 10.3 FL (ref 6–12)
POTASSIUM SERPL-SCNC: 3.8 MMOL/L (ref 3.5–5.2)
PROT SERPL-MCNC: 6.5 G/DL (ref 6–8.5)
PROT UR QL STRIP: ABNORMAL
PROTHROMBIN TIME: 12.1 SECONDS (ref 11.8–14.8)
RBC # BLD AUTO: 4.04 10*6/MM3 (ref 3.77–5.28)
RBC # UR STRIP: ABNORMAL /HPF
REF LAB TEST METHOD: ABNORMAL
SODIUM SERPL-SCNC: 143 MMOL/L (ref 136–145)
SP GR UR STRIP: >1.03 (ref 1–1.03)
SQUAMOUS #/AREA URNS HPF: ABNORMAL /HPF
UROBILINOGEN UR QL STRIP: ABNORMAL
WBC # UR STRIP: ABNORMAL /HPF
WBC NRBC COR # BLD AUTO: 10.07 10*3/MM3 (ref 3.4–10.8)

## 2023-12-08 PROCEDURE — 81001 URINALYSIS AUTO W/SCOPE: CPT | Performed by: STUDENT IN AN ORGANIZED HEALTH CARE EDUCATION/TRAINING PROGRAM

## 2023-12-08 PROCEDURE — 86140 C-REACTIVE PROTEIN: CPT | Performed by: STUDENT IN AN ORGANIZED HEALTH CARE EDUCATION/TRAINING PROGRAM

## 2023-12-08 PROCEDURE — 85610 PROTHROMBIN TIME: CPT | Performed by: STUDENT IN AN ORGANIZED HEALTH CARE EDUCATION/TRAINING PROGRAM

## 2023-12-08 PROCEDURE — 25010000002 FENTANYL CITRATE (PF) 50 MCG/ML SOLUTION: Performed by: EMERGENCY MEDICINE

## 2023-12-08 PROCEDURE — 87086 URINE CULTURE/COLONY COUNT: CPT | Performed by: STUDENT IN AN ORGANIZED HEALTH CARE EDUCATION/TRAINING PROGRAM

## 2023-12-08 PROCEDURE — 25010000002 HYDROMORPHONE PER 4 MG: Performed by: STUDENT IN AN ORGANIZED HEALTH CARE EDUCATION/TRAINING PROGRAM

## 2023-12-08 PROCEDURE — 85025 COMPLETE CBC W/AUTO DIFF WBC: CPT | Performed by: STUDENT IN AN ORGANIZED HEALTH CARE EDUCATION/TRAINING PROGRAM

## 2023-12-08 PROCEDURE — 72148 MRI LUMBAR SPINE W/O DYE: CPT

## 2023-12-08 PROCEDURE — 99285 EMERGENCY DEPT VISIT HI MDM: CPT

## 2023-12-08 PROCEDURE — 25010000002 HYDROMORPHONE PER 4 MG: Performed by: FAMILY MEDICINE

## 2023-12-08 PROCEDURE — 25010000002 KETOROLAC TROMETHAMINE PER 15 MG: Performed by: STUDENT IN AN ORGANIZED HEALTH CARE EDUCATION/TRAINING PROGRAM

## 2023-12-08 PROCEDURE — 80053 COMPREHEN METABOLIC PANEL: CPT | Performed by: STUDENT IN AN ORGANIZED HEALTH CARE EDUCATION/TRAINING PROGRAM

## 2023-12-08 PROCEDURE — 25010000002 HEPARIN (PORCINE) PER 1000 UNITS: Performed by: FAMILY MEDICINE

## 2023-12-08 PROCEDURE — 25810000003 LACTATED RINGERS SOLUTION: Performed by: STUDENT IN AN ORGANIZED HEALTH CARE EDUCATION/TRAINING PROGRAM

## 2023-12-08 PROCEDURE — 25010000002 DEXAMETHASONE PER 1 MG: Performed by: PHYSICIAN ASSISTANT

## 2023-12-08 PROCEDURE — 25010000002 ORPHENADRINE CITRATE PER 60 MG: Performed by: STUDENT IN AN ORGANIZED HEALTH CARE EDUCATION/TRAINING PROGRAM

## 2023-12-08 PROCEDURE — 85652 RBC SED RATE AUTOMATED: CPT | Performed by: STUDENT IN AN ORGANIZED HEALTH CARE EDUCATION/TRAINING PROGRAM

## 2023-12-08 PROCEDURE — 25010000002 KETOROLAC TROMETHAMINE PER 15 MG: Performed by: FAMILY MEDICINE

## 2023-12-08 PROCEDURE — 99222 1ST HOSP IP/OBS MODERATE 55: CPT | Performed by: PHYSICIAN ASSISTANT

## 2023-12-08 PROCEDURE — 73523 X-RAY EXAM HIPS BI 5/> VIEWS: CPT

## 2023-12-08 PROCEDURE — 25010000002 ORPHENADRINE CITRATE PER 60 MG: Performed by: FAMILY MEDICINE

## 2023-12-08 PROCEDURE — 0 HYDROMORPHONE 1 MG/ML SOLUTION: Performed by: FAMILY MEDICINE

## 2023-12-08 RX ORDER — AMITRIPTYLINE HYDROCHLORIDE 100 MG/1
1-2 TABLET ORAL NIGHTLY PRN
COMMUNITY
End: 2023-12-20

## 2023-12-08 RX ORDER — ORPHENADRINE CITRATE 100 MG/1
100 TABLET, EXTENDED RELEASE ORAL 2 TIMES DAILY
COMMUNITY
End: 2023-12-20 | Stop reason: SDUPTHER

## 2023-12-08 RX ORDER — HEPARIN SODIUM 5000 [USP'U]/ML
5000 INJECTION, SOLUTION INTRAVENOUS; SUBCUTANEOUS EVERY 8 HOURS SCHEDULED
Status: DISCONTINUED | OUTPATIENT
Start: 2023-12-08 | End: 2023-12-15 | Stop reason: HOSPADM

## 2023-12-08 RX ORDER — AMOXICILLIN 250 MG
2 CAPSULE ORAL 2 TIMES DAILY
Status: DISCONTINUED | OUTPATIENT
Start: 2023-12-08 | End: 2023-12-15 | Stop reason: HOSPADM

## 2023-12-08 RX ORDER — BISACODYL 10 MG
10 SUPPOSITORY, RECTAL RECTAL DAILY PRN
Status: DISCONTINUED | OUTPATIENT
Start: 2023-12-08 | End: 2023-12-15 | Stop reason: HOSPADM

## 2023-12-08 RX ORDER — DEXAMETHASONE SODIUM PHOSPHATE 10 MG/ML
10 INJECTION INTRAMUSCULAR; INTRAVENOUS EVERY 8 HOURS
Status: COMPLETED | OUTPATIENT
Start: 2023-12-08 | End: 2023-12-09

## 2023-12-08 RX ORDER — PREGABALIN 50 MG/1
50 CAPSULE ORAL EVERY 8 HOURS SCHEDULED
Status: DISCONTINUED | OUTPATIENT
Start: 2023-12-08 | End: 2023-12-15 | Stop reason: HOSPADM

## 2023-12-08 RX ORDER — HYDROMORPHONE HYDROCHLORIDE 1 MG/ML
0.5 INJECTION, SOLUTION INTRAMUSCULAR; INTRAVENOUS; SUBCUTANEOUS ONCE
Status: COMPLETED | OUTPATIENT
Start: 2023-12-08 | End: 2023-12-08

## 2023-12-08 RX ORDER — KETOROLAC TROMETHAMINE 10 MG/1
10 TABLET, FILM COATED ORAL EVERY 6 HOURS PRN
Status: ON HOLD | COMMUNITY
End: 2023-12-11

## 2023-12-08 RX ORDER — ORPHENADRINE CITRATE 30 MG/ML
60 INJECTION INTRAMUSCULAR; INTRAVENOUS EVERY 12 HOURS PRN
Status: DISCONTINUED | OUTPATIENT
Start: 2023-12-08 | End: 2023-12-15 | Stop reason: HOSPADM

## 2023-12-08 RX ORDER — KETOROLAC TROMETHAMINE 15 MG/ML
15 INJECTION, SOLUTION INTRAMUSCULAR; INTRAVENOUS ONCE
Status: COMPLETED | OUTPATIENT
Start: 2023-12-08 | End: 2023-12-08

## 2023-12-08 RX ORDER — POLYETHYLENE GLYCOL 3350 17 G/17G
17 POWDER, FOR SOLUTION ORAL DAILY PRN
Status: DISCONTINUED | OUTPATIENT
Start: 2023-12-08 | End: 2023-12-15 | Stop reason: HOSPADM

## 2023-12-08 RX ORDER — SODIUM CHLORIDE 0.9 % (FLUSH) 0.9 %
10 SYRINGE (ML) INJECTION AS NEEDED
Status: DISCONTINUED | OUTPATIENT
Start: 2023-12-08 | End: 2023-12-11 | Stop reason: SDUPTHER

## 2023-12-08 RX ORDER — MORPHINE SULFATE 2 MG/ML
2 INJECTION, SOLUTION INTRAMUSCULAR; INTRAVENOUS EVERY 4 HOURS PRN
Status: DISCONTINUED | OUTPATIENT
Start: 2023-12-08 | End: 2023-12-08

## 2023-12-08 RX ORDER — ACETAMINOPHEN 325 MG/1
650 TABLET ORAL EVERY 4 HOURS PRN
Status: DISCONTINUED | OUTPATIENT
Start: 2023-12-08 | End: 2023-12-15 | Stop reason: HOSPADM

## 2023-12-08 RX ORDER — NALOXONE HCL 0.4 MG/ML
0.4 VIAL (ML) INJECTION
Status: DISCONTINUED | OUTPATIENT
Start: 2023-12-08 | End: 2023-12-15 | Stop reason: HOSPADM

## 2023-12-08 RX ORDER — NITROFURANTOIN 25; 75 MG/1; MG/1
100 CAPSULE ORAL 2 TIMES DAILY
Qty: 10 CAPSULE | Refills: 0 | Status: SHIPPED | OUTPATIENT
Start: 2023-12-08 | End: 2023-12-15 | Stop reason: HOSPADM

## 2023-12-08 RX ORDER — FAMOTIDINE 20 MG/1
40 TABLET, FILM COATED ORAL DAILY
Status: DISCONTINUED | OUTPATIENT
Start: 2023-12-08 | End: 2023-12-15 | Stop reason: HOSPADM

## 2023-12-08 RX ORDER — ORPHENADRINE CITRATE 30 MG/ML
60 INJECTION INTRAMUSCULAR; INTRAVENOUS ONCE
Status: COMPLETED | OUTPATIENT
Start: 2023-12-08 | End: 2023-12-08

## 2023-12-08 RX ORDER — SODIUM CHLORIDE 9 MG/ML
40 INJECTION, SOLUTION INTRAVENOUS AS NEEDED
Status: DISCONTINUED | OUTPATIENT
Start: 2023-12-08 | End: 2023-12-15 | Stop reason: HOSPADM

## 2023-12-08 RX ORDER — BISACODYL 5 MG/1
5 TABLET, DELAYED RELEASE ORAL DAILY PRN
Status: DISCONTINUED | OUTPATIENT
Start: 2023-12-08 | End: 2023-12-15 | Stop reason: HOSPADM

## 2023-12-08 RX ORDER — SODIUM CHLORIDE 0.9 % (FLUSH) 0.9 %
10 SYRINGE (ML) INJECTION AS NEEDED
Status: DISCONTINUED | OUTPATIENT
Start: 2023-12-08 | End: 2023-12-15 | Stop reason: HOSPADM

## 2023-12-08 RX ORDER — OXYCODONE HYDROCHLORIDE AND ACETAMINOPHEN 5; 325 MG/1; MG/1
1 TABLET ORAL EVERY 4 HOURS PRN
Status: DISCONTINUED | OUTPATIENT
Start: 2023-12-08 | End: 2023-12-15 | Stop reason: HOSPADM

## 2023-12-08 RX ORDER — KETOROLAC TROMETHAMINE 15 MG/ML
15 INJECTION, SOLUTION INTRAMUSCULAR; INTRAVENOUS EVERY 6 HOURS PRN
Status: DISCONTINUED | OUTPATIENT
Start: 2023-12-08 | End: 2023-12-12

## 2023-12-08 RX ORDER — ACETAMINOPHEN 500 MG
1000 TABLET ORAL ONCE
Status: COMPLETED | OUTPATIENT
Start: 2023-12-08 | End: 2023-12-08

## 2023-12-08 RX ORDER — ONDANSETRON 2 MG/ML
4 INJECTION INTRAMUSCULAR; INTRAVENOUS EVERY 6 HOURS PRN
Status: DISCONTINUED | OUTPATIENT
Start: 2023-12-08 | End: 2023-12-15 | Stop reason: HOSPADM

## 2023-12-08 RX ORDER — CHOLECALCIFEROL (VITAMIN D3) 125 MCG
5 CAPSULE ORAL NIGHTLY PRN
Status: DISCONTINUED | OUTPATIENT
Start: 2023-12-08 | End: 2023-12-15 | Stop reason: HOSPADM

## 2023-12-08 RX ORDER — FENTANYL CITRATE 50 UG/ML
50 INJECTION, SOLUTION INTRAMUSCULAR; INTRAVENOUS ONCE
Status: COMPLETED | OUTPATIENT
Start: 2023-12-08 | End: 2023-12-08

## 2023-12-08 RX ORDER — TIZANIDINE 4 MG/1
4 TABLET ORAL ONCE
Status: DISCONTINUED | OUTPATIENT
Start: 2023-12-08 | End: 2023-12-08

## 2023-12-08 RX ORDER — LIDOCAINE 50 MG/G
2 PATCH TOPICAL ONCE
Status: COMPLETED | OUTPATIENT
Start: 2023-12-08 | End: 2023-12-08

## 2023-12-08 RX ORDER — SODIUM CHLORIDE 0.9 % (FLUSH) 0.9 %
10 SYRINGE (ML) INJECTION EVERY 12 HOURS SCHEDULED
Status: DISCONTINUED | OUTPATIENT
Start: 2023-12-08 | End: 2023-12-15 | Stop reason: HOSPADM

## 2023-12-08 RX ORDER — HYDROMORPHONE HYDROCHLORIDE 1 MG/ML
0.5 INJECTION, SOLUTION INTRAMUSCULAR; INTRAVENOUS; SUBCUTANEOUS
Status: DISCONTINUED | OUTPATIENT
Start: 2023-12-08 | End: 2023-12-08

## 2023-12-08 RX ORDER — TRAMADOL HYDROCHLORIDE 50 MG/1
50 TABLET ORAL EVERY 6 HOURS PRN
Status: ON HOLD | COMMUNITY
End: 2023-12-11

## 2023-12-08 RX ADMIN — OXYCODONE HYDROCHLORIDE AND ACETAMINOPHEN 1 TABLET: 5; 325 TABLET ORAL at 12:35

## 2023-12-08 RX ADMIN — Medication 10 ML: at 20:26

## 2023-12-08 RX ADMIN — HYDROMORPHONE HYDROCHLORIDE 0.5 MG: 1 INJECTION, SOLUTION INTRAMUSCULAR; INTRAVENOUS; SUBCUTANEOUS at 11:27

## 2023-12-08 RX ADMIN — PREGABALIN 50 MG: 50 CAPSULE ORAL at 20:25

## 2023-12-08 RX ADMIN — SODIUM CHLORIDE, POTASSIUM CHLORIDE, SODIUM LACTATE AND CALCIUM CHLORIDE 500 ML: 600; 310; 30; 20 INJECTION, SOLUTION INTRAVENOUS at 06:29

## 2023-12-08 RX ADMIN — ORPHENADRINE CITRATE 60 MG: 60 INJECTION INTRAMUSCULAR; INTRAVENOUS at 13:42

## 2023-12-08 RX ADMIN — OXYCODONE HYDROCHLORIDE AND ACETAMINOPHEN 1 TABLET: 5; 325 TABLET ORAL at 17:30

## 2023-12-08 RX ADMIN — DEXAMETHASONE SODIUM PHOSPHATE 10 MG: 10 INJECTION INTRAMUSCULAR; INTRAVENOUS at 17:30

## 2023-12-08 RX ADMIN — KETOROLAC TROMETHAMINE 15 MG: 15 INJECTION, SOLUTION INTRAMUSCULAR; INTRAVENOUS at 07:45

## 2023-12-08 RX ADMIN — HYDROMORPHONE HYDROCHLORIDE 0.5 MG: 1 INJECTION, SOLUTION INTRAMUSCULAR; INTRAVENOUS; SUBCUTANEOUS at 03:01

## 2023-12-08 RX ADMIN — ORPHENADRINE CITRATE 60 MG: 60 INJECTION INTRAMUSCULAR; INTRAVENOUS at 01:40

## 2023-12-08 RX ADMIN — KETOROLAC TROMETHAMINE 15 MG: 15 INJECTION, SOLUTION INTRAMUSCULAR; INTRAVENOUS at 01:39

## 2023-12-08 RX ADMIN — DEXAMETHASONE SODIUM PHOSPHATE 10 MG: 10 INJECTION INTRAMUSCULAR; INTRAVENOUS at 11:27

## 2023-12-08 RX ADMIN — Medication 10 ML: at 05:47

## 2023-12-08 RX ADMIN — FENTANYL CITRATE 50 MCG: 50 INJECTION, SOLUTION INTRAMUSCULAR; INTRAVENOUS at 07:39

## 2023-12-08 RX ADMIN — Medication 10 ML: at 04:50

## 2023-12-08 RX ADMIN — HYDROMORPHONE HYDROCHLORIDE 1 MG: 1 INJECTION, SOLUTION INTRAMUSCULAR; INTRAVENOUS; SUBCUTANEOUS at 14:46

## 2023-12-08 RX ADMIN — PREGABALIN 50 MG: 50 CAPSULE ORAL at 13:13

## 2023-12-08 RX ADMIN — HYDROMORPHONE HYDROCHLORIDE 0.5 MG: 1 INJECTION, SOLUTION INTRAMUSCULAR; INTRAVENOUS; SUBCUTANEOUS at 05:44

## 2023-12-08 RX ADMIN — HEPARIN SODIUM 5000 UNITS: 5000 INJECTION INTRAVENOUS; SUBCUTANEOUS at 11:30

## 2023-12-08 RX ADMIN — ACETAMINOPHEN TAB 500 MG 1000 MG: 500 TAB at 01:39

## 2023-12-08 RX ADMIN — LIDOCAINE 2 PATCH: 50 PATCH CUTANEOUS at 01:45

## 2023-12-08 RX ADMIN — HEPARIN SODIUM 5000 UNITS: 5000 INJECTION INTRAVENOUS; SUBCUTANEOUS at 20:25

## 2023-12-08 NOTE — ED PROVIDER NOTES
"Subjective   History of Present Illness this is an addendum to Dr. Díaz's note.  Please see his note for complete history and physical.    Review of Systems    Past Medical History:   Diagnosis Date    Arthritis     Diabetes mellitus     diet controlled    Hx of colonic polyp     Hypertension     Joint pain     Hip    Lower back pain     Neck pain     PONV (postoperative nausea and vomiting)        Allergies   Allergen Reactions    Penicillins Swelling and Rash    Morphine GI Intolerance     Pt states morphine makes her sick and has \"had GERD surgery is unable to vomit\"       Past Surgical History:   Procedure Laterality Date    ANTERIOR CERVICAL DISCECTOMY W/ FUSION N/A 11/20/2020    Procedure: EXPLORATION OF FUSION C5-6, ANTERIOR CERVICAL DISCECTOMY FUSION C4-5, C6-7 REVISON ANTERIOR FUSION C5-6 WITH INSTRUMENTATION C4-7;  Surgeon: KEN Gilbert MD;  Location: [facility] OR;  Service: Orthopedic Spine;  Laterality: N/A;    APPENDECTOMY      COLONOSCOPY  06/06/2016    Normal exam repeat in 5 years    COLONOSCOPY N/A 01/14/2020    Diverticulosis repeat exam in 5 years    COLONOSCOPY W/ POLYPECTOMY  04/04/2012    Hyperplastic polyp cecum repeat exam in 1 year    ENDOSCOPY  08/14/2014    Very tortuous distal esophagus dilated 50 Fr, HH     ENDOSCOPY N/A 01/18/2021    A fundoplication was found, dilated    ENDOSCOPY  06/2023    HARDWARE REMOVAL N/A 11/20/2020    Procedure: REMOVAL OF INSTRUMENTATION;  Surgeon: KEN Gilbert MD;  Location: [facility] OR;  Service: Orthopedic Spine;  Laterality: N/A;    HYSTERECTOMY      Laparoscopic Hysterectomy bilateral salpingectomy for bleeding    NECK SURGERY      NISSEN FUNDOPLICATION      POSTERIOR CERVICAL FUSION N/A 12/03/2020    Procedure: POSTERIOR SPINAL FUSION WITH INSTRUMENTATION C4-7;  Surgeon: KEN Gilbert MD;  Location: [facility] OR;  Service: Orthopedic Spine;  Laterality: N/A;    STEROID INJECTION Left 06/30/2022    Procedure: LEFT HIP FLUROSCOPIC GUIDED " CORTICOSTEROID INJECTION;  Surgeon: Herberth Skelton MD;  Location: [facility] OR;  Service: Orthopedics;  Laterality: Left;    US GUIDED LYMPH NODE BIOPSY  2020       Family History   Problem Relation Age of Onset    No Known Problems Father     Colon cancer Mother     Colon polyps Mother     Diabetes Brother     Diabetes Brother     Diabetes Son     Diabetes Son     Heart attack Paternal Grandmother     Breast cancer Maternal Aunt     Lymphoma Maternal Aunt     Breast cancer Maternal Aunt     Lung cancer Maternal Aunt     Diabetes Maternal Aunt     Breast cancer Maternal Aunt        Social History     Socioeconomic History    Marital status:      Spouse name: Jason    Number of children: 2    Years of education: 12   Tobacco Use    Smoking status: Former     Packs/day: 2.00     Years: 20.00     Additional pack years: 0.00     Total pack years: 40.00     Types: Cigarettes     Start date: 1954     Quit date: 7/10/1990     Years since quittin.4    Smokeless tobacco: Never   Vaping Use    Vaping Use: Never used   Substance and Sexual Activity    Alcohol use: Yes     Comment: rare    Drug use: No    Sexual activity: Yes     Partners: Male           Objective   Physical Exam    Procedures           ED Course      Lab Results (last 24 hours)       Procedure Component Value Units Date/Time    Urinalysis With Culture If Indicated - Urine, Clean Catch [[id number]]  (Abnormal) Collected: 23 0326    Specimen: Urine, Clean Catch Updated: 23 0402     Color, UA Dark Yellow     Appearance, UA Cloudy     pH, UA 5.5     Specific Gravity, UA >1.030     Glucose, UA Negative     Ketones, UA Trace     Bilirubin, UA Small (1+)     Blood, UA Trace     Protein, UA Trace     Leuk Esterase, UA Small (1+)     Nitrite, UA Negative     Urobilinogen, UA 1.0 E.U./dL    Narrative:      In absence of clinical symptoms, the presence of pyuria, bacteria, and/or nitrites on the urinalysis result does not correlate with  infection.    Urinalysis, Microscopic Only - Urine, Clean Catch [[id number]]  (Abnormal) Collected: 12/08/23 0326    Specimen: Urine, Clean Catch Updated: 12/08/23 0402     RBC, UA 3-5 /HPF      WBC, UA 6-10 /HPF      Bacteria, UA Trace /HPF      Squamous Epithelial Cells, UA 3-6 /HPF      Calcium Oxalate Crystals, UA Small/1+ /HPF      Mucus, UA Small/1+ /HPF      Methodology Manual Light Microscopy    Urine Culture - Urine, Urine, Clean Catch [[id number]] Collected: 12/08/23 0326    Specimen: Urine, Clean Catch Updated: 12/08/23 0402    CBC & Differential [[id number]]  (Abnormal) Collected: 12/08/23 0450    Specimen: Blood Updated: 12/08/23 0509    Narrative:      The following orders were created for panel order CBC & Differential.  Procedure                               Abnormality         Status                     ---------                               -----------         ------                     CBC Auto Differential[[id number]]        Abnormal            Final result                 Please view results for these tests on the individual orders.    C-reactive Protein [[id number]]  (Normal) Collected: 12/08/23 0450    Specimen: Blood Updated: 12/08/23 0529     C-Reactive Protein <0.30 mg/dL     Sedimentation Rate [[id number]]  (Normal) Collected: 12/08/23 0450    Specimen: Blood Updated: 12/08/23 0521     Sed Rate 25 mm/hr     Comprehensive Metabolic Panel [[id number]]  (Abnormal) Collected: 12/08/23 0450    Specimen: Blood Updated: 12/08/23 0527     Glucose 106 mg/dL      BUN 21 mg/dL      Creatinine 0.70 mg/dL      Sodium 143 mmol/L      Potassium 3.8 mmol/L      Chloride 106 mmol/L      CO2 27.0 mmol/L      Calcium 9.4 mg/dL      Total Protein 6.5 g/dL      Albumin 3.8 g/dL      ALT (SGPT) 21 U/L      AST (SGOT) 26 U/L      Alkaline Phosphatase 105 U/L      Total Bilirubin 0.2 mg/dL      Globulin 2.7 gm/dL      A/G Ratio 1.4 g/dL      BUN/Creatinine Ratio 30.0     Anion Gap 10.0 mmol/L      eGFR 93.8  mL/min/1.73     Narrative:      GFR Normal >60  Chronic Kidney Disease <60  Kidney Failure <15      Protime-INR [[id number]]  (Abnormal) Collected: 12/08/23 0450    Specimen: Blood Updated: 12/08/23 0525     Protime 12.1 Seconds      INR 0.88    CBC Auto Differential [[id number]]  (Abnormal) Collected: 12/08/23 0450    Specimen: Blood Updated: 12/08/23 0509     WBC 10.07 10*3/mm3      RBC 4.04 10*6/mm3      Hemoglobin 10.4 g/dL      Hematocrit 35.0 %      MCV 86.6 fL      MCH 25.7 pg      MCHC 29.7 g/dL      RDW 18.6 %      RDW-SD 58.5 fl      MPV 10.3 fL      Platelets 310 10*3/mm3      Neutrophil % 47.6 %      Lymphocyte % 43.5 %      Monocyte % 7.6 %      Eosinophil % 0.3 %      Basophil % 0.6 %      Immature Grans % 0.4 %      Neutrophils, Absolute 4.79 10*3/mm3      Lymphocytes, Absolute 4.38 10*3/mm3      Monocytes, Absolute 0.77 10*3/mm3      Eosinophils, Absolute 0.03 10*3/mm3      Basophils, Absolute 0.06 10*3/mm3      Immature Grans, Absolute 0.04 10*3/mm3      nRBC 0.0 /100 WBC            MRI Lumbar Spine Without Contrast   Final Result   1. Lumbar spondylosis. A mild alignment disruption at L4-5. A mild   levoscoliosis.   2. The multilevel prominent disc osteophyte complexes, facet arthropathy   and ligamental hypertrophy. There is a resultant moderate spinal   stenosis at L4-5. There is bilateral neuroforaminal stenosis more on the   right at L4-5. There is a mild right neuroforaminal stenosis L5-S1.       This report was signed and finalized on 12/8/2023 6:00 AM by Dr. Ping Vasquez MD.                                                  Medical Decision Making  Patient presented to the ER with severe low back pain radiating to her bilateral lower extremities.  Labs were unremarkable.  MRI of the L-spine showed lumbar spondylosis.  There is a mild disruption at L4-5.  Patient also had multilevel disc osteophyte complexes, facet arthropathy and ligamental hypertrophy.  Dr. Estes with neurosurgery was  consulted.  He wanted the patient admitted to the hospitalist service for pain control and he will follow patient to determine if surgery is necessary.  I discussed the case with Dr. Terrazas with the hospitalist service and patient was then admitted to Dr. Toledo's service for further treatment.    Problems Addressed:  Acute cystitis with hematuria: complicated acute illness or injury  Acute low back pain with bilateral sciatica, unspecified back pain laterality: complicated acute illness or injury  Bilateral leg numbness: complicated acute illness or injury  Bilateral leg weakness: complicated acute illness or injury  Central stenosis of spinal canal: complicated acute illness or injury    Amount and/or Complexity of Data Reviewed  Labs: ordered. Decision-making details documented in ED Course.  Radiology: ordered. Decision-making details documented in ED Course.  Discussion of management or test interpretation with external provider(s): Dr Estes with [facility]  Dr Terrazas and Dr Toledo with the hospitalist group    Risk  OTC drugs.  Prescription drug management.  Decision regarding hospitalization.        Final diagnoses:   Acute low back pain with bilateral sciatica, unspecified back pain laterality   Central stenosis of spinal canal   Bilateral leg weakness   Bilateral leg numbness   Acute cystitis with hematuria       ED Disposition  ED Disposition       ED Disposition   Decision to Admit    Condition   --    Comment   Level of Care: Med/Surg [1]   Diagnosis: Back pain [932833]   Admitting Physician: ЕКАТЕРИНА TOLEDO [[id number]]   Attending Physician: ЕКАТЕРИНА TOLEDO [[id number]]   Certification: I Certify That Inpatient Hospital Services Are Medically Necessary For Greater Than 2 Midnights                 No follow-up provider specified.       Medication List        New Prescriptions      nitrofurantoin (macrocrystal-monohydrate) 100 MG capsule  Commonly known as: MACROBID  Take 1 capsule by mouth 2 (Two) Times a Day for  5 days.               Where to Get Your Medications        These medications were sent to [facility] - [place], [place] - [address] [place] [address] - [phone]  - [phone]   [address] [place] [address]      Phone: [phone]   nitrofurantoin (macrocrystal-monohydrate) 100 MG capsule            Kierra Salinas MD  12/08/23 8749

## 2023-12-08 NOTE — CONSULTS
"Reason for consult  Back pain with Radiculopathy    Chief Complaint   Patient presents with    Back Pain         Requesting provider: Dr. Salinas    HPI: Mrs. Leahy presented to the ER this morning with complaints of intractable leg pain, left greater than right.  Symptoms started 3 days ago without injury.  She indicates that her back really does not much except a little bit on the left side. \"It's my legs\". She indicates her leg pain is quite severe it radiates into the groin bilaterally as well as anterior and lateral thighs into the lateral calves, terminating at the lateral ankles, Left > Right.  She has rest pain but becomes severe with weightbearing making it difficult to walk.    Over the past 3 days, she has tried gabapentin, prednisone and muscle relaxers without symptom relief.    She reports history of falling for no apparent reason over the past several months. but none recently.  She has history of ACDF, Dr Espitia and then  anterior and posterior cervical fusion, Dr Gilbert. She has some neck pain with flexion that is not increased from her baseline.     She reports some paresthesias affecting the feet but denies any saddle anesthesia and loss of bowel and bladder control.  She was found in the ER to have UTI.   She is accompanied by her  and son.    Review of Systems   Constitutional:  Negative for chills and fever.   Respiratory:  Negative for cough, shortness of breath and wheezing.    Cardiovascular:  Negative for chest pain, palpitations and leg swelling.   Gastrointestinal:  Negative for abdominal pain, nausea and vomiting.   Musculoskeletal:  Positive for back pain and gait problem.   Neurological:  Positive for numbness. Negative for weakness.        Pertinent positives/negatives documented in HPI.  All other systems reviewed and negative.    Past Medical History:  has a past medical history of Arthritis, Diabetes mellitus, colonic polyp, Hypertension, Joint pain, Lower back pain, Neck " pain, and PONV (postoperative nausea and vomiting).    Past Surgical History:  has a past surgical history that includes Appendectomy; Colonoscopy (06/06/2016); Colonoscopy w/ polypectomy (04/04/2012); Esophagogastroduodenoscopy (08/14/2014); Neck surgery; Hysterectomy; Colonoscopy (N/A, 01/14/2020); US Guided Lymph Node Biopsy (08/03/2020); Nissen fundoplication; Hardware Removal (N/A, 11/20/2020); Anterior cervical discectomy w/ fusion (N/A, 11/20/2020); Posterior Cervical Fusion (N/A, 12/03/2020); Esophagogastroduodenoscopy (N/A, 01/18/2021); Steroid Injection (Left, 06/30/2022); and Esophagogastroduodenoscopy (06/2023).    Family History: family history includes Breast cancer in her maternal aunt, maternal aunt, and maternal aunt; Colon cancer in her mother; Colon polyps in her mother; Diabetes in her brother, brother, maternal aunt, son, and son; Heart attack in her paternal grandmother; Lung cancer in her maternal aunt; Lymphoma in her maternal aunt; No Known Problems in her father.    Social History:  reports that she quit smoking about 33 years ago. Her smoking use included cigarettes. She started smoking about 69 years ago. She has a 40.00 pack-year smoking history. She has never used smokeless tobacco. She reports current alcohol use. She reports that she does not use drugs.    Allergies: Penicillins and Morphine    Medications: Scheduled Meds:famotidine, 40 mg, Oral, Daily  heparin (porcine), 5,000 Units, Subcutaneous, Q8H  lidocaine, 2 patch, Transdermal, Once  senna-docusate sodium, 2 tablet, Oral, BID  sodium chloride, 10 mL, Intravenous, Q12H      Continuous Infusions:   PRN Meds:.  acetaminophen    senna-docusate sodium **AND** polyethylene glycol **AND** bisacodyl **AND** bisacodyl    ketorolac    melatonin    Morphine **AND** naloxone    ondansetron    oxyCODONE-acetaminophen    [COMPLETED] Insert Peripheral IV **AND** sodium chloride    sodium chloride    sodium chloride     Objective:  General  "Appearance:  Comfortable.    Vital signs: (most recent): Blood pressure 150/71, pulse 90, temperature 98.3 °F (36.8 °C), temperature source Oral, resp. rate 16, height 162.6 cm (64\"), weight 81.2 kg (179 lb), SpO2 93%, not currently breastfeeding.  Vital signs are normal.  No fever.    Lungs:  Normal effort.    Heart: Normal rate.    Extremities: (There is limited lower extremity range of motion secondary to pain.  She has pain with bilateral hip flexion and both internal and external rotation, left > right. TTP left SI joint)  Skin:  Warm and dry.    Abdomen: Abdomen is soft.    Pulses: Distal pulses are intact.        Neurologic Exam     Mental Status   Oriented to person, place, and time.   Speech: speech is normal   Level of consciousness: alert  Knowledge: good.     Cranial Nerves   Cranial nerves II through XII intact.     CN III, IV, VI   Pupils are equal, round, and reactive to light.    Motor Exam   Muscle bulk: normal  Overall muscle tone: normal    Strength   Right iliopsoas: 5/5  Left iliopsoas: 4/5  Right quadriceps: 5/5  Left quadriceps: 5/5  Right hamstrin/5  Left hamstrin/5  Right anterior tibial: 5/5  Left anterior tibial: 5/5  Right posterior tibial: 5/5  Left posterior tibial: 5/5  Right gastroc: 5/5  Left gastroc: 5/5No EHL weakness.     Sensory Exam   There is decree sensation to light touch right second toe and lateral aspect of the left foot.     Gait, Coordination, and Reflexes     Reflexes   Right brachioradialis: 2+  Left brachioradialis: 2+  Right biceps: 2+  Left biceps: 2+  Right triceps: 2+  Left triceps: 2+  Right patellar: 2+  Left patellar: 2+  Right achilles: 2+  Left achilles: 2+  Right : 2+  Left : 2+  Right Lucas: absent  Left Lucas: absent  Right ankle clonus: absent  Left ankle clonus: absentGait not tested.       Vital Signs  Temp:  [98.3 °F (36.8 °C)] 98.3 °F (36.8 °C)  Heart Rate:  [76-90] 90  Resp:  [16-20] 16  BP: (150-162)/(71-83) 150/71    Physical " Exam  Constitutional:       General: Vital signs are normal.      Appearance: She is well-developed.      Comments: Resting on ER gurney in dark room and is comfortable until she tries to move   HENT:      Head: Normocephalic and atraumatic.   Eyes:      General: Lids are normal.      Conjunctiva/sclera: Conjunctivae normal.      Pupils: Pupils are equal, round, and reactive to light.   Cardiovascular:      Rate and Rhythm: Normal rate.      Pulses: Intact distal pulses.   Pulmonary:      Effort: Pulmonary effort is normal.      Breath sounds: Normal breath sounds.   Abdominal:      Palpations: Abdomen is soft.   Musculoskeletal:         General: Tenderness (Exquisite tenderness left SI joint.  Bilateral hips are tender to flexion and internal and external rotation, left greater than right.) present.      Cervical back: Tenderness (with flexion) present. No rigidity.   Skin:     General: Skin is warm and dry.   Neurological:      Mental Status: She is alert and oriented to person, place, and time.      GCS: GCS eye subscore is 4. GCS verbal subscore is 5. GCS motor subscore is 6.      Cranial Nerves: Cranial nerves 2-12 are intact. No cranial nerve deficit.      Sensory: Sensory deficit present.      Deep Tendon Reflexes: Reflexes are normal and symmetric. Reflexes normal.      Reflex Scores:       Tricep reflexes are 2+ on the right side and 2+ on the left side.       Bicep reflexes are 2+ on the right side and 2+ on the left side.       Brachioradialis reflexes are 2+ on the right side and 2+ on the left side.       Patellar reflexes are 2+ on the right side and 2+ on the left side.       Achilles reflexes are 2+ on the right side and 2+ on the left side.  Psychiatric:         Speech: Speech normal.         Behavior: Behavior normal.         Thought Content: Thought content normal.         Results Review:   I reviewed the patient's new clinical results.  I reviewed the patient's new imaging results and agree with  the interpretation.  I reviewed the patient's other test results and agree with the interpretation          Lab Results (last 24 hours)       Procedure Component Value Units Date/Time    C-reactive Protein [[id number]]  (Normal) Collected: 12/08/23 0450    Specimen: Blood Updated: 12/08/23 0529     C-Reactive Protein <0.30 mg/dL     Comprehensive Metabolic Panel [[id number]]  (Abnormal) Collected: 12/08/23 0450    Specimen: Blood Updated: 12/08/23 0527     Glucose 106 mg/dL      BUN 21 mg/dL      Creatinine 0.70 mg/dL      Sodium 143 mmol/L      Potassium 3.8 mmol/L      Chloride 106 mmol/L      CO2 27.0 mmol/L      Calcium 9.4 mg/dL      Total Protein 6.5 g/dL      Albumin 3.8 g/dL      ALT (SGPT) 21 U/L      AST (SGOT) 26 U/L      Alkaline Phosphatase 105 U/L      Total Bilirubin 0.2 mg/dL      Globulin 2.7 gm/dL      A/G Ratio 1.4 g/dL      BUN/Creatinine Ratio 30.0     Anion Gap 10.0 mmol/L      eGFR 93.8 mL/min/1.73     Narrative:      GFR Normal >60  Chronic Kidney Disease <60  Kidney Failure <15      Protime-INR [[id number]]  (Abnormal) Collected: 12/08/23 0450    Specimen: Blood Updated: 12/08/23 0525     Protime 12.1 Seconds      INR 0.88    Sedimentation Rate [[id number]]  (Normal) Collected: 12/08/23 0450    Specimen: Blood Updated: 12/08/23 0521     Sed Rate 25 mm/hr     CBC & Differential [[id number]]  (Abnormal) Collected: 12/08/23 0450    Specimen: Blood Updated: 12/08/23 0509    Narrative:      The following orders were created for panel order CBC & Differential.  Procedure                               Abnormality         Status                     ---------                               -----------         ------                     CBC Auto Differential[[id number]]        Abnormal            Final result                 Please view results for these tests on the individual orders.    CBC Auto Differential [[id number]]  (Abnormal) Collected: 12/08/23 0450    Specimen: Blood Updated: 12/08/23 0509     WBC  10.07 10*3/mm3      RBC 4.04 10*6/mm3      Hemoglobin 10.4 g/dL      Hematocrit 35.0 %      MCV 86.6 fL      MCH 25.7 pg      MCHC 29.7 g/dL      RDW 18.6 %      RDW-SD 58.5 fl      MPV 10.3 fL      Platelets 310 10*3/mm3      Neutrophil % 47.6 %      Lymphocyte % 43.5 %      Monocyte % 7.6 %      Eosinophil % 0.3 %      Basophil % 0.6 %      Immature Grans % 0.4 %      Neutrophils, Absolute 4.79 10*3/mm3      Lymphocytes, Absolute 4.38 10*3/mm3      Monocytes, Absolute 0.77 10*3/mm3      Eosinophils, Absolute 0.03 10*3/mm3      Basophils, Absolute 0.06 10*3/mm3      Immature Grans, Absolute 0.04 10*3/mm3      nRBC 0.0 /100 WBC     Urinalysis With Culture If Indicated - Urine, Clean Catch [[id number]]  (Abnormal) Collected: 12/08/23 0326    Specimen: Urine, Clean Catch Updated: 12/08/23 0402     Color, UA Dark Yellow     Appearance, UA Cloudy     pH, UA 5.5     Specific Gravity, UA >1.030     Glucose, UA Negative     Ketones, UA Trace     Bilirubin, UA Small (1+)     Blood, UA Trace     Protein, UA Trace     Leuk Esterase, UA Small (1+)     Nitrite, UA Negative     Urobilinogen, UA 1.0 E.U./dL    Narrative:      In absence of clinical symptoms, the presence of pyuria, bacteria, and/or nitrites on the urinalysis result does not correlate with infection.    Urinalysis, Microscopic Only - Urine, Clean Catch [[id number]]  (Abnormal) Collected: 12/08/23 0326    Specimen: Urine, Clean Catch Updated: 12/08/23 0402     RBC, UA 3-5 /HPF      WBC, UA 6-10 /HPF      Bacteria, UA Trace /HPF      Squamous Epithelial Cells, UA 3-6 /HPF      Calcium Oxalate Crystals, UA Small/1+ /HPF      Mucus, UA Small/1+ /HPF      Methodology Manual Light Microscopy    Urine Culture - Urine, Urine, Clean Catch [[id number]] Collected: 12/08/23 0326    Specimen: Urine, Clean Catch Updated: 12/08/23 0402          MRI of the lumbar spine was reviewed and demonstrates anterior listhesis L4 over 5.  There is no high-grade central stenosis throughout.   There is moderate central stenosis at L4-5 with bilateral LRS and moderate  bilateral neuroforaminal stenosis.  There is also moderate NFS on the left at L3-4.      Assessment/Plan:     Mrs. 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  She does however demonstrate tenderness with range of motion bilateral hip joints, left > right.  I conferred with Dr. Estes regarding history and physical examination.     Recommend IV dexamethasone 10 mg every 8 hours for 3 doses  Start Lyrica 50 mg 3 times daily  X-ray AP pelvis and bilateral hips.  Continue opiates as ordered currently for pain.  We will continue to follow the patient and give further recommendations.      I discussed the patient's findings and my recommendations with patient, family, primary care team, and consulting provider    Viviana Ramos PA-C  12/08/23  09:44 CST    I spent 45 minutes caring for Sedrick on this date of service. This time includes time spent by me in the following activities: preparing for the visit, reviewing tests, obtaining and/or reviewing a separately obtained history, performing a medically appropriate examination and/or evaluation, counseling and educating the patient/family/caregiver, ordering medications, tests, or procedures, referring and communicating with other health care professionals, documenting information in the medical record, independently interpreting results and communicating that information with the patient/family/caregiver, and care coordination

## 2023-12-08 NOTE — ED NOTES
Spoke with Dr. Palencia about pt pain and sensitivity to morphine. He will change PRN med to dilaudid.

## 2023-12-08 NOTE — H&P
[facility] Medicine Services  HISTORY AND PHYSICAL    Date of Admission: 12/8/2023  Primary Care Physician: Kiran Madden MD    Subjective   Primary Historian: Patient    Chief Complaint: Lower extremity pain    History of Present Illness  68-year-old female with history of chronic low back pain, prior anterior cervical discectomy and fusion, prior lumbar spine surgeries, comes to the hospital reporting back pain that has been present for the past 3 to 4 days, located in the the lumbar area, radiated to both lower extremities, from the groins down to the legs, intensity severe, not relieved on any position.  At the time of my evaluation, the patient is still in pain, severe, crying.  She has received analgesia in the emergency department with temporary improvement but pain recurs.  She reports no fevers.  No incontinence.  She came to the hospital ER on December 5, 2023, was managed medically, and discharged but had to return to the hospital due to persistent pain.    Patient received Dilaudid, fentanyl, Toradol, Norflex and dexamethasone in the emergency department.        Review of Systems   Otherwise complete ROS reviewed and negative except as mentioned in the HPI.    Past Medical History:   Past Medical History:   Diagnosis Date    Arthritis     Diabetes mellitus     diet controlled    Hx of colonic polyp     Hypertension     Joint pain     Hip    Lower back pain     Neck pain     PONV (postoperative nausea and vomiting)      Past Surgical History:  Past Surgical History:   Procedure Laterality Date    ANTERIOR CERVICAL DISCECTOMY W/ FUSION N/A 11/20/2020    Procedure: EXPLORATION OF FUSION C5-6, ANTERIOR CERVICAL DISCECTOMY FUSION C4-5, C6-7 REVISON ANTERIOR FUSION C5-6 WITH INSTRUMENTATION C4-7;  Surgeon: KEN Gilbert MD;  Location: [facility];  Service: Orthopedic Spine;  Laterality: N/A;    APPENDECTOMY      COLONOSCOPY  06/06/2016    Normal exam repeat in  "5 years    COLONOSCOPY N/A 01/14/2020    Diverticulosis repeat exam in 5 years    COLONOSCOPY W/ POLYPECTOMY  04/04/2012    Hyperplastic polyp cecum repeat exam in 1 year    ENDOSCOPY  08/14/2014    Very tortuous distal esophagus dilated 50 Fr, HH     ENDOSCOPY N/A 01/18/2021    A fundoplication was found, dilated    ENDOSCOPY  06/2023    HARDWARE REMOVAL N/A 11/20/2020    Procedure: REMOVAL OF INSTRUMENTATION;  Surgeon: KEN Gilbert MD;  Location:  [facility];  Service: Orthopedic Spine;  Laterality: N/A;    HYSTERECTOMY      Laparoscopic Hysterectomy bilateral salpingectomy for bleeding    NECK SURGERY      NISSEN FUNDOPLICATION      POSTERIOR CERVICAL FUSION N/A 12/03/2020    Procedure: POSTERIOR SPINAL FUSION WITH INSTRUMENTATION C4-7;  Surgeon: KEN Gilbert MD;  Location:  [facility];  Service: Orthopedic Spine;  Laterality: N/A;    STEROID INJECTION Left 06/30/2022    Procedure: LEFT HIP FLUROSCOPIC GUIDED CORTICOSTEROID INJECTION;  Surgeon: Herberth Skelton MD;  Location:  [facility];  Service: Orthopedics;  Laterality: Left;    US GUIDED LYMPH NODE BIOPSY  08/03/2020     Social History:  reports that she quit smoking about 33 years ago. Her smoking use included cigarettes. She started smoking about 69 years ago. She has a 40.00 pack-year smoking history. She has never used smokeless tobacco. She reports current alcohol use. She reports that she does not use drugs.    Family History: family history includes Breast cancer in her maternal aunt, maternal aunt, and maternal aunt; Colon cancer in her mother; Colon polyps in her mother; Diabetes in her brother, brother, maternal aunt, son, and son; Heart attack in her paternal grandmother; Lung cancer in her maternal aunt; Lymphoma in her maternal aunt; No Known Problems in her father.   Reviewed    Allergies:  Allergies   Allergen Reactions    Penicillins Swelling and Rash    Morphine GI Intolerance     Pt states morphine makes her sick and has \"had GERD " "surgery is unable to vomit\"       Medications:  Prior to Admission medications    Medication Sig Start Date End Date Taking? Authorizing Provider   amitriptyline (ELAVIL) 100 MG tablet TAKE ONE (1) TO TWO (2) TABLETS AT BEDTIME AS NEEDED SLEEP 9/25/23  Yes Kiran Madden MD   atorvastatin (LIPITOR) 20 MG tablet Take 1 tablet by mouth Daily. 10/17/23  Yes Kiran Madden MD   HYDROcodone-acetaminophen (NORCO) 5-325 MG per tablet Take 1 tablet by mouth Every 8 (Eight) Hours As Needed for Moderate Pain for up to 6 doses. 12/6/23  Yes Thomas Vail MD   ketorolac (TORADOL) 10 MG tablet Take 1 tablet by mouth Every 6 (Six) Hours As Needed for Moderate Pain.   Yes Cira Cuellar MD   orphenadrine (NORFLEX) 100 MG 12 hr tablet Take 1 tablet by mouth 2 (Two) Times a Day.   Yes Cira Cuellar MD   traMADol (Ultram) 50 MG tablet Take 1 tablet by mouth Every 6 (Six) Hours As Needed for Moderate Pain.   Yes Cira Cuellar MD   cholecalciferol (VITAMIN D3) 25 MCG (1000 UT) tablet Take 1 tablet by mouth Daily.    ProviderCira MD   nitrofurantoin, macrocrystal-monohydrate, (MACROBID) 100 MG capsule Take 1 capsule by mouth 2 (Two) Times a Day for 5 days. 12/8/23 12/13/23  Jaiden Díaz MD   nystatin (MYCOSTATIN) 100,000 unit/mL suspension Swish and swallow 5 mL 4 (Four) Times a Day. 6/20/23   Kiran Madden MD   predniSONE (DELTASONE) 20 MG tablet Take 1 tablet by mouth 2 (Two) Times a Day for 5 days. 12/6/23 12/11/23  Thomas Vail MD     I have utilized all available immediate resources to obtain, update, or review the patient's current medications (including all prescriptions, over-the-counter products, herbals, cannabis/cannabidiol products, and vitamin/mineral/dietary (nutritional) supplements).    Objective     Vital Signs: /89 (BP Location: Right arm, Patient Position: Lying)   Pulse 81   Temp 98.1 °F (36.7 °C) (Oral)   Resp 16   Ht 162.6 cm (64\")   Wt 81.2 " kg (179 lb)   SpO2 98%   BMI 30.73 kg/m²   Physical Exam   Constitutional:       Appearance: In distress, crying due to pain.  HENT:      Head: Normocephalic and atraumatic.      Nose: Nose normal.      Mouth/Throat:      Mouth: Mucous membranes are moist.      Pharynx: Oropharynx is clear.   Eyes:      Extraocular Movements: Extraocular movements intact.      Conjunctiva/sclera: Conjunctivae normal.      Pupils: Pupils are equal, round, and reactive to light.   Cardiovascular:      Rate and Rhythm: Normal rate and regular rhythm.      Pulses: Normal pulses.   Pulmonary:      Effort: No respiratory distress.      Breath sounds: Normal breath sounds. No wheezing, rhonchi or rales.   Abdominal:      General: Abdomen is flat. Bowel sounds are normal.      Palpations: Abdomen is soft.      Tenderness: There is no guarding or rebound.   Musculoskeletal:         General: Normal range of motion.      Cervical back: Normal range of motion and neck supple.      Left lower leg: No edema.   Skin:     Capillary Refill: Capillary refill takes less than 2 seconds.      Coloration: Skin is not jaundiced.      Findings: No rash.   Neurological:      Mental Status: Alert, oriented x 3.  Memory preserved.     Sensory: No sensory deficit.      Motor: Dorsiflexion of hallux 4 out of 5 symmetric bilateral.  Upper extremity strength 5 out of 5.     Coordination: Coordination normal.   Psychiatric:         Mood and Affect: Anxious.     Behavior: Behavior normal.           Results Reviewed:  Lab Results (last 24 hours)       Procedure Component Value Units Date/Time    C-reactive Protein [[id number]]  (Normal) Collected: 12/08/23 0450    Specimen: Blood Updated: 12/08/23 0529     C-Reactive Protein <0.30 mg/dL     Comprehensive Metabolic Panel [[id number]]  (Abnormal) Collected: 12/08/23 0450    Specimen: Blood Updated: 12/08/23 0527     Glucose 106 mg/dL      BUN 21 mg/dL      Creatinine 0.70 mg/dL      Sodium 143 mmol/L      Potassium  3.8 mmol/L      Chloride 106 mmol/L      CO2 27.0 mmol/L      Calcium 9.4 mg/dL      Total Protein 6.5 g/dL      Albumin 3.8 g/dL      ALT (SGPT) 21 U/L      AST (SGOT) 26 U/L      Alkaline Phosphatase 105 U/L      Total Bilirubin 0.2 mg/dL      Globulin 2.7 gm/dL      A/G Ratio 1.4 g/dL      BUN/Creatinine Ratio 30.0     Anion Gap 10.0 mmol/L      eGFR 93.8 mL/min/1.73     Narrative:      GFR Normal >60  Chronic Kidney Disease <60  Kidney Failure <15      Protime-INR [[id number]]  (Abnormal) Collected: 12/08/23 0450    Specimen: Blood Updated: 12/08/23 0525     Protime 12.1 Seconds      INR 0.88    Sedimentation Rate [[id number]]  (Normal) Collected: 12/08/23 0450    Specimen: Blood Updated: 12/08/23 0521     Sed Rate 25 mm/hr     CBC & Differential [[id number]]  (Abnormal) Collected: 12/08/23 0450    Specimen: Blood Updated: 12/08/23 0509    Narrative:      The following orders were created for panel order CBC & Differential.  Procedure                               Abnormality         Status                     ---------                               -----------         ------                     CBC Auto Differential[[id number]]        Abnormal            Final result                 Please view results for these tests on the individual orders.    CBC Auto Differential [[id number]]  (Abnormal) Collected: 12/08/23 0450    Specimen: Blood Updated: 12/08/23 0509     WBC 10.07 10*3/mm3      RBC 4.04 10*6/mm3      Hemoglobin 10.4 g/dL      Hematocrit 35.0 %      MCV 86.6 fL      MCH 25.7 pg      MCHC 29.7 g/dL      RDW 18.6 %      RDW-SD 58.5 fl      MPV 10.3 fL      Platelets 310 10*3/mm3      Neutrophil % 47.6 %      Lymphocyte % 43.5 %      Monocyte % 7.6 %      Eosinophil % 0.3 %      Basophil % 0.6 %      Immature Grans % 0.4 %      Neutrophils, Absolute 4.79 10*3/mm3      Lymphocytes, Absolute 4.38 10*3/mm3      Monocytes, Absolute 0.77 10*3/mm3      Eosinophils, Absolute 0.03 10*3/mm3      Basophils, Absolute 0.06  10*3/mm3      Immature Grans, Absolute 0.04 10*3/mm3      nRBC 0.0 /100 WBC     Urinalysis With Culture If Indicated - Urine, Clean Catch [[id number]]  (Abnormal) Collected: 12/08/23 0326    Specimen: Urine, Clean Catch Updated: 12/08/23 0402     Color, UA Dark Yellow     Appearance, UA Cloudy     pH, UA 5.5     Specific Gravity, UA >1.030     Glucose, UA Negative     Ketones, UA Trace     Bilirubin, UA Small (1+)     Blood, UA Trace     Protein, UA Trace     Leuk Esterase, UA Small (1+)     Nitrite, UA Negative     Urobilinogen, UA 1.0 E.U./dL    Narrative:      In absence of clinical symptoms, the presence of pyuria, bacteria, and/or nitrites on the urinalysis result does not correlate with infection.    Urinalysis, Microscopic Only - Urine, Clean Catch [[id number]]  (Abnormal) Collected: 12/08/23 0326    Specimen: Urine, Clean Catch Updated: 12/08/23 0402     RBC, UA 3-5 /HPF      WBC, UA 6-10 /HPF      Bacteria, UA Trace /HPF      Squamous Epithelial Cells, UA 3-6 /HPF      Calcium Oxalate Crystals, UA Small/1+ /HPF      Mucus, UA Small/1+ /HPF      Methodology Manual Light Microscopy    Urine Culture - Urine, Urine, Clean Catch [[id number]] Collected: 12/08/23 0326    Specimen: Urine, Clean Catch Updated: 12/08/23 0402          Imaging Results (Last 24 Hours)       Procedure Component Value Units Date/Time    MRI Lumbar Spine Without Contrast [[id number]] Collected: 12/08/23 0551     Updated: 12/08/23 0603    Narrative:      EXAMINATION: MRI LUMBAR SPINE WO CONTRAST-     12/8/2023 3:40 AM     HISTORY: Low back pain, cauda equina syndrome suspected     The multiplanar, multisequence MR imaging of the lumbar spine is  performed without intravenous contrast enhancement.     There is no previous study for comparison. The correlation made with CT  scan of the lumbar spine dated 12/5/2023.     There is mild anterolisthesis of L4 over L5. No finding to suggest  spondylolysis.     Alignment from T12-L4 is normal.     Loss  of height and signal of the intervertebral disc, more pronounced at  L4-5, representing degenerative disc disease.     The vertebral body heights are normal.     There is a mild levoscoliosis.     The bone marrow signal of the vertebral bodies and the posterior  elements are normal.     L1-2: No significant disc bulging or herniation. No significant  osteophytes. Moderate. The neural foramina and spinal canal are patent.     L2-3: Mild diffuse disc bulging. There is bilateral moderate facet  arthropathy and mild ligamental hypertrophy. Neural foramina and spinal  canal are patent.     L3-4: Mild diffuse disc bulging more toward the left. No significant  osteophytes. And ligamental hypertrophy. No significant spinal stenosis.  Mild narrowing of the neural foramina bilaterally.     L4-5: Moderate diffuse disc bulging asymmetrically more towards the  right. There is bilateral facet arthropathy and ligamental hypertrophy.  There is small amount of fluid in the facet joints, left more than the  right. There is moderate spinal stenosis. There is bilateral  neuroforaminal stenosis more on the right.     L5-S1: Moderate diffuse disc bulging asymmetrically more towards the  right. Bilateral facet arthropathy moderately more towards the right.  Small joint effusion. Spinal canal is patent. A mild right  neuroforaminal stenosis.     The size and signal of the conus medullaris and limited visualized lower  thoracic spinal cord is normal. No focal enlargement or expansion. Cauda  equina nerve roots are normal.     Limited visualized paravertebral, paraspinal soft tissues are  unremarkable.       Impression:      1. Lumbar spondylosis. A mild alignment disruption at L4-5. A mild  levoscoliosis.  2. The multilevel prominent disc osteophyte complexes, facet arthropathy  and ligamental hypertrophy. There is a resultant moderate spinal  stenosis at L4-5. There is bilateral neuroforaminal stenosis more on the  right at L4-5. There is  a mild right neuroforaminal stenosis L5-S1.     This report was signed and finalized on 12/8/2023 6:00 AM by Dr. Ping Vasquez MD.             I have personally reviewed and interpreted the radiology studies and ECG obtained at time of admission.     Assessment / Plan   Assessment:   Active Hospital Problems    Diagnosis     **Intractable low back pain     Lumbar radiculopathy, acute        Intractable lumbar back pain with lumbar radiculopathy  Lumbar spondylosis and lumbar stenosis  Osteoarthritis.        CT scan of the lumbar spine on December 5, 2023, performed in the emergency department, showed no acute fracture or malalignment, findings of lumbar spondylosis and moderate spinal canal stenosis L4-L5.  Right neuroforaminal stenosis L4-5 L5-S1.      MRI of the lumbar spine today shows lumbar spondylosis, mild ligament disruption L4-L5.  Multilevel prominent disc osteophyte complexes, facet arthropathy and ligament hypertrophy.  Moderate spinal stenosis L4-L5.  Bilateral neuroforaminal stenosis more prominent on the right at L4-L5.  Mild right neuroforaminal stenosis L5-S1.    Pain not controlled yet.    Treatment Plan  The patient will be admitted to my service here at [facility].  Will increase Dilaudid to 1 mg IV every 3 hours as needed.  Percocet for moderate pain.  Continue Norflex 60 mg IV every 12 hours as needed.  Toradol 15 mg IV every 6 hours.    Monitor renal function and electrolytes.  Continue dexamethasone  Patient to have neurosurgery evaluation, consultation was placed.    Medical Decision Making  Number and Complexity of problems: 3  Differential Diagnosis: See above    Conditions and Status        Condition is unchanged.     MDM Data  External documents reviewed: None  Cardiac tracing (EKG, telemetry) interpretation: Reviewed  Radiology interpretation: Reviewed  Labs reviewed: Yes  Any tests that were considered but not ordered: No   Decision rules/scores evaluated (example  [id number], Wells, etc): None     Discussed with: Family members and patient     Care Planning  Shared decision making: With patient  Code status and discussions: Full code    Disposition  Social Determinants of Health that impact treatment or disposition: None  Estimated length of stay is 2+ days.     I confirmed that the patient's advanced care plan is present, code status is documented, and a surrogate decision maker is listed in the patient's medical record.     The patient's surrogate decision maker is family member.     The patient was seen and examined by me on December 8, 2023 at 11 am.    Electronically signed by Kenneth Palencia MD, 12/08/23, 12:55 CST.

## 2023-12-08 NOTE — ED NOTES
Nursing report ED to floor  Sedrick Leahy  68 y.o.  female    HPI:   Chief Complaint   Patient presents with    Back Pain       Admitting doctor:   Kenneth Palencia MD    Consulting provider(s):  Consults       Date and Time Order Name Status Description    12/8/2023  7:36 AM Inpatient Neurosurgery Consult Completed              Admitting diagnosis:   The primary encounter diagnosis was Acute low back pain with bilateral sciatica, unspecified back pain laterality. Diagnoses of Central stenosis of spinal canal, Bilateral leg weakness, Bilateral leg numbness, and Acute cystitis with hematuria were also pertinent to this visit.    Code status:   Current Code Status       Date Active Code Status Order ID Comments User Context       12/8/2023 0739 CPR (Attempt to Resuscitate) [id number]  Kenneth Palencia MD ED        Question Answer    Code Status (Patient has no pulse and is not breathing) CPR (Attempt to Resuscitate)    Medical Interventions (Patient has pulse or is breathing) Full Support    Level Of Support Discussed With Patient                    Allergies:   Penicillins and Morphine    Intake and Output  No intake or output data in the 24 hours ending 12/08/23 1213    Weight:       12/08/23  0126   Weight: 81.2 kg (179 lb)       Most recent vitals:   Vitals:    12/08/23 0614 12/08/23 0631 12/08/23 0816 12/08/23 1201   BP:  162/78 150/71 153/65   BP Location:       Patient Position:       Pulse:  78 90 81   Resp:  20 16 16   Temp:       TempSrc:       SpO2: (!) 88% 99% 93% 100%   Weight:       Height:         Oxygen Therapy: .    Active LDAs/IV Access:   Lines, Drains & Airways       Active LDAs       Name Placement date Placement time Site Days    Peripheral IV 12/08/23 0450 Left Antecubital 12/08/23  0450  Antecubital  less than 1                    Labs (abnormal labs have a star):   Labs Reviewed   URINALYSIS W/ CULTURE IF INDICATED - Abnormal; Notable for the following components:       Result Value     Color, UA Dark Yellow (*)     Appearance, UA Cloudy (*)     Specific Gravity, UA >1.030 (*)     Ketones, UA Trace (*)     Bilirubin, UA Small (1+) (*)     Blood, UA Trace (*)     Protein, UA Trace (*)     Leuk Esterase, UA Small (1+) (*)     All other components within normal limits    Narrative:     In absence of clinical symptoms, the presence of pyuria, bacteria, and/or nitrites on the urinalysis result does not correlate with infection.   URINALYSIS, MICROSCOPIC ONLY - Abnormal; Notable for the following components:    RBC, UA 3-5 (*)     WBC, UA 6-10 (*)     Bacteria, UA Trace (*)     Squamous Epithelial Cells, UA 3-6 (*)     Mucus, UA Small/1+ (*)     All other components within normal limits   COMPREHENSIVE METABOLIC PANEL - Abnormal; Notable for the following components:    Glucose 106 (*)     BUN/Creatinine Ratio 30.0 (*)     All other components within normal limits    Narrative:     GFR Normal >60  Chronic Kidney Disease <60  Kidney Failure <15     PROTIME-INR - Abnormal; Notable for the following components:    INR 0.88 (*)     All other components within normal limits   CBC WITH AUTO DIFFERENTIAL - Abnormal; Notable for the following components:    Hemoglobin 10.4 (*)     MCH 25.7 (*)     MCHC 29.7 (*)     RDW 18.6 (*)     RDW-SD 58.5 (*)     Lymphocytes, Absolute 4.38 (*)     All other components within normal limits   C-REACTIVE PROTEIN - Normal   SEDIMENTATION RATE - Normal   URINE CULTURE   CBC AND DIFFERENTIAL    Narrative:     The following orders were created for panel order CBC & Differential.  Procedure                               Abnormality         Status                     ---------                               -----------         ------                     CBC Auto Differential[[id number]]        Abnormal            Final result                 Please view results for these tests on the individual orders.       Meds given in ED:   Medications   lidocaine (LIDODERM) 5 % 2 patch (2 patches  Transdermal Medication Applied 12/8/23 0145)   sodium chloride 0.9 % flush 10 mL (10 mL Intravenous Given 12/8/23 0530)   ketorolac (TORADOL) injection 15 mg (15 mg Intravenous Given 12/8/23 0745)   sodium chloride 0.9 % flush 10 mL (has no administration in time range)   sodium chloride 0.9 % flush 10 mL (has no administration in time range)   sodium chloride 0.9 % infusion 40 mL (has no administration in time range)   sennosides-docusate (PERICOLACE) 8.6-50 MG per tablet 2 tablet (2 tablets Oral Not Given 12/8/23 1206)     And   polyethylene glycol (MIRALAX) packet 17 g (has no administration in time range)     And   bisacodyl (DULCOLAX) EC tablet 5 mg (has no administration in time range)     And   bisacodyl (DULCOLAX) suppository 10 mg (has no administration in time range)   acetaminophen (TYLENOL) tablet 650 mg (has no administration in time range)   oxyCODONE-acetaminophen (PERCOCET) 5-325 MG per tablet 1 tablet (has no administration in time range)   naloxone (NARCAN) injection 0.4 mg (has no administration in time range)   melatonin tablet 5 mg (has no administration in time range)   ondansetron (ZOFRAN) injection 4 mg (has no administration in time range)   famotidine (PEPCID) tablet 40 mg (0 mg Oral Hold 12/8/23 1133)   heparin (porcine) 5000 UNIT/ML injection 5,000 Units (5,000 Units Subcutaneous Given 12/8/23 1130)   dexAMETHasone (DECADRON) injection 10 mg (10 mg Intravenous Given 12/8/23 1127)   pregabalin (LYRICA) capsule 50 mg (has no administration in time range)   HYDROmorphone (DILAUDID) injection 0.5 mg (0.5 mg Intravenous Given 12/8/23 1127)   acetaminophen (TYLENOL) tablet 1,000 mg (1,000 mg Oral Given 12/8/23 0139)   ketorolac (TORADOL) injection 15 mg (15 mg Intramuscular Given 12/8/23 0139)   orphenadrine (NORFLEX) injection 60 mg (60 mg Intramuscular Given 12/8/23 0140)   HYDROmorphone (DILAUDID) injection 0.5 mg (0.5 mg Intramuscular Given 12/8/23 0301)   HYDROmorphone (DILAUDID) injection  0.5 mg (0.5 mg Intravenous Given 12/8/23 4914)   lactated ringers bolus 500 mL (0 mL Intravenous Stopped 12/8/23 3326)   fentaNYL citrate (PF) (SUBLIMAZE) injection 50 mcg (50 mcg Intravenous Given 12/8/23 9092)           NIH Stroke Scale:       Isolation/Infection(s):  No active isolations   No active infections       Nursing report ED to floor:  Mental status: .AxO  Ambulatory status: .  Precautions: .    ED nurse phone extentsion- ..  [phone]  Report called to YAJAIRA Ramsay

## 2023-12-08 NOTE — ED PROVIDER NOTES
"EMERGENCY DEPARTMENT ATTENDING NOTE    Patient Name: Sedrick Leahy    Chief Complaint   Patient presents with    Back Pain       PATIENT PRESENTATION:  Sedrick Leahy is a very pleasant 68 y.o. female with history of prior orthopedic spine surgeon about 1 year prior as well as recent presented to the emergency ferment due to acute low back pain present emergency department today to continue worsening symptoms.        Patient states that there was no trauma or fall but she had acute lower back pain with radiation down her bilateral legs.  She states that her pain is now severe and she is having some weakness of her legs.  Denies any saddle anesthesia no fecal urinary incontinence.  No new trauma.  Has been ambulatory with significant pain.  About a year ago had a spine surgery performed by orthopedic spine surgeon, Dr. Gilbert, and is recently been referred to neurosurgery spine surgery given her prior CT findings.  No dysuria.  No fevers or chills.  No abdominal pain.  No chest pain or shortness of breath.      PHYSICAL EXAM:   VS: /76   Pulse 76   Temp 98.3 °F (36.8 °C) (Oral)   Resp 20   Ht 162.6 cm (64\")   Wt 81.2 kg (179 lb)   SpO2 (!) 88%   BMI 30.73 kg/m²   GENERAL: Tearful uncomfortable appearing elderly woman lying stretcher no acute distress; well-nourished, well-developed, awake, alert, no acute distress, nontoxic appearing,  GI: soft, nontender, nondistended  MUSCULOSKELETAL/EXTREMITIES: No midline cervical thoracic or lumbar spine tenderness levels with patient; no pelvic instability or tenderness; extremities without obvious deformity  SKIN: warm and dry with no obvious rashes  NEUROLOGIC: Decreased strength of bilateral lower extremities compared to the upper extremities; sensate bilaterally feels good slight increase in regular left  PSYCHIATRIC: alert, pleasant and cooperative. Appropriate mood and affect.      MEDICAL DECISION MAKING:    Sedrick Leahy is a 68 y.o. female who presented " to the ED due to continuing worsening lower back pain with leg weakness and numbness of the bilateral lower extremities in the setting of recent CT showing spinal canal stenosis.    Differential Diagnosis Considered: Cauda equina syndrome, acute on chronic pain, sciatica, musculoskeletal spasm    Labs Ordered:  Labs Reviewed   URINALYSIS W/ CULTURE IF INDICATED - Abnormal; Notable for the following components:       Result Value    Color, UA Dark Yellow (*)     Appearance, UA Cloudy (*)     Specific Gravity, UA >1.030 (*)     Ketones, UA Trace (*)     Bilirubin, UA Small (1+) (*)     Blood, UA Trace (*)     Protein, UA Trace (*)     Leuk Esterase, UA Small (1+) (*)     All other components within normal limits    Narrative:     In absence of clinical symptoms, the presence of pyuria, bacteria, and/or nitrites on the urinalysis result does not correlate with infection.   URINALYSIS, MICROSCOPIC ONLY - Abnormal; Notable for the following components:    RBC, UA 3-5 (*)     WBC, UA 6-10 (*)     Bacteria, UA Trace (*)     Squamous Epithelial Cells, UA 3-6 (*)     Mucus, UA Small/1+ (*)     All other components within normal limits   COMPREHENSIVE METABOLIC PANEL - Abnormal; Notable for the following components:    Glucose 106 (*)     BUN/Creatinine Ratio 30.0 (*)     All other components within normal limits    Narrative:     GFR Normal >60  Chronic Kidney Disease <60  Kidney Failure <15     PROTIME-INR - Abnormal; Notable for the following components:    INR 0.88 (*)     All other components within normal limits   CBC WITH AUTO DIFFERENTIAL - Abnormal; Notable for the following components:    Hemoglobin 10.4 (*)     MCH 25.7 (*)     MCHC 29.7 (*)     RDW 18.6 (*)     RDW-SD 58.5 (*)     Lymphocytes, Absolute 4.38 (*)     All other components within normal limits   C-REACTIVE PROTEIN - Normal   SEDIMENTATION RATE - Normal   URINE CULTURE   CBC AND DIFFERENTIAL    Narrative:     The following orders were created for panel  order CBC & Differential.  Procedure                               Abnormality         Status                     ---------                               -----------         ------                     CBC Auto Differential[[id number]]        Abnormal            Final result                 Please view results for these tests on the individual orders.        Imaging Ordered:   MRI Lumbar Spine Without Contrast   Final Result   1. Lumbar spondylosis. A mild alignment disruption at L4-5. A mild   levoscoliosis.   2. The multilevel prominent disc osteophyte complexes, facet arthropathy   and ligamental hypertrophy. There is a resultant moderate spinal   stenosis at L4-5. There is bilateral neuroforaminal stenosis more on the   right at L4-5. There is a mild right neuroforaminal stenosis L5-S1.       This report was signed and finalized on 12/8/2023 6:00 AM by Dr. Ping Vasquez MD.              Internal chart review:   Past Medical History:   Diagnosis Date    Arthritis     Diabetes mellitus     diet controlled    Hx of colonic polyp     Hypertension     Joint pain     Hip    Lower back pain     Neck pain     PONV (postoperative nausea and vomiting)        Past Surgical History:   Procedure Laterality Date    ANTERIOR CERVICAL DISCECTOMY W/ FUSION N/A 11/20/2020    Procedure: EXPLORATION OF FUSION C5-6, ANTERIOR CERVICAL DISCECTOMY FUSION C4-5, C6-7 REVISON ANTERIOR FUSION C5-6 WITH INSTRUMENTATION C4-7;  Surgeon: KEN Gilbert MD;  Location: [facility] OR;  Service: Orthopedic Spine;  Laterality: N/A;    APPENDECTOMY      COLONOSCOPY  06/06/2016    Normal exam repeat in 5 years    COLONOSCOPY N/A 01/14/2020    Diverticulosis repeat exam in 5 years    COLONOSCOPY W/ POLYPECTOMY  04/04/2012    Hyperplastic polyp cecum repeat exam in 1 year    ENDOSCOPY  08/14/2014    Very tortuous distal esophagus dilated 50 Fr, HH     ENDOSCOPY N/A 01/18/2021    A fundoplication was found, dilated    ENDOSCOPY  06/2023    HARDWARE  "REMOVAL N/A 11/20/2020    Procedure: REMOVAL OF INSTRUMENTATION;  Surgeon: KEN Gilbert MD;  Location:  [facility] OR;  Service: Orthopedic Spine;  Laterality: N/A;    HYSTERECTOMY      Laparoscopic Hysterectomy bilateral salpingectomy for bleeding    NECK SURGERY      NISSEN FUNDOPLICATION      POSTERIOR CERVICAL FUSION N/A 12/03/2020    Procedure: POSTERIOR SPINAL FUSION WITH INSTRUMENTATION C4-7;  Surgeon: KEN Gilbert MD;  Location:  [facility] OR;  Service: Orthopedic Spine;  Laterality: N/A;    STEROID INJECTION Left 06/30/2022    Procedure: LEFT HIP FLUROSCOPIC GUIDED CORTICOSTEROID INJECTION;  Surgeon: Herberth Skelton MD;  Location:  [facility] OR;  Service: Orthopedics;  Laterality: Left;    US GUIDED LYMPH NODE BIOPSY  08/03/2020       Allergies   Allergen Reactions    Penicillins Swelling and Rash    Morphine GI Intolerance     Pt states morphine makes her sick and has \"had GERD surgery is unable to vomit\"         Current Facility-Administered Medications:     lactated ringers bolus 500 mL, 500 mL, Intravenous, Once, Jaiden Díaz MD    lidocaine (LIDODERM) 5 % 2 patch, 2 patch, Transdermal, Once, Jaiden Díaz MD, 2 patch at 12/08/23 0145    [COMPLETED] Insert Peripheral IV, , , Once **AND** sodium chloride 0.9 % flush 10 mL, 10 mL, Intravenous, PRN, Jaiden Díaz MD, 10 mL at 12/08/23 0535    Current Outpatient Medications:     amitriptyline (ELAVIL) 100 MG tablet, TAKE ONE (1) TO TWO (2) TABLETS AT BEDTIME AS NEEDED SLEEP, Disp: 180 tablet, Rfl: 0    atorvastatin (LIPITOR) 20 MG tablet, Take 1 tablet by mouth Daily., Disp: 90 tablet, Rfl: 5    HYDROcodone-acetaminophen (NORCO) 5-325 MG per tablet, Take 1 tablet by mouth Every 8 (Eight) Hours As Needed for Moderate Pain for up to 6 doses., Disp: 6 tablet, Rfl: 0    ketorolac (TORADOL) 10 MG tablet, Take 1 tablet by mouth Every 6 (Six) Hours As Needed for Moderate Pain., Disp: , Rfl:     orphenadrine (NORFLEX) 100 MG 12 hr tablet, Take 1 tablet " by mouth 2 (Two) Times a Day., Disp: , Rfl:     traMADol (Ultram) 50 MG tablet, Take 1 tablet by mouth Every 6 (Six) Hours As Needed for Moderate Pain., Disp: , Rfl:     cholecalciferol (VITAMIN D3) 25 MCG (1000 UT) tablet, Take 1 tablet by mouth Daily., Disp: , Rfl:     nitrofurantoin, macrocrystal-monohydrate, (MACROBID) 100 MG capsule, Take 1 capsule by mouth 2 (Two) Times a Day for 5 days., Disp: 10 capsule, Rfl: 0    nystatin (MYCOSTATIN) 100,000 unit/mL suspension, Swish and swallow 5 mL 4 (Four) Times a Day., Disp: 473 mL, Rfl: 0    predniSONE (DELTASONE) 20 MG tablet, Take 1 tablet by mouth 2 (Two) Times a Day for 5 days., Disp: 10 tablet, Rfl: 0    External documents reviewed: Reviewed recent CT scan performed 2 days prior which shows evidence of spinal canal stenosis with some degenerative disc disease.    MRI Lumbar Spine Without Contrast    Result Date: 12/8/2023  1. Lumbar spondylosis. A mild alignment disruption at L4-5. A mild levoscoliosis. 2. The multilevel prominent disc osteophyte complexes, facet arthropathy and ligamental hypertrophy. There is a resultant moderate spinal stenosis at L4-5. There is bilateral neuroforaminal stenosis more on the right at L4-5. There is a mild right neuroforaminal stenosis L5-S1.  This report was signed and finalized on 12/8/2023 6:00 AM by Dr. Ping Vasquez MD.      CT Lumbar Spine Without Contrast    Result Date: 12/6/2023  1. No acute fracture or malalignment. 2. Lumbar spondylosis more pronounced in the lower lumbar spine. Mild anterolisthesis of L4 over L5. No spondylolysis. 3. Moderate spinal canal stenosis at L4-5. The right neuroforaminal stenosis at L4-5 and L5-S1. The details given above.  This report was signed and finalized on 12/6/2023 7:46 AM by Dr. Ping Vasquez MD.         My lab interpretation: Urinalysis with 6-10 white blood cells 3-5 red blood cells trace bacteria 36 squamous cells and small leukocytes concerning for acute cystitis.   Otherwise normal white blood cell count ESR and CRP.  CMP is unremarkable.    My imaging interpretation: MRI lumbar spine without contrast notable for lumbar spondylosis with mild alignment shift and L4-L5 and levoscoliosis.  Multilevel prominent disc osteophyte complexes with facet arthropathy and ligamental hypertrophy.  Resulting moderate spinal canal stenosis at L4-L5.  Bilateral neuroforaminal stenosis more on the right at L4-L5.    ED Course and Re-evaluation: 67yo F recently evaluated in emergency department 2 days prior in the setting of acute low back pain with sciatica with CT imaging discharged on pain medications presenting due to worsening symptoms.  Patient's physical exam clear limited by pain but difficult to assess of true weakness given her severely worsening pain with CT showing significant degenerative disc disease, clarification for emergent MRI to rule out cauda equina syndrome.  She has no significant spinal stenosis of the lumbar spine.  Urinalysis obtained in setting of patient's low back pain she is evidence of acute cystitis but this is not concerning for pyelonephritis.  Otherwise no significant infectious labs.  MRI with large osteophyte complex and concerned about canal stenosis.  Neurosurgery consultation is pending.      At time of my signout to the [facility] emergency medicine attending, Dr. Salinas, neurosurgery spine surgery evaluation is pending the emergency department.  Final disposition pending their evaluation at time of signout.      ED Diagnosis:  Acute low back pain with bilateral sciatica, unspecified back pain laterality; Central stenosis of spinal canal; Bilateral leg weakness; Bilateral leg numbness; Acute cystitis with hematuria    Disposition: Pending neurosurgery consultation and likely evaluation emergency department as well as patient's ability to ambulate if no indication for any surgical management emergently possible admission versus discharge  home        Signed:  Jaiden Díaz MD  Emergency Medicine Physician    Please note that portions of this note were completed with a voice recognition program.      Jaiden Díaz MD  12/08/23 0619

## 2023-12-08 NOTE — PLAN OF CARE
Goal Outcome Evaluation:  Plan of Care Reviewed With: patient        Progress: no change  Outcome Evaluation: Pt arrived from in ED c/o pain 9 out of 10, it was too early for any IV pain meds, so gave PO med that was available;  came to see patient shortly after arrival and adjusted her PRN medicines and she was given additional meds as indicated; she is currently resting and comfortable; up with assist; voiding; IID; safety maintained; family at bedside.

## 2023-12-09 LAB
ANION GAP SERPL CALCULATED.3IONS-SCNC: 11 MMOL/L (ref 5–15)
BACTERIA SPEC AEROBE CULT: NO GROWTH
BASOPHILS # BLD AUTO: 0.01 10*3/MM3 (ref 0–0.2)
BASOPHILS NFR BLD AUTO: 0.1 % (ref 0–1.5)
BUN SERPL-MCNC: 21 MG/DL (ref 8–23)
BUN/CREAT SERPL: 33.3 (ref 7–25)
CALCIUM SPEC-SCNC: 8.8 MG/DL (ref 8.6–10.5)
CHLORIDE SERPL-SCNC: 104 MMOL/L (ref 98–107)
CO2 SERPL-SCNC: 23 MMOL/L (ref 22–29)
CREAT SERPL-MCNC: 0.63 MG/DL (ref 0.57–1)
DEPRECATED RDW RBC AUTO: 56.7 FL (ref 37–54)
EGFRCR SERPLBLD CKD-EPI 2021: 96.2 ML/MIN/1.73
EOSINOPHIL # BLD AUTO: 0 10*3/MM3 (ref 0–0.4)
EOSINOPHIL NFR BLD AUTO: 0 % (ref 0.3–6.2)
ERYTHROCYTE [DISTWIDTH] IN BLOOD BY AUTOMATED COUNT: 18 % (ref 12.3–15.4)
GLUCOSE SERPL-MCNC: 156 MG/DL (ref 65–99)
HCT VFR BLD AUTO: 35.3 % (ref 34–46.6)
HGB BLD-MCNC: 11 G/DL (ref 12–15.9)
IMM GRANULOCYTES # BLD AUTO: 0.04 10*3/MM3 (ref 0–0.05)
IMM GRANULOCYTES NFR BLD AUTO: 0.5 % (ref 0–0.5)
LYMPHOCYTES # BLD AUTO: 1.55 10*3/MM3 (ref 0.7–3.1)
LYMPHOCYTES NFR BLD AUTO: 19 % (ref 19.6–45.3)
MCH RBC QN AUTO: 26.6 PG (ref 26.6–33)
MCHC RBC AUTO-ENTMCNC: 31.2 G/DL (ref 31.5–35.7)
MCV RBC AUTO: 85.5 FL (ref 79–97)
MONOCYTES # BLD AUTO: 0.08 10*3/MM3 (ref 0.1–0.9)
MONOCYTES NFR BLD AUTO: 1 % (ref 5–12)
NEUTROPHILS NFR BLD AUTO: 6.47 10*3/MM3 (ref 1.7–7)
NEUTROPHILS NFR BLD AUTO: 79.4 % (ref 42.7–76)
NRBC BLD AUTO-RTO: 0 /100 WBC (ref 0–0.2)
PLATELET # BLD AUTO: 311 10*3/MM3 (ref 140–450)
PMV BLD AUTO: 9.8 FL (ref 6–12)
POTASSIUM SERPL-SCNC: 4.5 MMOL/L (ref 3.5–5.2)
RBC # BLD AUTO: 4.13 10*6/MM3 (ref 3.77–5.28)
SODIUM SERPL-SCNC: 138 MMOL/L (ref 136–145)
WBC NRBC COR # BLD AUTO: 8.15 10*3/MM3 (ref 3.4–10.8)

## 2023-12-09 PROCEDURE — 85025 COMPLETE CBC W/AUTO DIFF WBC: CPT | Performed by: FAMILY MEDICINE

## 2023-12-09 PROCEDURE — 36415 COLL VENOUS BLD VENIPUNCTURE: CPT | Performed by: FAMILY MEDICINE

## 2023-12-09 PROCEDURE — 0 HYDROMORPHONE 1 MG/ML SOLUTION: Performed by: FAMILY MEDICINE

## 2023-12-09 PROCEDURE — 25010000002 HEPARIN (PORCINE) PER 1000 UNITS: Performed by: FAMILY MEDICINE

## 2023-12-09 PROCEDURE — 25010000002 KETOROLAC TROMETHAMINE PER 15 MG: Performed by: FAMILY MEDICINE

## 2023-12-09 PROCEDURE — 99232 SBSQ HOSP IP/OBS MODERATE 35: CPT | Performed by: NEUROLOGICAL SURGERY

## 2023-12-09 PROCEDURE — 80048 BASIC METABOLIC PNL TOTAL CA: CPT | Performed by: FAMILY MEDICINE

## 2023-12-09 PROCEDURE — 25010000002 DEXAMETHASONE PER 1 MG: Performed by: PHYSICIAN ASSISTANT

## 2023-12-09 RX ORDER — ATORVASTATIN CALCIUM 10 MG/1
20 TABLET, FILM COATED ORAL NIGHTLY
Status: DISCONTINUED | OUTPATIENT
Start: 2023-12-09 | End: 2023-12-15 | Stop reason: HOSPADM

## 2023-12-09 RX ADMIN — KETOROLAC TROMETHAMINE 15 MG: 15 INJECTION, SOLUTION INTRAMUSCULAR; INTRAVENOUS at 19:27

## 2023-12-09 RX ADMIN — HEPARIN SODIUM 5000 UNITS: 5000 INJECTION INTRAVENOUS; SUBCUTANEOUS at 21:33

## 2023-12-09 RX ADMIN — KETOROLAC TROMETHAMINE 15 MG: 15 INJECTION, SOLUTION INTRAMUSCULAR; INTRAVENOUS at 00:56

## 2023-12-09 RX ADMIN — HYDROMORPHONE HYDROCHLORIDE 1 MG: 1 INJECTION, SOLUTION INTRAMUSCULAR; INTRAVENOUS; SUBCUTANEOUS at 23:27

## 2023-12-09 RX ADMIN — HEPARIN SODIUM 5000 UNITS: 5000 INJECTION INTRAVENOUS; SUBCUTANEOUS at 13:24

## 2023-12-09 RX ADMIN — OXYCODONE HYDROCHLORIDE AND ACETAMINOPHEN 1 TABLET: 5; 325 TABLET ORAL at 11:14

## 2023-12-09 RX ADMIN — KETOROLAC TROMETHAMINE 15 MG: 15 INJECTION, SOLUTION INTRAMUSCULAR; INTRAVENOUS at 09:14

## 2023-12-09 RX ADMIN — HEPARIN SODIUM 5000 UNITS: 5000 INJECTION INTRAVENOUS; SUBCUTANEOUS at 06:21

## 2023-12-09 RX ADMIN — DOCUSATE SODIUM 50 MG AND SENNOSIDES 8.6 MG 2 TABLET: 8.6; 5 TABLET, FILM COATED ORAL at 09:14

## 2023-12-09 RX ADMIN — DEXAMETHASONE SODIUM PHOSPHATE 10 MG: 10 INJECTION INTRAMUSCULAR; INTRAVENOUS at 01:54

## 2023-12-09 RX ADMIN — Medication 10 ML: at 09:15

## 2023-12-09 RX ADMIN — Medication 10 ML: at 21:34

## 2023-12-09 RX ADMIN — HYDROMORPHONE HYDROCHLORIDE 1 MG: 1 INJECTION, SOLUTION INTRAMUSCULAR; INTRAVENOUS; SUBCUTANEOUS at 13:24

## 2023-12-09 RX ADMIN — PREGABALIN 50 MG: 50 CAPSULE ORAL at 06:21

## 2023-12-09 RX ADMIN — PREGABALIN 50 MG: 50 CAPSULE ORAL at 21:33

## 2023-12-09 RX ADMIN — FAMOTIDINE 40 MG: 20 TABLET, FILM COATED ORAL at 09:14

## 2023-12-09 RX ADMIN — PREGABALIN 50 MG: 50 CAPSULE ORAL at 13:24

## 2023-12-09 RX ADMIN — ATORVASTATIN CALCIUM 20 MG: 10 TABLET, FILM COATED ORAL at 21:33

## 2023-12-09 NOTE — PROGRESS NOTES
"    [facility] Medicine Services  INPATIENT PROGRESS NOTE    Patient Name: Sedrick Leahy  Date of Admission: 12/8/2023  Today's Date: 12/09/23  Length of Stay: 1  Primary Care Physician: Kiran Madden MD    Subjective   Chief Complaint: Back pain  HPI   Patient states that she had a flare of pain this afternoon after she got up and went for a walk.  She just received a \"pain shot\" not too long ago and has had improvement in symptoms.  Reports that her right great toe feels numb.  She does report having some incontinence symptoms, however it sounds like these began well before her most recent bout with acute back pain.    Review of Systems   All pertinent negatives and positives are as above. All other systems have been reviewed and are negative unless otherwise stated.     Objective    Temp:  [97.8 °F (36.6 °C)-98.4 °F (36.9 °C)] 97.9 °F (36.6 °C)  Heart Rate:  [78-93] 78  Resp:  [16] 16  BP: (135-169)/(53-70) 145/58  Physical Exam  Vitals reviewed.   Constitutional:       General: She is not in acute distress.  HENT:      Head: Normocephalic.      Mouth/Throat:      Mouth: Mucous membranes are moist.   Pulmonary:      Effort: Pulmonary effort is normal. No respiratory distress.   Musculoskeletal:         General: No deformity.   Skin:     General: Skin is warm.   Neurological:      Mental Status: She is alert and oriented to person, place, and time.      Sensory: Sensory deficit (distal right foot/great toe) present.      Motor: Weakness present.   Psychiatric:         Mood and Affect: Mood normal.         Results Review:  I have reviewed the labs, radiology results, and diagnostic studies.    Laboratory Data:   Results from last 7 days   Lab Units 12/09/23  0420 12/08/23  0450 12/05/23  2342   WBC 10*3/mm3 8.15 10.07 6.75   HEMOGLOBIN g/dL 11.0* 10.4* 10.6*   HEMATOCRIT % 35.3 35.0 36.0   PLATELETS 10*3/mm3 311 310 266        Results from last 7 days   Lab Units " 12/09/23  0420 12/08/23  0450 12/05/23  2342   SODIUM mmol/L 138 143 141   POTASSIUM mmol/L 4.5 3.8 3.7   CHLORIDE mmol/L 104 106 105   CO2 mmol/L 23.0 27.0 24.0   BUN mg/dL 21 21 18   CREATININE mg/dL 0.63 0.70 0.54*   CALCIUM mg/dL 8.8 9.4 9.0   BILIRUBIN mg/dL  --  0.2  --    ALK PHOS U/L  --  105  --    ALT (SGPT) U/L  --  21  --    AST (SGOT) U/L  --  26  --    GLUCOSE mg/dL 156* 106* 107*       Culture Data:   Urine Culture   Date Value Ref Range Status   12/08/2023 No growth  Final       Radiology Data:   Imaging Results (Last 24 Hours)       ** No results found for the last 24 hours. **            I have reviewed the patient's current medications.     Assessment/Plan   Assessment  Active Hospital Problems    Diagnosis     **Intractable low back pain     Lumbar radiculopathy, acute        Treatment Plan  NSGY recommendations reviewed; to OR on Monday  Pain control (IV Dilaudid; PO Percocet; IV Toradol; Lidoderm patch)  Bowel regimen  Patient requests restarting outpatient Amitriptyline  Heparin PPx  Patient received Dexamethasone X 3 doses  Labs reviewed; stable  Monitor BP trend  Continuous pulsox  PT and OT assessment    Medical Decision Making  Number and Complexity of problems: High complexity with intractable pain symptoms due to L4/5 spondylolisthesis requiring IV/parenteral medication for control of symptoms      Conditions and Status        Condition is unchanged.     MDM Data  External documents reviewed: none  Cardiac tracing (EKG, telemetry) interpretation: no new EKGs  Radiology interpretation: no new radiology studies  Labs reviewed: as above  Any tests that were considered but not ordered: none     Decision rules/scores evaluated (example [id number], Wells, etc): none     Discussed with: Discussed with patient and spouse at bedside     Care Planning  Shared decision making: Discussed with patient with agreement to proceed with treatment plan as outlined  Code status and discussions: Full  code    Disposition  Social Determinants of Health that impact treatment or disposition: None apparent at this time  I expect the patient to be discharged to home > 2 days; planning for OR with NSGY on Monday    Electronically signed by Denzel Whitman MD, 12/09/23, 16:34 CST.

## 2023-12-09 NOTE — PLAN OF CARE
Goal Outcome Evaluation:  Plan of Care Reviewed With: patient        Progress: no change  Outcome Evaluation: VSS. PRN pain meds given as ordered with good results. Safety maintained.

## 2023-12-09 NOTE — PLAN OF CARE
Goal Outcome Evaluation:           Progress: no change  Outcome Evaluation: VSS. Pt continues to c/o severe pain to RLE with movement. Call bell in reach. Pain meds given.

## 2023-12-09 NOTE — CONSULTS
Reason for consult   back pain    Chief Complaint   Patient presents with    Back Pain         Requesting provider:   Denzel Whitman MD       HPI: Patient is a 68-year-old female who was admitted through the ER with intractable back pain and leg pain.  On CT scan and MRI she clearly has a spondylolisthesis at L4/5 however there does not appear to be any severe central canal stenosis and some of her symptoms are consistent with hip issues or sacroiliac joint.  The patient describes pain particularly when leaning over in an L4 distribution bilaterally however this morning it was much worse on the right and she also has some tingling in the large toes on the right foot which is consistent with L5.  Based on her symptoms and the fact that she has some dorsiflexion weakness on the right I do think this is most likely coming from the spondylolisthesis at L4/5 although there may be some sacroiliac issues also but clearly the L4/5 spondylolisthesis is the main issue.    Review of Systems     Pertinent positives/negatives documented in HPI.  All other systems reviewed and negative.    Past Medical History:  has a past medical history of Arthritis, Diabetes mellitus, colonic polyp, Hypertension, Joint pain, Lower back pain, Neck pain, and PONV (postoperative nausea and vomiting).    Past Surgical History:  has a past surgical history that includes Appendectomy; Colonoscopy (06/06/2016); Colonoscopy w/ polypectomy (04/04/2012); Esophagogastroduodenoscopy (08/14/2014); Neck surgery; Hysterectomy; Colonoscopy (N/A, 01/14/2020); US Guided Lymph Node Biopsy (08/03/2020); Nissen fundoplication; Hardware Removal (N/A, 11/20/2020); Anterior cervical discectomy w/ fusion (N/A, 11/20/2020); Posterior Cervical Fusion (N/A, 12/03/2020); Esophagogastroduodenoscopy (N/A, 01/18/2021); Steroid Injection (Left, 06/30/2022); and Esophagogastroduodenoscopy (06/2023).    Family History: family history includes Breast cancer in her  "maternal aunt, maternal aunt, and maternal aunt; Colon cancer in her mother; Colon polyps in her mother; Diabetes in her brother, brother, maternal aunt, son, and son; Heart attack in her paternal grandmother; Lung cancer in her maternal aunt; Lymphoma in her maternal aunt; No Known Problems in her father.    Social History:  reports that she quit smoking about 33 years ago. Her smoking use included cigarettes. She started smoking about 69 years ago. She has a 40.00 pack-year smoking history. She has never used smokeless tobacco. She reports current alcohol use. She reports that she does not use drugs.    Allergies: Penicillins and Morphine    Medications: Scheduled Meds:famotidine, 40 mg, Oral, Daily  heparin (porcine), 5,000 Units, Subcutaneous, Q8H  pregabalin, 50 mg, Oral, Q8H  senna-docusate sodium, 2 tablet, Oral, BID  sodium chloride, 10 mL, Intravenous, Q12H      Continuous Infusions:   PRN Meds:.  acetaminophen    senna-docusate sodium **AND** polyethylene glycol **AND** bisacodyl **AND** bisacodyl    HYDROmorphone    ketorolac    melatonin    [DISCONTINUED] Morphine **AND** naloxone    ondansetron    orphenadrine    oxyCODONE-acetaminophen    [COMPLETED] Insert Peripheral IV **AND** sodium chloride    sodium chloride    sodium chloride     Objective:  Vital signs: (most recent): Blood pressure 135/59, pulse 93, temperature 97.8 °F (36.6 °C), temperature source Oral, resp. rate 16, height 162.6 cm (64\"), weight 81.2 kg (179 lb), SpO2 96%, not currently breastfeeding.        Neurologic Exam     Mental Status   Oriented to person, place, and time.   Attention: normal. Concentration: normal.   Speech: speech is normal   Level of consciousness: alert  Knowledge: good.   Normal comprehension.     Cranial Nerves   Cranial nerves II through XII intact.     Motor Exam     Strength   Strength 5/5 throughout.   Strength 5/5 except as noted.   Right peroneal: 4/5Right dorsiflexion weakness 4/5     Sensory Exam   Light " touch normal.     Gait, Coordination, and Reflexes     Gait  Gait: normal      Vital Signs  Temp:  [97.8 °F (36.6 °C)-98.4 °F (36.9 °C)] 97.8 °F (36.6 °C)  Heart Rate:  [72-93] 93  Resp:  [15-16] 16  BP: (133-169)/(53-89) 135/59    Physical Exam  Neurological:      Mental Status: She is oriented to person, place, and time.      Cranial Nerves: Cranial nerves 2-12 are intact.      Motor: Motor strength is normal.     Gait: Gait is intact.   Psychiatric:         Speech: Speech normal.         Results Review:   I reviewed the patient's new clinical results.  I reviewed the patient's new imaging results and agree with the interpretation.  I reviewed the patient's other test results and agree with the interpretation          Lab Results (last 24 hours)       Procedure Component Value Units Date/Time    Urine Culture - Urine, Urine, Clean Catch [[id number]]  (Normal) Collected: 12/08/23 0326    Specimen: Urine, Clean Catch Updated: 12/09/23 1155     Urine Culture No growth    Basic Metabolic Panel [[id number]]  (Abnormal) Collected: 12/09/23 0420    Specimen: Blood Updated: 12/09/23 0457     Glucose 156 mg/dL      BUN 21 mg/dL      Creatinine 0.63 mg/dL      Sodium 138 mmol/L      Potassium 4.5 mmol/L      Comment: Slight hemolysis detected by analyzer. Result may be falsely elevated.        Chloride 104 mmol/L      CO2 23.0 mmol/L      Calcium 8.8 mg/dL      BUN/Creatinine Ratio 33.3     Anion Gap 11.0 mmol/L      eGFR 96.2 mL/min/1.73     Narrative:      GFR Normal >60  Chronic Kidney Disease <60  Kidney Failure <15      CBC Auto Differential [[id number]]  (Abnormal) Collected: 12/09/23 0420    Specimen: Blood Updated: 12/09/23 0430     WBC 8.15 10*3/mm3      RBC 4.13 10*6/mm3      Hemoglobin 11.0 g/dL      Hematocrit 35.3 %      MCV 85.5 fL      MCH 26.6 pg      MCHC 31.2 g/dL      RDW 18.0 %      RDW-SD 56.7 fl      MPV 9.8 fL      Platelets 311 10*3/mm3      Neutrophil % 79.4 %      Lymphocyte % 19.0 %      Monocyte %  1.0 %      Eosinophil % 0.0 %      Basophil % 0.1 %      Immature Grans % 0.5 %      Neutrophils, Absolute 6.47 10*3/mm3      Lymphocytes, Absolute 1.55 10*3/mm3      Monocytes, Absolute 0.08 10*3/mm3      Eosinophils, Absolute 0.00 10*3/mm3      Basophils, Absolute 0.01 10*3/mm3      Immature Grans, Absolute 0.04 10*3/mm3      nRBC 0.0 /100 WBC               Assessment/Plan:   L4/5 spondylolisthesis with dorsiflexion weakness on the right and intractable back and radicular pain.  Given the fact that the patient is in so much pain and clearly has dorsiflexion weakness on the right we are going to have to stabilize this level and decompress her nerves.  We will plan on likely doing surgery on Monday.  Will continue with the steroids for now and try to keep her comfortable.      Intractable low back pain    Lumbar radiculopathy, acute      I discussed the patient's findings and my recommendations with patient    Rick YOST DO [place]  12/09/23  12:23 CST    I spent 30 minutes caring for Sedrick on this date of service. This time includes time spent by me in the following activities: preparing for the visit, reviewing tests, obtaining and/or reviewing a separately obtained history, and performing a medically appropriate examination and/or evaluation

## 2023-12-10 PROCEDURE — 25010000002 HEPARIN (PORCINE) PER 1000 UNITS: Performed by: FAMILY MEDICINE

## 2023-12-10 PROCEDURE — 25010000002 ONDANSETRON PER 1 MG: Performed by: FAMILY MEDICINE

## 2023-12-10 PROCEDURE — 25010000002 KETOROLAC TROMETHAMINE PER 15 MG: Performed by: FAMILY MEDICINE

## 2023-12-10 PROCEDURE — 25810000003 SODIUM CHLORIDE 0.9 % SOLUTION: Performed by: NEUROLOGICAL SURGERY

## 2023-12-10 PROCEDURE — 0 HYDROMORPHONE 1 MG/ML SOLUTION: Performed by: FAMILY MEDICINE

## 2023-12-10 RX ORDER — LIDOCAINE 50 MG/G
2 PATCH TOPICAL
Status: DISCONTINUED | OUTPATIENT
Start: 2023-12-10 | End: 2023-12-15 | Stop reason: HOSPADM

## 2023-12-10 RX ORDER — SODIUM CHLORIDE 9 MG/ML
75 INJECTION, SOLUTION INTRAVENOUS CONTINUOUS
Status: DISCONTINUED | OUTPATIENT
Start: 2023-12-10 | End: 2023-12-12

## 2023-12-10 RX ADMIN — HEPARIN SODIUM 5000 UNITS: 5000 INJECTION INTRAVENOUS; SUBCUTANEOUS at 05:14

## 2023-12-10 RX ADMIN — LIDOCAINE 2 PATCH: 700 PATCH TOPICAL at 20:21

## 2023-12-10 RX ADMIN — HEPARIN SODIUM 5000 UNITS: 5000 INJECTION INTRAVENOUS; SUBCUTANEOUS at 13:09

## 2023-12-10 RX ADMIN — PREGABALIN 50 MG: 50 CAPSULE ORAL at 20:21

## 2023-12-10 RX ADMIN — KETOROLAC TROMETHAMINE 15 MG: 15 INJECTION, SOLUTION INTRAMUSCULAR; INTRAVENOUS at 07:59

## 2023-12-10 RX ADMIN — SODIUM CHLORIDE 75 ML/HR: 9 INJECTION, SOLUTION INTRAVENOUS at 18:37

## 2023-12-10 RX ADMIN — HYDROMORPHONE HYDROCHLORIDE 1 MG: 1 INJECTION, SOLUTION INTRAMUSCULAR; INTRAVENOUS; SUBCUTANEOUS at 15:41

## 2023-12-10 RX ADMIN — PREGABALIN 50 MG: 50 CAPSULE ORAL at 13:08

## 2023-12-10 RX ADMIN — OXYCODONE HYDROCHLORIDE AND ACETAMINOPHEN 1 TABLET: 5; 325 TABLET ORAL at 18:36

## 2023-12-10 RX ADMIN — Medication 10 ML: at 20:22

## 2023-12-10 RX ADMIN — KETOROLAC TROMETHAMINE 15 MG: 15 INJECTION, SOLUTION INTRAMUSCULAR; INTRAVENOUS at 20:22

## 2023-12-10 RX ADMIN — KETOROLAC TROMETHAMINE 15 MG: 15 INJECTION, SOLUTION INTRAMUSCULAR; INTRAVENOUS at 13:09

## 2023-12-10 RX ADMIN — ATORVASTATIN CALCIUM 20 MG: 10 TABLET, FILM COATED ORAL at 20:21

## 2023-12-10 RX ADMIN — HYDROMORPHONE HYDROCHLORIDE 1 MG: 1 INJECTION, SOLUTION INTRAMUSCULAR; INTRAVENOUS; SUBCUTANEOUS at 09:09

## 2023-12-10 RX ADMIN — OXYCODONE HYDROCHLORIDE AND ACETAMINOPHEN 1 TABLET: 5; 325 TABLET ORAL at 11:31

## 2023-12-10 RX ADMIN — PREGABALIN 50 MG: 50 CAPSULE ORAL at 05:14

## 2023-12-10 RX ADMIN — HYDROMORPHONE HYDROCHLORIDE 1 MG: 1 INJECTION, SOLUTION INTRAMUSCULAR; INTRAVENOUS; SUBCUTANEOUS at 03:49

## 2023-12-10 RX ADMIN — DOCUSATE SODIUM 50 MG AND SENNOSIDES 8.6 MG 2 TABLET: 8.6; 5 TABLET, FILM COATED ORAL at 08:02

## 2023-12-10 RX ADMIN — HEPARIN SODIUM 5000 UNITS: 5000 INJECTION INTRAVENOUS; SUBCUTANEOUS at 21:37

## 2023-12-10 RX ADMIN — Medication 10 ML: at 08:04

## 2023-12-10 RX ADMIN — FAMOTIDINE 40 MG: 20 TABLET, FILM COATED ORAL at 08:02

## 2023-12-10 RX ADMIN — OXYCODONE HYDROCHLORIDE AND ACETAMINOPHEN 1 TABLET: 5; 325 TABLET ORAL at 05:14

## 2023-12-10 RX ADMIN — ONDANSETRON 4 MG: 2 INJECTION INTRAMUSCULAR; INTRAVENOUS at 15:49

## 2023-12-10 NOTE — PLAN OF CARE
Goal Outcome Evaluation:           Progress: no change  Outcome Evaluation: Patient received alert and orientated.  Patient pain not well controlled; nurse educated patient and family on pain management and when to call for pain medication before pain gets to be a 10/10.  Patient verbalized understanding of pain management.  Patient in bed with no new complaints, family support at the bedside and patient reports no side effects from prescribed medications during the shift.  Safety maintained; will continue to assess and treat patient per plan of care.

## 2023-12-10 NOTE — PROGRESS NOTES
[facility]  INPATIENT PROGRESS NOTE    Patient Name: Sedrick Leahy  Date of Admission: 12/8/2023  Today's Date: 12/10/23  Length of Stay: 2  Primary Care Physician: Kiran Madden MD    Subjective   Chief Complaint: Back pain  HPI   Patient continues to have flares of pain in her back and lower extremities.  She indicates that symptoms seem to get worse after she gets up and ambulates in the room, goes to the bathroom, etc.  Denies any dysuria.  Visiting with spouse and brother at bedside this AM.  Reports that she typically has no trouble with constipation and has been very regular.      Review of Systems   All pertinent negatives and positives are as above. All other systems have been reviewed and are negative unless otherwise stated.     Objective    Temp:  [97.7 °F (36.5 °C)-98.1 °F (36.7 °C)] 97.8 °F (36.6 °C)  Heart Rate:  [74-93] 74  Resp:  [16] 16  BP: (133-151)/(53-67) 146/58  Physical Exam  Vitals reviewed.   Constitutional:       General: She is not in acute distress.  HENT:      Head: Normocephalic.      Mouth/Throat:      Mouth: Mucous membranes are moist.   Pulmonary:      Effort: Pulmonary effort is normal. No respiratory distress.      Comments: On room air  Musculoskeletal:         General: No deformity.   Skin:     General: Skin is warm.   Neurological:      Mental Status: She is alert and oriented to person, place, and time.      Sensory: Sensory deficit (distal right foot/great toe) present.      Motor: Weakness present.   Psychiatric:         Mood and Affect: Mood normal.         Results Review:  I have reviewed the labs, radiology results, and diagnostic studies.    Laboratory Data:   Results from last 7 days   Lab Units 12/09/23  0420 12/08/23  0450 12/05/23  2342   WBC 10*3/mm3 8.15 10.07 6.75   HEMOGLOBIN g/dL 11.0* 10.4* 10.6*   HEMATOCRIT % 35.3 35.0 36.0   PLATELETS 10*3/mm3 311 310 266        Results from last 7 days   Lab  Units 12/09/23  0420 12/08/23  0450 12/05/23  2342   SODIUM mmol/L 138 143 141   POTASSIUM mmol/L 4.5 3.8 3.7   CHLORIDE mmol/L 104 106 105   CO2 mmol/L 23.0 27.0 24.0   BUN mg/dL 21 21 18   CREATININE mg/dL 0.63 0.70 0.54*   CALCIUM mg/dL 8.8 9.4 9.0   BILIRUBIN mg/dL  --  0.2  --    ALK PHOS U/L  --  105  --    ALT (SGPT) U/L  --  21  --    AST (SGOT) U/L  --  26  --    GLUCOSE mg/dL 156* 106* 107*       Culture Data:   Urine Culture   Date Value Ref Range Status   12/08/2023 No growth  Final       Radiology Data:   Imaging Results (Last 24 Hours)       ** No results found for the last 24 hours. **            I have reviewed the patient's current medications.     Assessment/Plan   Assessment  Active Hospital Problems    Diagnosis     **Intractable low back pain     Lumbar radiculopathy, acute        Treatment Plan  NPO after MN  To OR with Dr. Estes with NSGY tomororw  Pain control (IV Dilaudid; PO Percocet; IV Toradol; Lidoderm patch)  Bowel regimen  Plan to repeat CBC, BMP and coags in AM  Heparin PPx  Patient received Dexamethasone X 3 doses  Monitor BP trend  PT and OT assessment  Admission UA noted; urine culture with no growth.  Patient denies any dysuria or other urinary symptoms.    Medical Decision Making  Number and Complexity of problems: High complexity with intractable pain symptoms due to L4/5 spondylolisthesis requiring IV/parenteral medication for control of symptoms      Conditions and Status        Condition is unchanged.     [facility] Data  External documents reviewed: none  Cardiac tracing (EKG, telemetry) interpretation: no new EKGs  Radiology interpretation: no new radiology studies  Labs reviewed: as above  Any tests that were considered but not ordered: none     Decision rules/scores evaluated (example [id number], Wells, etc): none     Discussed with: Discussed with patient, spouse, and brother at bedside     Care Planning  Shared decision making: Discussed with patient with agreement to  proceed with treatment plan as outlined  Code status and discussions: Full code    Disposition  Social Determinants of Health that impact treatment or disposition: None apparent at this time  I expect the patient to be discharged to home > 2 days; planning for OR with NSGY on Monday    Electronically signed by Denzel Whitman MD, 12/10/23, 10:40 CST.

## 2023-12-10 NOTE — PLAN OF CARE
Goal Outcome Evaluation:  Plan of Care Reviewed With: patient           Outcome Evaluation: Patient continues to c/o lower leg pain. PRN pain meds given. IV, voiding, ambulating to bathroom. NPO after midnight for surgery. Safety maintained

## 2023-12-10 NOTE — NURSING NOTE
Pt noted to be having significantly more pain today than yesterday. Having difficulty getting lower extremity pain under control. Dilaudid given at this time. Will monitor.

## 2023-12-11 ENCOUNTER — ANESTHESIA EVENT (OUTPATIENT)
Dept: PERIOP | Facility: HOSPITAL | Age: 68
End: 2023-12-11
Payer: MEDICARE

## 2023-12-11 ENCOUNTER — ANESTHESIA (OUTPATIENT)
Dept: PERIOP | Facility: HOSPITAL | Age: 68
End: 2023-12-11
Payer: MEDICARE

## 2023-12-11 ENCOUNTER — APPOINTMENT (OUTPATIENT)
Dept: GENERAL RADIOLOGY | Facility: HOSPITAL | Age: 68
DRG: 460 | End: 2023-12-11
Payer: MEDICARE

## 2023-12-11 PROBLEM — M21.371 FOOT DROP, RIGHT: Status: ACTIVE | Noted: 2023-12-08

## 2023-12-11 PROBLEM — M43.10 ACQUIRED SPONDYLOLISTHESIS: Status: ACTIVE | Noted: 2023-12-08

## 2023-12-11 LAB
ABO GROUP BLD: NORMAL
ANION GAP SERPL CALCULATED.3IONS-SCNC: 7 MMOL/L (ref 5–15)
BLD GP AB SCN SERPL QL: NEGATIVE
BUN SERPL-MCNC: 27 MG/DL (ref 8–23)
BUN/CREAT SERPL: 36.5 (ref 7–25)
CALCIUM SPEC-SCNC: 8.5 MG/DL (ref 8.6–10.5)
CHLORIDE SERPL-SCNC: 107 MMOL/L (ref 98–107)
CO2 SERPL-SCNC: 27 MMOL/L (ref 22–29)
CREAT SERPL-MCNC: 0.74 MG/DL (ref 0.57–1)
DEPRECATED RDW RBC AUTO: 60.4 FL (ref 37–54)
EGFRCR SERPLBLD CKD-EPI 2021: 87.7 ML/MIN/1.73
ERYTHROCYTE [DISTWIDTH] IN BLOOD BY AUTOMATED COUNT: 18.4 % (ref 12.3–15.4)
GLUCOSE SERPL-MCNC: 121 MG/DL (ref 65–99)
HCT VFR BLD AUTO: 34.6 % (ref 34–46.6)
HGB BLD-MCNC: 10.1 G/DL (ref 12–15.9)
INR PPP: 0.95 (ref 0.91–1.09)
MCH RBC QN AUTO: 25.8 PG (ref 26.6–33)
MCHC RBC AUTO-ENTMCNC: 29.2 G/DL (ref 31.5–35.7)
MCV RBC AUTO: 88.3 FL (ref 79–97)
PLATELET # BLD AUTO: 285 10*3/MM3 (ref 140–450)
PMV BLD AUTO: 10 FL (ref 6–12)
POTASSIUM SERPL-SCNC: 4.5 MMOL/L (ref 3.5–5.2)
PROTHROMBIN TIME: 12.7 SECONDS (ref 11.8–14.8)
RBC # BLD AUTO: 3.92 10*6/MM3 (ref 3.77–5.28)
RH BLD: POSITIVE
SODIUM SERPL-SCNC: 141 MMOL/L (ref 136–145)
T&S EXPIRATION DATE: NORMAL
WBC NRBC COR # BLD AUTO: 11.14 10*3/MM3 (ref 3.4–10.8)

## 2023-12-11 PROCEDURE — 85610 PROTHROMBIN TIME: CPT | Performed by: INTERNAL MEDICINE

## 2023-12-11 PROCEDURE — 25810000003 SODIUM CHLORIDE 0.9 % SOLUTION: Performed by: NEUROLOGICAL SURGERY

## 2023-12-11 PROCEDURE — 86850 RBC ANTIBODY SCREEN: CPT | Performed by: ANESTHESIOLOGY

## 2023-12-11 PROCEDURE — 25010000002 DEXAMETHASONE PER 1 MG

## 2023-12-11 PROCEDURE — 25010000002 SUGAMMADEX 200 MG/2ML SOLUTION

## 2023-12-11 PROCEDURE — 25010000002 HEPARIN (PORCINE) PER 1000 UNITS: Performed by: NEUROLOGICAL SURGERY

## 2023-12-11 PROCEDURE — 72100 X-RAY EXAM L-S SPINE 2/3 VWS: CPT

## 2023-12-11 PROCEDURE — 25010000002 BUPIVACAINE 0.25 % SOLUTION: Performed by: NEUROLOGICAL SURGERY

## 2023-12-11 PROCEDURE — 86900 BLOOD TYPING SEROLOGIC ABO: CPT | Performed by: ANESTHESIOLOGY

## 2023-12-11 PROCEDURE — 76000 FLUOROSCOPY <1 HR PHYS/QHP: CPT

## 2023-12-11 PROCEDURE — 0SG00AJ FUSION OF LUMBAR VERTEBRAL JOINT WITH INTERBODY FUSION DEVICE, POSTERIOR APPROACH, ANTERIOR COLUMN, OPEN APPROACH: ICD-10-PCS | Performed by: NEUROLOGICAL SURGERY

## 2023-12-11 PROCEDURE — 25010000002 ONDANSETRON PER 1 MG

## 2023-12-11 PROCEDURE — 25010000002 KETOROLAC TROMETHAMINE PER 15 MG: Performed by: FAMILY MEDICINE

## 2023-12-11 PROCEDURE — 25010000002 DROPERIDOL PER 5 MG: Performed by: ANESTHESIOLOGY

## 2023-12-11 PROCEDURE — C1713 ANCHOR/SCREW BN/BN,TIS/BN: HCPCS | Performed by: NEUROLOGICAL SURGERY

## 2023-12-11 PROCEDURE — 25010000002 FENTANYL CITRATE (PF) 50 MCG/ML SOLUTION: Performed by: ANESTHESIOLOGY

## 2023-12-11 PROCEDURE — 25010000002 VANCOMYCIN 1 G RECONSTITUTED SOLUTION

## 2023-12-11 PROCEDURE — C1769 GUIDE WIRE: HCPCS | Performed by: NEUROLOGICAL SURGERY

## 2023-12-11 PROCEDURE — 22630 ARTHRD PST TQ 1NTRSPC LUM: CPT | Performed by: NEUROLOGICAL SURGERY

## 2023-12-11 PROCEDURE — 22853 INSJ BIOMECHANICAL DEVICE: CPT | Performed by: NEUROLOGICAL SURGERY

## 2023-12-11 PROCEDURE — 25010000002 FENTANYL CITRATE (PF) 250 MCG/5ML SOLUTION

## 2023-12-11 PROCEDURE — 0 HYDROMORPHONE 1 MG/ML SOLUTION: Performed by: FAMILY MEDICINE

## 2023-12-11 PROCEDURE — 25010000002 KETOROLAC TROMETHAMINE PER 15 MG: Performed by: NEUROLOGICAL SURGERY

## 2023-12-11 PROCEDURE — 25010000002 VANCOMYCIN 1 G RECONSTITUTED SOLUTION 1 EACH VIAL: Performed by: NEUROLOGICAL SURGERY

## 2023-12-11 PROCEDURE — 0SB20ZZ EXCISION OF LUMBAR VERTEBRAL DISC, OPEN APPROACH: ICD-10-PCS | Performed by: NEUROLOGICAL SURGERY

## 2023-12-11 PROCEDURE — 25810000003 SODIUM CHLORIDE 0.9 % SOLUTION 250 ML FLEX CONT

## 2023-12-11 PROCEDURE — 86901 BLOOD TYPING SEROLOGIC RH(D): CPT | Performed by: ANESTHESIOLOGY

## 2023-12-11 PROCEDURE — 25810000003 LACTATED RINGERS PER 1000 ML: Performed by: ANESTHESIOLOGY

## 2023-12-11 PROCEDURE — 22840 INSERT SPINE FIXATION DEVICE: CPT | Performed by: NEUROLOGICAL SURGERY

## 2023-12-11 PROCEDURE — 63052 LAM FACETC/FRMT ARTHRD LUM 1: CPT | Performed by: NEUROLOGICAL SURGERY

## 2023-12-11 PROCEDURE — 25010000002 PROPOFOL 10 MG/ML EMULSION

## 2023-12-11 PROCEDURE — 80048 BASIC METABOLIC PNL TOTAL CA: CPT | Performed by: INTERNAL MEDICINE

## 2023-12-11 PROCEDURE — 25010000002 KETOROLAC TROMETHAMINE PER 15 MG: Performed by: ANESTHESIOLOGY

## 2023-12-11 PROCEDURE — 25010000002 HYDROMORPHONE PER 4 MG: Performed by: ANESTHESIOLOGY

## 2023-12-11 PROCEDURE — 25010000002 PHENYLEPHRINE 10 MG/ML SOLUTION 1 ML VIAL

## 2023-12-11 PROCEDURE — 25010000002 KETOROLAC TROMETHAMINE PER 15 MG

## 2023-12-11 PROCEDURE — 85027 COMPLETE CBC AUTOMATED: CPT | Performed by: INTERNAL MEDICINE

## 2023-12-11 RX ORDER — ONDANSETRON 2 MG/ML
4 INJECTION INTRAMUSCULAR; INTRAVENOUS
Status: DISCONTINUED | OUTPATIENT
Start: 2023-12-11 | End: 2023-12-11 | Stop reason: HOSPADM

## 2023-12-11 RX ORDER — ORPHENADRINE CITRATE 100 MG/1
100 TABLET, EXTENDED RELEASE ORAL 2 TIMES DAILY
Status: DISCONTINUED | OUTPATIENT
Start: 2023-12-11 | End: 2023-12-15 | Stop reason: HOSPADM

## 2023-12-11 RX ORDER — DEXAMETHASONE SODIUM PHOSPHATE 4 MG/ML
4 INJECTION, SOLUTION INTRA-ARTICULAR; INTRALESIONAL; INTRAMUSCULAR; INTRAVENOUS; SOFT TISSUE ONCE AS NEEDED
Status: DISCONTINUED | OUTPATIENT
Start: 2023-12-11 | End: 2023-12-11 | Stop reason: HOSPADM

## 2023-12-11 RX ORDER — FLUMAZENIL 0.1 MG/ML
0.2 INJECTION INTRAVENOUS AS NEEDED
Status: DISCONTINUED | OUTPATIENT
Start: 2023-12-11 | End: 2023-12-11 | Stop reason: HOSPADM

## 2023-12-11 RX ORDER — ONDANSETRON 2 MG/ML
INJECTION INTRAMUSCULAR; INTRAVENOUS AS NEEDED
Status: DISCONTINUED | OUTPATIENT
Start: 2023-12-11 | End: 2023-12-11 | Stop reason: SURG

## 2023-12-11 RX ORDER — MAGNESIUM HYDROXIDE 1200 MG/15ML
LIQUID ORAL AS NEEDED
Status: DISCONTINUED | OUTPATIENT
Start: 2023-12-11 | End: 2023-12-11 | Stop reason: HOSPADM

## 2023-12-11 RX ORDER — SODIUM CHLORIDE 0.9 % (FLUSH) 0.9 %
3 SYRINGE (ML) INJECTION EVERY 12 HOURS SCHEDULED
Status: DISCONTINUED | OUTPATIENT
Start: 2023-12-11 | End: 2023-12-11 | Stop reason: HOSPADM

## 2023-12-11 RX ORDER — KETOROLAC TROMETHAMINE 30 MG/ML
INJECTION, SOLUTION INTRAMUSCULAR; INTRAVENOUS AS NEEDED
Status: DISCONTINUED | OUTPATIENT
Start: 2023-12-11 | End: 2023-12-11 | Stop reason: SURG

## 2023-12-11 RX ORDER — SODIUM CHLORIDE 0.9 % (FLUSH) 0.9 %
10 SYRINGE (ML) INJECTION AS NEEDED
Status: DISCONTINUED | OUTPATIENT
Start: 2023-12-11 | End: 2023-12-11 | Stop reason: HOSPADM

## 2023-12-11 RX ORDER — SODIUM CHLORIDE, SODIUM LACTATE, POTASSIUM CHLORIDE, CALCIUM CHLORIDE 600; 310; 30; 20 MG/100ML; MG/100ML; MG/100ML; MG/100ML
1000 INJECTION, SOLUTION INTRAVENOUS CONTINUOUS
Status: DISCONTINUED | OUTPATIENT
Start: 2023-12-11 | End: 2023-12-11 | Stop reason: HOSPADM

## 2023-12-11 RX ORDER — SCOLOPAMINE TRANSDERMAL SYSTEM 1 MG/1
1 PATCH, EXTENDED RELEASE TRANSDERMAL ONCE
Status: DISCONTINUED | OUTPATIENT
Start: 2023-12-11 | End: 2023-12-11

## 2023-12-11 RX ORDER — HYDROCODONE BITARTRATE AND ACETAMINOPHEN 10; 325 MG/1; MG/1
1 TABLET ORAL ONCE AS NEEDED
Status: COMPLETED | OUTPATIENT
Start: 2023-12-11 | End: 2023-12-11

## 2023-12-11 RX ORDER — FENTANYL CITRATE 50 UG/ML
25 INJECTION, SOLUTION INTRAMUSCULAR; INTRAVENOUS
Status: COMPLETED | OUTPATIENT
Start: 2023-12-11 | End: 2023-12-11

## 2023-12-11 RX ORDER — FAMOTIDINE 10 MG/ML
20 INJECTION, SOLUTION INTRAVENOUS
Status: COMPLETED | OUTPATIENT
Start: 2023-12-11 | End: 2023-12-11

## 2023-12-11 RX ORDER — SODIUM CHLORIDE 9 MG/ML
40 INJECTION, SOLUTION INTRAVENOUS AS NEEDED
Status: DISCONTINUED | OUTPATIENT
Start: 2023-12-11 | End: 2023-12-11 | Stop reason: HOSPADM

## 2023-12-11 RX ORDER — KETOROLAC TROMETHAMINE 30 MG/ML
15 INJECTION, SOLUTION INTRAMUSCULAR; INTRAVENOUS ONCE
Status: COMPLETED | OUTPATIENT
Start: 2023-12-11 | End: 2023-12-11

## 2023-12-11 RX ORDER — HYDROMORPHONE HYDROCHLORIDE 1 MG/ML
0.2 INJECTION, SOLUTION INTRAMUSCULAR; INTRAVENOUS; SUBCUTANEOUS
Status: COMPLETED | OUTPATIENT
Start: 2023-12-11 | End: 2023-12-11

## 2023-12-11 RX ORDER — LIDOCAINE HYDROCHLORIDE 20 MG/ML
INJECTION, SOLUTION EPIDURAL; INFILTRATION; INTRACAUDAL; PERINEURAL AS NEEDED
Status: DISCONTINUED | OUTPATIENT
Start: 2023-12-11 | End: 2023-12-11 | Stop reason: SURG

## 2023-12-11 RX ORDER — IBUPROFEN 600 MG/1
600 TABLET ORAL ONCE AS NEEDED
Status: DISCONTINUED | OUTPATIENT
Start: 2023-12-11 | End: 2023-12-11 | Stop reason: HOSPADM

## 2023-12-11 RX ORDER — BUPIVACAINE HYDROCHLORIDE 2.5 MG/ML
INJECTION, SOLUTION INFILTRATION; PERINEURAL AS NEEDED
Status: DISCONTINUED | OUTPATIENT
Start: 2023-12-11 | End: 2023-12-11 | Stop reason: HOSPADM

## 2023-12-11 RX ORDER — ACETAMINOPHEN 500 MG
1000 TABLET ORAL ONCE
Status: DISCONTINUED | OUTPATIENT
Start: 2023-12-11 | End: 2023-12-11 | Stop reason: HOSPADM

## 2023-12-11 RX ORDER — BUPIVACAINE HCL/0.9 % NACL/PF 0.125 %
PLASTIC BAG, INJECTION (ML) EPIDURAL AS NEEDED
Status: DISCONTINUED | OUTPATIENT
Start: 2023-12-11 | End: 2023-12-11 | Stop reason: SURG

## 2023-12-11 RX ORDER — DROPERIDOL 2.5 MG/ML
0.62 INJECTION, SOLUTION INTRAMUSCULAR; INTRAVENOUS ONCE AS NEEDED
Status: COMPLETED | OUTPATIENT
Start: 2023-12-11 | End: 2023-12-11

## 2023-12-11 RX ORDER — PROPOFOL 10 MG/ML
VIAL (ML) INTRAVENOUS AS NEEDED
Status: DISCONTINUED | OUTPATIENT
Start: 2023-12-11 | End: 2023-12-11 | Stop reason: SURG

## 2023-12-11 RX ORDER — LABETALOL HYDROCHLORIDE 5 MG/ML
5 INJECTION, SOLUTION INTRAVENOUS
Status: DISCONTINUED | OUTPATIENT
Start: 2023-12-11 | End: 2023-12-11 | Stop reason: HOSPADM

## 2023-12-11 RX ORDER — NALOXONE HCL 0.4 MG/ML
0.04 VIAL (ML) INJECTION AS NEEDED
Status: DISCONTINUED | OUTPATIENT
Start: 2023-12-11 | End: 2023-12-11 | Stop reason: HOSPADM

## 2023-12-11 RX ORDER — SODIUM CHLORIDE, SODIUM LACTATE, POTASSIUM CHLORIDE, CALCIUM CHLORIDE 600; 310; 30; 20 MG/100ML; MG/100ML; MG/100ML; MG/100ML
100 INJECTION, SOLUTION INTRAVENOUS CONTINUOUS
Status: DISCONTINUED | OUTPATIENT
Start: 2023-12-11 | End: 2023-12-11 | Stop reason: HOSPADM

## 2023-12-11 RX ORDER — MELATONIN
1000 DAILY
Status: DISCONTINUED | OUTPATIENT
Start: 2023-12-11 | End: 2023-12-15 | Stop reason: HOSPADM

## 2023-12-11 RX ORDER — SODIUM CHLORIDE 0.9 % (FLUSH) 0.9 %
3-10 SYRINGE (ML) INJECTION AS NEEDED
Status: DISCONTINUED | OUTPATIENT
Start: 2023-12-11 | End: 2023-12-11 | Stop reason: HOSPADM

## 2023-12-11 RX ORDER — FENTANYL CITRATE 50 UG/ML
INJECTION, SOLUTION INTRAMUSCULAR; INTRAVENOUS AS NEEDED
Status: DISCONTINUED | OUTPATIENT
Start: 2023-12-11 | End: 2023-12-11 | Stop reason: SURG

## 2023-12-11 RX ORDER — DEXAMETHASONE SODIUM PHOSPHATE 4 MG/ML
INJECTION, SOLUTION INTRA-ARTICULAR; INTRALESIONAL; INTRAMUSCULAR; INTRAVENOUS; SOFT TISSUE AS NEEDED
Status: DISCONTINUED | OUTPATIENT
Start: 2023-12-11 | End: 2023-12-11 | Stop reason: SURG

## 2023-12-11 RX ORDER — VANCOMYCIN HYDROCHLORIDE 1 G/20ML
INJECTION, POWDER, LYOPHILIZED, FOR SOLUTION INTRAVENOUS AS NEEDED
Status: DISCONTINUED | OUTPATIENT
Start: 2023-12-11 | End: 2023-12-11 | Stop reason: SURG

## 2023-12-11 RX ORDER — SUCCINYLCHOLINE/SOD CL,ISO/PF 200MG/10ML
SYRINGE (ML) INTRAVENOUS AS NEEDED
Status: DISCONTINUED | OUTPATIENT
Start: 2023-12-11 | End: 2023-12-11 | Stop reason: SURG

## 2023-12-11 RX ADMIN — SUGAMMADEX 200 MG: 100 INJECTION, SOLUTION INTRAVENOUS at 12:31

## 2023-12-11 RX ADMIN — Medication 1000 UNITS: at 21:22

## 2023-12-11 RX ADMIN — PROPOFOL 150 MG: 10 INJECTION, EMULSION INTRAVENOUS at 12:33

## 2023-12-11 RX ADMIN — SODIUM CHLORIDE 75 ML/HR: 9 INJECTION, SOLUTION INTRAVENOUS at 09:54

## 2023-12-11 RX ADMIN — HYDROMORPHONE HYDROCHLORIDE 0.2 MG: 1 INJECTION, SOLUTION INTRAMUSCULAR; INTRAVENOUS; SUBCUTANEOUS at 16:00

## 2023-12-11 RX ADMIN — DROPERIDOL 0.62 MG: 2.5 INJECTION, SOLUTION INTRAMUSCULAR; INTRAVENOUS at 14:45

## 2023-12-11 RX ADMIN — HYDROMORPHONE HYDROCHLORIDE 0.2 MG: 1 INJECTION, SOLUTION INTRAMUSCULAR; INTRAVENOUS; SUBCUTANEOUS at 15:50

## 2023-12-11 RX ADMIN — HYDROMORPHONE HYDROCHLORIDE 1 MG: 1 INJECTION, SOLUTION INTRAMUSCULAR; INTRAVENOUS; SUBCUTANEOUS at 10:23

## 2023-12-11 RX ADMIN — ATORVASTATIN CALCIUM 20 MG: 10 TABLET, FILM COATED ORAL at 21:23

## 2023-12-11 RX ADMIN — DOCUSATE SODIUM 50 MG AND SENNOSIDES 8.6 MG 2 TABLET: 8.6; 5 TABLET, FILM COATED ORAL at 21:22

## 2023-12-11 RX ADMIN — LIDOCAINE 2 PATCH: 700 PATCH TOPICAL at 21:22

## 2023-12-11 RX ADMIN — FENTANYL CITRATE 100 MCG: 50 INJECTION, SOLUTION INTRAMUSCULAR; INTRAVENOUS at 12:26

## 2023-12-11 RX ADMIN — FENTANYL CITRATE 25 MCG: 50 INJECTION, SOLUTION INTRAMUSCULAR; INTRAVENOUS at 15:00

## 2023-12-11 RX ADMIN — OXYCODONE HYDROCHLORIDE AND ACETAMINOPHEN 1 TABLET: 5; 325 TABLET ORAL at 00:02

## 2023-12-11 RX ADMIN — FENTANYL CITRATE 25 MCG: 50 INJECTION, SOLUTION INTRAMUSCULAR; INTRAVENOUS at 15:53

## 2023-12-11 RX ADMIN — Medication 100 MCG: at 12:31

## 2023-12-11 RX ADMIN — HYDROCODONE BITARTRATE AND ACETAMINOPHEN 1 TABLET: 10; 325 TABLET ORAL at 15:25

## 2023-12-11 RX ADMIN — PREGABALIN 50 MG: 50 CAPSULE ORAL at 21:22

## 2023-12-11 RX ADMIN — Medication 30 MG: at 12:18

## 2023-12-11 RX ADMIN — LIDOCAINE HYDROCHLORIDE 100 MG: 20 INJECTION, SOLUTION EPIDURAL; INFILTRATION; INTRACAUDAL; PERINEURAL at 12:18

## 2023-12-11 RX ADMIN — KETOROLAC TROMETHAMINE 30 MG: 30 INJECTION, SOLUTION INTRAMUSCULAR; INTRAVENOUS at 13:39

## 2023-12-11 RX ADMIN — DEXAMETHASONE SODIUM PHOSPHATE 4 MG: 4 INJECTION, SOLUTION INTRAMUSCULAR; INTRAVENOUS at 12:18

## 2023-12-11 RX ADMIN — FENTANYL CITRATE 25 MCG: 50 INJECTION, SOLUTION INTRAMUSCULAR; INTRAVENOUS at 16:05

## 2023-12-11 RX ADMIN — SCOPALAMINE 1 PATCH: 1 PATCH, EXTENDED RELEASE TRANSDERMAL at 12:03

## 2023-12-11 RX ADMIN — Medication 200 MCG: at 12:44

## 2023-12-11 RX ADMIN — HYDROMORPHONE HYDROCHLORIDE 1 MG: 1 INJECTION, SOLUTION INTRAMUSCULAR; INTRAVENOUS; SUBCUTANEOUS at 03:36

## 2023-12-11 RX ADMIN — SODIUM CHLORIDE, POTASSIUM CHLORIDE, SODIUM LACTATE AND CALCIUM CHLORIDE 1000 ML: 600; 310; 30; 20 INJECTION, SOLUTION INTRAVENOUS at 11:56

## 2023-12-11 RX ADMIN — FENTANYL CITRATE 50 MCG: 50 INJECTION, SOLUTION INTRAMUSCULAR; INTRAVENOUS at 12:22

## 2023-12-11 RX ADMIN — KETOROLAC TROMETHAMINE 15 MG: 15 INJECTION, SOLUTION INTRAMUSCULAR; INTRAVENOUS at 09:49

## 2023-12-11 RX ADMIN — VANCOMYCIN HYDROCHLORIDE 1 G: 1 INJECTION, POWDER, LYOPHILIZED, FOR SOLUTION INTRAVENOUS at 12:24

## 2023-12-11 RX ADMIN — ORPHENADRINE CITRATE 100 MG: 100 TABLET, EXTENDED RELEASE ORAL at 21:22

## 2023-12-11 RX ADMIN — SODIUM CHLORIDE, POTASSIUM CHLORIDE, SODIUM LACTATE AND CALCIUM CHLORIDE 100 ML/HR: 600; 310; 30; 20 INJECTION, SOLUTION INTRAVENOUS at 11:55

## 2023-12-11 RX ADMIN — HYDROMORPHONE HYDROCHLORIDE 0.2 MG: 1 INJECTION, SOLUTION INTRAMUSCULAR; INTRAVENOUS; SUBCUTANEOUS at 14:40

## 2023-12-11 RX ADMIN — KETOROLAC TROMETHAMINE 15 MG: 30 INJECTION, SOLUTION INTRAMUSCULAR; INTRAVENOUS at 16:45

## 2023-12-11 RX ADMIN — ONDANSETRON 4 MG: 2 INJECTION INTRAMUSCULAR; INTRAVENOUS at 14:01

## 2023-12-11 RX ADMIN — HYDROMORPHONE HYDROCHLORIDE 0.2 MG: 1 INJECTION, SOLUTION INTRAMUSCULAR; INTRAVENOUS; SUBCUTANEOUS at 14:55

## 2023-12-11 RX ADMIN — PHENYLEPHRINE HYDROCHLORIDE 0.41 MCG/KG/MIN: 10 INJECTION INTRAVENOUS at 12:44

## 2023-12-11 RX ADMIN — PREGABALIN 50 MG: 50 CAPSULE ORAL at 05:10

## 2023-12-11 RX ADMIN — KETOROLAC TROMETHAMINE 15 MG: 15 INJECTION, SOLUTION INTRAMUSCULAR; INTRAVENOUS at 23:14

## 2023-12-11 RX ADMIN — SODIUM CHLORIDE, POTASSIUM CHLORIDE, SODIUM LACTATE AND CALCIUM CHLORIDE: 600; 310; 30; 20 INJECTION, SOLUTION INTRAVENOUS at 14:04

## 2023-12-11 RX ADMIN — FAMOTIDINE 20 MG: 10 INJECTION INTRAVENOUS at 12:03

## 2023-12-11 RX ADMIN — FENTANYL CITRATE 25 MCG: 50 INJECTION, SOLUTION INTRAMUSCULAR; INTRAVENOUS at 14:50

## 2023-12-11 RX ADMIN — Medication 10 ML: at 21:23

## 2023-12-11 RX ADMIN — FENTANYL CITRATE 100 MCG: 50 INJECTION, SOLUTION INTRAMUSCULAR; INTRAVENOUS at 12:18

## 2023-12-11 RX ADMIN — Medication 100 MCG: at 12:27

## 2023-12-11 RX ADMIN — HEPARIN SODIUM 5000 UNITS: 5000 INJECTION INTRAVENOUS; SUBCUTANEOUS at 21:22

## 2023-12-11 RX ADMIN — Medication 200 MCG: at 12:35

## 2023-12-11 RX ADMIN — OXYCODONE HYDROCHLORIDE AND ACETAMINOPHEN 1 TABLET: 5; 325 TABLET ORAL at 18:44

## 2023-12-11 NOTE — PROGRESS NOTES
"    [facility] Medicine Services  INPATIENT PROGRESS NOTE    Patient Name: Sedrick Leahy  Date of Admission: 12/8/2023  Today's Date: 12/11/23  Length of Stay: 3  Primary Care Physician: Kiran Madden MD    Subjective   Chief Complaint: Back pain  HPI   Patient reports that she is having much worsening pain this morning.  It sounds like that she lost an IV that had to be replaced.  She feels like that she got \"behind\" regarding her pain control, and symptoms got much more exacerbated early this morning.  She just had a new IV placed, and has received some pain medication, but still is very tearful.  Reports pain across her lower back, radiating down into her right lower extremity.    Review of Systems   All pertinent negatives and positives are as above. All other systems have been reviewed and are negative unless otherwise stated.     Objective    Temp:  [96.7 °F (35.9 °C)-98 °F (36.7 °C)] 96.7 °F (35.9 °C)  Heart Rate:  [76-83] 81  Resp:  [16] 16  BP: (102-141)/(42-86) 102/45  Physical Exam  Vitals reviewed.   Constitutional:       General: She is not in acute distress.     Appearance: She is ill-appearing.      Comments: Tearful this morning; uncomfortable and in pain   HENT:      Head: Normocephalic.      Mouth/Throat:      Mouth: Mucous membranes are moist.   Pulmonary:      Effort: Pulmonary effort is normal. No respiratory distress.   Musculoskeletal:         General: No deformity.   Skin:     General: Skin is warm.   Neurological:      Mental Status: She is alert and oriented to person, place, and time.      Sensory: Sensory deficit (she reports more dullness to touch RLE as compared to LLE) present.      Motor: Weakness present.   Psychiatric:      Comments: tearful         Results Review:  I have reviewed the labs, radiology results, and diagnostic studies.    Laboratory Data:   Results from last 7 days   Lab Units 12/11/23  0329 12/09/23  0420 12/08/23  0450   WBC " 10*3/mm3 11.14* 8.15 10.07   HEMOGLOBIN g/dL 10.1* 11.0* 10.4*   HEMATOCRIT % 34.6 35.3 35.0   PLATELETS 10*3/mm3 285 311 310        Results from last 7 days   Lab Units 12/11/23  0329 12/09/23  0420 12/08/23  0450   SODIUM mmol/L 141 138 143   POTASSIUM mmol/L 4.5 4.5 3.8   CHLORIDE mmol/L 107 104 106   CO2 mmol/L 27.0 23.0 27.0   BUN mg/dL 27* 21 21   CREATININE mg/dL 0.74 0.63 0.70   CALCIUM mg/dL 8.5* 8.8 9.4   BILIRUBIN mg/dL  --   --  0.2   ALK PHOS U/L  --   --  105   ALT (SGPT) U/L  --   --  21   AST (SGOT) U/L  --   --  26   GLUCOSE mg/dL 121* 156* 106*       Culture Data:   Urine Culture   Date Value Ref Range Status   12/08/2023 No growth  Final       Radiology Data:   Imaging Results (Last 24 Hours)       ** No results found for the last 24 hours. **            I have reviewed the patient's current medications.     Assessment/Plan   Assessment  Active Hospital Problems    Diagnosis     **Intractable low back pain     Acquired spondylolisthesis     Foot drop, right     Lumbar radiculopathy, acute        Treatment Plan  NPO   To OR with Dr. Estes with NSGY this afternoon  Pain control (IV Dilaudid; PO Percocet; IV Toradol; Lidoderm patch)  Bowel regimen  Labs reviewed; stable  Heparin PPx  Patient received Dexamethasone X 3 doses  PT and OT will be needed post-op  Admission UA noted; urine culture with no growth.  Patient denies any dysuria or other urinary symptoms.    Medical Decision Making  Number and Complexity of problems: High complexity with intractable pain symptoms due to L4/5 spondylolisthesis requiring IV/parenteral medication for control of symptoms      Conditions and Status        Condition is unchanged.     [facility] Data  External documents reviewed: none  Cardiac tracing (EKG, telemetry) interpretation: no new EKGs  Radiology interpretation: no new radiology studies  Labs reviewed: as above  Any tests that were considered but not ordered: none     Decision rules/scores evaluated (example  [id number], Wells, etc): none     Discussed with: Discussed with patient, spouse, and bedside nurse, [place]     Care Planning  Shared decision making: Discussed with patient with agreement to proceed with treatment plan as outlined  Code status and discussions: Full code    Disposition  Social Determinants of Health that impact treatment or disposition: None apparent at this time  I expect the patient to be discharged to home > 2 days; planning for OR with NSGY this afternoon    Electronically signed by Denzel Whitman MD, 12/11/23, 10:09 CST.

## 2023-12-11 NOTE — BRIEF OP NOTE
LUMBAR LAMINECTOMY TRANSFORAMINAL LUMBAR INTERBODY FUSION L4/5 Operative report    Sedrick Leahy  12/11/2023    Pre-op Diagnosis:   Acquired spondylolisthesis [M43.10]  Foot drop, right [M21.371]       Post-Op Diagnosis Codes:     * Acquired spondylolisthesis [M43.10]     * Foot drop, right [M21.371]    Procedure/CPT® Codes:        Procedure(s):  LUMBAR LAMINECTOMY TRANSFORAMINAL LUMBAR INTERBODY FUSION L4/5              Surgeon(s):  Rick Estes,     Anesthesia: General    Staff:   Circulator: Gregory Wiley RN  Scrub Person: Carmelo Estrada  Vendor Representative: Paola Tejada Michael  Assistant: April Rees  Assistant: April Rees      Estimated Blood Loss: minimal    Urine Voided: * No values recorded between 12/11/2023 12:14 PM and 12/11/2023  2:23 PM *    Specimens:                None          Drains: * No LDAs found *    Findings: L4/5 spondylolisthesis        Complications: None    Assistant: April Rees  was responsible for performing the following activities: Retraction, Suction, and Irrigation and their skilled assistance was necessary for the success of this case.    Rick Estes DO     Date: 12/11/2023  Time: 15:53 CST

## 2023-12-11 NOTE — ANESTHESIA PROCEDURE NOTES
Airway  Urgency: elective    Date/Time: 12/11/2023 12:19 PM  Airway not difficult    General Information and Staff    Patient location during procedure: OR  CRNA/CAA: Gregory Arango CRNA    Indications and Patient Condition    Preoxygenated: yes  Mask difficulty assessment: 1 - vent by mask    Final Airway Details  Final airway type: endotracheal airway      Successful airway: ETT  Cuffed: yes   Successful intubation technique: direct laryngoscopy  Endotracheal tube insertion site: oral  Blade: Gomez  Blade size: 3  ETT size (mm): 7.5  Cormack-Lehane Classification: grade I - full view of glottis  Placement verified by: chest auscultation   Cuff volume (mL): 5  Measured from: lips  ETT/EBT  to lips (cm): 21  Number of attempts at approach: 1  Assessment: lips, teeth, and gum same as pre-op and atraumatic intubation

## 2023-12-11 NOTE — ANESTHESIA POSTPROCEDURE EVALUATION
"Patient: Sedrick Leahy    Procedure Summary       Date: 12/11/23 Room / Location:  [facility] OR  /  [facility] OR    Anesthesia Start: 1214 Anesthesia Stop: 1427    Procedure: LUMBAR LAMINECTOMY TRANSFORAMINAL LUMBAR INTERBODY FUSION L4/5 (Bilateral: Spine Lumbar) Diagnosis:       Acquired spondylolisthesis      Foot drop, right      (Acquired spondylolisthesis [M43.10])      (Foot drop, right [M21.371])    Surgeons: Rick Estes DO Provider: Gregory Arango CRNA    Anesthesia Type: general ASA Status: 3            Anesthesia Type: general    Vitals  Vitals Value Taken Time   /91 12/11/23 1711   Temp 97.7 °F (36.5 °C) 12/11/23 1423   Pulse 96 12/11/23 1718   Resp 12 12/11/23 1640   SpO2 97 % 12/11/23 1718   Vitals shown include unfiled device data.        Post Anesthesia Care and Evaluation    Patient location during evaluation: PACU  Patient participation: complete - patient participated  Level of consciousness: awake and awake and alert  Pain score: 0  Pain management: adequate    Airway patency: patent  Anesthetic complications: No anesthetic complications  PONV Status: none  Cardiovascular status: acceptable  Respiratory status: acceptable  Hydration status: acceptable    Comments: Patient discharged according to acceptable Olga score per RN assessment. See nursing records for further information.     Blood pressure 142/64, pulse 96, temperature 97.7 °F (36.5 °C), temperature source Temporal, resp. rate 12, height 162.6 cm (64\"), weight 81.2 kg (179 lb), SpO2 99%, not currently breastfeeding.      "

## 2023-12-11 NOTE — PLAN OF CARE
Problem: Adult Inpatient Plan of Care  Goal: Plan of Care Review  Outcome: Ongoing, Progressing  Flowsheets (Taken 12/11/2023 6636)  Progress: no change  Plan of Care Reviewed With: patient  Outcome Evaluation: Pt complains of pain, see MAR. Up x1 assist. Voiding. NPO since midnight. IVF. Safety maintained.

## 2023-12-11 NOTE — PLAN OF CARE
Goal Outcome Evaluation:  Plan of Care Reviewed With: patient, family           Outcome Evaluation: Patient sent to surgery for. Lower lumbar dressings with scant amount of drainage. Neros intact. IV, DTV. Patient currently resting. Safety maintained

## 2023-12-11 NOTE — ANESTHESIA PREPROCEDURE EVALUATION
Anesthesia Evaluation     history of anesthetic complications:  PONV  NPO Solid Status: > 8 hours  NPO Liquid Status: > 8 hours           Airway   Mallampati: II  No difficulty expected  Dental      Pulmonary    Cardiovascular   Exercise tolerance: good (4-7 METS)    (+) hypertension, valvular problems/murmurs murmur, hyperlipidemia    ROS comment: 7/21/23  ·  Left ventricular systolic function is normal. Left ventricular ejection fraction appears to be 56 - 60%.  ·  Normal right ventricular cavity size and systolic function noted.  ·  No significant valvular abnormalities identified on this study.         Neuro/Psych  (+) numbness, psychiatric history Anxiety and Depression  (-) seizures, TIA, CVA  GI/Hepatic/Renal/Endo    (+) obesity, GI bleeding , diabetes mellitus (borderline) type 2  (-) liver disease, no renal disease    Musculoskeletal     (+) neck pain  Abdominal    Substance History      OB/GYN          Other   arthritis,                   Anesthesia Plan    ASA 3     general     intravenous induction     Anesthetic plan, risks, benefits, and alternatives have been provided, discussed and informed consent has been obtained with: patient.    CODE STATUS:    Level Of Support Discussed With: Patient  Code Status (Patient has no pulse and is not breathing): CPR (Attempt to Resuscitate)  Medical Interventions (Patient has pulse or is breathing): Full Support       Physical Therapy: Daily Note   Patient: Toshia Born (89 y.o. female)   Examination Date:   Plan of Care/Certification Expiration Date: 11/10/22    No data recorded   :  1977 # of Visits since [facility]:   2   MRN: [id number]  CSN: [id number] Start of Care Date:   10/10/2022   Insurance: Payor: [employer] / Plan: [employer] LANG - EXCHANGE / Product Type: *No Product type* /   Insurance ID: [id number] - (Commercial) Secondary Insurance (if applicable):    Referring Physician: Ethan Patterson MD     PCP: Qasim Miller MD Visits to Date/Visits Approved:     No Show/Cancelled Appts:   /       Medical Diagnosis: Pathological fracture, unspecified humerus, initial encounter for fracture [P69.369K]    Treatment Diagnosis: s/p ORIF right humerus due to 2 pathological fxs (dx with lymphoma)        SUBJECTIVE EXAMINATION   Pain Level: Pain Screening  Patient Currently in Pain: Yes  Pain Assessment: 0-10  Pain Level: 3  Pain Location: Arm  Pain Orientation: Right    Patient Comments: Subjective: Pt arrives to therapy stating she is doing radiation daily to fight her cancer. She states her R shoulder is sore rated 3/10 today. HEP Compliance: Fair   Educated pt on stretching daily. TREATMENT     Exercises:      Treatment Reasoning    Exercise 1: Wall slides for flexion and scaption x 5 H10  Exercise 2: Standing shoulder flexion 0# x 10 R UE  Exercise 3: Standing shoulder scaption 0# x 10 RUE  Exercise 4: pulleys flexion and abduction x 10 H5'', attempted IR but too painful and thus deferred  Exercise 5: wall slides RUE flexion with eccentric abduction H5'' x 5  Exercise 6: capital D's x 10  Exercise 7: table slides flexion H5'' x 10                          Manual Therapy: (CPT 52840) Treatment Reasoning     Joint Mobilization: PROM to RUE flexion, abduction and ER. limited by pain and needed gentle oscillations to promote relaxation.  R scap mobs with focus on upward rotation to inc ROM Pt Education: Additional Comments: *Pt dx with Lymphoma in Aug 2022 and is currently undergoing chemo and radiation* NO ESTIM OR US; Okay KT tape, CP, HP       ASSESSMENT     Assessment: Assessment: Pt tolerating wall slides and table slides this date but limited by fatigue needing frequent RB's. PROM to RUE to inc ROM but limited by pain needing frequent oscillations and very gentle slow motions in order to tolerate. Donned CP post. Continue with POC. Body Structures, Functions, Activity Limitations Requiring Skilled Therapeutic Intervention: Decreased functional mobility , Decreased ROM, Decreased strength, Decreased endurance, Increased pain    Post-Treatment Pain Level: Activity Tolerance: Patient limited by endurance;  Patient limited by pain    Therapy Prognosis: Good       GOALS   Patient Goals : Be able to use her right arm overhead  Short Term Goals Completed by 1-2 weeks from date of evaluation Current Status Goal Status   Pt reports having 3/10 or less right shoulder pain with ADLS   New   Pt reports able to complete her HEP 3-5x per week   New                                                                       Long Term Goals Completed by 4-6 weeks from date of evaluation on 10/10/22 Current Status Goal Status   Pt reports having 2/10 or less R UE pain while performing ADLS and work duties   New   Increase AROM of right shoulder to atolytn=549 degrees, abduction =140 degrees and IR= able to reach to her toleraance for improved function   New   Increase RUE strength to 4+/5 or >so that pt can perform overhead activities without increased pain   New   Improve her Spadi (Shoulder Pain & Disability Index) from 34% disability on eval to <10% by time of DC   New   Pt indep with an advanced HEP   New                                                TREATMENT PLAN   Plan Frequency: 1-2x per week  Plan weeks: 4-6 weeks  Current Treatment Recommendations: Strengthening, ROM, Functional mobility training, Manual Therapy - Soft Tissue Mobilization, Pain management, Home exercise program, Safety education & training, Modalities   Additional Comments: *Pt dx with Lymphoma in Aug 2022 and is currently undergoing chemo and radiation* NO ESTIM OR US; Okay KT tape, CP, HP       Therapy Time  Individual Time In:       1330  Individual Time Out:  1706  Minutes:  45        Electronically signed by Yony Mariscal PTA  on 10/13/2022 at 2:26 PM   POC NOTE

## 2023-12-12 PROCEDURE — 99024 POSTOP FOLLOW-UP VISIT: CPT | Performed by: PHYSICIAN ASSISTANT

## 2023-12-12 PROCEDURE — 25010000002 HEPARIN (PORCINE) PER 1000 UNITS: Performed by: NEUROLOGICAL SURGERY

## 2023-12-12 PROCEDURE — 97162 PT EVAL MOD COMPLEX 30 MIN: CPT

## 2023-12-12 PROCEDURE — 25010000002 KETOROLAC TROMETHAMINE PER 15 MG: Performed by: PHYSICIAN ASSISTANT

## 2023-12-12 PROCEDURE — 97535 SELF CARE MNGMENT TRAINING: CPT

## 2023-12-12 PROCEDURE — 97116 GAIT TRAINING THERAPY: CPT

## 2023-12-12 PROCEDURE — 97166 OT EVAL MOD COMPLEX 45 MIN: CPT

## 2023-12-12 PROCEDURE — 25010000002 KETOROLAC TROMETHAMINE PER 15 MG: Performed by: NEUROLOGICAL SURGERY

## 2023-12-12 RX ORDER — KETOROLAC TROMETHAMINE 30 MG/ML
30 INJECTION, SOLUTION INTRAMUSCULAR; INTRAVENOUS 4 TIMES DAILY
Status: COMPLETED | OUTPATIENT
Start: 2023-12-12 | End: 2023-12-12

## 2023-12-12 RX ADMIN — ORPHENADRINE CITRATE 100 MG: 100 TABLET, EXTENDED RELEASE ORAL at 21:56

## 2023-12-12 RX ADMIN — ATORVASTATIN CALCIUM 20 MG: 10 TABLET, FILM COATED ORAL at 21:56

## 2023-12-12 RX ADMIN — KETOROLAC TROMETHAMINE 30 MG: 30 INJECTION, SOLUTION INTRAMUSCULAR; INTRAVENOUS at 12:08

## 2023-12-12 RX ADMIN — KETOROLAC TROMETHAMINE 30 MG: 30 INJECTION, SOLUTION INTRAMUSCULAR; INTRAVENOUS at 17:28

## 2023-12-12 RX ADMIN — PREGABALIN 50 MG: 50 CAPSULE ORAL at 21:56

## 2023-12-12 RX ADMIN — HEPARIN SODIUM 5000 UNITS: 5000 INJECTION INTRAVENOUS; SUBCUTANEOUS at 06:39

## 2023-12-12 RX ADMIN — ORPHENADRINE CITRATE 100 MG: 100 TABLET, EXTENDED RELEASE ORAL at 09:12

## 2023-12-12 RX ADMIN — OXYCODONE HYDROCHLORIDE AND ACETAMINOPHEN 1 TABLET: 5; 325 TABLET ORAL at 14:28

## 2023-12-12 RX ADMIN — OXYCODONE HYDROCHLORIDE AND ACETAMINOPHEN 1 TABLET: 5; 325 TABLET ORAL at 07:59

## 2023-12-12 RX ADMIN — DOCUSATE SODIUM 50 MG AND SENNOSIDES 8.6 MG 2 TABLET: 8.6; 5 TABLET, FILM COATED ORAL at 21:56

## 2023-12-12 RX ADMIN — Medication 1000 UNITS: at 09:13

## 2023-12-12 RX ADMIN — FAMOTIDINE 40 MG: 20 TABLET, FILM COATED ORAL at 09:13

## 2023-12-12 RX ADMIN — Medication 10 ML: at 09:12

## 2023-12-12 RX ADMIN — PREGABALIN 50 MG: 50 CAPSULE ORAL at 06:39

## 2023-12-12 RX ADMIN — HEPARIN SODIUM 5000 UNITS: 5000 INJECTION INTRAVENOUS; SUBCUTANEOUS at 14:27

## 2023-12-12 RX ADMIN — KETOROLAC TROMETHAMINE 15 MG: 15 INJECTION, SOLUTION INTRAMUSCULAR; INTRAVENOUS at 06:39

## 2023-12-12 RX ADMIN — DOCUSATE SODIUM 50 MG AND SENNOSIDES 8.6 MG 2 TABLET: 8.6; 5 TABLET, FILM COATED ORAL at 09:13

## 2023-12-12 RX ADMIN — Medication 10 ML: at 21:56

## 2023-12-12 RX ADMIN — HEPARIN SODIUM 5000 UNITS: 5000 INJECTION INTRAVENOUS; SUBCUTANEOUS at 21:56

## 2023-12-12 RX ADMIN — PREGABALIN 50 MG: 50 CAPSULE ORAL at 14:26

## 2023-12-12 RX ADMIN — LIDOCAINE 2 PATCH: 700 PATCH TOPICAL at 21:56

## 2023-12-12 RX ADMIN — KETOROLAC TROMETHAMINE 30 MG: 30 INJECTION, SOLUTION INTRAMUSCULAR; INTRAVENOUS at 21:56

## 2023-12-12 NOTE — PLAN OF CARE
Goal Outcome Evaluation:  Plan of Care Reviewed With: patient, spouse        Progress: no change  Outcome Evaluation: OT eval completed. Pt presents A&Ox4 with c/o proximal B LE and low back pain, but willing to participate. Pt educated on spinal precautions and LSO wear. Pt fit for LSO. Pt came to EOB sitting using log roll tech with Min A for rolling and Mod A x1-2 for side-lying>sit. While seated EOB, pt required B UE support and CGA d/t c/o increased pain. Pt required Max A for donning LSO, Dep A for adjusting socks at EOB. Pt completed sit<>stand t/f with CGA/Min A x2, HHAx2, and cues for tech. Pt completed short distance fxl mobility in room with CGA/Min A x2 and HHAx2. Pt demo occasional R knee buckling with increased pain reported. Pt educated on tech to decrease pain and pressure relief while seated in chair. Pt pain biggest barrier to occupational performance at this time. Skilled OT warranted to provide education and address pain, balance, activity tolerance, trunk control, sensation, and strength in order to increase pt safety and I during ADLs, fxl mobility, and fxl t/f's. Pt's spouse reports upcoming back sx, anticipate difficulty assisting pt at this time. Recommend short term rehab placement at D/C pending progress.      Anticipated Discharge Disposition (OT): sub acute care setting, skilled nursing facility

## 2023-12-12 NOTE — PROGRESS NOTES
"Sedrick Leahy  68 y.o.      Chief complaint:   Back pain    Subjective:  Symptoms:  Stable.  No shortness of breath or chest pain.    Diet:  No nausea or vomiting.    Activity level: Activity impairment: Has not yet been out of bed.    Pain:  She complains of pain that is moderate.  She reports pain is improving.  Pain is requiring pain medication.      POD #1 S/P L4/5 TLIF.  Patient is tearful and complaining of back pain with radiation to lateral hips.  Pain all the way down the right  leg has resolved.  She was recently medicated with oral Percocet.  She states that her pain is not as bad as preoperatively but she is still in a lot of pain.  She also states she is unable to urinate and has not voided since sometime last night.      Temp:  [97.6 °F (36.4 °C)-98.3 °F (36.8 °C)] 98.3 °F (36.8 °C)  Heart Rate:  [] 76  Resp:  [12-28] 18  BP: (101-150)/(45-80) 149/62  FiO2 (%):  [100 %] 100 %      Objective:  General Appearance:  In pain.    Vital signs: (most recent): Blood pressure 149/62, pulse 76, temperature 98.3 °F (36.8 °C), temperature source Oral, resp. rate 18, height 162.6 cm (64\"), weight 81.2 kg (179 lb), SpO2 96%, not currently breastfeeding.  No fever.    Lungs:  Normal effort.    Heart: Normal rate.    Extremities: Normal range of motion.    Skin:  Warm and cool.    Abdomen: Abdomen is distended (over bladder).    Pulses: Distal pulses are intact.        Neurologic Exam     Mental Status   Oriented to person, place, and time.   Level of consciousness: alert  Knowledge: good.     Cranial Nerves   Cranial nerves II through XII intact.     Motor Exam     Strength   Right iliopsoas: 4/5  Right quadriceps: 4/5  Right hamstrin/5  Right anterior tibial: 4/5  Right posterior tibial: 4/5  Right gastroc: 4/5Right EHL, 4/5     Sensory Exam   Decreased sensation to light touch right anterior lower leg and dorsal surface right foot     Gait, Coordination, and Reflexes Gait not tested       Lab Results " (last 24 hours)       ** No results found for the last 24 hours. **                Plan:   I conferred with Dr Estes re: pt's pain and weakness. She had right L/E weakness pre-operatively. He recommends increasing Toradol to 30 mg IV every 6 hours. To help with pain. We will have nursing do bladder scan with In-N-Out cath if needed.  We will have PT fit her with LSO brace today and get her up walking.  If her pain is controlled and she does well from an ambulation standpoint we may consider letting her go home if it is okay with medicine.      Intractable low back pain    Lumbar radiculopathy, acute    Acquired spondylolisthesis    Foot drop, right        Meshia K DERICK Ramos

## 2023-12-12 NOTE — OP NOTE
LUMBAR LAMINECTOMY TRANSFORAMINAL LUMBAR INTERBODY FUSION L4/5 Operative report     Sedrick Leahy  12/11/2023     Pre-op Diagnosis:   Acquired spondylolisthesis [M43.10]  Foot drop, right [M21.371]       Post-Op Diagnosis Codes:     * Acquired spondylolisthesis [M43.10]     * Foot drop, right [M21.371]     Procedure/CPT® Codes:           Procedure(s):  LUMBAR LAMINECTOMY TRANSFORAMINAL LUMBAR INTERBODY FUSION L4/5                    Surgeon(s):  Rick Estes DO     Anesthesia: General     Staff:   Circulator: Gregory Wiley RN  Scrub Person: Carmelo Estrada  Vendor Representative: Paola Tejada Michael  Assistant: April Rees  Assistant: April Rees        Estimated Blood Loss: minimal     Urine Voided: * No values recorded between 12/11/2023 12:14 PM and 12/11/2023  2:23 PM *     Specimens:                None            Drains: * No LDAs found *     Findings: L4/5 spondylolisthesis           Complications: None     Assistant: April Rees  was responsible for performing the following activities: Retraction, Suction, and Irrigation and their skilled assistance was necessary for the success of this case.    Procedure    Patient is a 68-year-old female who was admitted to the hospital with uncontrolled back pain and was found to have a L4/5 spondylolisthesis with right dorsiflexion weakness.  Because she did not respond to steroids or conservative management we did offer her a transforaminal lumbar interbody fusion secondary to the dorsiflexion weakness and the fact that she really could not ambulate.  I did discuss with her and her  the risks and benefits of the procedure including bleeding infection paralysis, death need for operation of failure to improve as well as host of other complications that can occur with this type surgery in general.  Patient understood the risk and benefits of the procedure and wished to proceed.  She was brought to the operative suite and put  under general anesthetic, once under general anesthetic she was turned onto the Ramón table and padded all the appropriate points.  We then prepped and draped in a sterile fashion and used a 5-minute DuraPrep scrub sterile towels Ioban and sterile drapes.  We then brought in AP and lateral fluoroscopy and marked our incision sites we then infiltrated with 1% lidocaine with epinephrine and opened with a 10 blade and then used the trocars to place K wires into the L4 and L5 pedicles bilaterally once this was completed we were then able to open the fascia from K wire to K-wire on the right and then dilate with a quadrant dilator system over the L4-5 facet.  We then brought in the intraoperative microscope for microdissection portion of the case and then used a high-speed drill to drill out the facet at L4-5.  We then resected the ligament with Kerrison punches and decompress the L4 and L5 nerve roots.  There was a rather large disc herniation at this level and we did an annulotomy and an aggressive discectomy using the paddle benji pituitary rongeurs and the serrated curettes to prepare the endplates.  Once this was completed we were then able to size her for a 10 mm x 34 mm titanium graft which was packed with graft on and master graft and also packed the disc base with graft on the master graft.  We then placed the titanium graft and got lateral fluoroscopy which showed good placement of all instrumentation.  We then irrigated with copious amounts antibiotic saline and got meticulous hemostasis with the bipolar cautery we then remove the quadrant retractor removed down to the pedicle screw portion of the case.  We placed pedicle screws using the sextant system into L4 and L5 bilaterally and then swung a 40 mm imtiaz between the heads of the screws and locked down the L5 screws to the appropriate torque.  We then used the reduction system to pull the L4/5 spondylolisthesis back into alignment.  Once this was  completed we locked down all of the screws to the appropriate torque and then got final AP and lateral fluoroscopy images which showed good placement of all instrumentation.  We then closed the fascia with 0 Nurolon and injected intramuscular Marcaine for postoperative pain control and then closed the skin with 2-0 Vicryl Mastisol Steri-Strips Telfa and OpSite.  Patient went to postoperative recovery in stable condition.

## 2023-12-12 NOTE — THERAPY EVALUATION
Patient Name: Sedrick Leahy  : 1955    MRN: [id number]                              Today's Date: 2023       Admit Date: 2023    Visit Dx:     ICD-10-CM ICD-9-CM   1. Foot drop, right  M21.371 736.79   2. Acute low back pain with bilateral sciatica, unspecified back pain laterality  M54.42 724.2    M54.41 724.3   3. Central stenosis of spinal canal  M48.00 724.00   4. Bilateral leg weakness  R29.898 729.89   5. Bilateral leg numbness  R20.0 782.0   6. Acute cystitis with hematuria  N30.01 595.0   7. Acquired spondylolisthesis  M43.10 738.4     Patient Active Problem List   Diagnosis    Wellness examination-done    Abnormal x-ray-resolved    Anemia: iron,    Chronic neck pain    Chronic back pain-linberg    Depression    Anxiety    History of kidney stones    Vitamin D deficiency    Hypertension-no rx    Hyperlipidemia-diet    Hematuria-kupper    Elevated fasting glucose    Nausea-problem with nissen    History of Nissen fundoplication    Lumbar radiculopathy, acute    Urinary incontinence    Laboratory test*    Chronic narcotic use-Nazario    Menopause: see osteopenia    Palpitations: holter benign    Rash    Hyperplastic colonic polyp    Positive colorectal cancer screening using Cologuard test    Family history of colon cancer in mother    Epigastric pain    Dark stools    Type 2 diabetes mellitus without complication, without long-term current use of insulin    OsteopeniaL 2020 ? 2y    Stenosis, cervical spine    Gastrointestinal hemorrhage    Class 1 obesity due to excess calories with serious comorbidity and body mass index (BMI) of 32.0 to 32.9 in adult    Degeneration of lumbar or lumbosacral intervertebral disc    Intractable low back pain    Former smoker    Murmur    Gait difficulty    Memory loss    Abnormal laboratory test    Cerebral microvascular disease    Insomnia    Acquired spondylolisthesis    Foot drop, right     Past Medical History:   Diagnosis Date    Arthritis     Diabetes  mellitus     diet controlled    Hx of colonic polyp     Hypertension     Joint pain     Hip    Lower back pain     Neck pain     PONV (postoperative nausea and vomiting)      Past Surgical History:   Procedure Laterality Date    ANTERIOR CERVICAL DISCECTOMY W/ FUSION N/A 11/20/2020    Procedure: EXPLORATION OF FUSION C5-6, ANTERIOR CERVICAL DISCECTOMY FUSION C4-5, C6-7 REVISON ANTERIOR FUSION C5-6 WITH INSTRUMENTATION C4-7;  Surgeon: KEN Gilbert MD;  Location:  [facility];  Service: Orthopedic Spine;  Laterality: N/A;    APPENDECTOMY      COLONOSCOPY  06/06/2016    Normal exam repeat in 5 years    COLONOSCOPY N/A 01/14/2020    Diverticulosis repeat exam in 5 years    COLONOSCOPY W/ POLYPECTOMY  04/04/2012    Hyperplastic polyp cecum repeat exam in 1 year    ENDOSCOPY  08/14/2014    Very tortuous distal esophagus dilated 50 Fr, HH     ENDOSCOPY N/A 01/18/2021    A fundoplication was found, dilated    ENDOSCOPY  06/2023    HARDWARE REMOVAL N/A 11/20/2020    Procedure: REMOVAL OF INSTRUMENTATION;  Surgeon: KEN Gilbert MD;  Location:  [facility];  Service: Orthopedic Spine;  Laterality: N/A;    HYSTERECTOMY      Laparoscopic Hysterectomy bilateral salpingectomy for bleeding    NECK SURGERY      NISSEN FUNDOPLICATION      POSTERIOR CERVICAL FUSION N/A 12/03/2020    Procedure: POSTERIOR SPINAL FUSION WITH INSTRUMENTATION C4-7;  Surgeon: KEN Gilbert MD;  Location:  [facility];  Service: Orthopedic Spine;  Laterality: N/A;    STEROID INJECTION Left 06/30/2022    Procedure: LEFT HIP FLUROSCOPIC GUIDED CORTICOSTEROID INJECTION;  Surgeon: Herberth Skelton MD;  Location:  [facility];  Service: Orthopedics;  Laterality: Left;    US GUIDED LYMPH NODE BIOPSY  08/03/2020      General Information       Row Name 12/12/23 0820          OT Time and Intention    Document Type evaluation  Pt presents with acute low back pain with radiation to B LE. Imaging revealed spondylolisthesis at L4/5. Pt is now s/p L4/5 TLIF  (12/11/23). Hx includes: DM, HTN, cervical fusions.  -LR     Mode of Treatment occupational therapy;co-treatment  -LR       Row Name 12/12/23 0820          General Information    Patient Profile Reviewed yes  -LR     Prior Level of Function independent:;all household mobility;community mobility;transfer;ADL's;home management;cooking;cleaning;driving;shopping  -LR     Existing Precautions/Restrictions fall;spinal;brace worn when out of bed;LSO  -LR     Barriers to Rehab medically complex;physical barrier  -LR       Row Name 12/12/23 0820          Occupational Profile    Environmental Supports and Barriers (Occupational Profile) tub/shower combo  -LR       Row Name 12/12/23 0820          Living Environment    People in Home spouse  -LR       Row Name 12/12/23 0820          Home Main Entrance    Number of Stairs, Main Entrance two  -LR     Stair Railings, Main Entrance none  -LR       Row Name 12/12/23 0820          Stairs Within Home, Primary    Stairs, Within Home, Primary 2nd floor with spiral staircase, but does not have to use  -LR     Number of Stairs, Within Home, Primary none  -LR       Row Name 12/12/23 0820          Cognition    Orientation Status (Cognition) oriented x 4  -LR       Row Name 12/12/23 0820          Safety Issues, Functional Mobility    Safety Issues Affecting Function (Mobility) safety precaution awareness;safety precautions follow-through/compliance;insight into deficits/self-awareness  -LR     Impairments Affecting Function (Mobility) balance;endurance/activity tolerance;pain;postural/trunk control;sensation/sensory awareness;strength  -LR               User Key  (r) = Recorded By, (t) = Taken By, (c) = Cosigned By      Initials Name Provider Type    LR Tanisha Castro OTR/L Occupational Therapist                     Mobility/ADL's       Row Name 12/12/23 0820          Bed Mobility    Bed Mobility rolling left;rolling right;sidelying-sit  -LR     Rolling Left [place] (Bed Mobility)  minimum assist (75% patient effort);verbal cues  -LR     Rolling Right [place] (Bed Mobility) minimum assist (75% patient effort);verbal cues  -LR     Sidelying-Sit [place] (Bed Mobility) moderate assist (50% patient effort);verbal cues;1 person assist;2 person assist  -LR     Assistive Device (Bed Mobility) bed rails;head of bed elevated  -LR     Comment, (Bed Mobility) increased time and effort, cues for log roll tech, pt c/o increased pain with bed mobility  -LR       Row Name 12/12/23 0820          Transfers    Transfers sit-stand transfer;stand-sit transfer  -LR       Row Name 12/12/23 0820          Sit-Stand Transfer    Sit-Stand [place] (Transfers) contact guard;minimum assist (75% patient effort);2 person assist;verbal cues  -LR     Assistive Device (Sit-Stand Transfers) other (see comments)  HHA x2  -LR       Row Name 12/12/23 0820          Stand-Sit Transfer    Stand-Sit [place] (Transfers) contact guard;minimum assist (75% patient effort);2 person assist;verbal cues  -LR     Assistive Device (Stand-Sit Transfers) other (see comments)  HHA x2  -LR       Row Name 12/12/23 0820          Functional Mobility    Functional Mobility- Ind. Level contact guard assist;minimum assist (75% patient effort);2 person assist required;verbal cues required  -LR     Functional Mobility- Device other (see comments)  HHAx2  -LR     Functional Mobility- Safety Issues step length decreased  -LR     Functional Mobility- Comment R knee buckling at times, pt c/o increased R LE pain with fxl mobility  -LR       Row Name 12/12/23 0820          Activities of Daily Living    BADL Assessment/Intervention lower body dressing  -LR       Row Name 12/12/23 0820          Lower Body Dressing Assessment/Training    [place] Level (Lower Body Dressing) socks;dependent (less than 25% patient effort);verbal cues  -LR     Position (Lower Body Dressing) edge of bed sitting  -LR     Comment, (Lower Body Dressing) adjust  socks, cues for tech  -LR               User Key  (r) = Recorded By, (t) = Taken By, (c) = Cosigned By      Initials Name Provider Type    LR Tanisha Castro OTR/L Occupational Therapist                   Obj/Interventions       Row Name 12/12/23 0820          Sensory Assessment (Somatosensory)    Sensory Assessment (Somatosensory) right UE  -LR     Sensory Assessment numbness and tingling 1st - 3rd digits on R hand  -LR       Row Name 12/12/23 0820          Vision Assessment/Intervention    Visual Impairment/Limitations corrective lenses for reading  -LR       Row Name 12/12/23 0820          Range of Motion Comprehensive    General Range of Motion bilateral upper extremity ROM WFL  -LR       Row Name 12/12/23 0820          Strength Comprehensive (MMT)    General Manual Muscle Testing (MMT) Assessment upper extremity strength deficits identified  -LR     Comment, General Manual Muscle Testing (MMT) Assessment B UE 4-/5  -LR       Row Name 12/12/23 0820          Balance    Balance Assessment sitting static balance;sitting dynamic balance;standing static balance;standing dynamic balance  -LR     Static Sitting Balance contact guard  -LR     Dynamic Sitting Balance contact guard  -LR     Position, Sitting Balance supported;sitting edge of bed  -LR     Static Standing Balance contact guard;minimal assist;2-person assist;verbal cues  -LR     Dynamic Standing Balance contact guard;minimal assist;2-person assist;verbal cues  -LR     Position/Device Used, Standing Balance supported;other (see comments)  HHA x2  -LR               User Key  (r) = Recorded By, (t) = Taken By, (c) = Cosigned By      Initials Name Provider Type    Tanisha Millard OTR/L Occupational Therapist                   Goals/Plan       Row Name 12/12/23 0820          Transfer Goal 1 (OT)    Activity/Assistive Device (Transfer Goal 1, OT) bed-to-chair/chair-to-bed;toilet;commode;tub  -LR     [place] Level/Cues Needed (Transfer Goal 1, OT)  standby assist  -LR     Time Frame (Transfer Goal 1, OT) long term goal (LTG);by discharge  -LR     Progress/Outcome (Transfer Goal 1, OT) new goal  -LR       Row Name 12/12/23 0820          Dressing Goal 1 (OT)    Activity/Device (Dressing Goal 1, OT) dressing skills, all  -LR     [place]/Cues Needed (Dressing Goal 1, OT) minimum assist (75% or more patient effort)  -LR     Time Frame (Dressing Goal 1, OT) long term goal (LTG);by discharge  -LR     Strategies/Barriers (Dressing Goal 1, OT) to include LSO  -LR     Progress/Outcome (Dressing Goal 1, OT) new goal  -LR       Row Name 12/12/23 0820          Toileting Goal 1 (OT)    Activity/Device (Toileting Goal 1, OT) toileting skills, all;commode  -LR     [place] Level/Cues Needed (Toileting Goal 1, OT) standby assist  -LR     Time Frame (Toileting Goal 1, OT) long term goal (LTG);by discharge  -LR     Progress/Outcome (Toileting Goal 1, OT) new goal  -LR       Row Name 12/12/23 0820          Therapy Assessment/Plan (OT)    Planned Therapy Interventions (OT) activity tolerance training;adaptive equipment training;BADL retraining;functional balance retraining;IADL retraining;occupation/activity based interventions;orthotic fabrication/fitting/training;patient/caregiver education/training;ROM/therapeutic exercise;strengthening exercise;transfer/mobility retraining  -LR               User Key  (r) = Recorded By, (t) = Taken By, (c) = Cosigned By      Initials Name Provider Type    LR Tanisha Castro, OTR/L Occupational Therapist                   Clinical Impression       Row Name 12/12/23 0820          Pain Assessment    Pretreatment Pain Rating 7/10  -LR     Posttreatment Pain Rating 8/10  -LR     Pain Location lower  -LR     Pain Location - back;extremity  -LR     Pre/Posttreatment Pain Comment proximal B LE and low back (L > R)  -LR     Pain Intervention(s) Medication (See MAR);Repositioned;Ambulation/increased activity  -LR       Row Name 12/12/23  0820          Plan of Care Review    Plan of Care Reviewed With patient;spouse  -LR     Progress no change  -LR     Outcome Evaluation OT eval completed. Pt presents A&Ox4 with c/o proximal B LE and low back pain, but willing to participate. Pt educated on spinal precautions and LSO wear. Pt fit for LSO. Pt came to EOB sitting using log roll tech with Min A for rolling and Mod A x1-2 for side-lying>sit. While seated EOB, pt required B UE support and CGA d/t c/o increased pain. Pt required Max A for donning LSO, Dep A for adjusting socks at EOB. Pt completed sit<>stand t/f with CGA/Min A x2, HHAx2, and cues for tech. Pt completed short distance fxl mobility in room with CGA/Min A x2 and HHAx2. Pt demo occasional R knee buckling with increased pain reported. Pt educated on tech to decrease pain and pressure relief while seated in chair. Pt pain biggest barrier to occupational performance at this time. Skilled OT warranted to provide education and address pain, balance, activity tolerance, trunk control, sensation, and strength in order to increase pt safety and I during ADLs, fxl mobility, and fxl t/f's. Pt's spouse reports upcoming back sx, anticipate difficulty assisting pt at this time. Recommend short term rehab placement at D/C pending progress.  -LR       Row Name 12/12/23 0820          Therapy Assessment/Plan (OT)    Rehab Potential (OT) good, to achieve stated therapy goals  -LR     Criteria for Skilled Therapeutic Interventions Met (OT) yes;skilled treatment is necessary  -LR     Therapy Frequency (OT) 5 times/wk  -LR     Predicted Duration of Therapy Intervention (OT) until hospital D/C  -LR       Row Name 12/12/23 0820          Therapy Plan Review/Discharge Plan (OT)    Anticipated Discharge Disposition (OT) sub acute care setting;skilled nursing facility  -LR       Row Name 12/12/23 0820          Vital Signs    O2 Delivery Pre Treatment room air  -LR     O2 Delivery Intra Treatment room air  -LR     O2  Delivery Post Treatment room air  -LR     Pre Patient Position Supine  -LR     Intra Patient Position Standing  -LR     Post Patient Position Sitting  -LR       Row Name 12/12/23 0820          Positioning and Restraints    Pre-Treatment Position in bed  -LR     Post Treatment Position chair  -LR     In Chair notified nsg;sitting;call light within reach;encouraged to call for assist;with family/caregiver;with brace  -LR               User Key  (r) = Recorded By, (t) = Taken By, (c) = Cosigned By      Initials Name Provider Type    Tanisha Millard OTR/L Occupational Therapist                   Outcome Measures       Row Name 12/12/23 0820          How much help from another is currently needed...    Putting on and taking off regular lower body clothing? 2  -LR     Bathing (including washing, rinsing, and drying) 2  -LR     Toileting (which includes using toilet bed pan or urinal) 2  -LR     Putting on and taking off regular upper body clothing 2  -LR     Taking care of personal grooming (such as brushing teeth) 3  -LR     Eating meals 4  -LR     AM-PAC 6 Clicks Score (OT) 15  -LR       Row Name 12/12/23 0820          Functional Assessment    Outcome Measure Options AM-PAC 6 Clicks Daily Activity (OT)  -LR               User Key  (r) = Recorded By, (t) = Taken By, (c) = Cosigned By      Initials Name Provider Type    Tanisha Millard OTR/L Occupational Therapist                    Occupational Therapy Education       Title: PT OT SLP Therapies (In Progress)       Topic: Occupational Therapy (In Progress)       Point: ADL training (Done)       Description:   Instruct learner(s) on proper safety adaptation and remediation techniques during self care or transfers.   Instruct in proper use of assistive devices.                  Learning Progress Summary             Patient Acceptance, E,D, VU,NR by LR at 12/12/2023 0946   Significant Other Acceptance, E,D, VU,NR by LR at 12/12/2023 0946                          Point: Home exercise program (Not Started)       Description:   Instruct learner(s) on appropriate technique for monitoring, assisting and/or progressing therapeutic exercises/activities.                  Learner Progress:  Not documented in this visit.              Point: Precautions (Done)       Description:   Instruct learner(s) on prescribed precautions during self-care and functional transfers.                  Learning Progress Summary             Patient Acceptance, E,D, VU,NR by LR at 12/12/2023 0946   Significant Other Acceptance, E,D, VU,NR by LR at 12/12/2023 0946                         Point: Body mechanics (Done)       Description:   Instruct learner(s) on proper positioning and spine alignment during self-care, functional mobility activities and/or exercises.                  Learning Progress Summary             Patient Acceptance, E,D, VU,NR by LR at 12/12/2023 0946   Significant Other Acceptance, E,D, VU,NR by LR at 12/12/2023 0946                                         User Key       Initials Effective Dates Name Provider Type Discipline    LR 04/25/23 -  Tanisha Castro, OTR/L Occupational Therapist OT                  OT Recommendation and Plan  Planned Therapy Interventions (OT): activity tolerance training, adaptive equipment training, BADL retraining, functional balance retraining, IADL retraining, occupation/activity based interventions, orthotic fabrication/fitting/training, patient/caregiver education/training, ROM/therapeutic exercise, strengthening exercise, transfer/mobility retraining  Therapy Frequency (OT): 5 times/wk  Plan of Care Review  Plan of Care Reviewed With: patient, spouse  Progress: no change  Outcome Evaluation: OT eval completed. Pt presents A&Ox4 with c/o proximal B LE and low back pain, but willing to participate. Pt educated on spinal precautions and LSO wear. Pt fit for LSO. Pt came to EOB sitting using log roll tech with Min A for rolling and Mod A x1-2  for side-lying>sit. While seated EOB, pt required B UE support and CGA d/t c/o increased pain. Pt required Max A for donning LSO, Dep A for adjusting socks at EOB. Pt completed sit<>stand t/f with CGA/Min A x2, HHAx2, and cues for tech. Pt completed short distance fxl mobility in room with CGA/Min A x2 and HHAx2. Pt demo occasional R knee buckling with increased pain reported. Pt educated on tech to decrease pain and pressure relief while seated in chair. Pt pain biggest barrier to occupational performance at this time. Skilled OT warranted to provide education and address pain, balance, activity tolerance, trunk control, sensation, and strength in order to increase pt safety and I during ADLs, fxl mobility, and fxl t/f's. Pt's spouse reports upcoming back sx, anticipate difficulty assisting pt at this time. Recommend short term rehab placement at D/C pending progress.     Time Calculation:         Time Calculation- OT       Row Name 12/12/23 0820             Time Calculation- OT    OT Start Time 0820  -LR      OT Stop Time 0935  -LR      OT Time Calculation (min) 75 min  -LR      Total Timed Code Minutes- OT 15 minute(s)  -LR      OT Received On 12/12/23  -LR      OT Goal Re-Cert Due Date 12/22/23  -LR         Timed Charges    05182 - OT Self Care/Mgmt Minutes 15  -LR         Untimed Charges    OT Eval/Re-eval Minutes 60  -LR         Total Minutes    Timed Charges Total Minutes 15  -LR      Untimed Charges Total Minutes 60  -LR       Total Minutes 75  -LR                User Key  (r) = Recorded By, (t) = Taken By, (c) = Cosigned By      Initials Name Provider Type    LR Tanisha Castro OTR/L Occupational Therapist                  Therapy Charges for Today       Code Description Service Date Service Provider Modifiers Qty    95012987395 HC OT SELF CARE/MGMT/TRAIN EA 15 MIN 12/12/2023 Tanisha Castro OTR/L GO 1    76125079639 HC OT EVAL MOD COMPLEXITY 4 12/12/2023 Tanisha Castro OTR/L GO 1                  Tanisha Castro, OTR/L  12/12/2023

## 2023-12-12 NOTE — PROGRESS NOTES
"    [facility] Services  INPATIENT PROGRESS NOTE    Patient Name: Sedrick Leahy  Date of Admission: 12/8/2023  Today's Date: 12/12/23  Length of Stay: 4  Primary Care Physician: Kiran Madden MD    Subjective   Chief Complaint: Pain  HPI   Patient reports that she was able to walk around the bed to the bedside chair with therapy earlier this morning.  She did utilize an LSO brace.  She states that sitting down on the chair causes pain in her right buttock region, and states that she was not able to sit there very long.  Still feels like that she has some ongoing right foot drop.  Also has had some issues with urinary retention and required an In-N-Out catheterization, and per patient report had \"about a liter\" of urine during the catheterization.    Review of Systems   All pertinent negatives and positives are as above. All other systems have been reviewed and are negative unless otherwise stated.     Objective    Temp:  [97.6 °F (36.4 °C)-98.3 °F (36.8 °C)] 98.3 °F (36.8 °C)  Heart Rate:  [] 76  Resp:  [12-28] 18  BP: (101-150)/(45-80) 149/62  FiO2 (%):  [100 %] 100 %  Physical Exam  Vitals reviewed.   Constitutional:       General: She is not in acute distress.     Appearance: She is not toxic-appearing.      Comments: Lying in bed   HENT:      Head: Normocephalic.      Mouth/Throat:      Mouth: Mucous membranes are moist.   Pulmonary:      Effort: Pulmonary effort is normal. No respiratory distress.   Musculoskeletal:         General: No deformity.      Comments: Some TTP right posterior buttock near SI joint   Skin:     General: Skin is warm.   Neurological:      Mental Status: She is alert and oriented to person, place, and time.         Results Review:  I have reviewed the labs, radiology results, and diagnostic studies.    Laboratory Data:   Results from last 7 days   Lab Units 12/11/23  0329 12/09/23  0420 12/08/23  0450   WBC 10*3/mm3 11.14* 8.15 " 10.07   HEMOGLOBIN g/dL 10.1* 11.0* 10.4*   HEMATOCRIT % 34.6 35.3 35.0   PLATELETS 10*3/mm3 285 311 310        Results from last 7 days   Lab Units 12/11/23  0329 12/09/23  0420 12/08/23  0450   SODIUM mmol/L 141 138 143   POTASSIUM mmol/L 4.5 4.5 3.8   CHLORIDE mmol/L 107 104 106   CO2 mmol/L 27.0 23.0 27.0   BUN mg/dL 27* 21 21   CREATININE mg/dL 0.74 0.63 0.70   CALCIUM mg/dL 8.5* 8.8 9.4   BILIRUBIN mg/dL  --   --  0.2   ALK PHOS U/L  --   --  105   ALT (SGPT) U/L  --   --  21   AST (SGOT) U/L  --   --  26   GLUCOSE mg/dL 121* 156* 106*       Culture Data:   Urine Culture   Date Value Ref Range Status   12/08/2023 No growth  Final       Radiology Data:   Imaging Results (Last 24 Hours)       Procedure Component Value Units Date/Time    XR Spine Lumbar 2 or 3 View [[id number]] Collected: 12/11/23 1532     Updated: 12/11/23 1536    Narrative:      XR SPINE LUMBAR 2 OR 3 VW- 12/11/2023 1:03 PM     HISTORY: tlif; M21.371-Foot drop, right foot; M54.42-Lumbago with  sciatica, left side; M54.41-Lumbago with sciatica, right side;  M48.00-Spinal stenosis, site unspecified; R29.898-Other symptoms and  signs involving the musculoskeletal system; R20.0-Anesthesia of skin;  N30.01-Acute cystitis with hematuria; M43.10-Spondylolisthesis, site  unspecified     COMPARISON: None     FLUOROSCOPY TIME: 17.9     FLUOROSCOPY DOSE: 18 mGy     NUMBER OF IMAGES: 3       Impression:         Intraoperative fluoroscopic images during lumbar fusion.     Please refer to the operative note for more details.     This report was signed and finalized on 12/11/2023 3:32 PM by Juwan Roca.       FL C Arm During Surgery [[id number]] Resulted: 12/11/23 1528     Updated: 12/11/23 1528    Narrative:      This procedure was auto-finalized with no dictation required.            I have reviewed the patient's current medications.     Assessment/Plan   Assessment  Active Hospital Problems    Diagnosis     **Intractable low back pain     Acquired  spondylolisthesis     Foot drop, right     Lumbar radiculopathy, acute        Treatment Plan  POD #1 lumbar laminectomy with interbody fusion L4-5    Plans for LSO brace today  Physical therapy  Pain control; Toradol has been adjusted  Lidoderm patch, Lyrica, Percocet PRN  Monitor for urinary retention; required I/O cath earlier today with ~ 1 liter of UOP per patient report  Heparin PPx  Bowel regimen    Medical Decision Making  Number and Complexity of problems: Moderate complexity     Conditions and Status        Condition is unchanged.     [facility] Data  External documents reviewed: none  Cardiac tracing (EKG, telemetry) interpretation: no new EKGs  Radiology interpretation: no new radiology studies  Labs reviewed: as above  Any tests that were considered but not ordered: none     Decision rules/scores evaluated (example [id number], Wells, etc): none     Discussed with: Discussed with patient, spouse     Care Planning  Shared decision making: Discussed with patient with agreement to proceed with treatment plan as outlined  Code status and discussions: Full code    Disposition  Social Determinants of Health that impact treatment or disposition: None apparent at this time  I expect the patient to be discharged to home > 1-2 days    Electronically signed by Denzel Whitman MD, 12/12/23, 10:40 CST.

## 2023-12-12 NOTE — PLAN OF CARE
Goal Outcome Evaluation:  Plan of Care Reviewed With: patient, spouse        Progress: improving  Outcome Evaluation: PT eval completed.  Pt pleasant and agreeable to therapy.  Oriented X 4 w/ continued c/os low back pain.  Pt w/ weakness at R ankle/foot.  Performs bed mobility w/ min assist of 1 to mod assist of 2, tfers w/ CGA to min assist of 2 and performs gait w/ CGA to min assist X 2 w/ HHA B.  Brace education w/ brace donned prior to out of bed activity.  Pt gaurded w/ all mvmt, demonstrates decrease step length, foot clearance w/out heel strike tolerating ~ 15 ft requiring 2 standing rest due to increase pain w/ decrease tolerance.  Pt up in chair at end of visit w/ brace donned.  Initially c/os pain at R lower back w/ pain c/os primarily in the L lower back at end of visit.  Pt will benefit from continued education for brace mgmt, spinal precautions and to progress out of bed activity, for safety precautions and falls prevention increasing I as safely able.  Feel pt will benefit from use of rwx for out of bed activity.  Recommend short stay in subacute care at discharge.  Will follow for progress and needs.      Anticipated Discharge Disposition (PT): sub acute care setting

## 2023-12-12 NOTE — THERAPY TREATMENT NOTE
Acute Care - Physical Therapy Treatment Note  [facility]     Patient Name: Sedrick Leahy  : 1955  MRN: [id number]  Today's Date: 2023   Onset of Illness/Injury or Date of Surgery: 23  Visit Dx:     ICD-10-CM ICD-9-CM   1. Foot drop, right  M21.371 736.79   2. Acute low back pain with bilateral sciatica, unspecified back pain laterality  M54.42 724.2    M54.41 724.3   3. Central stenosis of spinal canal  M48.00 724.00   4. Bilateral leg weakness  R29.898 729.89   5. Bilateral leg numbness  R20.0 782.0   6. Acute cystitis with hematuria  N30.01 595.0   7. Acquired spondylolisthesis  M43.10 738.4   8. Gait abnormality [R26.9]  R26.9 781.2   9. Gait difficulty [R26.9]  R26.9 781.2     Patient Active Problem List   Diagnosis    Wellness examination-done    Abnormal x-ray-resolved    Anemia: iron,    Chronic neck pain    Chronic back pain-linberg    Depression    Anxiety    History of kidney stones    Vitamin D deficiency    Hypertension-no rx    Hyperlipidemia-diet    Hematuria-kupper    Elevated fasting glucose    Nausea-problem with nissen    History of Nissen fundoplication    Lumbar radiculopathy, acute    Urinary incontinence    Laboratory test*    Chronic narcotic use-Nazario    Menopause: see osteopenia    Palpitations: holter benign    Rash    Hyperplastic colonic polyp    Positive colorectal cancer screening using Cologuard test    Family history of colon cancer in mother    Epigastric pain    Dark stools    Type 2 diabetes mellitus without complication, without long-term current use of insulin    OsteopeniaL 2020 ? 2y    Stenosis, cervical spine    Gastrointestinal hemorrhage    Class 1 obesity due to excess calories with serious comorbidity and body mass index (BMI) of 32.0 to 32.9 in adult    Degeneration of lumbar or lumbosacral intervertebral disc    Intractable low back pain    Former smoker    Murmur    Gait difficulty    Memory loss    Abnormal laboratory test    Cerebral  microvascular disease    Insomnia    Acquired spondylolisthesis    Foot drop, right     Past Medical History:   Diagnosis Date    Arthritis     Diabetes mellitus     diet controlled    Hx of colonic polyp     Hypertension     Joint pain     Hip    Lower back pain     Neck pain     PONV (postoperative nausea and vomiting)      Past Surgical History:   Procedure Laterality Date    ANTERIOR CERVICAL DISCECTOMY W/ FUSION N/A 11/20/2020    Procedure: EXPLORATION OF FUSION C5-6, ANTERIOR CERVICAL DISCECTOMY FUSION C4-5, C6-7 REVISON ANTERIOR FUSION C5-6 WITH INSTRUMENTATION C4-7;  Surgeon: KEN Gilbert MD;  Location:  [facility] OR;  Service: Orthopedic Spine;  Laterality: N/A;    APPENDECTOMY      COLONOSCOPY  06/06/2016    Normal exam repeat in 5 years    COLONOSCOPY N/A 01/14/2020    Diverticulosis repeat exam in 5 years    COLONOSCOPY W/ POLYPECTOMY  04/04/2012    Hyperplastic polyp cecum repeat exam in 1 year    ENDOSCOPY  08/14/2014    Very tortuous distal esophagus dilated 50 Fr, HH     ENDOSCOPY N/A 01/18/2021    A fundoplication was found, dilated    ENDOSCOPY  06/2023    HARDWARE REMOVAL N/A 11/20/2020    Procedure: REMOVAL OF INSTRUMENTATION;  Surgeon: KEN Gilbert MD;  Location:  [facility] OR;  Service: Orthopedic Spine;  Laterality: N/A;    HYSTERECTOMY      Laparoscopic Hysterectomy bilateral salpingectomy for bleeding    NECK SURGERY      NISSEN FUNDOPLICATION      POSTERIOR CERVICAL FUSION N/A 12/03/2020    Procedure: POSTERIOR SPINAL FUSION WITH INSTRUMENTATION C4-7;  Surgeon: KEN Gilbert MD;  Location:  [facility] OR;  Service: Orthopedic Spine;  Laterality: N/A;    STEROID INJECTION Left 06/30/2022    Procedure: LEFT HIP FLUROSCOPIC GUIDED CORTICOSTEROID INJECTION;  Surgeon: Herberth Skelton MD;  Location:  [facility] OR;  Service: Orthopedics;  Laterality: Left;    US GUIDED LYMPH NODE BIOPSY  08/03/2020     PT Assessment (last 12 hours)       PT Evaluation and Treatment       Row Name 12/12/23 5433           Physical Therapy Time and Intention    Subjective Information complains of;weakness;fatigue;pain  -     Document Type therapy note (daily note)  -     Mode of Treatment physical therapy  -       Row Name 12/12/23 1342          General Information    Existing Precautions/Restrictions brace worn when out of bed;fall;spinal  -       Row Name 12/12/23 1342          Bed Mobility    Comment, (Bed Mobility) chair  -       Row Name 12/12/23 1342          Sit-Stand Transfer    Sit-Stand [place] (Transfers) minimum assist (75% patient effort);verbal cues  -       Row Name 12/12/23 1342          Stand-Sit Transfer    Stand-Sit [place] (Transfers) minimum assist (75% patient effort);verbal cues  -       Row Name 12/12/23 1342          Gait/Stairs (Locomotion)    [place] Level (Gait) minimum assist (75% patient effort);verbal cues  -     Assistive Device (Gait) walker, front-wheeled  -     Distance in Feet (Gait) 25  -       Row Name             Wound 12/11/23 1257 lumbar spine Incision    Wound - Properties Group Placement Date: 12/11/23  -SHILO Placement Time: 1257  -SHILO Location: lumbar spine  -SHILO Primary Wound Type: Incision  -SHILO    Retired Wound - Properties Group Placement Date: 12/11/23  -SHILO Placement Time: 1257  -SHILO Location: lumbar spine  -SHILO Primary Wound Type: Incision  -SHILO    Retired Wound - Properties Group Date first assessed: 12/11/23  -SHILO Time first assessed: 1257  -SHILO Location: lumbar spine  -SHILO Primary Wound Type: Incision  -SHILO      Row Name 12/12/23 1342          Positioning and Restraints    Pre-Treatment Position sitting in chair/recliner  -     Post Treatment Position chair  -     In Chair notified nsg;sitting;call light within reach;encouraged to call for assist;with family/caregiver  -               User Key  (r) = Recorded By, (t) = Taken By, (c) = Cosigned By      Initials Name Provider Type     Tracey Sosa, PTA Physical Therapist Assistant    SHILO Wiley  Gregory KAUR RN Registered Nurse                    Physical Therapy Education       Title: PT OT SLP Therapies (In Progress)       Topic: Physical Therapy (Done)       Point: Mobility training (Done)       Learning Progress Summary             Patient Acceptance, E,TB,D, VU,DU,NR by  at 12/12/2023 0944    Comment: Education re: purpose of PT/importance of activity, safety/falls prevention, spinal precautions, wearing schedule of brace, proper theresa/doffing brace, improved tech w/ bed mob, tfers and gait   Family Acceptance, E,TB,D, VU,DU,NR by  at 12/12/2023 0944    Comment: Education re: purpose of PT/importance of activity, safety/falls prevention, spinal precautions, wearing schedule of brace, proper theresa/doffing brace, improved tech w/ bed mob, tfers and gait                         Point: Body mechanics (Done)       Learning Progress Summary             Patient Acceptance, E,TB,D, VU,DU,NR by  at 12/12/2023 0944    Comment: Education re: purpose of PT/importance of activity, safety/falls prevention, spinal precautions, wearing schedule of brace, proper theresa/doffing brace, improved tech w/ bed mob, tfers and gait   Family Acceptance, E,TB,D, VU,DU,NR by  at 12/12/2023 0944    Comment: Education re: purpose of PT/importance of activity, safety/falls prevention, spinal precautions, wearing schedule of brace, proper theresa/doffing brace, improved tech w/ bed mob, tfers and gait                         Point: Precautions (Done)       Learning Progress Summary             Patient Acceptance, E,TB,D, VU,DU,NR by  at 12/12/2023 0944    Comment: Education re: purpose of PT/importance of activity, safety/falls prevention, spinal precautions, wearing schedule of brace, proper theresa/doffing brace, improved tech w/ bed mob, tfers and gait   Family Acceptance, E,TB,D, VU,DU,NR by  at 12/12/2023 0944    Comment: Education re: purpose of PT/importance of activity, safety/falls prevention, spinal precautions, wearing  schedule of brace, proper theresa/doffing brace, improved tech w/ bed mob, tfers and gait                                         User Key       Initials Effective Dates Name Provider Type [facility] 08/02/18 -  Giselle Land, PT Physical Therapist PT                  PT Recommendation and Plan             Time Calculation:    PT Charges       Row Name 12/12/23 1401 12/12/23 1017          Time Calculation    Start Time 1342  - 0817  -     Stop Time 1401  - 0929  -     Time Calculation (min) 19 min  - 72 min  -     PT Received On -- 12/12/23  -     PT Goal Re-Cert Due Date -- 12/22/23  -        Time Calculation- PT    Total Timed Code Minutes- PT 19 minute(s)  -AH --        Timed Charges    79193 - Gait Training Minutes  19  -AH --        Total Minutes    Timed Charges Total Minutes 19  -AH --      Total Minutes 19  -AH --               User Key  (r) = Recorded By, (t) = Taken By, (c) = Cosigned By      Initials Name Provider Type     Tracey Sosa PTA Physical Therapist Assistant    Giselle Stout, PT Physical Therapist                  Therapy Charges for Today       Code Description Service Date Service Provider Modifiers Qty    [id number] HC GAIT TRAINING EA 15 MIN 12/12/2023 Tracey Sosa PTA GP 1            PT G-Codes  Outcome Measure Options: AM-PAC 6 Clicks Daily Activity (OT)  AM-PAC 6 Clicks Score (PT): 15  AM-PAC 6 Clicks Score (OT): 15    Tracey Sosa PTA  12/12/2023

## 2023-12-12 NOTE — PLAN OF CARE
Goal Outcome Evaluation:  Plan of Care Reviewed With: (P) patient     VSS. Significant pain this morning treated with ketorolac and oxycodone. Manageable level achieved per pt. Proactive pain medications given per pt request throughout the day to prevent breakthrough pain. Family at bedside. Pt has been up with PT/OT. Up in chair as tolerated x2 assist. Pt states that she feels as though she has improved from this morning. Safety maintained. Call light within reach at all times. LSO Brace when OOB.       Progress: (P) improving

## 2023-12-12 NOTE — PLAN OF CARE
Goal Outcome Evaluation:  Plan of Care Reviewed With: patient        Progress: no change  Outcome Evaluation: PRN pain meds as ordered. VSS. Safety maintained. Drsg to lower back with small amount of drainage.

## 2023-12-12 NOTE — CASE MANAGEMENT/SOCIAL WORK
Discharge Planning Assessment  [facility]     Patient Name: Sedrick Leahy  MRN: [id number]  Today's Date: 12/12/2023    Admit Date: 12/8/2023        Discharge Needs Assessment       Row Name 12/12/23 0851       Living Environment    People in Home spouse    Name(s) of People in Home [place] - spouse    Current Living Arrangements home    Potentially Unsafe Housing Conditions none    In the past 12 months has the electric, gas, oil, or water company threatened to shut off services in your home? No    Primary Care Provided by self    Provides Primary Care For no one    Family Caregiver if Needed spouse    Quality of Family Relationships helpful;involved;supportive    Able to Return to Prior Arrangements yes       Resource/Environmental Concerns    Resource/Environmental Concerns none    Transportation Concerns none       Food Insecurity    Within the past 12 months, you worried that your food would run out before you got the money to buy more. Never true    Within the past 12 months, the food you bought just didn't last and you didn't have money to get more. Never true       Transition Planning    Patient/Family Anticipates Transition to home with family;home with help/services    Transportation Anticipated family or friend will provide       Discharge Needs Assessment    Readmission Within the Last 30 Days no previous admission in last 30 days    Equipment Currently Used at Home glucometer    Concerns to be Addressed discharge planning    Anticipated Changes Related to Illness none    Discharge Coordination/Progress Pt lives with psouse, fairly indpendent until recent back surgery.  Did still drive.  Only states used glucometer until now, not sure if she will need anything.  Has PCP and RX coverage, anticipates DC home, will follow for any DC needs.                   Discharge Plan    No documentation.                 Continued Care and Services - Admitted Since 12/8/2023    Coordination has not been started for this  encounter.          Demographic Summary    No documentation.                  Functional Status    No documentation.                  Psychosocial    No documentation.                  Abuse/Neglect    No documentation.                  Legal    No documentation.                  Substance Abuse    No documentation.                  Patient Forms    No documentation.                     Ada Dunn RN

## 2023-12-12 NOTE — THERAPY EVALUATION
Patient Name: Sedrick Leahy  : 1955    MRN: [id number]                              Today's Date: 2023       Admit Date: 2023    Visit Dx:     ICD-10-CM ICD-9-CM   1. Foot drop, right  M21.371 736.79   2. Acute low back pain with bilateral sciatica, unspecified back pain laterality  M54.42 724.2    M54.41 724.3   3. Central stenosis of spinal canal  M48.00 724.00   4. Bilateral leg weakness  R29.898 729.89   5. Bilateral leg numbness  R20.0 782.0   6. Acute cystitis with hematuria  N30.01 595.0   7. Acquired spondylolisthesis  M43.10 738.4   8. Gait abnormality [R26.9]  R26.9 781.2     Patient Active Problem List   Diagnosis    Wellness examination-done    Abnormal x-ray-resolved    Anemia: iron,    Chronic neck pain    Chronic back pain-linberg    Depression    Anxiety    History of kidney stones    Vitamin D deficiency    Hypertension-no rx    Hyperlipidemia-diet    Hematuria-kupper    Elevated fasting glucose    Nausea-problem with nissen    History of Nissen fundoplication    Lumbar radiculopathy, acute    Urinary incontinence    Laboratory test*    Chronic narcotic use-Nazario    Menopause: see osteopenia    Palpitations: holter benign    Rash    Hyperplastic colonic polyp    Positive colorectal cancer screening using Cologuard test    Family history of colon cancer in mother    Epigastric pain    Dark stools    Type 2 diabetes mellitus without complication, without long-term current use of insulin    OsteopeniaL 2020 ? 2y    Stenosis, cervical spine    Gastrointestinal hemorrhage    Class 1 obesity due to excess calories with serious comorbidity and body mass index (BMI) of 32.0 to 32.9 in adult    Degeneration of lumbar or lumbosacral intervertebral disc    Intractable low back pain    Former smoker    Murmur    Gait difficulty    Memory loss    Abnormal laboratory test    Cerebral microvascular disease    Insomnia    Acquired spondylolisthesis    Foot drop, right     Past Medical History:    Diagnosis Date    Arthritis     Diabetes mellitus     diet controlled    Hx of colonic polyp     Hypertension     Joint pain     Hip    Lower back pain     Neck pain     PONV (postoperative nausea and vomiting)      Past Surgical History:   Procedure Laterality Date    ANTERIOR CERVICAL DISCECTOMY W/ FUSION N/A 11/20/2020    Procedure: EXPLORATION OF FUSION C5-6, ANTERIOR CERVICAL DISCECTOMY FUSION C4-5, C6-7 REVISON ANTERIOR FUSION C5-6 WITH INSTRUMENTATION C4-7;  Surgeon: KEN Gilbert MD;  Location:  [facility];  Service: Orthopedic Spine;  Laterality: N/A;    APPENDECTOMY      COLONOSCOPY  06/06/2016    Normal exam repeat in 5 years    COLONOSCOPY N/A 01/14/2020    Diverticulosis repeat exam in 5 years    COLONOSCOPY W/ POLYPECTOMY  04/04/2012    Hyperplastic polyp cecum repeat exam in 1 year    ENDOSCOPY  08/14/2014    Very tortuous distal esophagus dilated 50 Fr, HH     ENDOSCOPY N/A 01/18/2021    A fundoplication was found, dilated    ENDOSCOPY  06/2023    HARDWARE REMOVAL N/A 11/20/2020    Procedure: REMOVAL OF INSTRUMENTATION;  Surgeon: KEN Gilbert MD;  Location:  [facility];  Service: Orthopedic Spine;  Laterality: N/A;    HYSTERECTOMY      Laparoscopic Hysterectomy bilateral salpingectomy for bleeding    NECK SURGERY      NISSEN FUNDOPLICATION      POSTERIOR CERVICAL FUSION N/A 12/03/2020    Procedure: POSTERIOR SPINAL FUSION WITH INSTRUMENTATION C4-7;  Surgeon: KEN Gilbert MD;  Location:  [facility];  Service: Orthopedic Spine;  Laterality: N/A;    STEROID INJECTION Left 06/30/2022    Procedure: LEFT HIP FLUROSCOPIC GUIDED CORTICOSTEROID INJECTION;  Surgeon: Herberth Skelton MD;  Location:  [facility];  Service: Orthopedics;  Laterality: Left;    US GUIDED LYMPH NODE BIOPSY  08/03/2020      General Information       Row Name 12/12/23 0830          Physical Therapy Time and Intention    Document Type evaluation;other (see comments)  see MAR  -JE     Mode of Treatment physical therapy  -JE        Row Name 12/12/23 0830          General Information    Patient Profile Reviewed yes  -SEBASTIÁN     Prior Level of Function independent:;all household mobility;community mobility;ADL's;home management;driving;shopping;using stairs  has tub shower combo  -SEBASTIÁN     Existing Precautions/Restrictions fall;spinal;brace worn when out of bed  -SEBASTIÁN     Barriers to Rehab none identified  -       Row Name 12/12/23 0830          Living Environment    People in Home spouse  -SEBASTIÁN     Name(s) of People in Home spouse - Jason Hugo goes by Bethel  -SEBASTIÁN       Row Name 12/12/23 0830          Home Main Entrance    Number of Stairs, Main Entrance two  at front at sidewalk; where they normally come and go - flat entrance  -SEBASTIÁN     Stair Railings, Main Entrance none  -JE       Row Name 12/12/23 0830          Stairs Within Home, Primary    Stairs, Within Home, Primary 2nd floor spiral stairway but rarely go up  -       Row Name 12/12/23 0830          Cognition    Orientation Status (Cognition) oriented x 4  -       Row Name 12/12/23 0830          Safety Issues, Functional Mobility    Safety Issues Affecting Function (Mobility) friction/shear risk;safety precaution awareness  -SEBASTIÁN     Impairments Affecting Function (Mobility) balance;endurance/activity tolerance;pain;strength  -               User Key  (r) = Recorded By, (t) = Taken By, (c) = Cosigned By      Initials Name Provider Type    Giselle Stout, PT Physical Therapist                   Mobility       Row Name 12/12/23 0817          Bed Mobility    Bed Mobility rolling left;rolling right;sidelying-sit  -SEBASTIÁN     Rolling Left [place] (Bed Mobility) minimum assist (75% patient effort);verbal cues  -SEBASTIÁN     Rolling Right [place] (Bed Mobility) minimum assist (75% patient effort);verbal cues  -SEBASTIÁN     Sidelying-Sit [place] (Bed Mobility) moderate assist (50% patient effort);verbal cues;1 person assist;2 person assist  -SEBASTIÁN     Assistive Device (Bed Mobility) bed  rails;head of bed elevated  -     Comment, (Bed Mobility) increase time and effort to complete task; increase pain w/ activity  -       Row Name 12/12/23 0817          Sit-Stand Transfer    Sit-Stand [place] (Transfers) contact guard;minimum assist (75% patient effort);2 person assist;verbal cues  -       Row Name 12/12/23 0817          Gait/Stairs (Locomotion)    Divide Level (Gait) minimum assist (75% patient effort);contact guard;2 person assist;verbal cues  -     Assistive Device (Gait) other (see comments)  HHA X 2  -     Deviations/Abnormal Patterns (Gait) gait speed decreased;base of support, narrow;stride length decreased  -     Bilateral Gait Deviations heel strike decreased  -     Comment, (Gait/Stairs) decrease foot clearance  -               User Key  (r) = Recorded By, (t) = Taken By, (c) = Cosigned By      Initials Name Provider Type    Giselle Stout, PT Physical Therapist                   Obj/Interventions       Row Name 12/12/23 0817          Range of Motion Comprehensive    Comment, General Range of Motion WFLS, gaurded to move, R ankle AA  -[facility] Name 12/12/23 0817          Strength Comprehensive (MMT)    Comment, General Manual Muscle Testing (MMT) Assessment defer to OT for formal UE strength assessment, however moves UEs against gravity, L ankle DF/PF and EHL 4+/5, R ankle DF 2/5, EHL 0/5, ankle PF 4 to 4-/5  -[facility] Name 12/12/23 0817          Balance    Balance Assessment sitting static balance;sitting dynamic balance;sit to stand dynamic balance;standing static balance;standing dynamic balance  -     Static Sitting Balance contact guard;standby assist  -     Dynamic Sitting Balance contact guard;verbal cues  -     Position, Sitting Balance supported;sitting edge of bed  -     Sit to Stand Dynamic Balance contact guard;minimal assist;2-person assist;verbal cues  -     Static Standing Balance contact guard;minimal assist;2-person  assist;verbal cues  -     Dynamic Standing Balance contact guard;minimal assist;2-person assist;verbal cues  -     Position/Device Used, Standing Balance supported;other (see comments)  HHA X 2  -       Row Name 12/12/23 0817          Sensory Assessment (Somatosensory)    Sensory Assessment (Somatosensory) other (see comments)  c/o N/T in 1st - 3rd digits on R hand  -               User Key  (r) = Recorded By, (t) = Taken By, (c) = Cosigned By      Initials Name Provider Type    Giselle Stout, PT Physical Therapist                   Goals/Plan       Row Name 12/12/23 0817          Bed Mobility Goal 1 (PT)    Activity/Assistive Device (Bed Mobility Goal 1, PT) rolling to left;rolling to right;scooting;bridging;sidelying to sit/sit to sidelying  -     [place] Level/Cues Needed (Bed Mobility Goal 1, PT) standby assist;modified independence  -     Time Frame (Bed Mobility Goal 1, PT) long term goal (LTG);10 days  -     Progress/Outcomes (Bed Mobility Goal 1, PT) goal ongoing  -       Row Name 12/12/23 0817          Transfer Goal 1 (PT)    Activity/Assistive Device (Transfer Goal 1, PT) sit-to-stand/stand-to-sit;bed-to-chair/chair-to-bed  -     [place] Level/Cues Needed (Transfer Goal 1, PT) other (see comments)  rwx for bed to/from chair  -     Time Frame (Transfer Goal 1, PT) long term goal (LTG);10 days  -     Progress/Outcome (Transfer Goal 1, PT) goal ongoing  -       Row Name 12/12/23 0817          Gait Training Goal 1 (PT)    Activity/Assistive Device (Gait Training Goal 1, PT) gait (walking locomotion);assistive device use;decrease fall risk;improve balance and speed;increase endurance/gait distance;increase energy conservation;walker, rolling  -     [place] Level (Gait Training Goal 1, PT) standby assist  -     Distance (Gait Training Goal 1, PT) 50 ft  -     Time Frame (Gait Training Goal 1, PT) long term goal (LTG);10 days  -     Progress/Outcome (Gait  Training Goal 1, PT) goal ongoing  -       Row Name 12/12/23 0817          Stairs Goal 1 (PT)    Activity/Assistive Device (Stairs Goal 1, PT) ascending stairs;descending stairs;using handrail, right;using handrail, left;step-to-step;decrease fall risk;improve balance and speed  -     [place] Level/Cues Needed (Stairs Goal 1, PT) contact guard required  -     Number of Stairs (Stairs Goal 1, PT) 2-3  -JE     Time Frame (Stairs Goal 1, PT) long term goal (LTG);10 days  -     Progress/Outcome (Stairs Goal 1, PT) goal ongoing  -       Row Name 12/12/23 0817          Patient Education Goal (PT)    Activity (Patient Education Goal, PT) spinal precautions, brace mgmt  -     [place]/Cues/Accuracy (Memory Goal 2, PT) demonstrates adequately;independent;verbalizes understanding  -     Time Frame (Patient Education Goal, PT) 10 days;long term goal (LTG)  -JE     Progress/Outcome (Patient Education Goal, PT) goal ongoing  -       Row Name 12/12/23 0817          Therapy Assessment/Plan (PT)    Planned Therapy Interventions (PT) balance training;bed mobility training;gait training;patient/family education;orthotic fitting/training;stair training;transfer training;ROM (range of motion);other (see comments)  safety/falls prevention, spinal precautions, energy conservation techniques, brace mgmt  -               User Key  (r) = Recorded By, (t) = Taken By, (c) = Cosigned By      Initials Name Provider Type    Giselle Stout, PT Physical Therapist                   Clinical Impression       Row Name 12/12/23 0817          Pain    Pretreatment Pain Rating 7/10  -     Posttreatment Pain Rating 8/10  -     Pain Location lower  -     Pain Location - back  -     Pre/Posttreatment Pain Comment initially w/ c/os in her R lower back, by end of visit c/os primarily in the L lower back  -     Pain Intervention(s) Medication (See MAR);Ambulation/increased activity;Repositioned;Rest  -       Row Name  12/12/23 0817          Plan of Care Review    Plan of Care Reviewed With patient;spouse  -     Progress improving  -     Outcome Evaluation PT eval completed.  Pt pleasant and agreeable to therapy.  Oriented X 4 w/ continued c/os low back pain.  Pt w/ weakness at R ankle/foot.  Performs bed mobility w/ min assist of 1 to mod assist of 2, tfers w/ CGA to min assist of 2 and performs gait w/ CGA to min assist X 2 w/ HHA B.  Brace education w/ brace donned prior to out of bed activity.  Pt gaurded w/ all mvmt, demonstrates decrease step length, foot clearance w/out heel strike tolerating ~ 15 ft requiring 2 standing rest due to increase pain w/ decrease tolerance.  Pt up in chair at end of visit w/ brace donned.  Initially c/os pain at R lower back w/ pain c/os primarily in the L lower back at end of visit.  Pt will benefit from continued education for brace mgmt, spinal precautions and to progress out of bed activity, for safety precautions and falls prevention increasing I as safely able.  Feel pt will benefit from use of rwx for out of bed activity.  Recommend short stay in subacute care at discharge.  Will follow for progress and needs.  -       Row Name 12/12/23 0817          Therapy Assessment/Plan (PT)    Patient/Family Therapy Goals Statement (PT) decrease pain, improve mobility  -     Rehab Potential (PT) good, to achieve stated therapy goals  -     Criteria for Skilled Interventions Met (PT) yes;meets criteria;skilled treatment is necessary  -     Therapy Frequency (PT) 2 times/day  -     Predicted Duration of Therapy Intervention (PT) until discharge or goals achieved  -       Row Name 12/12/23 0817          Vital Signs    O2 Delivery Pre Treatment room air  -JE     O2 Delivery Intra Treatment room air  -JE     O2 Delivery Post Treatment room air  -     Pre Patient Position Supine  -     Intra Patient Position Standing  -     Post Patient Position Sitting  -       Row Name 12/12/23  0817          Positioning and Restraints    Pre-Treatment Position in bed  -     Post Treatment Position chair  -JE     In Chair notified nsg;sitting;call light within reach;encouraged to call for assist;with family/caregiver;with brace  -               User Key  (r) = Recorded By, (t) = Taken By, (c) = Cosigned By      Initials Name Provider Type    Giselle Stout, PT Physical Therapist                   Outcome Measures       Row Name 12/12/23 0817          How much help from another person do you currently need...    Turning from your back to your side while in flat bed without using bedrails? 3  -JE     Moving from lying on back to sitting on the side of a flat bed without bedrails? 3  -JE     Moving to and from a bed to a chair (including a wheelchair)? 3  -JE     Standing up from a chair using your arms (e.g., wheelchair, bedside chair)? 3  -JE     Climbing 3-5 steps with a railing? 2  -JE     To walk in hospital room? 3  -     AM-PAC 6 Clicks Score (PT) 17  -     Highest Level of Mobility Goal 5 --> Static standing  -       Row Name 12/12/23 0820 12/12/23 0817       Functional Assessment    Outcome Measure Options AM-PAC 6 Clicks Daily Activity (OT)  -LR AM-PAC 6 Clicks Basic Mobility (PT)  -              User Key  (r) = Recorded By, (t) = Taken By, (c) = Cosigned By      Initials Name Provider Type    Giselle Stout, PT Physical Therapist    Tanisha Millard, OTR/L Occupational Therapist                                 Physical Therapy Education       Title: PT OT SLP Therapies (In Progress)       Topic: Physical Therapy (Done)       Point: Mobility training (Done)       Learning Progress Summary             Patient Acceptance, E,TB,D, VU,DU,NR by SEBASTIÁN at 12/12/2023 0930    Comment: Education re: purpose of PT/importance of activity, safety/falls prevention, spinal precautions, wearing schedule of brace, proper theresa/doffing brace, improved tech w/ bed mob, tfers and gait   Family  Acceptance, E,TB,D, VU,DU,NR by  at 12/12/2023 0944    Comment: Education re: purpose of PT/importance of activity, safety/falls prevention, spinal precautions, wearing schedule of brace, proper theresa/doffing brace, improved tech w/ bed mob, tfers and gait                         Point: Body mechanics (Done)       Learning Progress Summary             Patient Acceptance, E,TB,D, VU,DU,NR by  at 12/12/2023 0944    Comment: Education re: purpose of PT/importance of activity, safety/falls prevention, spinal precautions, wearing schedule of brace, proper theresa/doffing brace, improved tech w/ bed mob, tfers and gait   Family Acceptance, E,TB,D, VU,DU,NR by  at 12/12/2023 0944    Comment: Education re: purpose of PT/importance of activity, safety/falls prevention, spinal precautions, wearing schedule of brace, proper theresa/doffing brace, improved tech w/ bed mob, tfers and gait                         Point: Precautions (Done)       Learning Progress Summary             Patient Acceptance, E,TB,D, VU,DU,NR by  at 12/12/2023 0944    Comment: Education re: purpose of PT/importance of activity, safety/falls prevention, spinal precautions, wearing schedule of brace, proper theresa/doffing brace, improved tech w/ bed mob, tfers and gait   Family Acceptance, E,TB,D, VU,DU,NR by  at 12/12/2023 0944    Comment: Education re: purpose of PT/importance of activity, safety/falls prevention, spinal precautions, wearing schedule of brace, proper theresa/doffing brace, improved tech w/ bed mob, tfers and gait                                         User Key       Initials Effective Dates Name Provider Type Discipline     08/02/18 -  Giselle Land, PT Physical Therapist PT                  PT Recommendation and Plan  Planned Therapy Interventions (PT): balance training, bed mobility training, gait training, patient/family education, orthotic fitting/training, stair training, transfer training, ROM (range of motion), other (see  comments) (safety/falls prevention, spinal precautions, energy conservation techniques, brace mgmt)  Plan of Care Reviewed With: patient, spouse  Progress: improving  Outcome Evaluation: PT eval completed.  Pt pleasant and agreeable to therapy.  Oriented X 4 w/ continued c/os low back pain.  Pt w/ weakness at R ankle/foot.  Performs bed mobility w/ min assist of 1 to mod assist of 2, tfers w/ CGA to min assist of 2 and performs gait w/ CGA to min assist X 2 w/ HHA B.  Brace education w/ brace donned prior to out of bed activity.  Pt gaurded w/ all mvmt, demonstrates decrease step length, foot clearance w/out heel strike tolerating ~ 15 ft requiring 2 standing rest due to increase pain w/ decrease tolerance.  Pt up in chair at end of visit w/ brace donned.  Initially c/os pain at R lower back w/ pain c/os primarily in the L lower back at end of visit.  Pt will benefit from continued education for brace mgmt, spinal precautions and to progress out of bed activity, for safety precautions and falls prevention increasing I as safely able.  Feel pt will benefit from use of rwx for out of bed activity.  Recommend short stay in subacute care at discharge.  Will follow for progress and needs.     Time Calculation:         PT Charges       Row Name 12/12/23 1017             Time Calculation    Start Time 0817  -      Stop Time 0929  -      Time Calculation (min) 72 min  -      PT Received On 12/12/23  -      PT Goal Re-Cert Due Date 12/22/23  -                User Key  (r) = Recorded By, (t) = Taken By, (c) = Cosigned By      Initials Name Provider Type    Giselle Stout, PT Physical Therapist                  Therapy Charges for Today       Code Description Service Date Service Provider Modifiers Qty    73167490007 HC PT EVAL MOD COMPLEXITY 4 12/12/2023 Giselle Land, PT GP 1    14480444162 HC GAIT TRAINING EA 15 MIN 12/12/2023 Giselle Land, PT GP 1            PT G-Codes  Outcome Measure Options: AM-PAC  6 Clicks Daily Activity (OT)  AM-PAC 6 Clicks Score (PT): 17  AM-PAC 6 Clicks Score (OT): 15  PT Discharge Summary  Anticipated Discharge Disposition (PT): sub acute care setting    Giselle Land, PT  12/12/2023

## 2023-12-13 PROCEDURE — 97116 GAIT TRAINING THERAPY: CPT

## 2023-12-13 PROCEDURE — 99024 POSTOP FOLLOW-UP VISIT: CPT | Performed by: PHYSICIAN ASSISTANT

## 2023-12-13 PROCEDURE — 25010000002 HEPARIN (PORCINE) PER 1000 UNITS: Performed by: NEUROLOGICAL SURGERY

## 2023-12-13 RX ADMIN — PREGABALIN 50 MG: 50 CAPSULE ORAL at 06:12

## 2023-12-13 RX ADMIN — HEPARIN SODIUM 5000 UNITS: 5000 INJECTION INTRAVENOUS; SUBCUTANEOUS at 06:12

## 2023-12-13 RX ADMIN — OXYCODONE HYDROCHLORIDE AND ACETAMINOPHEN 1 TABLET: 5; 325 TABLET ORAL at 04:09

## 2023-12-13 RX ADMIN — Medication 10 ML: at 21:05

## 2023-12-13 RX ADMIN — Medication 1000 UNITS: at 08:03

## 2023-12-13 RX ADMIN — PREGABALIN 50 MG: 50 CAPSULE ORAL at 21:05

## 2023-12-13 RX ADMIN — HEPARIN SODIUM 5000 UNITS: 5000 INJECTION INTRAVENOUS; SUBCUTANEOUS at 13:00

## 2023-12-13 RX ADMIN — ORPHENADRINE CITRATE 100 MG: 100 TABLET, EXTENDED RELEASE ORAL at 20:22

## 2023-12-13 RX ADMIN — FAMOTIDINE 40 MG: 20 TABLET, FILM COATED ORAL at 08:02

## 2023-12-13 RX ADMIN — PREGABALIN 50 MG: 50 CAPSULE ORAL at 13:00

## 2023-12-13 RX ADMIN — ORPHENADRINE CITRATE 100 MG: 100 TABLET, EXTENDED RELEASE ORAL at 08:03

## 2023-12-13 RX ADMIN — OXYCODONE HYDROCHLORIDE AND ACETAMINOPHEN 1 TABLET: 5; 325 TABLET ORAL at 09:31

## 2023-12-13 RX ADMIN — HEPARIN SODIUM 5000 UNITS: 5000 INJECTION INTRAVENOUS; SUBCUTANEOUS at 21:04

## 2023-12-13 RX ADMIN — OXYCODONE HYDROCHLORIDE AND ACETAMINOPHEN 1 TABLET: 5; 325 TABLET ORAL at 13:30

## 2023-12-13 RX ADMIN — LIDOCAINE 2 PATCH: 700 PATCH TOPICAL at 20:04

## 2023-12-13 RX ADMIN — DOCUSATE SODIUM 50 MG AND SENNOSIDES 8.6 MG 2 TABLET: 8.6; 5 TABLET, FILM COATED ORAL at 20:23

## 2023-12-13 RX ADMIN — Medication 10 ML: at 08:03

## 2023-12-13 RX ADMIN — OXYCODONE HYDROCHLORIDE AND ACETAMINOPHEN 1 TABLET: 5; 325 TABLET ORAL at 17:35

## 2023-12-13 RX ADMIN — ATORVASTATIN CALCIUM 20 MG: 10 TABLET, FILM COATED ORAL at 20:23

## 2023-12-13 RX ADMIN — DOCUSATE SODIUM 50 MG AND SENNOSIDES 8.6 MG 2 TABLET: 8.6; 5 TABLET, FILM COATED ORAL at 08:02

## 2023-12-13 NOTE — PROGRESS NOTES
"Sedrick Leahy  68 y.o.      Chief complaint:   Back pain  Mrs. Leahy is POD #2 S/P L4/5 TLIF.  She reports that her back pain is better today.  She still has some radiating pain into the lateral hips and reports some numbness to the right anterior thigh.  She walked yesterday down the hallway with physical therapy and reports difficulty lifting her right foot.  She feels that the right lower extremity in general is getting a little bit stronger.    Subjective:  Symptoms:  Improved.  No shortness of breath, chest pain or chest pressure.    Diet:  Adequate intake.  No nausea or vomiting.    Activity level: Impaired due to weakness.    Pain:  She complains of pain that is moderate.  She reports pain is improving.  Pain is well controlled.          Temp:  [97.4 °F (36.3 °C)-98.6 °F (37 °C)] 97.4 °F (36.3 °C)  Heart Rate:  [71-82] 82  Resp:  [16] 16  BP: (131-163)/(48-56) 163/56      Objective:  General Appearance:  Comfortable (Sitting in a bedside chair eating breakfast.).    Vital signs: (most recent): Blood pressure 163/56, pulse 82, temperature 97.4 °F (36.3 °C), temperature source Oral, resp. rate 16, height 162.6 cm (64\"), weight 81.2 kg (179 lb), SpO2 95%, not currently breastfeeding.  Vital signs are normal.    Output: Producing urine.    Lungs:  Normal effort.    Heart: Normal rate.    Skin:  Warm and dry.    Abdomen: Abdomen is soft.    Pulses: Distal pulses are intact.        Neurologic Exam     Mental Status   Oriented to person, place, and time.   Level of consciousness: alert  Knowledge: good.     Motor Exam   Muscle bulk: normal  Overall muscle tone: normalThere is right hamstrings weakness grade 4+/5.  She can dorsiflex the right foot and maintain against resistance but there is weakness, 4/5.     Sensory Exam   Decree sensation to light touch right anterior thigh.       Lab Results (last 24 hours)       ** No results found for the last 24 hours. **                Plan:   Her pain is improving.  She " still has some weakness right lower extremity but it seems to be improving as well.  I do think she is going to need an AFO brace for the right foot as well as physical therapy on discharge.  We will see how she does with therapy today and consider discharge home later this afternoon.      Intractable low back pain    Lumbar radiculopathy, acute    Acquired spondylolisthesis    Foot drop, right        Meshia K DERICK Ramos

## 2023-12-13 NOTE — PLAN OF CARE
Goal Outcome Evaluation:  Plan of Care Reviewed With: patient, spouse        Progress: no change  Outcome Evaluation: pt in chair with LSO, min assist to theresa shoes, sit-stand min assist, pt amb 40 feet min rwx, cues to pick feet up, pt tends to scoot feet shana R foot, trans back to bed min-mod, pt would benefit from rehab

## 2023-12-13 NOTE — PROGRESS NOTES
[facility] Medicine Services  INPATIENT PROGRESS NOTE    Patient Name: Sedrick Leahy  Date of Admission: 12/8/2023  Today's Date: 12/13/23  Length of Stay: 5  Primary Care Physician: Kiran Madden MD    Subjective   Chief Complaint: Pain  HPI   Patient reports that she was able to do a little bit more with physical therapy this morning.  Her pain level is currently 6 out of 10 in severity.  She reports that the most difficult aspect is transfers and position changes which seem to exacerbate her pain the most.  She does feel like that she is starting to get a little bit of improvement in her right lower extremity.  She is hopeful she will make continued progress, and very much does not want to have to move forward with any form of rehab placement but wants to be able to go back home.    Review of Systems   All pertinent negatives and positives are as above. All other systems have been reviewed and are negative unless otherwise stated.     Objective    Temp:  [97.4 °F (36.3 °C)-98.6 °F (37 °C)] 97.4 °F (36.3 °C)  Heart Rate:  [71-82] 82  Resp:  [16] 16  BP: (131-163)/(48-56) 163/56  Physical Exam  Vitals reviewed.   Constitutional:       General: She is not in acute distress.     Appearance: She is not toxic-appearing.      Comments: Lying in bed and just got finished working with PT   HENT:      Head: Normocephalic.      Mouth/Throat:      Mouth: Mucous membranes are dry.   Pulmonary:      Effort: Pulmonary effort is normal. No respiratory distress.   Musculoskeletal:         General: No deformity.   Skin:     General: Skin is warm.   Neurological:      Mental Status: She is alert and oriented to person, place, and time.      Motor: Weakness present.   Psychiatric:         Mood and Affect: Mood normal.         Results Review:  I have reviewed the labs, radiology results, and diagnostic studies.    Laboratory Data:   Results from last 7 days   Lab Units 12/11/23  4891  12/09/23  0420 12/08/23  0450   WBC 10*3/mm3 11.14* 8.15 10.07   HEMOGLOBIN g/dL 10.1* 11.0* 10.4*   HEMATOCRIT % 34.6 35.3 35.0   PLATELETS 10*3/mm3 285 311 310        Results from last 7 days   Lab Units 12/11/23  0329 12/09/23  0420 12/08/23  0450   SODIUM mmol/L 141 138 143   POTASSIUM mmol/L 4.5 4.5 3.8   CHLORIDE mmol/L 107 104 106   CO2 mmol/L 27.0 23.0 27.0   BUN mg/dL 27* 21 21   CREATININE mg/dL 0.74 0.63 0.70   CALCIUM mg/dL 8.5* 8.8 9.4   BILIRUBIN mg/dL  --   --  0.2   ALK PHOS U/L  --   --  105   ALT (SGPT) U/L  --   --  21   AST (SGOT) U/L  --   --  26   GLUCOSE mg/dL 121* 156* 106*       Culture Data:   Urine Culture   Date Value Ref Range Status   12/08/2023 No growth  Final       Radiology Data:   Imaging Results (Last 24 Hours)       ** No results found for the last 24 hours. **            I have reviewed the patient's current medications.     Assessment/Plan   Assessment  Active Hospital Problems    Diagnosis     **Intractable low back pain     Acquired spondylolisthesis     Foot drop, right     Lumbar radiculopathy, acute        Treatment Plan  POD #2 lumbar laminectomy with interbody fusion L4-5    Physical therapy; LSO brace   Trial of IV Toradol  PO Percocet PRN  Lidoderm patch, Lyrica  Monitor for urinary retention  Heparin PPx  Bowel regimen  9.   Discussed with SW.  PT and OT recs reviewed regarding recommendation for short term rehab placement    Medical Decision Making  Number and Complexity of problems: Moderate complexity     Conditions and Status        Condition is unchanged.     MDM Data  External documents reviewed: none  Cardiac tracing (EKG, telemetry) interpretation: no new EKGs  Radiology interpretation: no new radiology studies  Labs reviewed: as above  Any tests that were considered but not ordered: none     Decision rules/scores evaluated (example [id number], Wells, etc): none     Discussed with: Discussed with patient, spouse.  Mabel with SW     Care Planning  Shared  decision making: Discussed with patient with agreement to proceed with treatment plan as outlined  Code status and discussions: Full code    Disposition  Social Determinants of Health that impact treatment or disposition: None apparent at this time  I expect the patient to be discharged to home > 1-2 days    Electronically signed by Denzel Whitman MD, 12/13/23, 09:33 CST.

## 2023-12-13 NOTE — CASE MANAGEMENT/SOCIAL WORK
Continued Stay Note   [facility]     Patient Name: Sedrick Leahy  MRN: [id number]  Today's Date: 12/13/2023    Admit Date: 12/8/2023    Plan: Referral to [facility]   Discharge Plan       Row Name 12/13/23 1208       Plan    Plan Referral to [facility]    Patient/Family in Agreement with Plan yes    Provided Post Acute Provider List? Yes    Provided Post Acute Provider Quality & Resource List? Yes    Delivered To Patient;Support Person    Method of Delivery In person    Plan Comments Spoke with pt and spouse in the room abut possible rehab. Pt is hoping to go home at d/c but knows her  will not be able to tug and lift on her. Discussed options and she would like a referral to [facility]. Referral sent.                   Discharge Codes    No documentation.                       EDWARD Garcia

## 2023-12-13 NOTE — PLAN OF CARE
Goal Outcome Evaluation:  Plan of Care Reviewed With: patient        Progress: no change  Outcome Evaluation: VSS. PRN/scheduled pain meds as ordered. Pt reports severe pain with movement. LSO brace on when out of bed. Safety maintained.

## 2023-12-13 NOTE — THERAPY TREATMENT NOTE
Acute Care - Physical Therapy Treatment Note  [facility]     Patient Name: Sedrick Leahy  : 1955  MRN: [id number]  Today's Date: 2023   Onset of Illness/Injury or Date of Surgery: 23  Visit Dx:     ICD-10-CM ICD-9-CM   1. Foot drop, right  M21.371 736.79   2. Acute low back pain with bilateral sciatica, unspecified back pain laterality  M54.42 724.2    M54.41 724.3   3. Central stenosis of spinal canal  M48.00 724.00   4. Bilateral leg weakness  R29.898 729.89   5. Bilateral leg numbness  R20.0 782.0   6. Acute cystitis with hematuria  N30.01 595.0   7. Acquired spondylolisthesis  M43.10 738.4   8. Gait abnormality [R26.9]  R26.9 781.2   9. Gait difficulty [R26.9]  R26.9 781.2     Patient Active Problem List   Diagnosis    Wellness examination-done    Abnormal x-ray-resolved    Anemia: iron,    Chronic neck pain    Chronic back pain-linberg    Depression    Anxiety    History of kidney stones    Vitamin D deficiency    Hypertension-no rx    Hyperlipidemia-diet    Hematuria-kupper    Elevated fasting glucose    Nausea-problem with nissen    History of Nissen fundoplication    Lumbar radiculopathy, acute    Urinary incontinence    Laboratory test*    Chronic narcotic use-Nazario    Menopause: see osteopenia    Palpitations: holter benign    Rash    Hyperplastic colonic polyp    Positive colorectal cancer screening using Cologuard test    Family history of colon cancer in mother    Epigastric pain    Dark stools    Type 2 diabetes mellitus without complication, without long-term current use of insulin    OsteopeniaL 2020 ? 2y    Stenosis, cervical spine    Gastrointestinal hemorrhage    Class 1 obesity due to excess calories with serious comorbidity and body mass index (BMI) of 32.0 to 32.9 in adult    Degeneration of lumbar or lumbosacral intervertebral disc    Intractable low back pain    Former smoker    Murmur    Gait difficulty    Memory loss    Abnormal laboratory test    Cerebral  microvascular disease    Insomnia    Acquired spondylolisthesis    Foot drop, right     Past Medical History:   Diagnosis Date    Arthritis     Diabetes mellitus     diet controlled    Hx of colonic polyp     Hypertension     Joint pain     Hip    Lower back pain     Neck pain     PONV (postoperative nausea and vomiting)      Past Surgical History:   Procedure Laterality Date    ANTERIOR CERVICAL DISCECTOMY W/ FUSION N/A 11/20/2020    Procedure: EXPLORATION OF FUSION C5-6, ANTERIOR CERVICAL DISCECTOMY FUSION C4-5, C6-7 REVISON ANTERIOR FUSION C5-6 WITH INSTRUMENTATION C4-7;  Surgeon: KEN Gilbert MD;  Location:  [facility] OR;  Service: Orthopedic Spine;  Laterality: N/A;    APPENDECTOMY      COLONOSCOPY  06/06/2016    Normal exam repeat in 5 years    COLONOSCOPY N/A 01/14/2020    Diverticulosis repeat exam in 5 years    COLONOSCOPY W/ POLYPECTOMY  04/04/2012    Hyperplastic polyp cecum repeat exam in 1 year    ENDOSCOPY  08/14/2014    Very tortuous distal esophagus dilated 50 Fr, HH     ENDOSCOPY N/A 01/18/2021    A fundoplication was found, dilated    ENDOSCOPY  06/2023    HARDWARE REMOVAL N/A 11/20/2020    Procedure: REMOVAL OF INSTRUMENTATION;  Surgeon: KEN Gilbert MD;  Location:  [facility] OR;  Service: Orthopedic Spine;  Laterality: N/A;    HYSTERECTOMY      Laparoscopic Hysterectomy bilateral salpingectomy for bleeding    NECK SURGERY      NISSEN FUNDOPLICATION      POSTERIOR CERVICAL FUSION N/A 12/03/2020    Procedure: POSTERIOR SPINAL FUSION WITH INSTRUMENTATION C4-7;  Surgeon: KEN Gilbert MD;  Location:  [facility] OR;  Service: Orthopedic Spine;  Laterality: N/A;    STEROID INJECTION Left 06/30/2022    Procedure: LEFT HIP FLUROSCOPIC GUIDED CORTICOSTEROID INJECTION;  Surgeon: Herberth Skelton MD;  Location:  [facility] OR;  Service: Orthopedics;  Laterality: Left;    US GUIDED LYMPH NODE BIOPSY  08/03/2020     PT Assessment (last 12 hours)       PT Evaluation and Treatment       Row Name 12/13/23 0851           Physical Therapy Time and Intention    Subjective Information complains of;weakness;fatigue;pain  -     Document Type therapy note (daily note)  -     Mode of Treatment physical therapy  -[facility] Name 12/13/23 0851          General Information    Existing Precautions/Restrictions brace worn when out of bed;fall;spinal  -[facility] Name 12/13/23 0851          Pain    Pretreatment Pain Rating 3/10  -     Posttreatment Pain Rating 5/10  -     Pain Location - back  -     Pre/Posttreatment Pain Comment R hip  -     Pain Intervention(s) Repositioned;Ambulation/increased activity;Nursing Notified  -[facility] Name 12/13/23 0851          Bed Mobility    Bed Mobility sidelying-sit;sit-sidelying  -     Sidelying-Sit [place] (Bed Mobility) --  chair  -     Sit-Sidelying [place] (Bed Mobility) minimum assist (75% patient effort);moderate assist (50% patient effort);verbal cues  -[facility] Name 12/13/23 0851          Sit-Stand Transfer    Sit-Stand [place] (Transfers) minimum assist (75% patient effort);verbal cues  -[facility] Name 12/13/23 08          Stand-Sit Transfer    Stand-Sit [place] (Transfers) minimum assist (75% patient effort);verbal cues  -[facility] Name 12/13/23 0851          Gait/Stairs (Locomotion)    [place] Level (Gait) minimum assist (75% patient effort);verbal cues  -     Assistive Device (Gait) walker, front-wheeled  -     Distance in Feet (Gait) 40  -     Bilateral Gait Deviations heel strike decreased  -     Comment, (Gait/Stairs) cues to pick feet up, pt tends to scoot her feet shana R  -       Row Name             Wound 12/11/23 1257 lumbar spine Incision    Wound - Properties Group Placement Date: 12/11/23  -SHILO Placement Time: 1257  -SHILO Location: lumbar spine  -SHILO Primary Wound Type: Incision  -SHILO    Retired Wound - Properties Group Placement Date: 12/11/23  -SHILO Placement Time: 1257  -SHILO Location: lumbar spine  -SHILO Primary Wound  Type: Incision  -SHILO    Retired Wound - Properties Group Date first assessed: 12/11/23  -SHILO Time first assessed: 1257  -SHILO Location: lumbar spine  -SHILO Primary Wound Type: Incision  -SHILO      Row Name 12/13/23 0851          Plan of Care Review    Plan of Care Reviewed With patient;spouse  -     Progress no change  -     Outcome Evaluation pt in chair with LSO, min assist to theresa shoes, sit-stand min assist, pt amb 40 feet min rwx, cues to pick feet up, pt tends to scoot feet shana R foot, trans back to bed min-mod, pt would benefit from rehab  -       Row Name 12/13/23 0851          Positioning and Restraints    Pre-Treatment Position sitting in chair/recliner  -     Post Treatment Position bed  -     In Bed fowlers;call light within reach;encouraged to call for assist;with family/caregiver;notified [facility]               User Key  (r) = Recorded By, (t) = Taken By, (c) = Cosigned By      Initials Name Provider Type     Tracey Sosa, PTA Physical Therapist Assistant    Gregory De Leon, RN Registered Nurse                    Physical Therapy Education       Title: PT OT SLP Therapies (In Progress)       Topic: Physical Therapy (Done)       Point: Mobility training (Done)       Learning Progress Summary             Patient Acceptance, E,TB,D, ANNAMARIE,MAXWELL,NR by  at 12/12/2023 0944    Comment: Education re: purpose of PT/importance of activity, safety/falls prevention, spinal precautions, wearing schedule of brace, proper theresa/doffing brace, improved tech w/ bed mob, tfers and gait   Family Acceptance, E,TB,D, ANNAMARIE,DU,NR by  at 12/12/2023 0944    Comment: Education re: purpose of PT/importance of activity, safety/falls prevention, spinal precautions, wearing schedule of brace, proper theresa/doffing brace, improved tech w/ bed mob, tfers and gait                         Point: Body mechanics (Done)       Learning Progress Summary             Patient Acceptance, E,TB,D, ANNAMARIE,DU,NR by  at 12/12/2023 0944    Comment:  Education re: purpose of PT/importance of activity, safety/falls prevention, spinal precautions, wearing schedule of brace, proper theresa/doffing brace, improved tech w/ bed mob, tfers and gait   Family Acceptance, E,TB,D, VU,DU,NR by  at 12/12/2023 0944    Comment: Education re: purpose of PT/importance of activity, safety/falls prevention, spinal precautions, wearing schedule of brace, proper theresa/doffing brace, improved tech w/ bed mob, tfers and gait                         Point: Precautions (Done)       Learning Progress Summary             Patient Acceptance, E,TB,D, VU,DU,NR by  at 12/12/2023 0944    Comment: Education re: purpose of PT/importance of activity, safety/falls prevention, spinal precautions, wearing schedule of brace, proper theresa/doffing brace, improved tech w/ bed mob, tfers and gait   Family Acceptance, E,TB,D, VU,DU,NR by  at 12/12/2023 0944    Comment: Education re: purpose of PT/importance of activity, safety/falls prevention, spinal precautions, wearing schedule of brace, proper theresa/doffing brace, improved tech w/ bed mob, tfers and gait                                         User Key       Initials Effective Dates Name Provider Type Discipline     08/02/18 -  Giselle Land, PT Physical Therapist PT                  PT Recommendation and Plan     Plan of Care Reviewed With: patient, spouse  Progress: no change  Outcome Evaluation: pt in chair with LSO, min assist to theresa shoes, sit-stand min assist, pt amb 40 feet min rwx, cues to pick feet up, pt tends to scoot feet shana R foot, trans back to bed min-mod, pt would benefit from rehab   Outcome Measures       Row Name 12/13/23 0900             How much help from another person do you currently need...    Turning from your back to your side while in flat bed without using bedrails? 3  -AH      Moving from lying on back to sitting on the side of a flat bed without bedrails? 3  -AH      Moving to and from a bed to a chair (including  a wheelchair)? 3  -AH      Standing up from a chair using your arms (e.g., wheelchair, bedside chair)? 3  -AH      Climbing 3-5 steps with a railing? 2  -AH      To walk in hospital room? 3  -AH      AM-PAC 6 Clicks Score (PT) 17  -      Highest Level of Mobility Goal 5 --> Static standing  -         Functional Assessment    Outcome Measure Options AM-PAC 6 Clicks Basic Mobility (PT)  -                User Key  (r) = Recorded By, (t) = Taken By, (c) = Cosigned By      Initials Name Provider Type     Tracey Sosa PTA Physical Therapist Assistant                     Time Calculation:    PT Charges       Row Name 12/13/23 0918             Time Calculation    Start Time 0851  -      Stop Time 0914  -      Time Calculation (min) 23 min  -      PT Received On 12/13/23  -         Time Calculation- PT    Total Timed Code Minutes- PT 23 minute(s)  -         Timed Charges    72504 - Gait Training Minutes  23  -         Total Minutes    Timed Charges Total Minutes 23  -AH       Total Minutes 23  -                User Key  (r) = Recorded By, (t) = Taken By, (c) = Cosigned By      Initials Name Provider Type     Tracey Sosa PTA Physical Therapist Assistant                  Therapy Charges for Today       Code Description Service Date Service Provider Modifiers Qty    68601204274 HC GAIT TRAINING EA 15 MIN 12/12/2023 Tracey Sosa PTA GP 1    57520796378 HC GAIT TRAINING EA 15 MIN 12/13/2023 Tracey Sosa PTA GP 2            PT G-Codes  Outcome Measure Options: AM-PAC 6 Clicks Basic Mobility (PT)  AM-PAC 6 Clicks Score (PT): 17  AM-PAC 6 Clicks Score (OT): 15    Tracey Sosa PTA  12/13/2023

## 2023-12-13 NOTE — PLAN OF CARE
Goal Outcome Evaluation:         Working with PT today.  at bedside. Percocet q 4. VSS. Denies any needs at this time.

## 2023-12-14 ENCOUNTER — APPOINTMENT (OUTPATIENT)
Dept: GENERAL RADIOLOGY | Facility: HOSPITAL | Age: 68
DRG: 460 | End: 2023-12-14
Payer: MEDICARE

## 2023-12-14 PROBLEM — M46.1 SACROILIITIS: Status: ACTIVE | Noted: 2023-12-08

## 2023-12-14 PROCEDURE — 25010000002 TRIAMCINOLONE PER 10 MG: Performed by: NEUROLOGICAL SURGERY

## 2023-12-14 PROCEDURE — 97530 THERAPEUTIC ACTIVITIES: CPT

## 2023-12-14 PROCEDURE — 97535 SELF CARE MNGMENT TRAINING: CPT

## 2023-12-14 PROCEDURE — 3E0U33Z INTRODUCTION OF ANTI-INFLAMMATORY INTO JOINTS, PERCUTANEOUS APPROACH: ICD-10-PCS | Performed by: NEUROLOGICAL SURGERY

## 2023-12-14 PROCEDURE — 25010000002 HEPARIN (PORCINE) PER 1000 UNITS: Performed by: NEUROLOGICAL SURGERY

## 2023-12-14 PROCEDURE — 72200 X-RAY EXAM SI JOINTS: CPT

## 2023-12-14 PROCEDURE — 99024 POSTOP FOLLOW-UP VISIT: CPT | Performed by: PHYSICIAN ASSISTANT

## 2023-12-14 PROCEDURE — 3E0U3BZ INTRODUCTION OF ANESTHETIC AGENT INTO JOINTS, PERCUTANEOUS APPROACH: ICD-10-PCS | Performed by: NEUROLOGICAL SURGERY

## 2023-12-14 PROCEDURE — 25010000002 BUPIVACAINE 0.25 % SOLUTION: Performed by: NEUROLOGICAL SURGERY

## 2023-12-14 PROCEDURE — 27096 INJECT SACROILIAC JOINT: CPT | Performed by: NEUROLOGICAL SURGERY

## 2023-12-14 PROCEDURE — 76000 FLUOROSCOPY <1 HR PHYS/QHP: CPT

## 2023-12-14 PROCEDURE — 97116 GAIT TRAINING THERAPY: CPT

## 2023-12-14 RX ORDER — BUPIVACAINE HYDROCHLORIDE 2.5 MG/ML
INJECTION, SOLUTION INFILTRATION; PERINEURAL AS NEEDED
Status: DISCONTINUED | OUTPATIENT
Start: 2023-12-14 | End: 2023-12-14 | Stop reason: HOSPADM

## 2023-12-14 RX ORDER — TRIAMCINOLONE ACETONIDE 40 MG/ML
INJECTION, SUSPENSION INTRA-ARTICULAR; INTRAMUSCULAR AS NEEDED
Status: DISCONTINUED | OUTPATIENT
Start: 2023-12-14 | End: 2023-12-14 | Stop reason: HOSPADM

## 2023-12-14 RX ADMIN — PREGABALIN 50 MG: 50 CAPSULE ORAL at 05:14

## 2023-12-14 RX ADMIN — HEPARIN SODIUM 5000 UNITS: 5000 INJECTION INTRAVENOUS; SUBCUTANEOUS at 05:13

## 2023-12-14 RX ADMIN — ORPHENADRINE CITRATE 100 MG: 100 TABLET, EXTENDED RELEASE ORAL at 20:20

## 2023-12-14 RX ADMIN — Medication 1000 UNITS: at 08:01

## 2023-12-14 RX ADMIN — FAMOTIDINE 40 MG: 20 TABLET, FILM COATED ORAL at 08:01

## 2023-12-14 RX ADMIN — OXYCODONE HYDROCHLORIDE AND ACETAMINOPHEN 1 TABLET: 5; 325 TABLET ORAL at 20:20

## 2023-12-14 RX ADMIN — ORPHENADRINE CITRATE 100 MG: 100 TABLET, EXTENDED RELEASE ORAL at 08:02

## 2023-12-14 RX ADMIN — HEPARIN SODIUM 5000 UNITS: 5000 INJECTION INTRAVENOUS; SUBCUTANEOUS at 13:09

## 2023-12-14 RX ADMIN — DOCUSATE SODIUM 50 MG AND SENNOSIDES 8.6 MG 2 TABLET: 8.6; 5 TABLET, FILM COATED ORAL at 08:01

## 2023-12-14 RX ADMIN — OXYCODONE HYDROCHLORIDE AND ACETAMINOPHEN 1 TABLET: 5; 325 TABLET ORAL at 10:59

## 2023-12-14 RX ADMIN — ATORVASTATIN CALCIUM 20 MG: 10 TABLET, FILM COATED ORAL at 20:20

## 2023-12-14 RX ADMIN — OXYCODONE HYDROCHLORIDE AND ACETAMINOPHEN 1 TABLET: 5; 325 TABLET ORAL at 01:31

## 2023-12-14 RX ADMIN — PREGABALIN 50 MG: 50 CAPSULE ORAL at 21:15

## 2023-12-14 RX ADMIN — HEPARIN SODIUM 5000 UNITS: 5000 INJECTION INTRAVENOUS; SUBCUTANEOUS at 21:15

## 2023-12-14 RX ADMIN — DOCUSATE SODIUM 50 MG AND SENNOSIDES 8.6 MG 2 TABLET: 8.6; 5 TABLET, FILM COATED ORAL at 20:20

## 2023-12-14 RX ADMIN — OXYCODONE HYDROCHLORIDE AND ACETAMINOPHEN 1 TABLET: 5; 325 TABLET ORAL at 06:01

## 2023-12-14 RX ADMIN — Medication 10 ML: at 08:02

## 2023-12-14 RX ADMIN — Medication 10 ML: at 20:20

## 2023-12-14 RX ADMIN — LIDOCAINE 2 PATCH: 700 PATCH TOPICAL at 20:19

## 2023-12-14 RX ADMIN — PREGABALIN 50 MG: 50 CAPSULE ORAL at 13:09

## 2023-12-14 NOTE — PLAN OF CARE
Goal Outcome Evaluation:  Plan of Care Reviewed With: patient, spouse        Progress: improving  Outcome Evaluation: Jordon sidelying<>sit, encouraged completion as I as possible and to defer from pulling on  d/t causing back to twist. Pt stated 2/3 spinal precautions.Toileted s/u SBA. Seated on shower bench s/u UB. LB ModA for calf down. Educated on long handled AE. Fxl mobiliy in room <> BR with CGA-Jordon needed for RW management. Pt with decreased pace coming back from BR post shower d/t fatigue and c/o 6/10 pain incisional and down RLE. Needing Jordon for LSO management. pt hopeful for d/c home with assist and outpatient therapy at [facility]. May benefit from short term rehab until increased fxl endurance and I with ADLs          Patient's belongings returned

## 2023-12-14 NOTE — PLAN OF CARE
Goal Outcome Evaluation:            Plan for bilateral SI joint injections today. Accepted to [facility], possible DC tomorrow. Showered with OT today. VSS. Denies any needs at this time.

## 2023-12-14 NOTE — BRIEF OP NOTE
SACROILIAC INJECTION  Progress Note    Sedrick Leahy  12/14/2023    Pre-op Diagnosis:   Sacroiliitis [M46.1]       Post-Op Diagnosis Codes:     * Sacroiliitis [M46.1]    Procedure/CPT® Codes:        Procedure(s):  SACROILIAC INJECTION, Bilateral              Surgeon(s):  Rick Estes DO    Anesthesia: Local    Staff:   [facility]: Manohar Whitman RN  Scrub Person: Elaine Ramon CST; April Rees         Estimated Blood Loss: none    Urine Voided: * No values recorded between 12/14/2023  3:40 PM and 12/14/2023  3:59 PM *    Specimens:                None          Drains: * No LDAs found *    Findings: None        Complications: None    Rick Estes DO     Date: 12/14/2023  Time: 16:12 CST

## 2023-12-14 NOTE — PROGRESS NOTES
[facility] Medicine Services  INPATIENT PROGRESS NOTE    Patient Name: Sedrick Leahy  Date of Admission: 12/8/2023  Today's Date: 12/14/23  Length of Stay: 6  Primary Care Physician: Kiran Madden MD    Subjective   Chief Complaint: Pain  HPI   Patient reports that the strength in her right lower extremity and right foot is slowly improving, but she continues to have a lot of pain in the lateral aspects of her back near the SI joints.  Yesterday the pain was worse on the left, and today it is worse on the right.  She does report that Dr. Estes is planning to take her back to the operating room for SI joint injections around 4 PM.  She is visiting with family at bedside.  She did seem agreeable to possible short-term swing bed placement at [facility].    Review of Systems   All pertinent negatives and positives are as above. All other systems have been reviewed and are negative unless otherwise stated.     Objective    Temp:  [97.4 °F (36.3 °C)-98.3 °F (36.8 °C)] 97.4 °F (36.3 °C)  Heart Rate:  [79-86] 79  Resp:  [16] 16  BP: (124-152)/(45-59) 137/59  Physical Exam  Vitals reviewed.   Constitutional:       General: She is not in acute distress.     Appearance: She is not toxic-appearing.   HENT:      Head: Normocephalic.      Mouth/Throat:      Mouth: Mucous membranes are moist.   Pulmonary:      Effort: Pulmonary effort is normal. No respiratory distress.   Musculoskeletal:         General: No deformity.      Comments: TTP near right SI joint   Skin:     General: Skin is warm.   Neurological:      Mental Status: She is alert and oriented to person, place, and time.      Sensory: Sensory deficit (decreased sensation right great and 2nd toe; some weakness noted with dorsi and plantarflexion but improving.) present.      Motor: Weakness present.   Psychiatric:         Mood and Affect: Mood normal.         Results Review:  I have reviewed the labs, radiology results, and  diagnostic studies.    Laboratory Data:   Results from last 7 days   Lab Units 12/11/23 0329 12/09/23 0420 12/08/23  0450   WBC 10*3/mm3 11.14* 8.15 10.07   HEMOGLOBIN g/dL 10.1* 11.0* 10.4*   HEMATOCRIT % 34.6 35.3 35.0   PLATELETS 10*3/mm3 285 311 310        Results from last 7 days   Lab Units 12/11/23 0329 12/09/23 0420 12/08/23 0450   SODIUM mmol/L 141 138 143   POTASSIUM mmol/L 4.5 4.5 3.8   CHLORIDE mmol/L 107 104 106   CO2 mmol/L 27.0 23.0 27.0   BUN mg/dL 27* 21 21   CREATININE mg/dL 0.74 0.63 0.70   CALCIUM mg/dL 8.5* 8.8 9.4   BILIRUBIN mg/dL  --   --  0.2   ALK PHOS U/L  --   --  105   ALT (SGPT) U/L  --   --  21   AST (SGOT) U/L  --   --  26   GLUCOSE mg/dL 121* 156* 106*       Culture Data:   Urine Culture   Date Value Ref Range Status   12/08/2023 No growth  Final       Radiology Data:   Imaging Results (Last 24 Hours)       ** No results found for the last 24 hours. **            I have reviewed the patient's current medications.     Assessment/Plan   Assessment  Active Hospital Problems    Diagnosis     **Intractable low back pain     Acquired spondylolisthesis     Foot drop, right     Sacroiliitis     Lumbar radiculopathy, acute        Treatment Plan  POD #3 lumbar laminectomy with interbody fusion L4-5    To OR this afternoon with Dr. Estes for SI joint injections  Physical therapy; LSO brace and would benefit from right AFO  PO Percocet PRN  Lidoderm patch, Lyrica  Monitor for urinary retention  Heparin PPx  Bowel regimen  9.   Discussed with SW.  PT and OT recs reviewed.  Patient seems agreeable to short term rehab placement at [facility] if needed; hopefully she will make better progress and be able to go home    Medical Decision Making  Number and Complexity of problems: Moderate complexity     Conditions and Status        Condition is unchanged.     MDM Data  External documents reviewed: none  Cardiac tracing (EKG, telemetry) interpretation: no new EKGs  Radiology interpretation:  no new radiology studies  Labs reviewed: as above  Any tests that were considered but not ordered: none     Decision rules/scores evaluated (example [id number], Wells, etc): none     Discussed with: Discussed with patient, spouse.       Care Planning  Shared decision making: Discussed with patient with agreement to proceed with treatment plan as outlined  Code status and discussions: Full code    Disposition  Social Determinants of Health that impact treatment or disposition: None apparent at this time  I expect the patient to be discharged to home vs. [facility]  > 1-2 days    Electronically signed by Denzel Whitman MD, 12/14/23, 10:38 CST.

## 2023-12-14 NOTE — THERAPY TREATMENT NOTE
Acute Care - Physical Therapy Treatment Note  [facility]     Patient Name: Sedrick Leahy  : 1955  MRN: [id number]  Today's Date: 2023   Onset of Illness/Injury or Date of Surgery: 23  Visit Dx:     ICD-10-CM ICD-9-CM   1. Foot drop, right  M21.371 736.79   2. Acute low back pain with bilateral sciatica, unspecified back pain laterality  M54.42 724.2    M54.41 724.3   3. Central stenosis of spinal canal  M48.00 724.00   4. Bilateral leg weakness  R29.898 729.89   5. Bilateral leg numbness  R20.0 782.0   6. Acute cystitis with hematuria  N30.01 595.0   7. Acquired spondylolisthesis  M43.10 738.4   8. Gait abnormality [R26.9]  R26.9 781.2   9. Gait difficulty [R26.9]  R26.9 781.2   10. Sacroiliitis  M46.1 720.2     Patient Active Problem List   Diagnosis    Wellness examination-done    Abnormal x-ray-resolved    Anemia: iron,    Chronic neck pain    Chronic back pain-linberg    Depression    Anxiety    History of kidney stones    Vitamin D deficiency    Hypertension-no rx    Hyperlipidemia-diet    Hematuria-kupper    Elevated fasting glucose    Nausea-problem with nissen    History of Nissen fundoplication    Lumbar radiculopathy, acute    Urinary incontinence    Laboratory test*    Chronic narcotic use-Nazario    Menopause: see osteopenia    Palpitations: holter benign    Rash    Hyperplastic colonic polyp    Positive colorectal cancer screening using Cologuard test    Family history of colon cancer in mother    Epigastric pain    Dark stools    Type 2 diabetes mellitus without complication, without long-term current use of insulin    OsteopeniaL 2020 ? 2y    Stenosis, cervical spine    Gastrointestinal hemorrhage    Class 1 obesity due to excess calories with serious comorbidity and body mass index (BMI) of 32.0 to 32.9 in adult    Degeneration of lumbar or lumbosacral intervertebral disc    Intractable low back pain    Former smoker    Murmur    Gait difficulty    Memory loss    Abnormal  laboratory test    Cerebral microvascular disease    Insomnia    Acquired spondylolisthesis    Foot drop, right    Sacroiliitis     Past Medical History:   Diagnosis Date    Arthritis     Diabetes mellitus     diet controlled    Hx of colonic polyp     Hypertension     Joint pain     Hip    Lower back pain     Neck pain     PONV (postoperative nausea and vomiting)      Past Surgical History:   Procedure Laterality Date    ANTERIOR CERVICAL DISCECTOMY W/ FUSION N/A 11/20/2020    Procedure: EXPLORATION OF FUSION C5-6, ANTERIOR CERVICAL DISCECTOMY FUSION C4-5, C6-7 REVISON ANTERIOR FUSION C5-6 WITH INSTRUMENTATION C4-7;  Surgeon: KEN Gilbert MD;  Location:  [facility];  Service: Orthopedic Spine;  Laterality: N/A;    APPENDECTOMY      COLONOSCOPY  06/06/2016    Normal exam repeat in 5 years    COLONOSCOPY N/A 01/14/2020    Diverticulosis repeat exam in 5 years    COLONOSCOPY W/ POLYPECTOMY  04/04/2012    Hyperplastic polyp cecum repeat exam in 1 year    ENDOSCOPY  08/14/2014    Very tortuous distal esophagus dilated 50 Fr, HH     ENDOSCOPY N/A 01/18/2021    A fundoplication was found, dilated    ENDOSCOPY  06/2023    HARDWARE REMOVAL N/A 11/20/2020    Procedure: REMOVAL OF INSTRUMENTATION;  Surgeon: KEN Gilbert MD;  Location:  [facility];  Service: Orthopedic Spine;  Laterality: N/A;    HYSTERECTOMY      Laparoscopic Hysterectomy bilateral salpingectomy for bleeding    NECK SURGERY      NISSEN FUNDOPLICATION      POSTERIOR CERVICAL FUSION N/A 12/03/2020    Procedure: POSTERIOR SPINAL FUSION WITH INSTRUMENTATION C4-7;  Surgeon: KEN Gilbert MD;  Location:  [facility];  Service: Orthopedic Spine;  Laterality: N/A;    STEROID INJECTION Left 06/30/2022    Procedure: LEFT HIP FLUROSCOPIC GUIDED CORTICOSTEROID INJECTION;  Surgeon: Herberth Skelton MD;  Location:  [facility];  Service: Orthopedics;  Laterality: Left;    US GUIDED LYMPH NODE BIOPSY  08/03/2020     PT Assessment (last 12 hours)       PT Evaluation  and Treatment       Row Name 12/14/23 [facility]          Physical Therapy Time and Intention    Subjective Information complains of;weakness;fatigue;pain  -     Document Type therapy note (daily note)  -     Mode of Treatment physical therapy  -AH       Row Name 12/14/23 1127          General Information    Existing Precautions/Restrictions brace worn when out of bed;fall;spinal  -AH       Row Name 12/14/23 [facility]          Pain    Pretreatment Pain Rating 5/10  -     Posttreatment Pain Rating 6/10  -     Pain Location - back  -     Pre/Posttreatment Pain Comment R hip  -     Pain Intervention(s) Medication (See MAR);Repositioned;Ambulation/increased activity  -AH       Row Name 12/14/23 [facility]          Bed Mobility    Bed Mobility sidelying-sit;sit-sidelying  -     Sidelying-Sit [place] (Bed Mobility) minimum assist (75% patient effort);verbal cues  -     Sit-Sidelying [place] (Bed Mobility) minimum assist (75% patient effort);moderate assist (50% patient effort);verbal cues  -AH       Row Name 12/14/23 [facility]          Transfers    Transfers toilet transfer  -AH       Row Name 12/14/23 [facility]          Sit-Stand Transfer    Sit-Stand [place] (Transfers) minimum assist (75% patient effort);verbal cues  -AH       Row Name 12/14/23 [facility]          Stand-Sit Transfer    Stand-Sit [place] (Transfers) minimum assist (75% patient effort);verbal cues  -AH       Row Name 12/14/23 [facility]          Toilet Transfer    [place] Level (Toilet Transfer) minimum assist (75% patient effort);verbal cues  -     Assistive Device (Toilet Transfer) commode;grab bars/safety frame  -AH       Row Name 12/14/23 [facility]          Gait/Stairs (Locomotion)    [place] Level (Gait) minimum assist (75% patient effort);verbal cues  -     Assistive Device (Gait) walker, front-wheeled  -     Distance in Feet (Gait) 40  -     Bilateral Gait Deviations heel strike decreased  -AH       Row Name             Wound 12/11/23  1257 lumbar spine Incision    Wound - Properties Group Placement Date: 12/11/23  -SHILO Placement Time: 1257  -SHILO Location: lumbar spine  -SHILO Primary Wound Type: Incision  -SHILO    Retired Wound - Properties Group Placement Date: 12/11/23  -SHILO Placement Time: 1257  -SHILO Location: lumbar spine  -SHILO Primary Wound Type: Incision  -SHILO    Retired Wound - Properties Group Date first assessed: 12/11/23  -SHILO Time first assessed: 1257  -SHILO Location: lumbar spine  -SHILO Primary Wound Type: Incision  -SHILO      Row Name 12/14/23 1127          Plan of Care Review    Plan of Care Reviewed With patient;spouse;family  -     Progress no change  -     Outcome Evaluation pt trans to EOB min assist, min assist to theresa LSO, sit-stand min-mod, amb 15 feet to bathroom rwx min assist, toilet trans min, pt amb 40 feet rwx min assist, cues to pick feet up, shana R, trans back to bed min assist, pt would benefit from rehab  -       Row Name 12/14/23 1127          Positioning and Restraints    Pre-Treatment Position in bed  -     Post Treatment Position bed  -     In Bed fowlers;call light within reach;encouraged to call for assist;with family/caregiver  -               User Key  (r) = Recorded By, (t) = Taken By, (c) = Cosigned By      Initials Name Provider Type     Tracey Sosa PTA Physical Therapist Assistant    Gregory De Leon, RN Registered Nurse                    Physical Therapy Education       Title: PT OT SLP Therapies (Done)       Topic: Physical Therapy (Done)       Point: Mobility training (Done)       Learning Progress Summary             Patient Acceptance, E,TB, VU by KRISTEN at 12/14/2023 0920    Acceptance, E,TB, VU by KRISTEN at 12/13/2023 0928    Acceptance, E,TB,D, VU,DU,NR by SEBASTIÁN at 12/12/2023 0944    Comment: Education re: purpose of PT/importance of activity, safety/falls prevention, spinal precautions, wearing schedule of brace, proper theresa/doffing brace, improved tech w/ bed mob, tfers and gait   Family Acceptance,  E,TB,D, VU,DU,NR by  at 12/12/2023 0944    Comment: Education re: purpose of PT/importance of activity, safety/falls prevention, spinal precautions, wearing schedule of brace, proper theresa/doffing brace, improved tech w/ bed mob, tfers and gait                         Point: Body mechanics (Done)       Learning Progress Summary             Patient Acceptance, E,TB, VU by  at 12/14/2023 0920    Acceptance, E,TB, VU by  at 12/13/2023 0928    Acceptance, E,TB,D, VU,DU,NR by  at 12/12/2023 0944    Comment: Education re: purpose of PT/importance of activity, safety/falls prevention, spinal precautions, wearing schedule of brace, proper theresa/doffing brace, improved tech w/ bed mob, tfers and gait   Family Acceptance, E,TB,D, VU,DU,NR by  at 12/12/2023 0944    Comment: Education re: purpose of PT/importance of activity, safety/falls prevention, spinal precautions, wearing schedule of brace, proper theresa/doffing brace, improved tech w/ bed mob, tfers and gait                         Point: Precautions (Done)       Learning Progress Summary             Patient Acceptance, E,TB, VU by  at 12/14/2023 0920    Acceptance, E,TB, VU by  at 12/13/2023 0928    Acceptance, E,TB,D, VU,DU,NR by  at 12/12/2023 0944    Comment: Education re: purpose of PT/importance of activity, safety/falls prevention, spinal precautions, wearing schedule of brace, proper theresa/doffing brace, improved tech w/ bed mob, tfers and gait   Family Acceptance, E,TB,D, VU,DU,NR by  at 12/12/2023 0944    Comment: Education re: purpose of PT/importance of activity, safety/falls prevention, spinal precautions, wearing schedule of brace, proper theresa/doffing brace, improved tech w/ bed mob, tfers and gait                                         User Key       Initials Effective Dates Name Provider Type Discipline     08/02/18 -  Giselle Land, PT Physical Therapist PT     05/22/23 -  Jason Grissom, RN Registered Nurse Nurse                  PT  Recommendation and Plan     Plan of Care Reviewed With: patient, spouse, family  Progress: no change  Outcome Evaluation: pt trans to EOB min assist, min assist to theresa LSO, sit-stand min-mod, amb 15 feet to bathroom rwx min assist, toilet trans min, pt amb 40 feet rwx min assist, cues to pick feet up, shana R, trans back to bed min assist, pt would benefit from rehab   Outcome Measures       Row Name 12/14/23 1100 12/13/23 0900          How much help from another person do you currently need...    Turning from your back to your side while in flat bed without using bedrails? 3  -AH 3  -AH     Moving from lying on back to sitting on the side of a flat bed without bedrails? 3  -AH 3  -AH     Moving to and from a bed to a chair (including a wheelchair)? 3  -AH 3  -AH     Standing up from a chair using your arms (e.g., wheelchair, bedside chair)? 3  -AH 3  -AH     Climbing 3-5 steps with a railing? 2  -AH 2  -AH     To walk in hospital room? 3  -AH 3  -AH     AM-PAC 6 Clicks Score (PT) 17  -AH 17  -AH     Highest Level of Mobility Goal 5 --> Static standing  -AH 5 --> Static standing  -AH        Functional Assessment    Outcome Measure Options AM-PAC 6 Clicks Basic Mobility (PT)  - AM-PAC 6 Clicks Basic Mobility (PT)  -               User Key  (r) = Recorded By, (t) = Taken By, (c) = Cosigned By      Initials Name Provider Type     Tracey Sosa, PTA Physical Therapist Assistant                     Time Calculation:    PT Charges       Row Name 12/14/23 1159             Time Calculation    Start Time 1127  -      Stop Time 1153  -      Time Calculation (min) 26 min  -      PT Received On 12/14/23  -         Time Calculation- PT    Total Timed Code Minutes- PT 26 minute(s)  -         Timed Charges    33448 - Gait Training Minutes  26  -AH         Total Minutes    Timed Charges Total Minutes 26  -AH       Total Minutes 26  -                User Key  (r) = Recorded By, (t) = Taken By, (c) = Cosigned By       Initials Name Provider Type     Tracey Sosa PTA Physical Therapist Assistant                  Therapy Charges for Today       Code Description Service Date Service Provider Modifiers Qty    14686442064 HC GAIT TRAINING EA 15 MIN 12/13/2023 Tracey Sosa, KADIE GP 2    92865808264 HC GAIT TRAINING EA 15 MIN 12/14/2023 Tracey Sosa PTA GP 2            PT G-Codes  Outcome Measure Options: AM-PAC 6 Clicks Basic Mobility (PT)  AM-PAC 6 Clicks Score (PT): 17  AM-PAC 6 Clicks Score (OT): 15    Tracey Sosa PTA  12/14/2023

## 2023-12-14 NOTE — THERAPY TREATMENT NOTE
Patient Name: Sedrick Leahy  : 1955    MRN: [id number]                              Today's Date: 2023       Admit Date: 2023    Visit Dx: Therapist utilized gait belt, applied non-slipped socks, provided fall risk education/prevention, & facilitated muscle strengthening PRN to reduce patient falls risk during this session.      ICD-10-CM ICD-9-CM   1. Foot drop, right  M21.371 736.79   2. Acute low back pain with bilateral sciatica, unspecified back pain laterality  M54.42 724.2    M54.41 724.3   3. Central stenosis of spinal canal  M48.00 724.00   4. Bilateral leg weakness  R29.898 729.89   5. Bilateral leg numbness  R20.0 782.0   6. Acute cystitis with hematuria  N30.01 595.0   7. Acquired spondylolisthesis  M43.10 738.4   8. Gait abnormality [R26.9]  R26.9 781.2   9. Gait difficulty [R26.9]  R26.9 781.2   10. Sacroiliitis  M46.1 720.2     Patient Active Problem List   Diagnosis    Wellness examination-done    Abnormal x-ray-resolved    Anemia: iron,    Chronic neck pain    Chronic back pain-linberg    Depression    Anxiety    History of kidney stones    Vitamin D deficiency    Hypertension-no rx    Hyperlipidemia-diet    Hematuria-kupper    Elevated fasting glucose    Nausea-problem with nissen    History of Nissen fundoplication    Lumbar radiculopathy, acute    Urinary incontinence    Laboratory test*    Chronic narcotic use-Shereeberg    Menopause: see osteopenia    Palpitations: holter benign    Rash    Hyperplastic colonic polyp    Positive colorectal cancer screening using Cologuard test    Family history of colon cancer in mother    Epigastric pain    Dark stools    Type 2 diabetes mellitus without complication, without long-term current use of insulin    OsteopeniaL 2020 ? 2y    Stenosis, cervical spine    Gastrointestinal hemorrhage    Class 1 obesity due to excess calories with serious comorbidity and body mass index (BMI) of 32.0 to 32.9 in adult    Degeneration of lumbar or  lumbosacral intervertebral disc    Intractable low back pain    Former smoker    Murmur    Gait difficulty    Memory loss    Abnormal laboratory test    Cerebral microvascular disease    Insomnia    Acquired spondylolisthesis    Foot drop, right    Sacroiliitis     Past Medical History:   Diagnosis Date    Arthritis     Diabetes mellitus     diet controlled    Hx of colonic polyp     Hypertension     Joint pain     Hip    Lower back pain     Neck pain     PONV (postoperative nausea and vomiting)      Past Surgical History:   Procedure Laterality Date    ANTERIOR CERVICAL DISCECTOMY W/ FUSION N/A 11/20/2020    Procedure: EXPLORATION OF FUSION C5-6, ANTERIOR CERVICAL DISCECTOMY FUSION C4-5, C6-7 REVISON ANTERIOR FUSION C5-6 WITH INSTRUMENTATION C4-7;  Surgeon: KEN Gilbert MD;  Location:  [facility] OR;  Service: Orthopedic Spine;  Laterality: N/A;    APPENDECTOMY      COLONOSCOPY  06/06/2016    Normal exam repeat in 5 years    COLONOSCOPY N/A 01/14/2020    Diverticulosis repeat exam in 5 years    COLONOSCOPY W/ POLYPECTOMY  04/04/2012    Hyperplastic polyp cecum repeat exam in 1 year    ENDOSCOPY  08/14/2014    Very tortuous distal esophagus dilated 50 Fr, HH     ENDOSCOPY N/A 01/18/2021    A fundoplication was found, dilated    ENDOSCOPY  06/2023    HARDWARE REMOVAL N/A 11/20/2020    Procedure: REMOVAL OF INSTRUMENTATION;  Surgeon: KEN Gilbert MD;  Location:  [facility];  Service: Orthopedic Spine;  Laterality: N/A;    HYSTERECTOMY      Laparoscopic Hysterectomy bilateral salpingectomy for bleeding    NECK SURGERY      NISSEN FUNDOPLICATION      POSTERIOR CERVICAL FUSION N/A 12/03/2020    Procedure: POSTERIOR SPINAL FUSION WITH INSTRUMENTATION C4-7;  Surgeon: KEN Gilbert MD;  Location:  [facility];  Service: Orthopedic Spine;  Laterality: N/A;    STEROID INJECTION Left 06/30/2022    Procedure: LEFT HIP FLUROSCOPIC GUIDED CORTICOSTEROID INJECTION;  Surgeon: Herberth Skelton MD;  Location:  [facility] OR;   Service: Orthopedics;  Laterality: Left;    US GUIDED LYMPH NODE BIOPSY  08/03/2020      General Information       Row Name 12/14/23 1351          OT Time and Intention    Document Type therapy note (daily note)  -MT     Mode of Treatment occupational therapy  -MT       Row Name 12/14/23 [facility]          General Information    Patient Profile Reviewed yes  -MT     Existing Precautions/Restrictions brace worn when out of bed;fall;spinal  LSO  -MT       Row Name 12/14/23 1351          Cognition    Orientation Status (Cognition) oriented x 4  -MT       Row Name 12/14/23 [facility]          Safety Issues, Functional Mobility    Impairments Affecting Function (Mobility) balance;endurance/activity tolerance;pain;strength  -MT               User Key  (r) = Recorded By, (t) = Taken By, (c) = Cosigned By      Initials Name Provider Type    MT Sobia Willett COTA Occupational Therapist Assistant                     Mobility/ADL's       Row Name 12/14/23 [facility]          Bed Mobility    Rolling Left Pitman (Bed Mobility) minimum assist (75% patient effort);verbal cues  -MT     Rolling Right Pitman (Bed Mobility) minimum assist (75% patient effort);verbal cues  -MT     Sidelying-Sit Pitman (Bed Mobility) minimum assist (75% patient effort);verbal cues  -MT     Sit-Sidelying Pitman (Bed Mobility) minimum assist (75% patient effort);moderate assist (50% patient effort);verbal cues  -MT     Comment, (Bed Mobility) practiced with bed flat  -MT       Row Name 12/14/23 1351          Transfers    Transfers sit-stand transfer;stand-sit transfer;toilet transfer  -MT       Row Name 12/14/23 [facility]          Sit-Stand Transfer    Sit-Stand Pitman (Transfers) minimum assist (75% patient effort);verbal cues  -MT       Row Name 12/14/23 [facility]          Stand-Sit Transfer    Stand-Sit Pitman (Transfers) minimum assist (75% patient effort);verbal cues  -MT     Comment, (Stand-Sit Transfer) to control  -MT       Row Name  12/14/23 [facility]          Toilet Transfer    Type (Toilet Transfer) sit-stand;stand-sit  -MT     Grady Level (Toilet Transfer) contact guard;minimum assist (75% patient effort)  -MT     Assistive Device (Toilet Transfer) commode;grab bars/safety frame  -MT       Row Name 12/14/23 [facility]          Functional Mobility    Functional Mobility- Ind. Level contact guard assist;verbal cues required  -MT     Functional Mobility- Device walker, front-wheeled  -MT     Functional Mobility- Comment in room <> BR with CGA-Sebastian needed for RW management. Pt with decreased pace coming back from BR post shower d/t fatigue and c/o 6/10 pain incisional and down RLE  -MT       Row Name 12/14/23 [facility]          Activities of Daily Living    BADL Assessment/Intervention bathing;upper body dressing;lower body dressing;feeding;grooming;toileting  -MT       Row Name 12/14/23 [facility]          Lower Body Dressing Assessment/Training    Comment, (Lower Body Dressing) socks depA  -MT       Row Name 12/14/23 [facility]          Shower Transfer    Comment, (Shower Transfer) CGA-Sebastian  -MT       Row Name 12/14/23 [facility]          Bathing Assessment/Intervention    Comment, (Bathing) modA LB. s/u UB  -MT       Row Name 12/14/23 [facility]          Upper Body Dressing Assessment/Training    Comment, (Upper Body Dressing) Sebastian LSO  -MT       Row Name 12/14/23 [facility]          Grooming Assessment/Training    Comment, (Grooming) I with hair care needed RBs d/t fatigue overhead and incisional pain 6/10  -MT       Row Name 12/14/23 [facility]          Toileting Assessment/Training    Comment, (Toileting) SBA seated  -MT               User Key  (r) = Recorded By, (t) = Taken By, (c) = Cosigned By      Initials Name Provider Type    MT Sobia Willett COTA Occupational Therapist Assistant                   Obj/Interventions    No documentation.                  Goals/Plan    No documentation.                  Clinical Impression       Row Name 12/14/23 [facility]          Pain Assessment     Pretreatment Pain Rating 2/10  -MT     Posttreatment Pain Rating 6/10  -MT       Row Name 12/14/23 1351          Therapy Plan Review/Discharge Plan (OT)    Anticipated Discharge Disposition (OT) sub acute care setting  -MT       Row Name 12/14/23 1351          Positioning and Restraints    Pre-Treatment Position in bed  -MT     Post Treatment Position bed  -MT     In Bed with nsg  PCT  -MT               User Key  (r) = Recorded By, (t) = Taken By, (c) = Cosigned By      Initials Name Provider Type    MT Sobia Willett COTA Occupational Therapist Assistant                   Outcome Measures       Row Name 12/14/23 1351          How much help from another is currently needed...    Putting on and taking off regular lower body clothing? 2  -MT     Bathing (including washing, rinsing, and drying) 2  -MT     Toileting (which includes using toilet bed pan or urinal) 3  -MT     Putting on and taking off regular upper body clothing 3  -MT     Taking care of personal grooming (such as brushing teeth) 3  -MT     Eating meals 4  -MT     AM-PAC 6 Clicks Score (OT) 17  -MT       Row Name 12/14/23 1100 12/14/23 0800       How much help from another person do you currently need...    Turning from your back to your side while in flat bed without using bedrails? 3  -AH 3  -GW    Moving from lying on back to sitting on the side of a flat bed without bedrails? 3  - 3  -GW    Moving to and from a bed to a chair (including a wheelchair)? 3  - 3  -GW    Standing up from a chair using your arms (e.g., wheelchair, bedside chair)? 3  - 3  -GW    Climbing 3-5 steps with a railing? 2  - 3  -GW    To walk in hospital room? 3  -AH 3  -GW    AM-PAC 6 Clicks Score (PT) 17  -AH 18  -GW    Highest Level of Mobility Goal 5 --> Static standing  - 6 --> Walk 10 steps or more  -      Row Name 12/14/23 1100          Functional Assessment    Outcome Measure Options AM-PAC 6 Clicks Basic Mobility (PT)  -               User Key  (r) =  Recorded By, (t) = Taken By, (c) = Cosigned By      Initials Name Provider Type    Tracey Ernst PTA Physical Therapist Assistant    Sobia Dawn COTA Occupational Therapist Assistant    Jason Maxwell, RN Registered Nurse                    Occupational Therapy Education       Title: PT OT SLP Therapies (Done)       Topic: Occupational Therapy (Done)       Point: ADL training (Done)       Description:   Instruct learner(s) on proper safety adaptation and remediation techniques during self care or transfers.   Instruct in proper use of assistive devices.                  Learning Progress Summary             Patient Acceptance, E,TB, VU by  at 12/14/2023 0920    Acceptance, E,TB, VU by GW at 12/13/2023 0928    Acceptance, E,D, VU,NR by LR at 12/12/2023 0946   Significant Other Acceptance, E,D, VU,NR by LR at 12/12/2023 0946                         Point: Home exercise program (Done)       Description:   Instruct learner(s) on appropriate technique for monitoring, assisting and/or progressing therapeutic exercises/activities.                  Learning Progress Summary             Patient Acceptance, E,TB, VU by KRISTEN at 12/14/2023 0920    Acceptance, E,TB, VU by  at 12/13/2023 0928                         Point: Precautions (Done)       Description:   Instruct learner(s) on prescribed precautions during self-care and functional transfers.                  Learning Progress Summary             Patient Acceptance, E,TB, VU by  at 12/14/2023 0920    Acceptance, E,TB, VU by  at 12/13/2023 0928    Acceptance, E,D, VU,NR by LR at 12/12/2023 0946   Significant Other Acceptance, E,D, VU,NR by LR at 12/12/2023 0946                         Point: Body mechanics (Done)       Description:   Instruct learner(s) on proper positioning and spine alignment during self-care, functional mobility activities and/or exercises.                  Learning Progress Summary             Patient Acceptance, E,TB, VU by  at  12/14/2023 0920    Acceptance, E,TB, VU by  at 12/13/2023 0928    Acceptance, E,D, VU,NR by LR at 12/12/2023 0946   Significant Other Acceptance, E,D, VU,NR by LR at 12/12/2023 0946                                         User Key       Initials Effective Dates Name Provider Type Discipline     05/22/23 -  Jason Grissom, RN Registered Nurse Nurse    LR 04/25/23 -  Tanisha Castro, OTR/L Occupational Therapist OT                  OT Recommendation and Plan     Plan of Care Review  Plan of Care Reviewed With: patient, spouse  Progress: improving  Outcome Evaluation: Jordon sidelying<>sit, encouraged completion as I as possible and to defer from pulling on  d/t causing back to twist. Pt stated 2/3 spinal precautions.Toileted s/u SBA. Seated on shower bench s/u UB. LB ModA for calf down. Educated on long handled AE. Fxl mobiliy in room <> BR with CGA-Jordon needed for RW management. Pt with decreased pace coming back from BR post shower d/t fatigue and c/o 6/10 pain incisional and down RLE. Needing Jordon for LSO management. pt hopeful for d/c home with assist and outpatient therapy at [facility]. May benefit from short term rehab until increased fxl endurance and I with ADLs     Time Calculation:         Time Calculation- OT       Row Name 12/14/23 1351 12/14/23 1159          Time Calculation- OT    OT Start Time 1351  -MT --     OT Stop Time 1514  -MT --     OT Time Calculation (min) 83 min  -MT --     Total Timed Code Minutes- OT 83 minute(s)  -MT --     OT Received On 12/14/23  -MT --        Timed Charges    37158 - Gait Training Minutes  -- 26  -AH     09820 - OT Therapeutic Activity Minutes 10  -MT --     35300 - OT Self Care/Mgmt Minutes 73  -MT --        Total Minutes    Timed Charges Total Minutes 83  -MT 26  -AH      Total Minutes 83  -MT 26  -AH               User Key  (r) = Recorded By, (t) = Taken By, (c) = Cosigned By      Initials Name Provider Type     Tracey Sosa, PTA Physical  Therapist Assistant    Sobia Dawn COTA Occupational Therapist Assistant                  Therapy Charges for Today       Code Description Service Date Service Provider Modifiers Qty    48475424061 HC OT THERAPEUTIC ACT EA 15 MIN 12/14/2023 Sobia Willett COTA GO 1    17835668209 HC OT SELF CARE/MGMT/TRAIN EA 15 MIN 12/14/2023 Sobia Willett COTA GO 5                 MAC Marcelo  12/14/2023

## 2023-12-14 NOTE — OP NOTE
SACROILIAC INJECTION  Progress Note    Sedrick Leahy  12/14/2023    Pre-op Diagnosis:   Sacroiliitis [M46.1]       Post-Op Diagnosis Codes:     * Sacroiliitis [M46.1]    Procedure/CPT® Codes:        Procedure(s):  SACROILIAC INJECTION, Bilateral              Surgeon(s):  Rick Estes,     Anesthesia: Local    Staff:   Circulator: Manohar Whitman RN  Scrub Person: Elaine Ramon CST; April Rees         Estimated Blood Loss: none    Urine Voided: * No values recorded between 12/14/2023  3:40 PM and 12/14/2023  3:59 PM *    Specimens:                None          Drains: * No LDAs found *    Findings: None        Complications: None    Patient is a 68-year-old female who was brought in through the ER with severe back pain and was unable to walk and having great difficulty even sitting or turning over in bed.  On imaging studies she did have a spondylolisthesis at L4/5 and also unfortunately had dorsiflexion weakness on the right with an L4 radiculopathy.  She did however have significant SI type pain although it was thought to best treat the foot drop issue with the spondylolisthesis for which she went to surgery and had a transforaminal lumbar interbody fusion.  The leg pain did improve and the dorsiflexion weakness also improved however she still has the sacroiliac type pain that we see with inflamed sacroiliac joints.  Because of this I decided to take her to the OR and under local anesthetic injected both of her sacroiliac joints under fluoroscopy with Marcaine and Kenalog.  Oblique fluoroscopy we used a 5 gauge needle and a 3 cc syringe filled with 1/2 cc of .25%percent Marcaine and 1/2 cc of Kenalog.  We were able to use fluoroscopy to guide the 25-gauge needle into each sacroiliac joint and inject the Marcaine and Kenalog solution.  Patient tolerated procedure well and was taken to postoperative recovery in stable condition.

## 2023-12-14 NOTE — PLAN OF CARE
Goal Outcome Evaluation:  Plan of Care Reviewed With: other (see comments)        Progress: improving  Outcome Evaluation: Pt is POD #3 for lumbar laminectomy with interbody fusion of L4-5. She went back to OR this afternoon for SI injection. She was not in her room at time of RD visit. She is on a regular diet. She has consumed 56% of the last 4 meals. No sig weigh changes noted. She is planning to go to [facility] upon d/c soon. Cont to follow.

## 2023-12-14 NOTE — PLAN OF CARE
Goal Outcome Evaluation:  Plan of Care Reviewed With: patient, spouse, family        Progress: no change  Outcome Evaluation: pt trans to EOB min assist, min assist to theresa LSO, sit-stand min-mod, amb 15 feet to bathroom rwx min assist, toilet trans min, pt amb 40 feet rwx min assist, cues to pick feet up, shana R, trans back to bed min assist, pt would benefit from rehab

## 2023-12-14 NOTE — PROGRESS NOTES
"Sedrick Leahy  68 y.o.      Chief complaint:   Back pain    Subjective:  Symptoms:  Stable.  She reports weakness.  No shortness of breath, chest pain, chest pressure or diarrhea.    Diet:  Adequate intake.  No nausea or vomiting.    Activity level: Impaired due to pain.    Pain:  She complains of pain that is severe.  Pain is partially controlled.      POD #3, S/P L4/5 TLIF. Having quite a bit of pain currently right > left buttock.  She was medicated a few minutes ago.  She is laying in bed and is able to lay on her left side.  Most of the pain is in the SI joint region. No radiating pain to the lower extremities or groin. She has some numbness and tingling to the anterior thigh.  She walked yesterday afternoon with therapy and feels that her right lower extremity, especially the foot is getting a bit stronger.  No fevers overnight.  She denies any chest pain, shortness of breath and new radicular complaints.    Temp:  [97.4 °F (36.3 °C)-98.3 °F (36.8 °C)] 97.4 °F (36.3 °C)  Heart Rate:  [79-86] 79  Resp:  [16] 16  BP: (124-152)/(45-59) 137/59      Objective:  General Appearance:  In pain.    Vital signs: (most recent): Blood pressure 137/59, pulse 79, temperature 97.4 °F (36.3 °C), temperature source Oral, resp. rate 16, height 162.6 cm (64\"), weight 81.2 kg (179 lb), SpO2 92%, not currently breastfeeding.  Vital signs are normal.  No fever.    Output: Producing urine.    Lungs:  Normal effort.    Heart: Normal rate.    Skin:  Warm and cool.    Abdomen: Abdomen is soft.    Pulses: Distal pulses are intact.    There is exquisite tenderness bilateral SI joints, Right > Left    Neurologic Exam     Mental Status   Oriented to person, place, and time.   Level of consciousness: alert  Knowledge: good.     Motor Exam   Muscle bulk: normal  Overall muscle tone: normalThere is mild right foot dorsiflexion weakness but is significantly improved.  No EHL weakness.  Hip flexion produces pain in the SI joint on the right. "     Sensory Exam   Decreased sensation to light touch right anterior thigh.       Lab Results (last 24 hours)       ** No results found for the last 24 hours. **                Plan:   Mrs. Leahy's right foot weakness is improving. She is still having significant pain bilateral SI joints, right > left. I conferred with Dr Estes and we feel that she has had some sacroiliitis since presentation to the ER for pain. Surgery has improved radicular pain and her weakness is improving.   Dr. Estes would like to take patient to the OR later today for bilateral SI joint injections to see if we can get her pain under control.  The patient and her  are in agreement.  We will do this with local anesthetic.  Continue pain meds and DVT prophylaxis as current.    Sacroiliitis    Intractable low back pain    Lumbar radiculopathy, acute    Acquired spondylolisthesis    Foot drop, right        Meshia K DERICK Ramos

## 2023-12-15 ENCOUNTER — OUTSIDE FACILITY SERVICE (OUTPATIENT)
Dept: FAMILY MEDICINE CLINIC | Facility: CLINIC | Age: 68
End: 2023-12-15
Payer: MEDICARE

## 2023-12-15 VITALS
TEMPERATURE: 98.1 F | HEIGHT: 64 IN | RESPIRATION RATE: 16 BRPM | BODY MASS INDEX: 30.56 KG/M2 | HEART RATE: 78 BPM | OXYGEN SATURATION: 95 % | SYSTOLIC BLOOD PRESSURE: 148 MMHG | WEIGHT: 179 LBS | DIASTOLIC BLOOD PRESSURE: 57 MMHG

## 2023-12-15 LAB
ALBUMIN SERPL-MCNC: 3.3 G/DL (ref 3.5–5.2)
ALBUMIN/GLOB SERPL: 1.1 G/DL
ALP SERPL-CCNC: 94 U/L (ref 39–117)
ALT SERPL W P-5'-P-CCNC: 22 U/L (ref 1–33)
ANION GAP SERPL CALCULATED.3IONS-SCNC: 8 MMOL/L (ref 5–15)
AST SERPL-CCNC: 34 U/L (ref 1–32)
BILIRUB SERPL-MCNC: 0.3 MG/DL (ref 0–1.2)
BUN SERPL-MCNC: 13 MG/DL (ref 8–23)
BUN/CREAT SERPL: 22 (ref 7–25)
CALCIUM SPEC-SCNC: 9 MG/DL (ref 8.6–10.5)
CHLORIDE SERPL-SCNC: 103 MMOL/L (ref 98–107)
CO2 SERPL-SCNC: 25 MMOL/L (ref 22–29)
CREAT SERPL-MCNC: 0.59 MG/DL (ref 0.57–1)
DEPRECATED RDW RBC AUTO: 58.5 FL (ref 37–54)
EGFRCR SERPLBLD CKD-EPI 2021: 97.7 ML/MIN/1.73
ERYTHROCYTE [DISTWIDTH] IN BLOOD BY AUTOMATED COUNT: 18.1 % (ref 12.3–15.4)
GLOBULIN UR ELPH-MCNC: 3 GM/DL
GLUCOSE SERPL-MCNC: 107 MG/DL (ref 65–99)
HCT VFR BLD AUTO: 32.3 % (ref 34–46.6)
HGB BLD-MCNC: 9.7 G/DL (ref 12–15.9)
MCH RBC QN AUTO: 26.6 PG (ref 26.6–33)
MCHC RBC AUTO-ENTMCNC: 30 G/DL (ref 31.5–35.7)
MCV RBC AUTO: 88.5 FL (ref 79–97)
PLATELET # BLD AUTO: 308 10*3/MM3 (ref 140–450)
PMV BLD AUTO: 10.5 FL (ref 6–12)
POTASSIUM SERPL-SCNC: 4.2 MMOL/L (ref 3.5–5.2)
PROT SERPL-MCNC: 6.3 G/DL (ref 6–8.5)
RBC # BLD AUTO: 3.65 10*6/MM3 (ref 3.77–5.28)
SARS-COV-2 RNA RESP QL NAA+PROBE: NOT DETECTED
SODIUM SERPL-SCNC: 136 MMOL/L (ref 136–145)
WBC NRBC COR # BLD AUTO: 11.79 10*3/MM3 (ref 3.4–10.8)

## 2023-12-15 PROCEDURE — 25010000002 HEPARIN (PORCINE) PER 1000 UNITS: Performed by: NEUROLOGICAL SURGERY

## 2023-12-15 PROCEDURE — 99024 POSTOP FOLLOW-UP VISIT: CPT | Performed by: PHYSICIAN ASSISTANT

## 2023-12-15 PROCEDURE — 97535 SELF CARE MNGMENT TRAINING: CPT

## 2023-12-15 PROCEDURE — 87635 SARS-COV-2 COVID-19 AMP PRB: CPT | Performed by: INTERNAL MEDICINE

## 2023-12-15 PROCEDURE — 80053 COMPREHEN METABOLIC PANEL: CPT | Performed by: INTERNAL MEDICINE

## 2023-12-15 PROCEDURE — 85027 COMPLETE CBC AUTOMATED: CPT | Performed by: INTERNAL MEDICINE

## 2023-12-15 RX ORDER — BISACODYL 10 MG
10 SUPPOSITORY, RECTAL RECTAL DAILY PRN
Start: 2023-12-15 | End: 2023-12-20

## 2023-12-15 RX ORDER — POLYETHYLENE GLYCOL 3350 17 G/17G
17 POWDER, FOR SOLUTION ORAL DAILY PRN
Start: 2023-12-15

## 2023-12-15 RX ORDER — CHOLECALCIFEROL (VITAMIN D3) 125 MCG
5 CAPSULE ORAL NIGHTLY PRN
Start: 2023-12-15 | End: 2023-12-20

## 2023-12-15 RX ORDER — BISACODYL 5 MG/1
5 TABLET, DELAYED RELEASE ORAL DAILY PRN
Start: 2023-12-15 | End: 2023-12-20

## 2023-12-15 RX ORDER — OXYCODONE HYDROCHLORIDE AND ACETAMINOPHEN 5; 325 MG/1; MG/1
1 TABLET ORAL EVERY 4 HOURS PRN
Start: 2023-12-15 | End: 2023-12-20 | Stop reason: SDUPTHER

## 2023-12-15 RX ORDER — LIDOCAINE 50 MG/G
2 PATCH TOPICAL
Start: 2023-12-15 | End: 2023-12-20

## 2023-12-15 RX ORDER — AMOXICILLIN 250 MG
2 CAPSULE ORAL 2 TIMES DAILY
Start: 2023-12-15 | End: 2023-12-20

## 2023-12-15 RX ORDER — ACETAMINOPHEN 325 MG/1
650 TABLET ORAL EVERY 4 HOURS PRN
Start: 2023-12-15

## 2023-12-15 RX ORDER — NALOXONE HCL 0.4 MG/ML
0.4 VIAL (ML) INJECTION
Start: 2023-12-15 | End: 2023-12-20 | Stop reason: SDUPTHER

## 2023-12-15 RX ORDER — PREGABALIN 50 MG/1
50 CAPSULE ORAL EVERY 8 HOURS SCHEDULED
Start: 2023-12-15 | End: 2023-12-20 | Stop reason: SDUPTHER

## 2023-12-15 RX ORDER — FAMOTIDINE 40 MG/1
40 TABLET, FILM COATED ORAL DAILY
Start: 2023-12-15 | End: 2023-12-20

## 2023-12-15 RX ADMIN — FAMOTIDINE 40 MG: 20 TABLET, FILM COATED ORAL at 09:06

## 2023-12-15 RX ADMIN — PREGABALIN 50 MG: 50 CAPSULE ORAL at 05:25

## 2023-12-15 RX ADMIN — OXYCODONE HYDROCHLORIDE AND ACETAMINOPHEN 1 TABLET: 5; 325 TABLET ORAL at 10:42

## 2023-12-15 RX ADMIN — OXYCODONE HYDROCHLORIDE AND ACETAMINOPHEN 1 TABLET: 5; 325 TABLET ORAL at 05:36

## 2023-12-15 RX ADMIN — Medication 10 ML: at 09:07

## 2023-12-15 RX ADMIN — ORPHENADRINE CITRATE 100 MG: 100 TABLET, EXTENDED RELEASE ORAL at 09:06

## 2023-12-15 RX ADMIN — Medication 1000 UNITS: at 09:06

## 2023-12-15 RX ADMIN — PREGABALIN 50 MG: 50 CAPSULE ORAL at 14:15

## 2023-12-15 RX ADMIN — HEPARIN SODIUM 5000 UNITS: 5000 INJECTION INTRAVENOUS; SUBCUTANEOUS at 05:25

## 2023-12-15 RX ADMIN — HEPARIN SODIUM 5000 UNITS: 5000 INJECTION INTRAVENOUS; SUBCUTANEOUS at 14:15

## 2023-12-15 NOTE — PLAN OF CARE
Goal Outcome Evaluation:  Plan of Care Reviewed With: patient, spouse        Progress: improving     Goals met.  VSS.  Medicated for pain X1.  Dressing stained and intact to lower back.  LSO brace when OOB.  RA.  Patient being discharged to rehab - [facility] - will be transported by spouse. Safety maintained.

## 2023-12-15 NOTE — PROGRESS NOTES
"Sedrick Leahy  68 y.o.      Chief complaint:   Back pain    Subjective:  Symptoms:  Worsening.  She reports weakness.  No shortness of breath, chest pain, chest pressure or anxiety.    Diet:  Adequate intake.  No nausea or vomiting.    Activity level: Returning to normal.    Pain:  She complains of pain that is moderate.  She reports pain is improving.  Pain is well controlled.      Mrs. Leahy is postop day #4, L4-5 TLIF.  Dr. Estes did right sacroiliac injection yesterday.  She indicates she is feeling much better today and wants to go home.  She ambulated last night with less pain and feels that her right foot is getting stronger.  She has no new radicular complaints.  Paresthesias affecting the right thigh have decreased.  Her  is at her side and feels that he can take her home safely.    Temp:  [97.9 °F (36.6 °C)-98.8 °F (37.1 °C)] 98.1 °F (36.7 °C)  Heart Rate:  [74-95] 78  Resp:  [16-20] 16  BP: (103-156)/(50-83) 148/57      Objective:  General Appearance:  Comfortable (She is smiling today).    Vital signs: (most recent): Blood pressure 148/57, pulse 78, temperature 98.1 °F (36.7 °C), temperature source Oral, resp. rate 16, height 162.6 cm (64\"), weight 81.2 kg (179 lb), SpO2 95%, not currently breastfeeding.  No fever.    Output: Producing urine.    Lungs:  Normal effort.    Heart: Normal rate.    Skin:  Warm and dry.    Abdomen: Abdomen is soft.    Pulses: Distal pulses are intact.        Neurologic Exam     Mental Status   Oriented to person, place, and time.   Level of consciousness: alert  Knowledge: good.     Motor Exam     Strength   Right iliopsoas: 5/5  Left iliopsoas: 5/5  Right quadriceps: 5/5  Left quadriceps: 5/5  Right hamstrin/5  Left hamstrin/5  Left anterior tibial: 5/5  Right posterior tibial: 5/5  Left posterior tibial: 5/5  Right gastroc: 5/5  Left gastroc: 5/5No EHL weakness bilaterally.  Right foot dorsiflexion strength has improved, 4+/5.       Lab Results (last 24 hours) "       Procedure Component Value Units Date/Time    CBC (No Diff) [[id number]]  (Abnormal) Collected: 12/15/23 0904    Specimen: Blood Updated: 12/15/23 1005     WBC 11.79 10*3/mm3      RBC 3.65 10*6/mm3      Hemoglobin 9.7 g/dL      Hematocrit 32.3 %      MCV 88.5 fL      MCH 26.6 pg      MCHC 30.0 g/dL      RDW 18.1 %      RDW-SD 58.5 fl      MPV 10.5 fL      Platelets 308 10*3/mm3     Comprehensive Metabolic Panel [[id number]] Collected: 12/15/23 0904    Specimen: Blood Updated: 12/15/23 1000                Plan:   Mrs. Leahy is doing much better from a pain standpoint after her right SI joint injection.  Her right lower extremity weakness is improving.  We could consider doing an AFO brace on discharge for the right lower extremity given the amount of weakness she was having but her foot strength is improving and I think as long as she ambulates with a shoes on, to help support the foot, she will do okay.    If okay with medicine, I think we can discharge her home with her .  She should utilize a walker for safety.  She will observe 10 pound lifting restriction and avoid bending and twisting.  She will use the brace when standing or walking.    We would like to see her in the office in 2 weeks for postop check, sooner if needed.      Intractable low back pain    Lumbar radiculopathy, acute    Acquired spondylolisthesis    Foot drop, right    Sacroiliitis        Viviana Ramos PA-C

## 2023-12-15 NOTE — DISCHARGE SUMMARY
[facility] Medicine Services  DISCHARGE SUMMARY       Date of Admission: 12/8/2023  Date of Discharge:  12/15/2023  Primary Care Physician: Kiran Madden MD    Presenting Problem/History of Present Illness:  Back pain, LE pain    Final Discharge Diagnoses:  Active Hospital Problems    Diagnosis     **Intractable low back pain     Acquired spondylolisthesis     Foot drop, right     Sacroiliitis     Lumbar radiculopathy, acute        Consults:   Dr. Estes, neurosurgery    Procedures Performed:   1) LUMBAR LAMINECTOMY TRANSFORAMINAL LUMBAR INTERBODY FUSION L4/5  on 12/11    2) SACROILIAC INJECTION, Bilateral on 12/14    Pertinent Test Results:   Results for orders placed during the hospital encounter of 07/20/23    Adult Transthoracic Echo Complete W/ Cont if Necessary Per Protocol    Interpretation Summary    Left ventricular systolic function is normal. Left ventricular ejection fraction appears to be 56 - 60%.    Normal right ventricular cavity size and systolic function noted.    No significant valvular abnormalities identified on this study.      Imaging Results (All)       Procedure Component Value Units Date/Time    XR Sacroiliac Joints 1 or 2 View [[id number]] Collected: 12/14/23 1625     Updated: 12/14/23 1628    Narrative:      XR SACROILIAC JOINTS 1 OR 2 VW- 12/14/2023 3:13 PM     HISTORY: si joint injection; M21.371-Foot drop, right foot;  M54.42-Lumbago with sciatica, left side; M54.41-Lumbago with sciatica,  right side; M48.00-Spinal stenosis, site unspecified; R29.898-Other  symptoms and signs involving the musculoskeletal system;  R20.0-Anesthesia of skin; N30.01-Acute cystitis with hematuria;  M43.10-Spondylolisthesis, site unspecified; R26.9-Unspecified  abnormalities of gait and      COMPARISON: None     FLUOROSCOPY TIME: 0.4 minutes     FLUOROSCOPY DOSE: 13.5 mGy     NUMBER OF IMAGES: 1       Impression:         Intraoperative fluoroscopic images during  SI joint injection.  A radiologist was not present during the procedure.     Please refer to the operative note for more details.     This report was signed and finalized on 12/14/2023 4:25 PM by Dr. Trey Granados MD.       FL C Arm During Surgery [[id number]] Resulted: 12/14/23 1617     Updated: 12/14/23 1617    Narrative:      This procedure was auto-finalized with no dictation required.    XR Spine Lumbar 2 or 3 View [[id number]] Collected: 12/11/23 1532     Updated: 12/11/23 1536    Narrative:      XR SPINE LUMBAR 2 OR 3 VW- 12/11/2023 1:03 PM     HISTORY: tlif; M21.371-Foot drop, right foot; M54.42-Lumbago with  sciatica, left side; M54.41-Lumbago with sciatica, right side;  M48.00-Spinal stenosis, site unspecified; R29.898-Other symptoms and  signs involving the musculoskeletal system; R20.0-Anesthesia of skin;  N30.01-Acute cystitis with hematuria; M43.10-Spondylolisthesis, site  unspecified     COMPARISON: None     FLUOROSCOPY TIME: 17.9     FLUOROSCOPY DOSE: 18 mGy     NUMBER OF IMAGES: 3       Impression:         Intraoperative fluoroscopic images during lumbar fusion.     Please refer to the operative note for more details.     This report was signed and finalized on 12/11/2023 3:32 PM by Juwan Roca.       FL C Arm During Surgery [[id number]] Resulted: 12/11/23 1528     Updated: 12/11/23 1528    Narrative:      This procedure was auto-finalized with no dictation required.    XR Hips Bilateral With or Without Pelvis 5 View [[id number]] Collected: 12/08/23 1603     Updated: 12/08/23 1607    Narrative:      EXAM: XR HIPS BILATERAL W OR WO PELVIS 5 VIEW-      DATE: 12/8/2023 2:25 PM     HISTORY: right groin pain; M54.42-Lumbago with sciatica, left side;  M54.41-Lumbago with sciatica, right side; M48.00-Spinal stenosis, site  unspecified; R29.898-Other symptoms and signs involving the  musculoskeletal system; R20.0-Anesthesia of skin; N30.01-Acute cystitis  with hematuria       COMPARISON: 12/21/2022.      TECHNIQUE: AP pelvis, AP and lateral views of the bilateral hips. 5  images.     FINDINGS:    Pelvis appears intact. Sacroiliac joints appear symmetric and pain.  Upper sacral arcuate lines appear intact. Pubic symphysis anatomic.  Advanced degenerative changes in the lumbar spine.     LEFT hip appears intact and normally aligned with mild degenerative  change.     RIGHT hip appears intact and normally aligned with mild degenerative  change.     Pelvic phleboliths.          Impression:      1. Mild degenerative changes of the bilateral hips.  2. Advanced degenerative changes of the lumbar spine.     This report was signed and finalized on 12/8/2023 4:04 PM by Dr Kierra Alexander MD.       MRI Lumbar Spine Without Contrast [[id number]] Collected: 12/08/23 0551     Updated: 12/08/23 0603    Narrative:      EXAMINATION: MRI LUMBAR SPINE WO CONTRAST-     12/8/2023 3:40 AM     HISTORY: Low back pain, cauda equina syndrome suspected     The multiplanar, multisequence MR imaging of the lumbar spine is  performed without intravenous contrast enhancement.     There is no previous study for comparison. The correlation made with CT  scan of the lumbar spine dated 12/5/2023.     There is mild anterolisthesis of L4 over L5. No finding to suggest  spondylolysis.     Alignment from T12-L4 is normal.     Loss of height and signal of the intervertebral disc, more pronounced at  L4-5, representing degenerative disc disease.     The vertebral body heights are normal.     There is a mild levoscoliosis.     The bone marrow signal of the vertebral bodies and the posterior  elements are normal.     L1-2: No significant disc bulging or herniation. No significant  osteophytes. Moderate. The neural foramina and spinal canal are patent.     L2-3: Mild diffuse disc bulging. There is bilateral moderate facet  arthropathy and mild ligamental hypertrophy. Neural foramina and spinal  canal are patent.     L3-4: Mild diffuse disc bulging more  toward the left. No significant  osteophytes. And ligamental hypertrophy. No significant spinal stenosis.  Mild narrowing of the neural foramina bilaterally.     L4-5: Moderate diffuse disc bulging asymmetrically more towards the  right. There is bilateral facet arthropathy and ligamental hypertrophy.  There is small amount of fluid in the facet joints, left more than the  right. There is moderate spinal stenosis. There is bilateral  neuroforaminal stenosis more on the right.     L5-S1: Moderate diffuse disc bulging asymmetrically more towards the  right. Bilateral facet arthropathy moderately more towards the right.  Small joint effusion. Spinal canal is patent. A mild right  neuroforaminal stenosis.     The size and signal of the conus medullaris and limited visualized lower  thoracic spinal cord is normal. No focal enlargement or expansion. Cauda  equina nerve roots are normal.     Limited visualized paravertebral, paraspinal soft tissues are  unremarkable.       Impression:      1. Lumbar spondylosis. A mild alignment disruption at L4-5. A mild  levoscoliosis.  2. The multilevel prominent disc osteophyte complexes, facet arthropathy  and ligamental hypertrophy. There is a resultant moderate spinal  stenosis at L4-5. There is bilateral neuroforaminal stenosis more on the  right at L4-5. There is a mild right neuroforaminal stenosis L5-S1.     This report was signed and finalized on 12/8/2023 6:00 AM by Dr. Ping Vasquez MD.             LAB RESULTS:      Lab 12/11/23 0329 12/09/23 0420   WBC 11.14* 8.15   HEMOGLOBIN 10.1* 11.0*   HEMATOCRIT 34.6 35.3   PLATELETS 285 311   NEUTROS ABS  --  6.47   IMMATURE GRANS (ABS)  --  0.04   LYMPHS ABS  --  1.55   MONOS ABS  --  0.08*   EOS ABS  --  0.00   MCV 88.3 85.5   PROTIME 12.7  --          Lab 12/11/23  0329 12/09/23 0420   SODIUM 141 138   POTASSIUM 4.5 4.5   CHLORIDE 107 104   CO2 27.0 23.0   ANION GAP 7.0 11.0   BUN 27* 21   CREATININE 0.74 0.63   EGFR 87.7  96.2   GLUCOSE 121* 156*   CALCIUM 8.5* 8.8             Lab 12/11/23  0329   PROTIME 12.7   INR 0.95             Lab 12/11/23  1149   ABO TYPING A   RH TYPING Positive   ANTIBODY SCREEN Negative         Brief Urine Lab Results  (Last result in the past 365 days)        Color   Clarity   Blood   Leuk Est   Nitrite   Protein   CREAT   Urine HCG        12/08/23 0326 Dark Yellow   Cloudy   Trace   Small (1+)   Negative   Trace                 Microbiology Results (last 10 days)       Procedure Component Value - Date/Time    Urine Culture - Urine, Urine, Clean Catch [[id number]]  (Normal) Collected: 12/08/23 0326    Lab Status: Final result Specimen: Urine, Clean Catch Updated: 12/09/23 1155     Urine Culture No growth            Hospital Course:   Patient is a 68-year-old female with history of chronic low back pain and prior anterior cervical discectomy and fusion with prior lumbar surgeries.  She came to the hospital on 12/8 complaining of worsening pain radiating to both lower extremities which was severe and unrelenting.  Did not improve with any positioning.  She received multiple doses of medication in the ER without any relief to include Dilaudid, fentanyl, Toradol, Norflex, Decadron.  MRI of the lumbar spine was performed and patient was found to have mild alignment disruption at L4-5 with mild levoscoliosis.  Multilevel prominent disc osteophytes and bilateral neuroforaminal stenosis.  She was admitted to the hospital for pain control with neurosurgical evaluation.  She was having issues with right foot drop and weakness.  She underwent lumbar laminectomy of L4-5 with transforaminal lumbar interbody fusion on 12/11.  Continue to work with therapy and pain was improving.  Her right dorsiflexion is also been improving.  Still some ongoing pain so she went back to the OR on 12/14 for bilateral SI injections.  Overall she is doing significantly better.  Pain is much better controlled and she is tolerating p.o. meds  "alone.  No IVs needed for several days.  Plan to discharge her to a swing bed today.  This was discussed with neurosurgery who is in agreement.  Continue 10 pound or less weight lifting restrictions as well as wearing brace when out of bed.  Otherwise medically stable for discharge to swing bed today.      Physical Exam on Discharge:  /57 (BP Location: Right arm, Patient Position: Lying)   Pulse 78   Temp 98.1 °F (36.7 °C) (Oral)   Resp 16   Ht 162.6 cm (64\")   Wt 81.2 kg (179 lb)   SpO2 95%   BMI 30.73 kg/m²   Physical Exam  GEN: Awake, alert, interactive, in NAD  HEENT:  PERRLA, EOMI, Anicteric, Trachea midline  Lungs:  no wheezing/rales/rhonchi  Heart: RRR, +S1/s2, no rub  ABD: soft, nt/nd, +BS, no guarding/rebound  Extremities: atraumatic, no cyanosis, no edema  Skin: no rashes or petechiae  Neuro: AAOx3, decreased R foot dorsiflexion  Psych: normal mood & affect      Condition on Discharge: stable/improved    Discharge Disposition:  Swing Bed    Discharge Medications:     Discharge Medications        New Medications        Instructions Start Date   acetaminophen 325 MG tablet  Commonly known as: TYLENOL   650 mg, Oral, Every 4 Hours PRN      bisacodyl 5 MG EC tablet  Commonly known as: DULCOLAX   5 mg, Oral, Daily PRN      bisacodyl 10 MG suppository  Commonly known as: DULCOLAX   10 mg, Rectal, Daily PRN      famotidine 40 MG tablet  Commonly known as: PEPCID   40 mg, Oral, Daily      lidocaine 5 %  Commonly known as: LIDODERM   2 patches, Transdermal, Every 24 Hours Scheduled, Remove & Discard patch within 12 hours or as directed by MD      melatonin 5 MG tablet tablet   5 mg, Oral, Nightly PRN      naloxone 0.4 MG/ML injection  Commonly known as: NARCAN   0.4 mg, Intravenous, Every 5 Minutes PRN      oxyCODONE-acetaminophen 5-325 MG per tablet  Commonly known as: PERCOCET   1 tablet, Oral, Every 4 Hours PRN      polyethylene glycol 17 g packet  Commonly known as: MIRALAX   17 g, Oral, Daily " PRN      pregabalin 50 MG capsule  Commonly known as: LYRICA   50 mg, Oral, Every 8 Hours Scheduled      sennosides-docusate 8.6-50 MG per tablet  Commonly known as: PERICOLACE   2 tablets, Oral, 2 Times Daily             Continue These Medications        Instructions Start Date   amitriptyline 100 MG tablet  Commonly known as: ELAVIL   1-2 tablets, Oral, Nightly PRN      atorvastatin 20 MG tablet  Commonly known as: LIPITOR   20 mg, Oral, Daily      cholecalciferol 25 MCG (1000 UT) tablet  Commonly known as: VITAMIN D3   1,000 Units, Oral, Daily      orphenadrine 100 MG 12 hr tablet  Commonly known as: NORFLEX   100 mg, Oral, 2 Times Daily               Discharge Diet:   Dietary Orders (From admission, onward)       Start     Ordered    12/11/23 1848  Diet: Regular/House Diet, Cardiac Diets; Healthy Heart (2-3 Na+); Texture: Regular Texture (IDDSI 7); Fluid Consistency: Thin (IDDSI 0)  Diet Effective Now        References:    Diet Order Crosswalk   Question Answer Comment   Diets: Regular/House Diet    Diets: Cardiac Diets    Cardiac Diet: Healthy Heart (2-3 Na+)    Texture: Regular Texture (IDDSI 7)    Fluid Consistency: Thin (IDDSI 0)        12/11/23 1847                      Activity at Discharge:   Keep lifting to 10 lbs or less, where LSO brace when out of bed    Follow-up Appointments:   Future Appointments   Date Time Provider Department Center   1/10/2024  1:00 PM  [facility] (OP) NEURO RM 2 (DIAL) [facility]   1/19/2024  1:00 PM Castillo Guzman APRN [facility]   4/30/2024  1:30 PM Kiran Madden MD [facility]       Test Results Pending at Discharge: none    Electronically signed by Miguel Hernandez DO, 12/15/23, 09:04 CST.    Time: 35 minutes.

## 2023-12-15 NOTE — DISCHARGE PLACEMENT REQUEST
"To: [facility]      Iman Henriquez (68 y.o. Female)       Date of Birth   1955    Social Security Number       Address   [address] [place] [address]    Home Phone   [phone]    MRN   [id number]       Synagogue   [facility]    Marital Status                               Admission Date   12/8/23    Admission Type   Emergency    Admitting Provider   Miguel Hernandez DO    Attending Provider   Miguel Hernandez DO    Department, Room/Bed   [facility] 3C, 386/1       Discharge Date       Discharge Disposition   Short Term Hospital (DC - External)    Discharge Destination                                 Attending Provider: Miguel Hernandez DO    Allergies: Penicillins, Morphine    Isolation: None   Infection: None   Code Status: CPR    Ht: 162.6 cm (64\")   Wt: 81.2 kg (179 lb)    Admission Cmt: None   Principal Problem: Intractable low back pain [M54.59]                   Active Insurance as of 12/8/2023       Primary Coverage       Payor Plan Insurance Group Employer/Plan Group    MEDICARE MEDICARE A & B        Payor Plan Address Payor Plan Phone Number Payor Plan Fax Number Effective Dates    [address] [phone]  6/1/2019 - None Entered    [facility] [id number]         Subscriber Name Subscriber Birth Date Member ID       IMAN HENRIQUEZ 1955 [id number]               Secondary Coverage       Payor Plan Insurance Group Employer/Plan Group    [facility] SUPP [id number]       Payor Plan Address Payor Plan Phone Number Payor Plan Fax Number Effective Dates    [address]   9/18/2019 - None Entered    [facility] [id number]         Subscriber Name Subscriber Birth Date Member ID       IMAN HENRIQUEZ 1955 [id number]                     Emergency Contacts        (Rel.) Home Phone Work Phone Mobile Phone    Bethel Henriquez (Spouse) [phone] -- [phone]    LINDSAY BAKER (Son) [phone] -- [phone]               "   Discharge Summary        Miguel Hernandez DO at 12/15/23 0904                [facility]  DISCHARGE SUMMARY       Date of Admission: 12/8/2023  Date of Discharge:  12/15/2023  Primary Care Physician: Kiran Madden MD    Presenting Problem/History of Present Illness:  Back pain, LE pain    Final Discharge Diagnoses:  Active Hospital Problems    Diagnosis     **Intractable low back pain     Acquired spondylolisthesis     Foot drop, right     Sacroiliitis     Lumbar radiculopathy, acute        Consults:   Dr. Estes, neurosurgery    Procedures Performed:   1) LUMBAR LAMINECTOMY TRANSFORAMINAL LUMBAR INTERBODY FUSION L4/5  on 12/11    2) SACROILIAC INJECTION, Bilateral on 12/14    Pertinent Test Results:   Results for orders placed during the hospital encounter of 07/20/23    Adult Transthoracic Echo Complete W/ Cont if Necessary Per Protocol    Interpretation Summary    Left ventricular systolic function is normal. Left ventricular ejection fraction appears to be 56 - 60%.    Normal right ventricular cavity size and systolic function noted.    No significant valvular abnormalities identified on this study.      Imaging Results (All)       Procedure Component Value Units Date/Time    XR Sacroiliac Joints 1 or 2 View [[id number]] Collected: 12/14/23 1625     Updated: 12/14/23 1628    Narrative:      XR SACROILIAC JOINTS 1 OR 2 VW- 12/14/2023 3:13 PM     HISTORY: si joint injection; M21.371-Foot drop, right foot;  M54.42-Lumbago with sciatica, left side; M54.41-Lumbago with sciatica,  right side; M48.00-Spinal stenosis, site unspecified; R29.898-Other  symptoms and signs involving the musculoskeletal system;  R20.0-Anesthesia of skin; N30.01-Acute cystitis with hematuria;  M43.10-Spondylolisthesis, site unspecified; R26.9-Unspecified  abnormalities of gait and      COMPARISON: None     FLUOROSCOPY TIME: 0.4 minutes     FLUOROSCOPY DOSE: 13.5 mGy     NUMBER OF  IMAGES: 1       Impression:         Intraoperative fluoroscopic images during SI joint injection.  A radiologist was not present during the procedure.     Please refer to the operative note for more details.     This report was signed and finalized on 12/14/2023 4:25 PM by Dr. Trey Granados MD.       FL C Arm During Surgery [[id number]] Resulted: 12/14/23 1617     Updated: 12/14/23 1617    Narrative:      This procedure was auto-finalized with no dictation required.    XR Spine Lumbar 2 or 3 View [[id number]] Collected: 12/11/23 1532     Updated: 12/11/23 1536    Narrative:      XR SPINE LUMBAR 2 OR 3 VW- 12/11/2023 1:03 PM     HISTORY: tlif; M21.371-Foot drop, right foot; M54.42-Lumbago with  sciatica, left side; M54.41-Lumbago with sciatica, right side;  M48.00-Spinal stenosis, site unspecified; R29.898-Other symptoms and  signs involving the musculoskeletal system; R20.0-Anesthesia of skin;  N30.01-Acute cystitis with hematuria; M43.10-Spondylolisthesis, site  unspecified     COMPARISON: None     FLUOROSCOPY TIME: 17.9     FLUOROSCOPY DOSE: 18 mGy     NUMBER OF IMAGES: 3       Impression:         Intraoperative fluoroscopic images during lumbar fusion.     Please refer to the operative note for more details.     This report was signed and finalized on 12/11/2023 3:32 PM by Juwan Roca.       FL C Arm During Surgery [[id number]] Resulted: 12/11/23 1528     Updated: 12/11/23 1528    Narrative:      This procedure was auto-finalized with no dictation required.    XR Hips Bilateral With or Without Pelvis 5 View [[id number]] Collected: 12/08/23 1603     Updated: 12/08/23 1607    Narrative:      EXAM: XR HIPS BILATERAL W OR WO PELVIS 5 VIEW-      DATE: 12/8/2023 2:25 PM     HISTORY: right groin pain; M54.42-Lumbago with sciatica, left side;  M54.41-Lumbago with sciatica, right side; M48.00-Spinal stenosis, site  unspecified; R29.898-Other symptoms and signs involving the  musculoskeletal system; R20.0-Anesthesia of  skin; N30.01-Acute cystitis  with hematuria       COMPARISON: 12/21/2022.     TECHNIQUE: AP pelvis, AP and lateral views of the bilateral hips. 5  images.     FINDINGS:    Pelvis appears intact. Sacroiliac joints appear symmetric and pain.  Upper sacral arcuate lines appear intact. Pubic symphysis anatomic.  Advanced degenerative changes in the lumbar spine.     LEFT hip appears intact and normally aligned with mild degenerative  change.     RIGHT hip appears intact and normally aligned with mild degenerative  change.     Pelvic phleboliths.          Impression:      1. Mild degenerative changes of the bilateral hips.  2. Advanced degenerative changes of the lumbar spine.     This report was signed and finalized on 12/8/2023 4:04 PM by Dr Kierra Alexander MD.       MRI Lumbar Spine Without Contrast [[id number]] Collected: 12/08/23 0551     Updated: 12/08/23 0603    Narrative:      EXAMINATION: MRI LUMBAR SPINE WO CONTRAST-     12/8/2023 3:40 AM     HISTORY: Low back pain, cauda equina syndrome suspected     The multiplanar, multisequence MR imaging of the lumbar spine is  performed without intravenous contrast enhancement.     There is no previous study for comparison. The correlation made with CT  scan of the lumbar spine dated 12/5/2023.     There is mild anterolisthesis of L4 over L5. No finding to suggest  spondylolysis.     Alignment from T12-L4 is normal.     Loss of height and signal of the intervertebral disc, more pronounced at  L4-5, representing degenerative disc disease.     The vertebral body heights are normal.     There is a mild levoscoliosis.     The bone marrow signal of the vertebral bodies and the posterior  elements are normal.     L1-2: No significant disc bulging or herniation. No significant  osteophytes. Moderate. The neural foramina and spinal canal are patent.     L2-3: Mild diffuse disc bulging. There is bilateral moderate facet  arthropathy and mild ligamental hypertrophy. Neural foramina  and spinal  canal are patent.     L3-4: Mild diffuse disc bulging more toward the left. No significant  osteophytes. And ligamental hypertrophy. No significant spinal stenosis.  Mild narrowing of the neural foramina bilaterally.     L4-5: Moderate diffuse disc bulging asymmetrically more towards the  right. There is bilateral facet arthropathy and ligamental hypertrophy.  There is small amount of fluid in the facet joints, left more than the  right. There is moderate spinal stenosis. There is bilateral  neuroforaminal stenosis more on the right.     L5-S1: Moderate diffuse disc bulging asymmetrically more towards the  right. Bilateral facet arthropathy moderately more towards the right.  Small joint effusion. Spinal canal is patent. A mild right  neuroforaminal stenosis.     The size and signal of the conus medullaris and limited visualized lower  thoracic spinal cord is normal. No focal enlargement or expansion. Cauda  equina nerve roots are normal.     Limited visualized paravertebral, paraspinal soft tissues are  unremarkable.       Impression:      1. Lumbar spondylosis. A mild alignment disruption at L4-5. A mild  levoscoliosis.  2. The multilevel prominent disc osteophyte complexes, facet arthropathy  and ligamental hypertrophy. There is a resultant moderate spinal  stenosis at L4-5. There is bilateral neuroforaminal stenosis more on the  right at L4-5. There is a mild right neuroforaminal stenosis L5-S1.     This report was signed and finalized on 12/8/2023 6:00 AM by Dr. Ping Vasquez MD.             LAB RESULTS:      Lab 12/11/23 0329 12/09/23 0420   WBC 11.14* 8.15   HEMOGLOBIN 10.1* 11.0*   HEMATOCRIT 34.6 35.3   PLATELETS 285 311   NEUTROS ABS  --  6.47   IMMATURE GRANS (ABS)  --  0.04   LYMPHS ABS  --  1.55   MONOS ABS  --  0.08*   EOS ABS  --  0.00   MCV 88.3 85.5   PROTIME 12.7  --          Lab 12/11/23 0329 12/09/23 0420   SODIUM 141 138   POTASSIUM 4.5 4.5   CHLORIDE 107 104   CO2 27.0 23.0    ANION GAP 7.0 11.0   BUN 27* 21   CREATININE 0.74 0.63   EGFR 87.7 96.2   GLUCOSE 121* 156*   CALCIUM 8.5* 8.8             Lab 12/11/23  0329   PROTIME 12.7   INR 0.95             Lab 12/11/23  1149   ABO TYPING A   RH TYPING Positive   ANTIBODY SCREEN Negative         Brief Urine Lab Results  (Last result in the past 365 days)        Color   Clarity   Blood   Leuk Est   Nitrite   Protein   CREAT   Urine HCG        12/08/23 0326 Dark Yellow   Cloudy   Trace   Small (1+)   Negative   Trace                 Microbiology Results (last 10 days)       Procedure Component Value - Date/Time    Urine Culture - Urine, Urine, Clean Catch [[id number]]  (Normal) Collected: 12/08/23 0326    Lab Status: Final result Specimen: Urine, Clean Catch Updated: 12/09/23 1155     Urine Culture No growth            Hospital Course:   Patient is a 68-year-old female with history of chronic low back pain and prior anterior cervical discectomy and fusion with prior lumbar surgeries.  She came to the hospital on 12/8 complaining of worsening pain radiating to both lower extremities which was severe and unrelenting.  Did not improve with any positioning.  She received multiple doses of medication in the ER without any relief to include Dilaudid, fentanyl, Toradol, Norflex, Decadron.  MRI of the lumbar spine was performed and patient was found to have mild alignment disruption at L4-5 with mild levoscoliosis.  Multilevel prominent disc osteophytes and bilateral neuroforaminal stenosis.  She was admitted to the hospital for pain control with neurosurgical evaluation.  She was having issues with right foot drop and weakness.  She underwent lumbar laminectomy of L4-5 with transforaminal lumbar interbody fusion on 12/11.  Continue to work with therapy and pain was improving.  Her right dorsiflexion is also been improving.  Still some ongoing pain so she went back to the OR on 12/14 for bilateral SI injections.  Overall she is doing significantly  "better.  Pain is much better controlled and she is tolerating p.o. meds alone.  No IVs needed for several days.  Plan to discharge her to a swing bed today.  This was discussed with neurosurgery who is in agreement.  Continue 10 pound or less weight lifting restrictions as well as wearing brace when out of bed.  Otherwise medically stable for discharge to swing bed today.      Physical Exam on Discharge:  /57 (BP Location: Right arm, Patient Position: Lying)   Pulse 78   Temp 98.1 °F (36.7 °C) (Oral)   Resp 16   Ht 162.6 cm (64\")   Wt 81.2 kg (179 lb)   SpO2 95%   BMI 30.73 kg/m²   Physical Exam  GEN: Awake, alert, interactive, in NAD  HEENT:  PERRLA, EOMI, Anicteric, Trachea midline  Lungs:  no wheezing/rales/rhonchi  Heart: RRR, +S1/s2, no rub  ABD: soft, nt/nd, +BS, no guarding/rebound  Extremities: atraumatic, no cyanosis, no edema  Skin: no rashes or petechiae  Neuro: AAOx3, decreased R foot dorsiflexion  Psych: normal mood & affect      Condition on Discharge: stable/improved    Discharge Disposition:  Swing Bed    Discharge Medications:     Discharge Medications        New Medications        Instructions Start Date   acetaminophen 325 MG tablet  Commonly known as: TYLENOL   650 mg, Oral, Every 4 Hours PRN      bisacodyl 5 MG EC tablet  Commonly known as: DULCOLAX   5 mg, Oral, Daily PRN      bisacodyl 10 MG suppository  Commonly known as: DULCOLAX   10 mg, Rectal, Daily PRN      famotidine 40 MG tablet  Commonly known as: PEPCID   40 mg, Oral, Daily      lidocaine 5 %  Commonly known as: LIDODERM   2 patches, Transdermal, Every 24 Hours Scheduled, Remove & Discard patch within 12 hours or as directed by MD      melatonin 5 MG tablet tablet   5 mg, Oral, Nightly PRN      naloxone 0.4 MG/ML injection  Commonly known as: NARCAN   0.4 mg, Intravenous, Every 5 Minutes PRN      oxyCODONE-acetaminophen 5-325 MG per tablet  Commonly known as: PERCOCET   1 tablet, Oral, Every 4 Hours PRN      polyethylene " glycol 17 g packet  Commonly known as: MIRALAX   17 g, Oral, Daily PRN      pregabalin 50 MG capsule  Commonly known as: LYRICA   50 mg, Oral, Every 8 Hours Scheduled      sennosides-docusate 8.6-50 MG per tablet  Commonly known as: PERICOLACE   2 tablets, Oral, 2 Times Daily             Continue These Medications        Instructions Start Date   amitriptyline 100 MG tablet  Commonly known as: ELAVIL   1-2 tablets, Oral, Nightly PRN      atorvastatin 20 MG tablet  Commonly known as: LIPITOR   20 mg, Oral, Daily      cholecalciferol 25 MCG (1000 UT) tablet  Commonly known as: VITAMIN D3   1,000 Units, Oral, Daily      orphenadrine 100 MG 12 hr tablet  Commonly known as: NORFLEX   100 mg, Oral, 2 Times Daily               Discharge Diet:   Dietary Orders (From admission, onward)       Start     Ordered    12/11/23 1848  Diet: Regular/House Diet, Cardiac Diets; Healthy Heart (2-3 Na+); Texture: Regular Texture (IDDSI 7); Fluid Consistency: Thin (IDDSI 0)  Diet Effective Now        References:    Diet Order [place]   Question Answer Comment   Diets: Regular/House Diet    Diets: Cardiac Diets    Cardiac Diet: Healthy Heart (2-3 Na+)    Texture: Regular Texture (IDDSI 7)    Fluid Consistency: Thin (IDDSI 0)        12/11/23 1847                      Activity at Discharge:   Keep lifting to 10 lbs or less, where LSO brace when out of bed    Follow-up Appointments:   Future Appointments   Date Time Provider Department Center   1/10/2024  1:00 PM [facility] EMG (OP) NEURO RM 2 (DIAL) [facility]   1/19/2024  1:00 PM Castillo Guzman APRN [facility]   4/30/2024  1:30 PM Kiran Madden MD [facility]       Test Results Pending at Discharge: none    Electronically signed by Miguel Hernandez DO, 12/15/23, 09:04 CST.    Time: 35 minutes.           Electronically signed by Miguel Hernandez DO at 12/15/23 1059         COVID PRE-OP / PRE-PROCEDURE SCREENING ORDER (NO ISOLATION) - Swab, Nasopharynx [[id number]] (Order  [id number])  Order  Date: 12/15/2023 Department: [address] Released By: Marsha Miller RN (auto-released) Authorizing: Miguel Hernandez DO     Linked Results    Procedure Abnormality Status   COVID-19,CEPHEID/JESUS,COR/THEA/[facility]/STEPH/LAG IN-HOUSE,NP SWAB IN TRANSPORT MEDIA 1 HR TAT, RT-PCR - Swab, Nasopharynx Normal Final result       Reprint Order Requisition    COVID PRE-OP / PRE-PROCEDURE SCREENING ORDER (NO ISOLATION) - Swab, Nasopharynx (Order #[id number]) on 12/15/23              COVID PRE-OP / PRE-PROCEDURE SCREENING ORDER (NO ISOLATION) - Swab, Nasopharynx  Order: [id number]  Status: Final result         Visible to patient: No (scheduled for 12/15/2023  2:12 PM)         Next appt: 01/03/2024 at 12:45 PM in Neurosurgery (Rick Estes DO)      Specimen Information: Nasopharynx; Swab   0 Result Notes            Specimen Collected: 12/15/23 12:38 CST Last Resulted: 12/15/23 13:12 CST       Order Details          View Encounter          Lab and Collection Details          Routing          Result History       View All Conversations on this Encounter           Result Care Coordination      Patient Communication     12/15/2023  2:12 PM  Not seen Back to Top         COVID-19,CEPHEID/JESUS,COR/THEA/PAD/STEPH/LAG IN-HOUSE,NP SWAB IN TRANSPORT MEDIA 1 HR TAT, RT-PCR - Swab, Nasopharynx  Order: [id number] - Part of Panel Order [id number]  Status: Final result         Visible to patient: No (scheduled for 12/15/2023  2:12 PM)         Next appt: 01/03/2024 at 12:45 PM in Neurosurgery (Rick Estes DO)      Specimen Information: Nasopharynx; Swab   0 Result Notes          Component  Ref Range & Units    COVID19  Not Detected - Ref. Range Not Detected   Resulting Agency  [facility]              Narrative  Performed by:  [facility]  Fact sheet for providers: https://www.fda.gov/media/263406/download    Fact sheet for patients: https://www.fda.gov/media/889291/download  Fact sheet for providers:  https://www.fda.gov/media/921005/download    Fact sheet for patients: https://www.fda.gov/media/773485/download    Test performed by PCR.      Specimen Collected: 12/15/23 12:38 CST Last Resulted: 12/15/23 13:12 CST       Order Details          View Encounter          Lab and Collection Details          Routing          Result History       View All Conversations on this Encounter           Result Care Coordination      Patient Communication     12/15/2023  2:12 PM  Not seen Back to Top           Order Questions    Question Answer   Please select your location: [facility]   COVID Screening Order: In-House: COVID-19 ONLY CEPHEID, 1 HR TAT [[id number]]   Note: Please make sure your specimen is collected in the appropriate media, if applicable.   Release to patient Routine Release           Provider Comment To Patient      COVID PRE-OP / PRE-PROCEDURE SCREENING ORDER (NO ISOLATION) - Swab, Nasopharynx     12/15/2023  2:12 PM  Not seen        COVID-19,CEPHEID/JESUS,COR/THEA/PAD/STEPH/LAG IN-HOUSE,NP SWAB IN TRANSPORT MEDIA 1 HR TAT, RT-PCR - Swab, Nasopharynx     12/15/2023  2:12 PM  Not seen       Routing History    Priority Sent On From To Message Type    12/8/2023  6:03 AM Interface, Rad Results [place], Jaiden RIVAS MD Results       Result Read / Acknowledged    Acknowledge result    COVID-19,CEPHEID/JESUS,COR/THEA/PAD/STEPH/LAG IN-HOUSE,NP SWAB IN TRANSPORT MEDIA 1 HR TAT, RT-PCR - Swab, Nasopharynx (Order [id number])    No acknowledgement history exists for this order.         Lab Component SmartPhrase Guide    COVID PRE-OP / PRE-PROCEDURE SCREENING ORDER (NO ISOLATION) - Swab, Nasopharynx (Order #[id number]) on 12/15/23        COVID PRE-OP / PRE-PROCEDURE SCREENING ORDER (NO ISOLATION) - Swab, Nasopharynx (Order [id number])      Linked Results    Procedure Abnormality Status   COVID-19,CEPHEID/JESUS,COR/THEA/PAD/STEPH/LAG IN-HOUSE,NP SWAB IN TRANSPORT MEDIA 1 HR TAT, RT-PCR - Swab, Nasopharynx Normal Final result       COVID-19,CEPHEID/JESUS,COR/THEA/PAD/STEPH/LAG IN-HOUSE,NP SWAB IN TRANSPORT MEDIA 1 HR TAT, RT-PCR - Swab, Nasopharynx (Order [id number]

## 2023-12-15 NOTE — CASE MANAGEMENT/SOCIAL WORK
Continued Stay Note  [facility]     Patient Name: Sedrick Leahy  MRN: [id number]  Today's Date: 12/15/2023    Admit Date: 12/8/2023    Plan: [facility]   Discharge Plan       Row Name 12/15/23 1116       Plan    Plan [facility]    Patient/Family in Agreement with Plan yes    Plan Comments Patient will discharge to [facility] today pending COVID test results.  DC summary and requested labs faxed.  COVID test results will need to be faxed to [phone].  Patient will be going to room #120.  Nursing to call report to [phone], patient's nurse is Angus.                     Discharge Codes    No documentation.                 Expected Discharge Date and Time       Expected Discharge Date Expected Discharge Time    Dec 15, 2023               JAMMIE Reyna

## 2023-12-15 NOTE — PLAN OF CARE
Goal Outcome Evaluation:  Plan of Care Reviewed With: patient        Progress: improving  Outcome Evaluation: Jordon bed mobility, cues for spinal precautions. MaxA donning B shoes. Fxl mobility in room <> BR via RW and CGA. R LE weakness, drags toe at times needing cues for floor clearance. Pt toileted with SBA and increased time d/t decreased reach for perineal d/t pain. Pt dons UB clothing Jordon including LSO, LB clothing ModA. pt demo'd improved ability to bring LLE up to opposite knee for brief time. Pt demo'd improvements but would continue to benefit from rehab at short term rehab, discussed this need with pt/spouse.      Anticipated Discharge Disposition (OT): sub acute care setting

## 2023-12-15 NOTE — CASE MANAGEMENT/SOCIAL WORK
Continued Stay Note  [facility]     Patient Name: Sedrick Leahy  MRN: [id number]  Today's Date: 12/15/2023    Admit Date: 12/8/2023    Plan: [facility]   Discharge Plan       Row Name 12/15/23 1333       Plan    Plan [facility]    Plan Comments DC summary and negative COVID swab faxed to [facility].    Final Discharge Disposition Code 03 - skilled nursing facility (SNF)      Row Name 12/15/23 1116       Plan    Plan [facility]    Patient/Family in Agreement with Plan yes    Plan Comments Patient will discharge to [facility] today pending COVID test results.  DC summary and requested labs faxed.  COVID test results will need to be faxed to [phone]                   Discharge Codes    No documentation.                 Expected Discharge Date and Time       Expected Discharge Date Expected Discharge Time    Dec 15, 2023               JAMMIE Reyna

## 2023-12-15 NOTE — THERAPY DISCHARGE NOTE
Acute Care - Occupational Therapy Discharge Summary  [facility]     Patient Name: Sedrick Leahy  : 1955  MRN: [id number]    Today's Date: 12/15/2023  Onset of Illness/Injury or Date of Surgery: 23       Referring Physician: Dr. Palencia      Admit Date: 2023        OT Recommendation and Plan    Visit Dx:    ICD-10-CM ICD-9-CM   1. Foot drop, right  M21.371 736.79   2. Acute low back pain with bilateral sciatica, unspecified back pain laterality  M54.42 724.2    M54.41 724.3   3. Central stenosis of spinal canal  M48.00 724.00   4. Bilateral leg weakness  R29.898 729.89   5. Bilateral leg numbness  R20.0 782.0   6. Acute cystitis with hematuria  N30.01 595.0   7. Acquired spondylolisthesis  M43.10 738.4   8. Gait abnormality [R26.9]  R26.9 781.2   9. Gait difficulty [R26.9]  R26.9 781.2   10. Sacroiliitis  M46.1 720.2   11. Degeneration of lumbar or lumbosacral intervertebral disc  M51.37 722.52   12. Lumbar radiculopathy, acute  M54.16 724.4          Time Calculation- OT       Row Name 12/15/23 0940             Time Calculation- OT    OT Start Time 0940  -MT      OT Stop Time 1035  -MT      OT Time Calculation (min) 55 min  -MT      Total Timed Code Minutes- OT 55 minute(s)  -MT      OT Received On 12/15/23  -MT         Timed Charges    37336 - OT Self Care/Mgmt Minutes 55  -MT         Total Minutes    Timed Charges Total Minutes 55  -MT       Total Minutes 55  -MT                User Key  (r) = Recorded By, (t) = Taken By, (c) = Cosigned By      Initials Name Provider Type    Sobia Dawn COTA Occupational Therapist Assistant                       OT Rehab Goals       Row Name 12/15/23 1519             Transfer Goal 1 (OT)    Activity/Assistive Device (Transfer Goal 1, OT) bed-to-chair/chair-to-bed;toilet;commode;tub  -TS      [place] Level/Cues Needed (Transfer Goal 1, OT) standby assist  -TS      Time Frame (Transfer Goal 1, OT) long term goal (LTG);by discharge  -TS       Progress/Outcome (Transfer Goal 1, OT) goal not met  -TS         Dressing Goal 1 (OT)    Activity/Device (Dressing Goal 1, OT) dressing skills, all  -TS      [place]/Cues Needed (Dressing Goal 1, OT) minimum assist (75% or more patient effort)  -TS      Time Frame (Dressing Goal 1, OT) long term goal (LTG);by discharge  -TS      Strategies/Barriers (Dressing Goal 1, OT) to include LSO  -TS      Progress/Outcome (Dressing Goal 1, OT) goal not met  -TS         Toileting Goal 1 (OT)    Activity/Device (Toileting Goal 1, OT) toileting skills, all;commode  -TS      [place] Level/Cues Needed (Toileting Goal 1, OT) standby assist  -TS      Time Frame (Toileting Goal 1, OT) long term goal (LTG);by discharge  -TS      Progress/Outcome (Toileting Goal 1, OT) goal not met  -TS                User Key  (r) = Recorded By, (t) = Taken By, (c) = Cosigned By      Initials Name Provider Type Discipline    TS Deena Carlos COTA Occupational Therapist Assistant OT                     Outcome Measures       Row Name 12/14/23 1100 12/13/23 0900          How much help from another person do you currently need...    Turning from your back to your side while in flat bed without using bedrails? 3  -AH 3  -AH     Moving from lying on back to sitting on the side of a flat bed without bedrails? 3  -AH 3  -AH     Moving to and from a bed to a chair (including a wheelchair)? 3  -AH 3  -AH     Standing up from a chair using your arms (e.g., wheelchair, bedside chair)? 3  -AH 3  -AH     Climbing 3-5 steps with a railing? 2  -AH 2  -AH     To walk in hospital room? 3  -AH 3  -AH     AM-PAC 6 Clicks Score (PT) 17  -AH 17  -AH     Highest Level of Mobility Goal 5 --> Static standing  -AH 5 --> Static standing  -AH        Functional Assessment    Outcome Measure Options AM-PAC 6 Clicks Basic Mobility (PT)  -AH AM-PAC 6 Clicks Basic Mobility (PT)  -AH               User Key  (r) = Recorded By, (t) = Taken By, (c) = Cosigned By       Initials Name Provider Type     Tracey Sosa, PTA Physical Therapist Assistant                    Timed Therapy Charges  Total Units: 4      Suggested Charges  Total Units: 4      Procedure Name Documented Minutes Units Code    HC OT SELF CARE/MGMT/TRAIN EA 15 MIN 55 4   73423 (CPT®)                 Documented Minutes  Total Minutes: 55      Therapy Provided Minutes    15092 - OT Self Care/Mgmt Minutes 55                        OT Discharge Summary  Anticipated Discharge Disposition (OT): inpatient rehabilitation facility  Reason for Discharge: Discharge from facility  Outcomes Achieved: Refer to plan of care for updates on goals achieved  Discharge Destination: Inpatient rehabilitation facility      MAC Almanza  12/15/2023

## 2023-12-15 NOTE — DISCHARGE PLACEMENT REQUEST
"To: [facility]       From: Estrellita RAMIREZ [phone]        Iman Henriquez (68 y.o. Female)       Date of Birth   1955    Social Security Number       Address   [address] [place] [address]    Home Phone   [phone]    MRN   [id number]       Shinto   Latter day    Marital Status                               Admission Date   12/8/23    Admission Type   Emergency    Admitting Provider   Miguel Hernandez DO    Attending Provider   Miguel Hernandez DO    Department, Room/Bed   [facility] 3C, 386/1       Discharge Date       Discharge Disposition   Swing Bed    Discharge Destination                                 Attending Provider: Miguel Hernandez DO    Allergies: Penicillins, Morphine    Isolation: None   Infection: None   Code Status: CPR    Ht: 162.6 cm (64\")   Wt: 81.2 kg (179 lb)    Admission Cmt: None   Principal Problem: Intractable low back pain [M54.59]                   Active Insurance as of 12/8/2023       Primary Coverage       Payor Plan Insurance Group Employer/Plan Group    MEDICARE MEDICARE A & B        Payor Plan Address Payor Plan Phone Number Payor Plan Fax Number Effective Dates    [address] [phone]  6/1/2019 - None Entered    [facility] [id number]         Subscriber Name Subscriber Birth Date Member ID       IMAN HENRIQUEZ 1955 [id number]               Secondary Coverage       Payor Plan Insurance Group Employer/Plan Group    [facility] SUPP [id number]       Payor Plan Address Payor Plan Phone Number Payor Plan Fax Number Effective Dates    [address]   9/18/2019 - None Entered    [facility] [id number]         Subscriber Name Subscriber Birth Date Member ID       IMAN HENRIQUEZ 1955 [id number]                     Emergency Contacts        (Rel.) Home Phone Work Phone Mobile Phone    TosinBethel (Spouse) [phone] -- [phone]    LINDSAY BAKER (Son) [phone] -- [phone]      "         Lab Results (last 24 hours)       Procedure Component Value Units Date/Time    Comprehensive Metabolic Panel [[id number]]  (Abnormal) Collected: 12/15/23 0904    Specimen: Blood Updated: 12/15/23 1038     Glucose 107 mg/dL      BUN 13 mg/dL      Creatinine 0.59 mg/dL      Sodium 136 mmol/L      Potassium 4.2 mmol/L      Chloride 103 mmol/L      CO2 25.0 mmol/L      Calcium 9.0 mg/dL      Total Protein 6.3 g/dL      Albumin 3.3 g/dL      ALT (SGPT) 22 U/L      AST (SGOT) 34 U/L      Alkaline Phosphatase 94 U/L      Total Bilirubin 0.3 mg/dL      Globulin 3.0 gm/dL      A/G Ratio 1.1 g/dL      BUN/Creatinine Ratio 22.0     Anion Gap 8.0 mmol/L      eGFR 97.7 mL/min/1.73     Narrative:      GFR Normal >60  Chronic Kidney Disease <60  Kidney Failure <15      CBC (No Diff) [[id number]]  (Abnormal) Collected: 12/15/23 0904    Specimen: Blood Updated: 12/15/23 1005     WBC 11.79 10*3/mm3      RBC 3.65 10*6/mm3      Hemoglobin 9.7 g/dL      Hematocrit 32.3 %      MCV 88.5 fL      MCH 26.6 pg      MCHC 30.0 g/dL      RDW 18.1 %      RDW-SD 58.5 fl      MPV 10.5 fL      Platelets 308 10*3/mm3              Discharge Summary        Miguel Hernandez DO at 12/15/23 0904                [facility] Medicine Services  DISCHARGE SUMMARY       Date of Admission: 12/8/2023  Date of Discharge:  12/15/2023  Primary Care Physician: Kiran Madden MD    Presenting Problem/History of Present Illness:  Back pain, LE pain    Final Discharge Diagnoses:  Active Hospital Problems    Diagnosis     **Intractable low back pain     Acquired spondylolisthesis     Foot drop, right     Sacroiliitis     Lumbar radiculopathy, acute        Consults:   Dr. Estes, neurosurgery    Procedures Performed:   1) LUMBAR LAMINECTOMY TRANSFORAMINAL LUMBAR INTERBODY FUSION L4/5  on 12/11    2) SACROILIAC INJECTION, Bilateral on 12/14    Pertinent Test Results:   Results for orders placed during the hospital encounter of  07/20/23    Adult Transthoracic Echo Complete W/ Cont if Necessary Per Protocol    Interpretation Summary    Left ventricular systolic function is normal. Left ventricular ejection fraction appears to be 56 - 60%.    Normal right ventricular cavity size and systolic function noted.    No significant valvular abnormalities identified on this study.      Imaging Results (All)       Procedure Component Value Units Date/Time    XR Sacroiliac Joints 1 or 2 View [[id number]] Collected: 12/14/23 1625     Updated: 12/14/23 1628    Narrative:      XR SACROILIAC JOINTS 1 OR 2 VW- 12/14/2023 3:13 PM     HISTORY: si joint injection; M21.371-Foot drop, right foot;  M54.42-Lumbago with sciatica, left side; M54.41-Lumbago with sciatica,  right side; M48.00-Spinal stenosis, site unspecified; R29.898-Other  symptoms and signs involving the musculoskeletal system;  R20.0-Anesthesia of skin; N30.01-Acute cystitis with hematuria;  M43.10-Spondylolisthesis, site unspecified; R26.9-Unspecified  abnormalities of gait and      COMPARISON: None     FLUOROSCOPY TIME: 0.4 minutes     FLUOROSCOPY DOSE: 13.5 mGy     NUMBER OF IMAGES: 1       Impression:         Intraoperative fluoroscopic images during SI joint injection.  A radiologist was not present during the procedure.     Please refer to the operative note for more details.     This report was signed and finalized on 12/14/2023 4:25 PM by Dr. Trey Granados MD.       FL C Arm During Surgery [[id number]] Resulted: 12/14/23 1617     Updated: 12/14/23 1617    Narrative:      This procedure was auto-finalized with no dictation required.    XR Spine Lumbar 2 or 3 View [[id number]] Collected: 12/11/23 1532     Updated: 12/11/23 1536    Narrative:      XR SPINE LUMBAR 2 OR 3 VW- 12/11/2023 1:03 PM     HISTORY: tlif; M21.371-Foot drop, right foot; M54.42-Lumbago with  sciatica, left side; M54.41-Lumbago with sciatica, right side;  M48.00-Spinal stenosis, site unspecified; R29.898-Other symptoms  and  signs involving the musculoskeletal system; R20.0-Anesthesia of skin;  N30.01-Acute cystitis with hematuria; M43.10-Spondylolisthesis, site  unspecified     COMPARISON: None     FLUOROSCOPY TIME: 17.9     FLUOROSCOPY DOSE: 18 mGy     NUMBER OF IMAGES: 3       Impression:         Intraoperative fluoroscopic images during lumbar fusion.     Please refer to the operative note for more details.     This report was signed and finalized on 12/11/2023 3:32 PM by Juwan Roca.       FL C Arm During Surgery [[id number]] Resulted: 12/11/23 1528     Updated: 12/11/23 1528    Narrative:      This procedure was auto-finalized with no dictation required.    XR Hips Bilateral With or Without Pelvis 5 View [[id number]] Collected: 12/08/23 1603     Updated: 12/08/23 1607    Narrative:      EXAM: XR HIPS BILATERAL W OR WO PELVIS 5 VIEW-      DATE: 12/8/2023 2:25 PM     HISTORY: right groin pain; M54.42-Lumbago with sciatica, left side;  M54.41-Lumbago with sciatica, right side; M48.00-Spinal stenosis, site  unspecified; R29.898-Other symptoms and signs involving the  musculoskeletal system; R20.0-Anesthesia of skin; N30.01-Acute cystitis  with hematuria       COMPARISON: 12/21/2022.     TECHNIQUE: AP pelvis, AP and lateral views of the bilateral hips. 5  images.     FINDINGS:    Pelvis appears intact. Sacroiliac joints appear symmetric and pain.  Upper sacral arcuate lines appear intact. Pubic symphysis anatomic.  Advanced degenerative changes in the lumbar spine.     LEFT hip appears intact and normally aligned with mild degenerative  change.     RIGHT hip appears intact and normally aligned with mild degenerative  change.     Pelvic phleboliths.          Impression:      1. Mild degenerative changes of the bilateral hips.  2. Advanced degenerative changes of the lumbar spine.     This report was signed and finalized on 12/8/2023 4:04 PM by Dr Kierra Alexander MD.       MRI Lumbar Spine Without Contrast [[id number]] Collected:  12/08/23 0551     Updated: 12/08/23 0603    Narrative:      EXAMINATION: MRI LUMBAR SPINE WO CONTRAST-     12/8/2023 3:40 AM     HISTORY: Low back pain, cauda equina syndrome suspected     The multiplanar, multisequence MR imaging of the lumbar spine is  performed without intravenous contrast enhancement.     There is no previous study for comparison. The correlation made with CT  scan of the lumbar spine dated 12/5/2023.     There is mild anterolisthesis of L4 over L5. No finding to suggest  spondylolysis.     Alignment from T12-L4 is normal.     Loss of height and signal of the intervertebral disc, more pronounced at  L4-5, representing degenerative disc disease.     The vertebral body heights are normal.     There is a mild levoscoliosis.     The bone marrow signal of the vertebral bodies and the posterior  elements are normal.     L1-2: No significant disc bulging or herniation. No significant  osteophytes. Moderate. The neural foramina and spinal canal are patent.     L2-3: Mild diffuse disc bulging. There is bilateral moderate facet  arthropathy and mild ligamental hypertrophy. Neural foramina and spinal  canal are patent.     L3-4: Mild diffuse disc bulging more toward the left. No significant  osteophytes. And ligamental hypertrophy. No significant spinal stenosis.  Mild narrowing of the neural foramina bilaterally.     L4-5: Moderate diffuse disc bulging asymmetrically more towards the  right. There is bilateral facet arthropathy and ligamental hypertrophy.  There is small amount of fluid in the facet joints, left more than the  right. There is moderate spinal stenosis. There is bilateral  neuroforaminal stenosis more on the right.     L5-S1: Moderate diffuse disc bulging asymmetrically more towards the  right. Bilateral facet arthropathy moderately more towards the right.  Small joint effusion. Spinal canal is patent. A mild right  neuroforaminal stenosis.     The size and signal of the conus medullaris  and limited visualized lower  thoracic spinal cord is normal. No focal enlargement or expansion. Cauda  equina nerve roots are normal.     Limited visualized paravertebral, paraspinal soft tissues are  unremarkable.       Impression:      1. Lumbar spondylosis. A mild alignment disruption at L4-5. A mild  levoscoliosis.  2. The multilevel prominent disc osteophyte complexes, facet arthropathy  and ligamental hypertrophy. There is a resultant moderate spinal  stenosis at L4-5. There is bilateral neuroforaminal stenosis more on the  right at L4-5. There is a mild right neuroforaminal stenosis L5-S1.     This report was signed and finalized on 12/8/2023 6:00 AM by Dr. Ping Vasquez MD.             LAB RESULTS:      Lab 12/11/23 0329 12/09/23 0420   WBC 11.14* 8.15   HEMOGLOBIN 10.1* 11.0*   HEMATOCRIT 34.6 35.3   PLATELETS 285 311   NEUTROS ABS  --  6.47   IMMATURE GRANS (ABS)  --  0.04   LYMPHS ABS  --  1.55   MONOS ABS  --  0.08*   EOS ABS  --  0.00   MCV 88.3 85.5   PROTIME 12.7  --          Lab 12/11/23 0329 12/09/23  0420   SODIUM 141 138   POTASSIUM 4.5 4.5   CHLORIDE 107 104   CO2 27.0 23.0   ANION GAP 7.0 11.0   BUN 27* 21   CREATININE 0.74 0.63   EGFR 87.7 96.2   GLUCOSE 121* 156*   CALCIUM 8.5* 8.8             Lab 12/11/23 0329   PROTIME 12.7   INR 0.95             Lab 12/11/23  1149   ABO TYPING A   RH TYPING Positive   ANTIBODY SCREEN Negative         Brief Urine Lab Results  (Last result in the past 365 days)        Color   Clarity   Blood   Leuk Est   Nitrite   Protein   CREAT   Urine HCG        12/08/23 0326 Dark Yellow   Cloudy   Trace   Small (1+)   Negative   Trace                 Microbiology Results (last 10 days)       Procedure Component Value - Date/Time    Urine Culture - Urine, Urine, Clean Catch [[id number]]  (Normal) Collected: 12/08/23 0326    Lab Status: Final result Specimen: Urine, Clean Catch Updated: 12/09/23 1155     Urine Culture No growth            Hospital Course:   Patient  "is a 68-year-old female with history of chronic low back pain and prior anterior cervical discectomy and fusion with prior lumbar surgeries.  She came to the hospital on 12/8 complaining of worsening pain radiating to both lower extremities which was severe and unrelenting.  Did not improve with any positioning.  She received multiple doses of medication in the ER without any relief to include Dilaudid, fentanyl, Toradol, Norflex, Decadron.  MRI of the lumbar spine was performed and patient was found to have mild alignment disruption at L4-5 with mild levoscoliosis.  Multilevel prominent disc osteophytes and bilateral neuroforaminal stenosis.  She was admitted to the hospital for pain control with neurosurgical evaluation.  She was having issues with right foot drop and weakness.  She underwent lumbar laminectomy of L4-5 with transforaminal lumbar interbody fusion on 12/11.  Continue to work with therapy and pain was improving.  Her right dorsiflexion is also been improving.  Still some ongoing pain so she went back to the OR on 12/14 for bilateral SI injections.  Overall she is doing significantly better.  Pain is much better controlled and she is tolerating p.o. meds alone.  No IVs needed for several days.  Plan to discharge her to a swing bed today.  This was discussed with neurosurgery who is in agreement.  Continue 10 pound or less weight lifting restrictions as well as wearing brace when out of bed.  Otherwise medically stable for discharge to swing bed today.      Physical Exam on Discharge:  /57 (BP Location: Right arm, Patient Position: Lying)   Pulse 78   Temp 98.1 °F (36.7 °C) (Oral)   Resp 16   Ht 162.6 cm (64\")   Wt 81.2 kg (179 lb)   SpO2 95%   BMI 30.73 kg/m²   Physical Exam  GEN: Awake, alert, interactive, in NAD  HEENT:  PERRLA, EOMI, Anicteric, Trachea midline  Lungs:  no wheezing/rales/rhonchi  Heart: RRR, +S1/s2, no rub  ABD: soft, nt/nd, +BS, no guarding/rebound  Extremities: " atraumatic, no cyanosis, no edema  Skin: no rashes or petechiae  Neuro: AAOx3, decreased R foot dorsiflexion  Psych: normal mood & affect      Condition on Discharge: stable/improved    Discharge Disposition:  Swing Bed    Discharge Medications:     Discharge Medications        New Medications        Instructions Start Date   acetaminophen 325 MG tablet  Commonly known as: TYLENOL   650 mg, Oral, Every 4 Hours PRN      bisacodyl 5 MG EC tablet  Commonly known as: DULCOLAX   5 mg, Oral, Daily PRN      bisacodyl 10 MG suppository  Commonly known as: DULCOLAX   10 mg, Rectal, Daily PRN      famotidine 40 MG tablet  Commonly known as: PEPCID   40 mg, Oral, Daily      lidocaine 5 %  Commonly known as: LIDODERM   2 patches, Transdermal, Every 24 Hours Scheduled, Remove & Discard patch within 12 hours or as directed by MD      melatonin 5 MG tablet tablet   5 mg, Oral, Nightly PRN      naloxone 0.4 MG/ML injection  Commonly known as: NARCAN   0.4 mg, Intravenous, Every 5 Minutes PRN      oxyCODONE-acetaminophen 5-325 MG per tablet  Commonly known as: PERCOCET   1 tablet, Oral, Every 4 Hours PRN      polyethylene glycol 17 g packet  Commonly known as: MIRALAX   17 g, Oral, Daily PRN      pregabalin 50 MG capsule  Commonly known as: LYRICA   50 mg, Oral, Every 8 Hours Scheduled      sennosides-docusate 8.6-50 MG per tablet  Commonly known as: PERICOLACE   2 tablets, Oral, 2 Times Daily             Continue These Medications        Instructions Start Date   amitriptyline 100 MG tablet  Commonly known as: ELAVIL   1-2 tablets, Oral, Nightly PRN      atorvastatin 20 MG tablet  Commonly known as: LIPITOR   20 mg, Oral, Daily      cholecalciferol 25 MCG (1000 UT) tablet  Commonly known as: VITAMIN D3   1,000 Units, Oral, Daily      orphenadrine 100 MG 12 hr tablet  Commonly known as: NORFLEX   100 mg, Oral, 2 Times Daily               Discharge Diet:   Dietary Orders (From admission, onward)       Start     Ordered    12/11/23  1848  Diet: Regular/House Diet, Cardiac Diets; Healthy Heart (2-3 Na+); Texture: Regular Texture (IDDSI 7); Fluid Consistency: Thin (IDDSI 0)  Diet Effective Now        References:    Diet Order Crosswalk   Question Answer Comment   Diets: Regular/House Diet    Diets: Cardiac Diets    Cardiac Diet: Healthy Heart (2-3 Na+)    Texture: Regular Texture (IDDSI 7)    Fluid Consistency: Thin (IDDSI 0)        12/11/23 1847                      Activity at Discharge:   Keep lifting to 10 lbs or less, where LSO brace when out of bed    Follow-up Appointments:   Future Appointments   Date Time Provider Department Center   1/10/2024  1:00 PM [facility] EMG (OP) NEURO RM 2 (DIAL) [facility]   1/19/2024  1:00 PM Castillo Guzman APRN [facility]   4/30/2024  1:30 PM Kiran Madden MD [facility]R PAD       Test Results Pending at Discharge: none    Electronically signed by Miguel Hernandez DO, 12/15/23, 09:04 CST.    Time: 35 minutes.           Electronically signed by Miguel Hernandez DO at 12/15/23 5864

## 2023-12-15 NOTE — THERAPY TREATMENT NOTE
Patient Name: Sedrick Leahy  : 1955    MRN: [id number]                              Today's Date: 12/15/2023       Admit Date: 2023    Visit Dx: Therapist utilized gait belt, applied non-slipped socks, provided fall risk education/prevention, & facilitated muscle strengthening PRN to reduce patient falls risk during this session.      ICD-10-CM ICD-9-CM   1. Foot drop, right  M21.371 736.79   2. Acute low back pain with bilateral sciatica, unspecified back pain laterality  M54.42 724.2    M54.41 724.3   3. Central stenosis of spinal canal  M48.00 724.00   4. Bilateral leg weakness  R29.898 729.89   5. Bilateral leg numbness  R20.0 782.0   6. Acute cystitis with hematuria  N30.01 595.0   7. Acquired spondylolisthesis  M43.10 738.4   8. Gait abnormality [R26.9]  R26.9 781.2   9. Gait difficulty [R26.9]  R26.9 781.2   10. Sacroiliitis  M46.1 720.2   11. Degeneration of lumbar or lumbosacral intervertebral disc  M51.37 722.52   12. Lumbar radiculopathy, acute  M54.16 724.4     Patient Active Problem List   Diagnosis    Wellness examination-done    Abnormal x-ray-resolved    Anemia: iron,    Chronic neck pain    Chronic back pain-linberg    Depression    Anxiety    History of kidney stones    Vitamin D deficiency    Hypertension-no rx    Hyperlipidemia-diet    Hematuria-kupper    Elevated fasting glucose    Nausea-problem with nissen    History of Nissen fundoplication    Lumbar radiculopathy, acute    Urinary incontinence    Laboratory test*    Chronic narcotic use-Shereeberg    Menopause: see osteopenia    Palpitations: holter benign    Rash    Hyperplastic colonic polyp    Positive colorectal cancer screening using Cologuard test    Family history of colon cancer in mother    Epigastric pain    Dark stools    Type 2 diabetes mellitus without complication, without long-term current use of insulin    OsteopeniaL 2020 ? 2y    Stenosis, cervical spine    Gastrointestinal hemorrhage    Class 1 obesity due to  excess calories with serious comorbidity and body mass index (BMI) of 32.0 to 32.9 in adult    Degeneration of lumbar or lumbosacral intervertebral disc    Intractable low back pain    Former smoker    Murmur    Gait difficulty    Memory loss    Abnormal laboratory test    Cerebral microvascular disease    Insomnia    Acquired spondylolisthesis    Foot drop, right    Sacroiliitis     Past Medical History:   Diagnosis Date    Arthritis     Diabetes mellitus     diet controlled    Hx of colonic polyp     Hypertension     Joint pain     Hip    Lower back pain     Neck pain     PONV (postoperative nausea and vomiting)      Past Surgical History:   Procedure Laterality Date    ANTERIOR CERVICAL DISCECTOMY W/ FUSION N/A 11/20/2020    Procedure: EXPLORATION OF FUSION C5-6, ANTERIOR CERVICAL DISCECTOMY FUSION C4-5, C6-7 REVISON ANTERIOR FUSION C5-6 WITH INSTRUMENTATION C4-7;  Surgeon: KEN Gilbert MD;  Location:  [facility];  Service: Orthopedic Spine;  Laterality: N/A;    APPENDECTOMY      COLONOSCOPY  06/06/2016    Normal exam repeat in 5 years    COLONOSCOPY N/A 01/14/2020    Diverticulosis repeat exam in 5 years    COLONOSCOPY W/ POLYPECTOMY  04/04/2012    Hyperplastic polyp cecum repeat exam in 1 year    ENDOSCOPY  08/14/2014    Very tortuous distal esophagus dilated 50 Fr, HH     ENDOSCOPY N/A 01/18/2021    A fundoplication was found, dilated    ENDOSCOPY  06/2023    HARDWARE REMOVAL N/A 11/20/2020    Procedure: REMOVAL OF INSTRUMENTATION;  Surgeon: KEN Gilbert MD;  Location:  [facility];  Service: Orthopedic Spine;  Laterality: N/A;    HYSTERECTOMY      Laparoscopic Hysterectomy bilateral salpingectomy for bleeding    NECK SURGERY      NISSEN FUNDOPLICATION      POSTERIOR CERVICAL FUSION N/A 12/03/2020    Procedure: POSTERIOR SPINAL FUSION WITH INSTRUMENTATION C4-7;  Surgeon: KEN Gilbert MD;  Location:  [facility];  Service: Orthopedic Spine;  Laterality: N/A;    SACROILIAC JOINT INJECTION Bilateral  12/14/2023    Procedure: SACROILIAC INJECTION, Bilateral;  Surgeon: Rick Estes DO;  Location:  [facility];  Service: Neurosurgery;  Laterality: Bilateral;    STEROID INJECTION Left 06/30/2022    Procedure: LEFT HIP FLUROSCOPIC GUIDED CORTICOSTEROID INJECTION;  Surgeon: Herberth Skelton MD;  Location:  [facility];  Service: Orthopedics;  Laterality: Left;    US GUIDED LYMPH NODE BIOPSY  08/03/2020      General Information       Row Name 12/15/23 09          OT Time and Intention    Document Type therapy note (daily note)  -MT     Mode of Treatment occupational therapy  -MT       Row Name 12/15/23 0940          General Information    Patient Profile Reviewed yes  -MT     Existing Precautions/Restrictions brace worn when out of bed;fall;spinal  R LE weakness, drags toe at times  -MT       Row Name 12/15/23 0940          Cognition    Orientation Status (Cognition) oriented x 4  -MT       Row Name 12/15/23 0940          Safety Issues, Functional Mobility    Impairments Affecting Function (Mobility) balance;endurance/activity tolerance;pain;strength  -MT               User Key  (r) = Recorded By, (t) = Taken By, (c) = Cosigned By      Initials Name Provider Type    MT Sobia Willett COTA Occupational Therapist Assistant                     Mobility/ADL's       Row Name 12/15/23 0940          Bed Mobility    Sidelying-Sit [place] (Bed Mobility) minimum assist (75% patient effort);verbal cues  -MT     Sit-Sidelying [place] (Bed Mobility) minimum assist (75% patient effort);verbal cues  -MT     Assistive Device (Bed Mobility) bed rails  -MT     Comment, (Bed Mobility) Pt needing cues to keep back straight during bed mobility  -MT               User Key  (r) = Recorded By, (t) = Taken By, (c) = Cosigned By      Initials Name Provider Type    Sobia Dawn COTA Occupational Therapist Assistant                   Obj/Interventions    No documentation.                  Goals/Plan    No documentation.                   Clinical Impression       Row Name 12/15/23 0940          Pain Assessment    Pretreatment Pain Rating 2/10  -MT     Posttreatment Pain Rating 6/10  notified NSG pt request for pain meds  -MT       Row Name 12/15/23 0940          Therapy Plan Review/Discharge Plan (OT)    Anticipated Discharge Disposition (OT) sub acute care setting  -MT       Row Name 12/15/23 0940          Positioning and Restraints    Pre-Treatment Position in bed  -MT     Post Treatment Position bed  -MT     In Bed fowlers;call light within reach;encouraged to call for assist;exit alarm on;side rails up x2;with family/caregiver  -MT               User Key  (r) = Recorded By, (t) = Taken By, (c) = Cosigned By      Initials Name Provider Type    Sobia Dawn COTA Occupational Therapist Assistant                   Outcome Measures       Row Name 12/15/23 0940          How much help from another is currently needed...    Putting on and taking off regular lower body clothing? 2  -MT     Bathing (including washing, rinsing, and drying) 2  -MT     Toileting (which includes using toilet bed pan or urinal) 3  -MT     Putting on and taking off regular upper body clothing 3  -MT     Taking care of personal grooming (such as brushing teeth) 4  -MT     Eating meals 4  -MT     AM-PAC 6 Clicks Score (OT) 18  -MT               User Key  (r) = Recorded By, (t) = Taken By, (c) = Cosigned By      Initials Name Provider Type    Sobia Dawn COTA Occupational Therapist Assistant                    Occupational Therapy Education       Title: PT OT SLP Therapies (Done)       Topic: Occupational Therapy (Done)       Point: ADL training (Done)       Description:   Instruct learner(s) on proper safety adaptation and remediation techniques during self care or transfers.   Instruct in proper use of assistive devices.                  Learning Progress Summary             Patient Acceptance, E,TB, VU by KRISTEN at 12/14/2023 0920    Acceptance, E,TB, VU by   at 12/13/2023 0928    Acceptance, E,D, VU,NR by LR at 12/12/2023 0946   Significant Other Acceptance, E,D, VU,NR by LR at 12/12/2023 0946                         Point: Home exercise program (Done)       Description:   Instruct learner(s) on appropriate technique for monitoring, assisting and/or progressing therapeutic exercises/activities.                  Learning Progress Summary             Patient Acceptance, E,TB, VU by  at 12/14/2023 0920    Acceptance, E,TB, VU by  at 12/13/2023 0928                         Point: Precautions (Done)       Description:   Instruct learner(s) on prescribed precautions during self-care and functional transfers.                  Learning Progress Summary             Patient Acceptance, E,TB, VU by  at 12/14/2023 0920    Acceptance, E,TB, VU by  at 12/13/2023 0928    Acceptance, E,D, VU,NR by LR at 12/12/2023 0946   Significant Other Acceptance, E,D, VU,NR by LR at 12/12/2023 0946                         Point: Body mechanics (Done)       Description:   Instruct learner(s) on proper positioning and spine alignment during self-care, functional mobility activities and/or exercises.                  Learning Progress Summary             Patient Acceptance, E,TB, VU by  at 12/14/2023 0920    Acceptance, E,TB, VU by  at 12/13/2023 0928    Acceptance, E,D, VU,NR by LR at 12/12/2023 0946   Significant Other Acceptance, E,D, VU,NR by LR at 12/12/2023 0946                                         User Key       Initials Effective Dates Name Provider Type Discipline     05/22/23 -  Jason Grissom, YAJAIRA Registered Nurse Nurse     04/25/23 -  Tanisha Castro OTR/L Occupational Therapist OT                  OT Recommendation and Plan     Plan of Care Review  Plan of Care Reviewed With: patient  Progress: improving  Outcome Evaluation: Jordon bed mobility, cues for spinal precautions. MaxA donning B shoes. Fxl mobility in room <> BR via RW and CGA. R LE weakness, drags toe at  times needing cues for floor clearance. Pt toileted with SBA and increased time d/t decreased reach for perineal d/t pain. Pt dons UB clothing Jordon including LSO, LB clothing ModA. pt demo'd improved ability to bring LLE up to opposite knee for brief time. Pt demo'd improvements but would continue to benefit from rehab at short term rehab, discussed this need with pt/spouse.     Time Calculation:         Time Calculation- OT       Row Name 12/15/23 0940             Time Calculation- OT    OT Start Time 0940  -MT      OT Stop Time 1035  -MT      OT Time Calculation (min) 55 min  -MT      Total Timed Code Minutes- OT 55 minute(s)  -MT      OT Received On 12/15/23  -MT         Timed Charges    13240 - OT Self Care/Mgmt Minutes 55  -MT         Total Minutes    Timed Charges Total Minutes 55  -MT       Total Minutes 55  -MT                User Key  (r) = Recorded By, (t) = Taken By, (c) = Cosigned By      Initials Name Provider Type    MT Sobia Willett COTA Occupational Therapist Assistant                  Therapy Charges for Today       Code Description Service Date Service Provider Modifiers Qty    68548952129 HC OT THERAPEUTIC ACT EA 15 MIN 12/14/2023 Sobia Willett COTA GO 1    03314647734 HC OT SELF CARE/MGMT/TRAIN EA 15 MIN 12/14/2023 Sobia Willett COTA GO 5    90431798258 HC OT SELF CARE/MGMT/TRAIN EA 15 MIN 12/15/2023 Sobia Willett COTA GO 4                 MAC Marcelo  12/15/2023

## 2023-12-15 NOTE — THERAPY DISCHARGE NOTE
Acute Care - Physical Therapy Discharge Summary  [facility]       Patient Name: Sedrick Leahy  : 1955  MRN: [id number]    Today's Date: 12/15/2023  Onset of Illness/Injury or Date of Surgery: 23       Referring Physician: Dr. Palencia      Admit Date: 2023      PT Recommendation and Plan    Visit Dx:    ICD-10-CM ICD-9-CM   1. Foot drop, right  M21.371 736.79   2. Acute low back pain with bilateral sciatica, unspecified back pain laterality  M54.42 724.2    M54.41 724.3   3. Central stenosis of spinal canal  M48.00 724.00   4. Bilateral leg weakness  R29.898 729.89   5. Bilateral leg numbness  R20.0 782.0   6. Acute cystitis with hematuria  N30.01 595.0   7. Acquired spondylolisthesis  M43.10 738.4   8. Gait abnormality [R26.9]  R26.9 781.2   9. Gait difficulty [R26.9]  R26.9 781.2   10. Sacroiliitis  M46.1 720.2   11. Degeneration of lumbar or lumbosacral intervertebral disc  M51.37 722.52   12. Lumbar radiculopathy, acute  M54.16 724.4        Outcome Measures       Row Name 23 1100 23 0900          How much help from another person do you currently need...    Turning from your back to your side while in flat bed without using bedrails? 3  -AH 3  -AH     Moving from lying on back to sitting on the side of a flat bed without bedrails? 3  -AH 3  -AH     Moving to and from a bed to a chair (including a wheelchair)? 3  -AH 3  -AH     Standing up from a chair using your arms (e.g., wheelchair, bedside chair)? 3  -AH 3  -AH     Climbing 3-5 steps with a railing? 2  -AH 2  -AH     To walk in hospital room? 3  -AH 3  -AH     AM-PAC 6 Clicks Score (PT) 17  -AH 17  -AH     Highest Level of Mobility Goal 5 --> Static standing  -AH 5 --> Static standing  -AH        Functional Assessment    Outcome Measure Options AM-PAC 6 Clicks Basic Mobility (PT)  - AM-PAC 6 Clicks Basic Mobility (PT)  -               User Key  (r) = Recorded By, (t) = Taken By, (c) = Cosigned By      Initials Name Provider  Type    Tracey Ernst, PTA Physical Therapist Assistant                         PT Rehab Goals       Row Name 12/15/23 1519             Bed Mobility Goal 1 (PT)    Activity/Assistive Device (Bed Mobility Goal 1, PT) rolling to left;rolling to right;scooting;bridging;sidelying to sit/sit to sidelying  -NW      [place] Level/Cues Needed (Bed Mobility Goal 1, PT) standby assist;modified independence  -NW      Time Frame (Bed Mobility Goal 1, PT) long term goal (LTG);10 days  -NW      Progress/Outcomes (Bed Mobility Goal 1, PT) goal not met  -NW         Transfer Goal 1 (PT)    Activity/Assistive Device (Transfer Goal 1, PT) sit-to-stand/stand-to-sit;bed-to-chair/chair-to-bed  -NW      [place] Level/Cues Needed (Transfer Goal 1, PT) other (see comments)  rwx for bed to/from chair  -NW      Time Frame (Transfer Goal 1, PT) long term goal (LTG);10 days  -NW      Progress/Outcome (Transfer Goal 1, PT) goal not met  -NW         Gait Training Goal 1 (PT)    Activity/Assistive Device (Gait Training Goal 1, PT) gait (walking locomotion);assistive device use;decrease fall risk;improve balance and speed;increase endurance/gait distance;increase energy conservation;walker, rolling  -NW      [place] Level (Gait Training Goal 1, PT) standby assist  -NW      Distance (Gait Training Goal 1, PT) 50 ft  -NW      Time Frame (Gait Training Goal 1, PT) long term goal (LTG);10 days  -NW      Progress/Outcome (Gait Training Goal 1, PT) goal not met  -NW         Stairs Goal 1 (PT)    Activity/Assistive Device (Stairs Goal 1, PT) ascending stairs;descending stairs;using handrail, right;using handrail, left;step-to-step;decrease fall risk;improve balance and speed  -NW      [place] Level/Cues Needed (Stairs Goal 1, PT) contact guard required  -NW      Number of Stairs (Stairs Goal 1, PT) 2-3  -NW      Time Frame (Stairs Goal 1, PT) long term goal (LTG);10 days  -NW      Progress/Outcome (Stairs Goal 1, PT) goal not met   -NW         Patient Education Goal (PT)    Activity (Patient Education Goal, PT) spinal precautions, brace mgmt  -NW      [place]/Cues/Accuracy (Memory Goal 2, PT) demonstrates adequately;independent;verbalizes understanding  -NW      Time Frame (Patient Education Goal, PT) 10 days;long term goal (LTG)  -NW      Progress/Outcome (Patient Education Goal, PT) goal met  -NW                User Key  (r) = Recorded By, (t) = Taken By, (c) = Cosigned By      Initials Name Provider Type Discipline    Kezia Peace PTA Physical Therapist Assistant PT                        PT Discharge Summary  Anticipated Discharge Disposition (PT): sub acute care setting  Reason for Discharge: Discharge from facility  Outcomes Achieved: Refer to plan of care for updates on goals achieved  Discharge Destination: SNF      Kezia Tineo PTA   12/15/2023

## 2023-12-18 ENCOUNTER — OUTSIDE FACILITY SERVICE (OUTPATIENT)
Dept: FAMILY MEDICINE CLINIC | Facility: CLINIC | Age: 68
End: 2023-12-18
Payer: MEDICARE

## 2023-12-19 ENCOUNTER — DOCUMENTATION (OUTPATIENT)
Dept: FAMILY MEDICINE CLINIC | Facility: CLINIC | Age: 68
End: 2023-12-19
Payer: MEDICARE

## 2023-12-19 NOTE — PROGRESS NOTES
"[facility] Patient ID: Sedrick Leahy  MRN: [id number]         Patient ID: Sedrick Leahy  MRN: [id number]     Acct:  [id number]  Admit Date: 12.15.23  Date of service: 12.15.23      [facility]  SWING BED HISTORY AND PHYSICIAL    SOURCE: The source of this information is prior knowledge of the patient, review of her office records, her current chart, as well as discussion with she and her ; all are considered reliable.      PATIENT PROFILE: The patient is a 67 y/o white  female resident of [facility]; she was cooperative.      CHIEF COMPLAINT: \"back pain\"     HISTORY OF PRESENT ILLNESS: She has had several months if not years of on and off back discomfort and lower extremity weakness and difficulty walking.  She was recently referred to [facility] neurology; conservative suggestions were made.  Then she started having increased significant lower back discomfort.  She presented to [place] ER than later [facility] ER and was finally admitted; taken to the operating room and even having to have an SI joint injection after her surgery.  Because of associated foot drop and difficulty with lower extremity weakness and uncertainty of her ability to ambulate she excepted an offer for swing bed admission here [place].    Recent Lab Results:    CBC:   Results from last 7 days   Lab Units 12/15/23  0904   WBC 10*3/mm3 11.79*   HEMOGLOBIN g/dL 9.7*   HEMATOCRIT % 32.3*   PLATELETS 10*3/mm3 308     BMP:   Results from last 7 days   Lab Units 12/15/23  0904   SODIUM mmol/L 136   POTASSIUM mmol/L 4.2   CHLORIDE mmol/L 103   CO2 mmol/L 25.0   BUN mg/dL 13   CREATININE mg/dL 0.59   GLUCOSE mg/dL 107*   CALCIUM mg/dL 9.0   ALT (SGPT) U/L 22     Recent imaging:   XR Sacroiliac Joints 1 or 2 View    Result Date: 12/14/2023   Intraoperative fluoroscopic images during SI joint injection. A radiologist was not present during the procedure.  Please refer to the operative note for more details.  This report was signed " and finalized on 12/14/2023 4:25 PM by Dr. Trey Granados MD.      XR Spine Lumbar 2 or 3 View    Result Date: 12/11/2023   Intraoperative fluoroscopic images during lumbar fusion.  Please refer to the operative note for more details.  This report was signed and finalized on 12/11/2023 3:32 PM by Juwan Roca.      XR Hips Bilateral With or Without Pelvis 5 View    Result Date: 12/8/2023  1. Mild degenerative changes of the bilateral hips. 2. Advanced degenerative changes of the lumbar spine.  This report was signed and finalized on 12/8/2023 4:04 PM by Dr Kierra Alexander MD.      MRI Lumbar Spine Without Contrast    Result Date: 12/8/2023  1. Lumbar spondylosis. A mild alignment disruption at L4-5. A mild levoscoliosis. 2. The multilevel prominent disc osteophyte complexes, facet arthropathy and ligamental hypertrophy. There is a resultant moderate spinal stenosis at L4-5. There is bilateral neuroforaminal stenosis more on the right at L4-5. There is a mild right neuroforaminal stenosis L5-S1.  This report was signed and finalized on 12/8/2023 6:00 AM by Dr. Ping Vasquez MD.      CT Lumbar Spine Without Contrast    Result Date: 12/6/2023  1. No acute fracture or malalignment. 2. Lumbar spondylosis more pronounced in the lower lumbar spine. Mild anterolisthesis of L4 over L5. No spondylolysis. 3. Moderate spinal canal stenosis at L4-5. The right neuroforaminal stenosis at L4-5 and L5-S1. The details given above.  This report was signed and finalized on 12/6/2023 7:46 AM by Dr. Ping Vasquez MD.      MRI Brain With & Without Contrast    Result Date: 10/6/2023  1. Scattered areas of T2 high signal within the hemispheric white matter are nonspecific and likely due to chronic small vessel disease. 2. Partially empty appearance of the sella turcica. Fluid in the optic nerve sheaths. Small right transverse sinus. These findings may all be incidental. They may also be seen in patients with idiopathic  "intracranial hypertension.   This report was signed and finalized on 10/6/2023 11:23 AM CDT by Dr. Wilmer Gómez MD.         Allergies   Allergen Reactions    Penicillins Swelling and Rash    Morphine GI Intolerance     Pt states morphine makes her sick and has \"had GERD surgery is unable to vomit\"       ADMIT MEDICATIONS:  Prior to Admission medications    Medication Sig Start Date End Date Taking? Authorizing Provider   acetaminophen (TYLENOL) 325 MG tablet Take 2 tablets by mouth Every 4 (Four) Hours As Needed for Mild Pain. 12/15/23   Miguel Hernandez DO   amitriptyline (ELAVIL) 100 MG tablet Take 1-2 tablets by mouth At Night As Needed for Sleep.    ProviderCira MD   atorvastatin (LIPITOR) 20 MG tablet Take 1 tablet by mouth Daily. 10/17/23   Kiran Madden MD   bisacodyl (DULCOLAX) 10 MG suppository Insert 1 suppository into the rectum Daily As Needed for Constipation (Use if bisacodyl oral is ineffective). 12/15/23   Miguel Hernandez DO   bisacodyl (DULCOLAX) 5 MG EC tablet Take 1 tablet by mouth Daily As Needed for Constipation (Use if polyethylene glycol is ineffective). 12/15/23   Miguel Hernandez DO   cholecalciferol (VITAMIN D3) 25 MCG (1000 UT) tablet Take 1 tablet by mouth Daily.    Provider, MD Cira   famotidine (PEPCID) 40 MG tablet Take 1 tablet by mouth Daily. 12/15/23   Miguel Hernandez DO   lidocaine (LIDODERM) 5 % Place 2 patches on the skin as directed by provider Daily. Remove & Discard patch within 12 hours or as directed by MD 12/15/23   Miguel Hernandez DO   melatonin 5 MG tablet tablet Take 1 tablet by mouth At Night As Needed (sleep). 12/15/23   Miguel Hernandez DO   naloxone (NARCAN) 0.4 MG/ML injection Infuse 1 mL into a venous catheter Every 5 (Five) Minutes As Needed for Respiratory Depression. 12/15/23   Miguel Hernandez DO   orphenadrine (NORFLEX) 100 MG 12 hr tablet Take 1 tablet by mouth 2 (Two) Times a Day.    ProviderCira MD "   oxyCODONE-acetaminophen (PERCOCET) 5-325 MG per tablet Take 1 tablet by mouth Every 4 (Four) Hours As Needed for Moderate Pain for up to 5 days. 12/15/23 12/20/23  Miguel Hernandez, DO   polyethylene glycol (MIRALAX) 17 g packet Take 17 g by mouth Daily As Needed (Use if senna-docusate is ineffective). 12/15/23   Miguel Hernandez DO   pregabalin (LYRICA) 50 MG capsule Take 1 capsule by mouth Every 8 (Eight) Hours. 12/15/23   Miguel Hernandez, DO   sennosides-docusate (PERICOLACE) 8.6-50 MG per tablet Take 2 tablets by mouth 2 (Two) Times a Day. 12/15/23   Miguel Hernandez DO       PAST HISTORY:  CHILDHOOD: unremarkable.     [id number]     [id number]  Colonoscopy/[facility]/WB  Colonoscopy/[facility]/  Colonoscopy+neg/[facility]//07/BE  EGD+hiatal hernia/[facility]//07  Colonoscopy+irdsjx-ceij-nlj-div/[facility]//--09/3mo  Colonoscopy+polyp/[facility]/-15-09/3y  Limited colonoscopy+hfkl-tnkbe-voy/[facility]//10-22-09/[facility]  EGD+hh-biop/[facility]//--10  Colonoscopy+Polyp-div/[facility]//4.4.12/3m-1y pp  Attempted Colonoscopy/[facility]/4.8.13  Colonoscopy+nl/[facility]/4.9.13/3y  EGD+biop/[facility]/4.8.13  EGD+fbo-vh-yyyoshjg/[facility]/Mc/8.14.14  Esophagial Mobility Study/[place]/9.30.14  Colonoscopy/unable to complete due to quality of prep5.2.16  Colonoscopy/[facility]/6.6.16/5y  Cologuard+/11.21.19-gi suggested  Colon-div///1.14.2020/5y  EGD-past fundoplication///21  Colon-nl//[facility]/4.10.23/5y  EGD-irregular z//[facility]/4.10.23  SI injection//[place]/23     SURGERIES:  BRIONNA/[place]/[facility]/  Lap gb/[place]/[facility]/  Appendectomy/[place]/  Lap HH+nissen/[place]/12.3.14   C spine/[place]//.  C spine/[place]//12.3.20  LS surgery/[facility]/23     FAMILY HISTORY:  Heart/gm-m  DM/gm-f, b,b,si  CA-melanoma/b  CA-breast/a-m, a-m, a-m     HABITS:  Tobacco-smoker/age [age] ppd/dc   Alcohol/none     SOCIAL  HISTORY:  /1974,2000  /1992  Children/2  Employment/Security-desk/retired  Retired/+    HOSPITAL ADMITS:   BH:   5.20.15-5.22.15  chest pain-appears noncardiac  back pain-thoracic  nausea without vomiting  gastroesophageal reflux-post nissian  anemia-chronic  iron deficiency-chronic  headache-suggestive migraine vs Rx withdrawal    12.8.23-12.15.23  Intractable low back pain  Acquired spondylolisthesis  Foot drop R  Sacroilitis  Lumbar radiculopathy    Review of Systems  GENERAL:  Inactive/slower with limits, speed, stamina for age and lower back pain, LE weakness. Sleep is interrupted occ/pain. No fever now/recent.  ENDO:  No syncope, near or diaphoretic sweaty spells.  BS Ok past/through process.  HEENT: No head injury occ headache.   No vision change.   No significant hearing loss.  Ears without pain/drainage.  No sore throat.  No significant nasal/sinus congestion/drainage. No epistaxis.  CHEST: No chest wall tenderness or mass. No cough, wheeze, SOB; no hemoptysis.  CV: No chest pain, palpitations, ankle edema.  GI: No heartburn, dysphagia.  No abdominal pain, diarrhea, constipation, rectal bleeding, or melena.  :  Voids without dysuria, or incontinence to completion.  ORTHO: No painful/swollen joints but various on /off sore.  No sore neck but still pain lower back.    NEURO: No dizziness but non-focal LE weakness of extremities/mild UE weakness and more weakness R dorsiflexion.  Minimal/mod LE numbness/paresthesias.   PSYCH: No memory loss.  Mood good; not that anxious, depressed but/and not suicidal.  One to tolerate stress well.  Only health/hospital stress.     PHYSICAL EXAMINATION:  T: 98.6 F P: 72/min  RR: 12/min BP: 132/82 Wt: 182 lbs Ht: 64 in    Physical Exam  GENERAL:  Well nourished/developed in no acute distress. Obese   SKIN: Turgor excellent, without wound, rash, lesion.  HEENT: Normocephalic without trauma.  Pupils equal round reactive to light. Extraocular motions full without  nystagmus.   No thyromegaly, mass, tenderness, lymphadenopathy and supple.  CV: Regular rhythm.  No murmur, gallop, edema. Posterior pulses intact.  No carotid bruits.   CHEST: No chest wall tenderness or mass.   LUNGS: Symmetric motion with clear to auscultation.   ABD: Soft, nontender without mass.   ORTHO: Symmetric extremities without swelling/point tenderness.  Full gross range of motion.    NEURO: CN 2-12 grossly intact.  Symmetric facies. 2/5 strength throughout except R foot dorsiflexion 1/4.  Nonfocal use extremities. Speech clear.    PSYCH: Oriented x 3.  Pleasant calm, well kept.  Purposeful/directed conversation with intact short/long gross memory.     ASSESSMENT/PROBLEM LIST:   69 y/o white female   Allergy/intolerance: see above  Procedural history: see above  Family history: see above  History tobacco use  Menopausal  Osteopenia  History elevated bp  Hyperlipidemia  Murmur; neg echo  Obesity  Elevated fasting glucose  Gastroesophageal reflux  History nissens  Colon polyps  Kidney stones  Anemia  Depression  DDD  DJD-spinal  DJD-diffuse  Chronic neck pain  Chronic back pain  R foot drop  Cerebral microvascular disease  Memory loss  Gait difficulty    REASON FOR ADMISSION:    Acute gait decline  Acute back pain  Post op status; LS  Sacroilitis  Anemia-post op contribution  Weakness  Decline ADLs    PLANS:   Rx-reviewed; ordered as needed and will review daily/as needed.   Includes anticoagulation for DVT prevention, antiembolic stockings as appropriate, or early ambulation as appropriate  LAB-reviewed; ordered as needed and will review daily.  Particular: chemistry and CBC and at least once and prn  Imaging/cardiology testing-reviewed; ordered as needed and will review daily.  Particular:  none for now  Consults PT, OT  Diet: general; encouraged  Fluids: oral; encouraged  Special issues: none  Discharge planning: she/ expects home  Code status: full

## 2023-12-20 ENCOUNTER — DOCUMENTATION (OUTPATIENT)
Dept: FAMILY MEDICINE CLINIC | Facility: CLINIC | Age: 68
End: 2023-12-20
Payer: MEDICARE

## 2023-12-20 ENCOUNTER — OUTSIDE FACILITY SERVICE (OUTPATIENT)
Dept: FAMILY MEDICINE CLINIC | Facility: CLINIC | Age: 68
End: 2023-12-20
Payer: MEDICARE

## 2023-12-20 DIAGNOSIS — M54.42 ACUTE LOW BACK PAIN WITH BILATERAL SCIATICA, UNSPECIFIED BACK PAIN LATERALITY: ICD-10-CM

## 2023-12-20 DIAGNOSIS — M51.37 DEGENERATION OF LUMBAR OR LUMBOSACRAL INTERVERTEBRAL DISC: ICD-10-CM

## 2023-12-20 DIAGNOSIS — M54.16 LUMBAR RADICULOPATHY, ACUTE: ICD-10-CM

## 2023-12-20 DIAGNOSIS — M54.41 ACUTE LOW BACK PAIN WITH BILATERAL SCIATICA, UNSPECIFIED BACK PAIN LATERALITY: ICD-10-CM

## 2023-12-20 RX ORDER — PREGABALIN 50 MG/1
50 CAPSULE ORAL EVERY 8 HOURS SCHEDULED
Qty: 90 CAPSULE | Refills: 0
Start: 2023-12-20 | End: 2023-12-20 | Stop reason: SDUPTHER

## 2023-12-20 RX ORDER — PREGABALIN 50 MG/1
50 CAPSULE ORAL EVERY 8 HOURS SCHEDULED
Qty: 90 CAPSULE | Refills: 0 | Status: SHIPPED | OUTPATIENT
Start: 2023-12-20

## 2023-12-20 RX ORDER — OXYCODONE HYDROCHLORIDE AND ACETAMINOPHEN 5; 325 MG/1; MG/1
1 TABLET ORAL EVERY 4 HOURS PRN
Qty: 40 TABLET | Refills: 0
Start: 2023-12-20 | End: 2023-12-20 | Stop reason: SDUPTHER

## 2023-12-20 RX ORDER — NALOXONE HCL 0.4 MG/ML
0.4 VIAL (ML) INJECTION
Start: 2023-12-20

## 2023-12-20 RX ORDER — OXYCODONE HYDROCHLORIDE AND ACETAMINOPHEN 5; 325 MG/1; MG/1
1 TABLET ORAL EVERY 4 HOURS PRN
Qty: 40 TABLET | Refills: 0 | Status: SHIPPED | OUTPATIENT
Start: 2023-12-20

## 2023-12-20 RX ORDER — NALOXONE HCL 0.4 MG/ML
0.4 VIAL (ML) INJECTION
Start: 2023-12-20 | End: 2023-12-20 | Stop reason: SDUPTHER

## 2023-12-20 RX ORDER — ORPHENADRINE CITRATE 100 MG/1
100 TABLET, EXTENDED RELEASE ORAL 2 TIMES DAILY
Qty: 30 TABLET | Refills: 0 | Status: SHIPPED | OUTPATIENT
Start: 2023-12-20 | End: 2023-12-20 | Stop reason: SDUPTHER

## 2023-12-20 RX ORDER — ORPHENADRINE CITRATE 100 MG/1
100 TABLET, EXTENDED RELEASE ORAL 2 TIMES DAILY
Qty: 30 TABLET | Refills: 0 | Status: SHIPPED | OUTPATIENT
Start: 2023-12-20

## 2023-12-20 NOTE — PROGRESS NOTES
"Epic numbers  MRN: [id number]       [facility]  Swing bed progress note     Patient ID: Sedrick Leahy  MRN: [id number]                                    Acct:  [id number]  Admit Date: 12.15.23  Date of service: 12.18.23      Patient Care Team:  NATASHA Madden MD    Chief Complaint:  back pain    Subjective      HISTORY OF PRESENT ILLNESS: She has had several months if not years of on and off back discomfort and lower extremity weakness and difficulty walking.  She was recently referred to [facility] neurology; conservative suggestions were made.  Then she started having increased significant lower back discomfort.  She presented to [place] ER than later [facility] ER and was finally admitted; taken to the operating room and even having to have an SI joint injection after her surgery.  Because of associated foot drop and difficulty with lower extremity weakness and uncertainty of her ability to ambulate she excepted an offer for swing bed admission here [place].     Interval History: Having BM/no diarrhea.  Eating and drinking.  Voiding ok.  Less back pain; oral pain Rx.  Walked with walker/standby initially with increased pain and later less. Slept ok with pain Rx    Allergies   Allergen Reactions    Penicillins Swelling and Rash    Morphine GI Intolerance     Pt states morphine makes her sick and has \"had GERD surgery is unable to vomit\"     Current meds:   Acetaminophen 650 mg every 4 hours as needed  Amitriptyline 25 to 50 mg at bedtime as needed insomnia  Atorvastatin 20 mg bedtime  Bisacodyl 5-10 mg daily as needed constipation  Vitamin D3 at 1000 international units daily  Does skillet 100 mg twice daily  Pepcid 40 mg a day  Lidocaine 5% patch 2 patches applied daily  Melatonin 3 mg 4.5 mg bedtime as needed  Orphenadrine 100 mg every 12 hours  Percocet 5 1 every 4 hours as needed  MiraLAX 17 g a day as needed  Lyrica 50 mg every 8 hours  Sennosides 8.6 mg 17.2 mg bid    Review of Systems:   Review of Systems "   Constitutional:  Positive for activity change, appetite change and fatigue. Negative for chills and fever.   HENT: Negative.     Respiratory:  Negative for cough and shortness of breath.    Cardiovascular:  Negative for chest pain, palpitations and leg swelling.   Gastrointestinal:  Negative for constipation and diarrhea.   Genitourinary:  Negative for dysuria.   Musculoskeletal:  Positive for gait problem.   Neurological:  Positive for weakness. Negative for dizziness.     Objective     Vital Signs  T: 97.3 F P: 72/min  RR: 18/min BP: 120/65 Wt: 182 lbs Ht: 64 in    Lab Results:  [facility]/lab/swingbed/[place]/12.16.23  CMP  AST 43  CBC Hb 10.6    Physical Exam:  GENERAL:  Well nourished/developed in no acute distress. Obese   SKIN: Turgor excellent, without wound, rash, lesion.  HEENT: Normocephalic without trauma.  Pupils equal round reactive to light. Extraocular motions full without nystagmus.   No thyromegaly, mass, tenderness, lymphadenopathy and supple.  CV: Regular rhythm.  No murmur, gallop, edema. Posterior pulses intact.  No carotid bruits.   CHEST: No chest wall tenderness or mass.   LUNGS: Symmetric motion with clear to auscultation.   ABD: Soft, nontender without mass.   ORTHO: Symmetric extremities without swelling/point tenderness.  Full gross range of motion.    NEURO: CN 2-12 grossly intact.  Symmetric facies. 2/5 strength throughout except R foot dorsiflexion 1/4.  Nonfocal use extremities. Speech clear.    PSYCH: Oriented x 3.  Pleasant calm, well kept.  Purposeful/directed conversation with intact short/long gross memory.    Results Review:    above    ASSESSMENT/PLAN:  Reason for admit/problems addressing acutely:  Acute gait decline  Acute back pain  Post op status; LS  Sacroilitis  Anemia-post op contribution  Weakness  Decline ADLs    Chronic problems to review/consider during care:  69 y/o white female   Allergy/intolerance: see above  Procedural history: see above  Family history: see  above  History tobacco use  Menopausal  Osteopenia  History elevated bp  Hyperlipidemia  Murmur; neg echo  Obesity  Elevated fasting glucose  Gastroesophageal reflux  History nissens  Colon polyps  Kidney stones  Anemia  Depression  DDD  DJD-spinal  DJD-diffuse  Chronic neck pain  Chronic back pain  R foot drop  Cerebral microvascular disease  Memory loss  Gait difficulty    Rx-reviewed, considered with changes as needed: no changes  Labs reviewed, considered and changes made as needed: none planned till later  Imaging reviewed, and need for considered: no changes/none  Consults needed: PT.OT  Diet reviewed, considered and changes made as needed: no changes-encouraged  Fluids reviewed, considered and changes made as needed: no changes-oral-encouraged  Increase activity: as able  Code status: full  Discussed possible/future discharge plans: patient expects home   Case discussed with nursing, patient and case management  Available to talk if needed with POA/caregiver and members care team.    Kiran Madden MD  Electronically signed by Kiran Madden MD, 12/19/23, 9:37 PM CST.

## 2023-12-20 NOTE — PROGRESS NOTES
"[facility] Patient ID: Sedrick Leahy  MRN: [id number]         [facility]  SWING BED DISCHARGE SUMMARY    Patient ID: Sedrick Leahy  Patient ID: Sedrick Leahy  MRN: [id number]                                    Acct:  [id number]  Admit Date: 12.15.23  Date of Discharge: 12.20.23  Date of service: 12.20.23    Consults:    None    The patient was seen and examined on the day of discharge and this discharge summary is in conjunction with any daily progress note from day of discharge.     PATIENT PROFILE: The patient is a 67 y/o white  female resident of [facility]; she was cooperative.      CHIEF COMPLAINT: \"back pain\"     HISTORY OF PRESENT ILLNESS: She has had several months if not years of on and off back discomfort and lower extremity weakness and difficulty walking.  She was recently referred to [facility] neurology; conservative suggestions were made.  Then she started having increased significant lower back discomfort.  She presented to [facility] than later [facility] ER and was finally admitted; taken to the operating room and even having to have an SI joint injection after her surgery.  Because of associated foot drop and difficulty with lower extremity weakness and uncertainty of her ability to ambulate she excepted an offer for swing bed admission here [facility].     Recent Lab Results:     CBC:        Results from last 7 days   Lab Units 12/15/23  0904   WBC 10*3/mm3 11.79*   HEMOGLOBIN g/dL 9.7*   HEMATOCRIT % 32.3*   PLATELETS 10*3/mm3 308      BMP:        Results from last 7 days   Lab Units 12/15/23  0904   SODIUM mmol/L 136   POTASSIUM mmol/L 4.2   CHLORIDE mmol/L 103   CO2 mmol/L 25.0   BUN mg/dL 13   CREATININE mg/dL 0.59   GLUCOSE mg/dL 107*   CALCIUM mg/dL 9.0   ALT (SGPT) U/L 22      Recent imaging:   XR Sacroiliac Joints 1 or 2 View     Result Date: 12/14/2023   Intraoperative fluoroscopic images during SI joint injection. A radiologist was not present during the procedure.  Please " refer to the operative note for more details.  This report was signed and finalized on 12/14/2023 4:25 PM by Dr. Trey Granados MD.       XR Spine Lumbar 2 or 3 View     Result Date: 12/11/2023   Intraoperative fluoroscopic images during lumbar fusion.  Please refer to the operative note for more details.  This report was signed and finalized on 12/11/2023 3:32 PM by Juwan Roca.       XR Hips Bilateral With or Without Pelvis 5 View     Result Date: 12/8/2023  1. Mild degenerative changes of the bilateral hips. 2. Advanced degenerative changes of the lumbar spine.  This report was signed and finalized on 12/8/2023 4:04 PM by Dr Kierra Alexander MD.       MRI Lumbar Spine Without Contrast     Result Date: 12/8/2023  1. Lumbar spondylosis. A mild alignment disruption at L4-5. A mild levoscoliosis. 2. The multilevel prominent disc osteophyte complexes, facet arthropathy and ligamental hypertrophy. There is a resultant moderate spinal stenosis at L4-5. There is bilateral neuroforaminal stenosis more on the right at L4-5. There is a mild right neuroforaminal stenosis L5-S1.  This report was signed and finalized on 12/8/2023 6:00 AM by Dr. Ping Vasquez MD.       CT Lumbar Spine Without Contrast     Result Date: 12/6/2023  1. No acute fracture or malalignment. 2. Lumbar spondylosis more pronounced in the lower lumbar spine. Mild anterolisthesis of L4 over L5. No spondylolysis. 3. Moderate spinal canal stenosis at L4-5. The right neuroforaminal stenosis at L4-5 and L5-S1. The details given above.  This report was signed and finalized on 12/6/2023 7:46 AM by Dr. Ping Vasquez MD.       MRI Brain With & Without Contrast     Result Date: 10/6/2023  1. Scattered areas of T2 high signal within the hemispheric white matter are nonspecific and likely due to chronic small vessel disease. 2. Partially empty appearance of the sella turcica. Fluid in the optic nerve sheaths. Small right transverse sinus. These findings  "may all be incidental. They may also be seen in patients with idiopathic intracranial hypertension.   This report was signed and finalized on 10/6/2023 11:23 AM CDT by Dr. Wilmer Gómez MD.                 Allergies   Allergen Reactions    Penicillins Swelling and Rash    Morphine GI Intolerance       Pt states morphine makes her sick and has \"had GERD surgery is unable to vomit\"         ADMIT MEDICATIONS:          Prior to Admission medications    Medication Sig Start Date End Date Taking? Authorizing Provider   acetaminophen (TYLENOL) 325 MG tablet Take 2 tablets by mouth Every 4 (Four) Hours As Needed for Mild Pain. 12/15/23     Miguel Hernandez DO   amitriptyline (ELAVIL) 100 MG tablet Take 1-2 tablets by mouth At Night As Needed for Sleep.       Provider, MD Cira   atorvastatin (LIPITOR) 20 MG tablet Take 1 tablet by mouth Daily. 10/17/23     Kiran Madden MD   bisacodyl (DULCOLAX) 10 MG suppository Insert 1 suppository into the rectum Daily As Needed for Constipation (Use if bisacodyl oral is ineffective). 12/15/23     Miguel Hernandez DO   bisacodyl (DULCOLAX) 5 MG EC tablet Take 1 tablet by mouth Daily As Needed for Constipation (Use if polyethylene glycol is ineffective). 12/15/23     Miguel Hernandez DO   cholecalciferol (VITAMIN D3) 25 MCG (1000 UT) tablet Take 1 tablet by mouth Daily.       Provider, MD Cira   famotidine (PEPCID) 40 MG tablet Take 1 tablet by mouth Daily. 12/15/23     Miguel Hernandez DO   lidocaine (LIDODERM) 5 % Place 2 patches on the skin as directed by provider Daily. Remove & Discard patch within 12 hours or as directed by MD 12/15/23     Miguel Hernandez DO   melatonin 5 MG tablet tablet Take 1 tablet by mouth At Night As Needed (sleep). 12/15/23     Miguel Hernandez DO   naloxone (NARCAN) 0.4 MG/ML injection Infuse 1 mL into a venous catheter Every 5 (Five) Minutes As Needed for Respiratory Depression. 12/15/23     Miguel Hernandez, DO   orphenadrine " (NORFLEX) 100 MG 12 hr tablet Take 1 tablet by mouth 2 (Two) Times a Day.       Provider, MD Cira   oxyCODONE-acetaminophen (PERCOCET) 5-325 MG per tablet Take 1 tablet by mouth Every 4 (Four) Hours As Needed for Moderate Pain for up to 5 days. 12/15/23 12/20/23   Miguel Hernandez, DO   polyethylene glycol (MIRALAX) 17 g packet Take 17 g by mouth Daily As Needed (Use if senna-docusate is ineffective). 12/15/23     Miguel Hernandez DO   pregabalin (LYRICA) 50 MG capsule Take 1 capsule by mouth Every 8 (Eight) Hours. 12/15/23     Miguel Hernandez DO   sennosides-docusate (PERICOLACE) 8.6-50 MG per tablet Take 2 tablets by mouth 2 (Two) Times a Day. 12/15/23     Miguel Hernandez DO         PAST HISTORY:  CHILDHOOD: unremarkable.      [id number]     [id number]  Colonoscopy/[place]/[facility]  Colonoscopy/Brianna/[facility]/  Colonoscopy+neg/[facility]//07/BE  EGD+hiatal hernia/[facility]//07  Colonoscopy+jailsz-poya-iib-div/[facility]//--/3mo  Colonoscopy+polyp/[facility]//-15-09/3y  Limited colonoscopy+lfrp-ezcsx-igt/[facility]//10-22-09/[facility]  EGD+hh-biop/[facility]//5--10  Colonoscopy+Polyp-div/[facility]//4.4.12/3m-1y pp  Attempted Colonoscopy/[facility]//4.8.13  Colonoscopy+nl/[facility]//4.9.13/3y  EGD+biop/[facility]//4.8.13  EGD+jlw-hw-ilpiyled/[facility]//8.14.14  Esophagial Mobility Study/[place]/930.14  Colonoscopy/unable to complete due to quality of prep5.2.16  Colonoscopy/Mc/[facility]/6.6.16/5y  Cologuard+/11.21.19-gi suggested  Colon-div///./5y  EGD-past fundoplication/[facility]/21  Colon-nl//[facility]/4.10./5y  EGD-irregular z//[facility]/4.10.23  SI injection//[facility]/23     SURGERIES:  BRIONNA/[place]/[facility]/  Lap gb/[place]/[facility]/  Appendectomy/[place]/  Lap HH+nissen/[place]/12.3.14   C spine/[facility]//20  C spine/[facility]//12.3.20  LS surgery/[facility]/23     FAMILY HISTORY:  Heart/gm-m  DM/gm-f, b,b,si  CA-melanoma/b  CA-breast/a-m, a-m, a-m     HABITS:  Tobacco-smoker/age [age]  ppd/dc 1982  Alcohol/none     SOCIAL HISTORY:  /1974,2000  /1992  Children/2  Employment/Security-desk/retired  Retired/+     HOSPITAL ADMITS:   BH:   5.20.15-5.22.15  chest pain-appears noncardiac  back pain-thoracic  nausea without vomiting  gastroesophageal reflux-post nissian  anemia-chronic  iron deficiency-chronic  headache-suggestive migraine vs Rx withdrawal     12.8.23-12.15.23  Intractable low back pain  Acquired spondylolisthesis  Foot drop R  Sacroilitis  Lumbar radiculopathy     Review of Systems  GENERAL:  Inactive/slower with limits, speed, stamina for age and lower back pain, LE weakness. Sleep is interrupted occ/pain. No fever now/recent.  ENDO:  No syncope, near or diaphoretic sweaty spells.  BS Ok past/through process.  HEENT: No head injury occ headache.   No vision change.   No significant hearing loss.  Ears without pain/drainage.  No sore throat.  No significant nasal/sinus congestion/drainage. No epistaxis.  CHEST: No chest wall tenderness or mass. No cough, wheeze, SOB; no hemoptysis.  CV: No chest pain, palpitations, ankle edema.  GI: No heartburn, dysphagia.  No abdominal pain, diarrhea, constipation, rectal bleeding, or melena.  :  Voids without dysuria, or incontinence to completion.  ORTHO: No painful/swollen joints but various on /off sore.  No sore neck but still pain lower back.    NEURO: No dizziness but non-focal LE weakness of extremities/mild UE weakness and more weakness R dorsiflexion.  Minimal/mod LE numbness/paresthesias.   PSYCH: No memory loss.  Mood good; not that anxious, depressed but/and not suicidal.  One to tolerate stress well.  Only health/hospital stress.      PHYSICAL EXAMINATION:  T: 98.6 F P: 72/min  RR: 12/min BP: 132/82 Wt: 182 lbs Ht: 64 in     Physical Exam  GENERAL:  Well nourished/developed in no acute distress. Obese   SKIN: Turgor excellent, without wound, rash, lesion.  HEENT: Normocephalic without trauma.  Pupils equal round reactive  to light. Extraocular motions full without nystagmus.   No thyromegaly, mass, tenderness, lymphadenopathy and supple.  CV: Regular rhythm.  No murmur, gallop, edema. Posterior pulses intact.  No carotid bruits.   CHEST: No chest wall tenderness or mass.   LUNGS: Symmetric motion with clear to auscultation.   ABD: Soft, nontender without mass.   ORTHO: Symmetric extremities without swelling/point tenderness.  Full gross range of motion.    NEURO: CN 2-12 grossly intact.  Symmetric facies. 2/5 strength throughout except R foot dorsiflexion 1/4.  Nonfocal use extremities. Speech clear.    PSYCH: Oriented x 3.  Pleasant calm, well kept.  Purposeful/directed conversation with intact short/long gross memory.     ASSESSMENT/PROBLEM LIST:   67 y/o white female   Allergy/intolerance: see above  Procedural history: see above  Family history: see above  History tobacco use  Menopausal  Osteopenia  History elevated bp  Hyperlipidemia  Murmur; neg echo  Obesity  Elevated fasting glucose  Gastroesophageal reflux  History nissens  Colon polyps  Kidney stones  Anemia  Depression  DDD  DJD-spinal  DJD-diffuse  Chronic neck pain  Chronic back pain  R foot drop  Cerebral microvascular disease  Memory loss  Gait difficulty     REASON FOR ADMISSION:    Acute gait decline  Acute back pain  Post op status; LS  Sacroilitis  Anemia-post op contribution  Weakness  Decline ADLs    HOSPITAL COURSE: She received skilled care nursing supervision and care as well as work with PT and OT.  Her vitals remained stable.  Her wound and incision was healing well.  She had some nausea once with Percocet on empty stomach and Zofran covered this well.  She was stooling every 1 to 2 days and to her satisfaction.  She was eating in a reasonable fashion.  Her gait improved; the numbness and weakness of her lower extremities is likewise improving.  With the blessings of the team and the patient's desire to go home she is being discharged for continued healing  recovery and rehab.  Labs showed her anemia slightly improved and with the risk for constipation we did not add iron or pursue that.    Labs included:   Lab Results otherwise  CBC:   Results from last 7 days   Lab Units 12/15/23  0904   WBC 10*3/mm3 11.79*   HEMOGLOBIN g/dL 9.7*   HEMATOCRIT % 32.3*   PLATELETS 10*3/mm3 308     BMP:  Results from last 7 days   Lab Units 12/15/23  0904   SODIUM mmol/L 136   POTASSIUM mmol/L 4.2   CHLORIDE mmol/L 103   CO2 mmol/L 25.0   BUN mg/dL 13   CREATININE mg/dL 0.59   GLUCOSE mg/dL 107*   CALCIUM mg/dL 9.0   ALT (SGPT) U/L 22     [facility]/lab/swingbed/[place]/12.16.23  CMP  AST 43  CBC Hb 10.6    DISCHARGE ASSESSMENT:  Reasons for admit/problems addressed while here:   Acute gait decline  Acute back pain  Post op status; LS  Sacroilitis  Anemia-post op contribution  Weakness  Decline ADLs  Nausea with narcotics    Chronic problems affecting stay:  See above      PLAN:   See AVS    Discharge Disposition:  home to family/self care    Discharge Medications:    Current Outpatient Medications:     acetaminophen (TYLENOL) 325 MG tablet, Take 2 tablets by mouth Every 4 (Four) Hours As Needed for Mild Pain., Disp: , Rfl:     atorvastatin (LIPITOR) 20 MG tablet, Take 1 tablet by mouth Daily., Disp: 90 tablet, Rfl: 5    cholecalciferol (VITAMIN D3) 25 MCG (1000 UT) tablet, Take 1 tablet by mouth Daily., Disp: , Rfl:   Polyethylene glycol (Miralax) 17g day as needed-OTC    naloxone (NARCAN) 0.4 MG/ML injection, Infuse 1 mL into a venous catheter Every 5 (Five) Minutes As Needed for Respiratory Depression., Disp: , Rfl: #1 MD2    orphenadrine (NORFLEX) 100 MG 12 hr tablet, Take 1 tablet by mouth 2 (Two) Times a Day., Disp: , Rfl: #30NR to MD2    oxyCODONE-acetaminophen (PERCOCET) 5-325 MG per tablet, Take 1 tablet by mouth Every 4 (Four) Hours As Needed for Moderate Pain for up to 5 days., Disp: , Rfl: -#40 NR to MD2    polyethylene glycol (MIRALAX) 17 g packet, Take 17 g by mouth Daily As  Needed (Use if senna-docusate is ineffective)., Disp: , Rfl:     pregabalin (LYRICA) 50 MG capsule, Take 1 capsule by mouth Every 8 (Eight) Hours., Disp: , Rfl: #90 NR to MD2      Discharge Care Plan/Instructions:   Activity:   Gradually increase as able  No driving until oked by Dr Estes  Return to work NA    Diet:   No added salt  Stay hydrated; minimum 60 oz fluid/24 hr    Additional instructions:  Wound/bandage care as advised by Dr Estes     Follow-up Appointments:   Dr. Madden [facility] f/u extra 7-14 days (call for time)    Other appointments:   Future Appointments   Date Time Provider Department Center   1/3/2024 12:45 PM Rick Estes DO [facility] NS PAD PAD   1/19/2024  1:00 PM Castillo Guzman APRN [facility] N PAD PAD   2/7/2024  1:00 PM  PAD EMG (OP) NEURO RM 2 (DIAL) [facility] PAD [facility]NA PAD   4/30/2024  1:30 PM Kiran Madden MD [facility] PC METR PAD     CONDITION: stable/improved    PROGNOSIS: good    TIME: 35 minutes was spent reviewing diagnosis lists, Rx lists, labs, radiology reports; talking/messaging nursing, talking with patient/family, and ordering Rx, labs, diet and making other decisions for care plan.

## 2024-01-02 ENCOUNTER — OFFICE VISIT (OUTPATIENT)
Dept: FAMILY MEDICINE CLINIC | Facility: CLINIC | Age: 69
End: 2024-01-02
Payer: MEDICARE

## 2024-01-02 VITALS
RESPIRATION RATE: 18 BRPM | HEIGHT: 64 IN | OXYGEN SATURATION: 98 % | DIASTOLIC BLOOD PRESSURE: 68 MMHG | HEART RATE: 91 BPM | BODY MASS INDEX: 30.39 KG/M2 | TEMPERATURE: 96.9 F | SYSTOLIC BLOOD PRESSURE: 122 MMHG | WEIGHT: 178 LBS

## 2024-01-02 DIAGNOSIS — Z98.890 POST-OPERATIVE STATE: ICD-10-CM

## 2024-01-02 DIAGNOSIS — Z78.9 DECREASED ACTIVITIES OF DAILY LIVING (ADL): ICD-10-CM

## 2024-01-02 DIAGNOSIS — R26.9 GAIT DIFFICULTY: ICD-10-CM

## 2024-01-02 DIAGNOSIS — M54.9 ACUTE BACK PAIN, UNSPECIFIED BACK LOCATION, UNSPECIFIED BACK PAIN LATERALITY: ICD-10-CM

## 2024-01-02 DIAGNOSIS — T40.605D: ICD-10-CM

## 2024-01-02 DIAGNOSIS — D64.9 ANEMIA, UNSPECIFIED TYPE: ICD-10-CM

## 2024-01-02 NOTE — PROGRESS NOTES
Transitional Care Follow Up Visit  Subjective     Sedrick VITOR Leahy is a 68 y.o. female who presents for a transitional care management visit.  With .  Agrees to have  present and to hear everything said today and be involved in the visit.   .    Within 48 business hours after discharge our office contacted her via telephone to coordinate her care and needs.      I reviewed and discussed the details of that call along with the discharge summary, hospital problems, inpatient lab results, inpatient diagnostic studies, and consultation reports with [facility].     Current outpatient and discharge medications have been reconciled for the patient.        11/22/2020     2:39 PM   Date of TCM Phone Call   [facility]   Date of Admission 11/20/2020   Date of Discharge 11/22/2020   Discharge Disposition Home or Self Care     Admit Date: 12.15.23  Date of Discharge: 12.20.23  Reasons for admit/problems addressed while here:   Acute gait decline  Acute back pain  Post op status; LS  Sacroilitis  Anemia-post op contribution  Weakness  Decline ADLs  Nausea with narcotics    Risk for Readmission (LACE) No data recorded    History of Present Illness   She has had several months if not years of on and off back discomfort and lower extremity weakness and difficulty walking.  She was recently referred to [facility] neurology; conservative suggestions were made.  Then she started having increased significant lower back discomfort.  She presented to [place] ER than later [facility] ER and was finally admitted; taken to the operating room and even having to have an SI joint injection after her surgery.  Because of associated foot drop and difficulty with lower extremity weakness and uncertainty of her ability to ambulate she excepted an offer for swing bed admission here [place].     Course During Hospital Stay:    She received skilled care nursing supervision and care as well as work with PT and OT.  Her vitals remained stable.   Her wound and incision was healing well.  She had some nausea once with Percocet on empty stomach and Zofran covered this well.  She was stooling every 1 to 2 days and to her satisfaction.  She was eating in a reasonable fashion.  Her gait improved; the numbness and weakness of her lower extremities is likewise improving.  With the blessings of the team and the patient's desire to go home she is being discharged for continued healing recovery and rehab.  Labs showed her anemia slightly improved and with the risk for constipation we did not add iron or pursue that.      The following portions of the patient's history were reviewed and updated as appropriate: allergies, current medications, past family history, past medical history, past social history, past surgical history, and problem list.    Review of Systems   Constitutional:  Positive for activity change, appetite change and fatigue. Negative for fever.   HENT: Negative.     Respiratory:  Negative for cough and shortness of breath.    Cardiovascular:  Negative for chest pain, palpitations and leg swelling.   Gastrointestinal: Negative.  Negative for blood in stool, constipation, diarrhea and nausea.   Genitourinary:  Negative for dysuria.   Musculoskeletal:  Positive for back pain and gait problem. Negative for joint swelling.   Neurological:  Positive for weakness.     Results for orders placed or performed during the hospital encounter of 12/08/23   Urine Culture - Urine, Urine, Clean Catch    Specimen: Urine, Clean Catch   Result Value Ref Range    Urine Culture No growth    COVID-19,CEPHEID/JESUS,COR/THEA/PAD/STEPH/LAG IN-HOUSE,NP SWAB IN TRANSPORT MEDIA 1 HR TAT, RT-PCR - Swab, Nasopharynx    Specimen: Nasopharynx; Swab   Result Value Ref Range    COVID19 Not Detected Not Detected - Ref. Range   Urinalysis With Culture If Indicated - Urine, Clean Catch    Specimen: Urine, Clean Catch   Result Value Ref Range    Color, UA Dark Yellow (A) Yellow, Straw     Appearance, UA Cloudy (A) Clear    pH, UA 5.5 5.0 - 8.0    Specific Gravity, UA >1.030 (H) 1.005 - 1.030    Glucose, UA Negative Negative    Ketones, UA Trace (A) Negative    Bilirubin, UA Small (1+) (A) Negative    Blood, UA Trace (A) Negative    Protein, UA Trace (A) Negative    Leuk Esterase, UA Small (1+) (A) Negative    Nitrite, UA Negative Negative    Urobilinogen, UA 1.0 E.U./dL 0.2 - 1.0 E.U./dL   Urinalysis, Microscopic Only - Urine, Clean Catch    Specimen: Urine, Clean Catch   Result Value Ref Range    RBC, UA 3-5 (A) None Seen, 0-2 /HPF    WBC, UA 6-10 (A) None Seen, 0-2 /HPF    Bacteria, UA Trace (A) None Seen /HPF    Squamous Epithelial Cells, UA 3-6 (A) None Seen, 0-2 /HPF    Calcium Oxalate Crystals, UA Small/1+ None Seen /HPF    Mucus, UA Small/1+ (A) None Seen, Trace /HPF    Methodology Manual Light Microscopy    C-reactive Protein    Specimen: Blood   Result Value Ref Range    C-Reactive Protein <0.30 0.00 - 0.50 mg/dL   Sedimentation Rate    Specimen: Blood   Result Value Ref Range    Sed Rate 25 0 - 30 mm/hr   Comprehensive Metabolic Panel    Specimen: Blood   Result Value Ref Range    Glucose 106 (H) 65 - 99 mg/dL    BUN 21 8 - 23 mg/dL    Creatinine 0.70 0.57 - 1.00 mg/dL    Sodium 143 136 - 145 mmol/L    Potassium 3.8 3.5 - 5.2 mmol/L    Chloride 106 98 - 107 mmol/L    CO2 27.0 22.0 - 29.0 mmol/L    Calcium 9.4 8.6 - 10.5 mg/dL    Total Protein 6.5 6.0 - 8.5 g/dL    Albumin 3.8 3.5 - 5.2 g/dL    ALT (SGPT) 21 1 - 33 U/L    AST (SGOT) 26 1 - 32 U/L    Alkaline Phosphatase 105 39 - 117 U/L    Total Bilirubin 0.2 0.0 - 1.2 mg/dL    Globulin 2.7 gm/dL    A/G Ratio 1.4 g/dL    BUN/Creatinine Ratio 30.0 (H) 7.0 - 25.0    Anion Gap 10.0 5.0 - 15.0 mmol/L    eGFR 93.8 >60.0 mL/min/1.73   Protime-INR    Specimen: Blood   Result Value Ref Range    Protime 12.1 11.8 - 14.8 Seconds    INR 0.88 (L) 0.91 - 1.09   CBC Auto Differential    Specimen: Blood   Result Value Ref Range    WBC 10.07 3.40 - 10.80  10*3/mm3    RBC 4.04 3.77 - 5.28 10*6/mm3    Hemoglobin 10.4 (L) 12.0 - 15.9 g/dL    Hematocrit 35.0 34.0 - 46.6 %    MCV 86.6 79.0 - 97.0 fL    MCH 25.7 (L) 26.6 - 33.0 pg    MCHC 29.7 (L) 31.5 - 35.7 g/dL    RDW 18.6 (H) 12.3 - 15.4 %    RDW-SD 58.5 (H) 37.0 - 54.0 fl    MPV 10.3 6.0 - 12.0 fL    Platelets 310 140 - 450 10*3/mm3    Neutrophil % 47.6 42.7 - 76.0 %    Lymphocyte % 43.5 19.6 - 45.3 %    Monocyte % 7.6 5.0 - 12.0 %    Eosinophil % 0.3 0.3 - 6.2 %    Basophil % 0.6 0.0 - 1.5 %    Immature Grans % 0.4 0.0 - 0.5 %    Neutrophils, Absolute 4.79 1.70 - 7.00 10*3/mm3    Lymphocytes, Absolute 4.38 (H) 0.70 - 3.10 10*3/mm3    Monocytes, Absolute 0.77 0.10 - 0.90 10*3/mm3    Eosinophils, Absolute 0.03 0.00 - 0.40 10*3/mm3    Basophils, Absolute 0.06 0.00 - 0.20 10*3/mm3    Immature Grans, Absolute 0.04 0.00 - 0.05 10*3/mm3    nRBC 0.0 0.0 - 0.2 /100 WBC   Basic Metabolic Panel    Specimen: Blood   Result Value Ref Range    Glucose 156 (H) 65 - 99 mg/dL    BUN 21 8 - 23 mg/dL    Creatinine 0.63 0.57 - 1.00 mg/dL    Sodium 138 136 - 145 mmol/L    Potassium 4.5 3.5 - 5.2 mmol/L    Chloride 104 98 - 107 mmol/L    CO2 23.0 22.0 - 29.0 mmol/L    Calcium 8.8 8.6 - 10.5 mg/dL    BUN/Creatinine Ratio 33.3 (H) 7.0 - 25.0    Anion Gap 11.0 5.0 - 15.0 mmol/L    eGFR 96.2 >60.0 mL/min/1.73   CBC Auto Differential    Specimen: Blood   Result Value Ref Range    WBC 8.15 3.40 - 10.80 10*3/mm3    RBC 4.13 3.77 - 5.28 10*6/mm3    Hemoglobin 11.0 (L) 12.0 - 15.9 g/dL    Hematocrit 35.3 34.0 - 46.6 %    MCV 85.5 79.0 - 97.0 fL    MCH 26.6 26.6 - 33.0 pg    MCHC 31.2 (L) 31.5 - 35.7 g/dL    RDW 18.0 (H) 12.3 - 15.4 %    RDW-SD 56.7 (H) 37.0 - 54.0 fl    MPV 9.8 6.0 - 12.0 fL    Platelets 311 140 - 450 10*3/mm3    Neutrophil % 79.4 (H) 42.7 - 76.0 %    Lymphocyte % 19.0 (L) 19.6 - 45.3 %    Monocyte % 1.0 (L) 5.0 - 12.0 %    Eosinophil % 0.0 (L) 0.3 - 6.2 %    Basophil % 0.1 0.0 - 1.5 %    Immature Grans % 0.5 0.0 - 0.5 %     Neutrophils, Absolute 6.47 1.70 - 7.00 10*3/mm3    Lymphocytes, Absolute 1.55 0.70 - 3.10 10*3/mm3    Monocytes, Absolute 0.08 (L) 0.10 - 0.90 10*3/mm3    Eosinophils, Absolute 0.00 0.00 - 0.40 10*3/mm3    Basophils, Absolute 0.01 0.00 - 0.20 10*3/mm3    Immature Grans, Absolute 0.04 0.00 - 0.05 10*3/mm3    nRBC 0.0 0.0 - 0.2 /100 WBC   CBC (No Diff)    Specimen: Blood   Result Value Ref Range    WBC 11.14 (H) 3.40 - 10.80 10*3/mm3    RBC 3.92 3.77 - 5.28 10*6/mm3    Hemoglobin 10.1 (L) 12.0 - 15.9 g/dL    Hematocrit 34.6 34.0 - 46.6 %    MCV 88.3 79.0 - 97.0 fL    MCH 25.8 (L) 26.6 - 33.0 pg    MCHC 29.2 (L) 31.5 - 35.7 g/dL    RDW 18.4 (H) 12.3 - 15.4 %    RDW-SD 60.4 (H) 37.0 - 54.0 fl    MPV 10.0 6.0 - 12.0 fL    Platelets 285 140 - 450 10*3/mm3   Basic Metabolic Panel    Specimen: Blood   Result Value Ref Range    Glucose 121 (H) 65 - 99 mg/dL    BUN 27 (H) 8 - 23 mg/dL    Creatinine 0.74 0.57 - 1.00 mg/dL    Sodium 141 136 - 145 mmol/L    Potassium 4.5 3.5 - 5.2 mmol/L    Chloride 107 98 - 107 mmol/L    CO2 27.0 22.0 - 29.0 mmol/L    Calcium 8.5 (L) 8.6 - 10.5 mg/dL    BUN/Creatinine Ratio 36.5 (H) 7.0 - 25.0    Anion Gap 7.0 5.0 - 15.0 mmol/L    eGFR 87.7 >60.0 mL/min/1.73   Protime-INR    Specimen: Blood   Result Value Ref Range    Protime 12.7 11.8 - 14.8 Seconds    INR 0.95 0.91 - 1.09   CBC (No Diff)    Specimen: Blood   Result Value Ref Range    WBC 11.79 (H) 3.40 - 10.80 10*3/mm3    RBC 3.65 (L) 3.77 - 5.28 10*6/mm3    Hemoglobin 9.7 (L) 12.0 - 15.9 g/dL    Hematocrit 32.3 (L) 34.0 - 46.6 %    MCV 88.5 79.0 - 97.0 fL    MCH 26.6 26.6 - 33.0 pg    MCHC 30.0 (L) 31.5 - 35.7 g/dL    RDW 18.1 (H) 12.3 - 15.4 %    RDW-SD 58.5 (H) 37.0 - 54.0 fl    MPV 10.5 6.0 - 12.0 fL    Platelets 308 140 - 450 10*3/mm3   Comprehensive Metabolic Panel    Specimen: Blood   Result Value Ref Range    Glucose 107 (H) 65 - 99 mg/dL    BUN 13 8 - 23 mg/dL    Creatinine 0.59 0.57 - 1.00 mg/dL    Sodium 136 136 - 145 mmol/L     "Potassium 4.2 3.5 - 5.2 mmol/L    Chloride 103 98 - 107 mmol/L    CO2 25.0 22.0 - 29.0 mmol/L    Calcium 9.0 8.6 - 10.5 mg/dL    Total Protein 6.3 6.0 - 8.5 g/dL    Albumin 3.3 (L) 3.5 - 5.2 g/dL    ALT (SGPT) 22 1 - 33 U/L    AST (SGOT) 34 (H) 1 - 32 U/L    Alkaline Phosphatase 94 39 - 117 U/L    Total Bilirubin 0.3 0.0 - 1.2 mg/dL    Globulin 3.0 gm/dL    A/G Ratio 1.1 g/dL    BUN/Creatinine Ratio 22.0 7.0 - 25.0    Anion Gap 8.0 5.0 - 15.0 mmol/L    eGFR 97.7 >60.0 mL/min/1.73   Type & Screen    Specimen: Blood   Result Value Ref Range    ABO Type A     RH type Positive     Antibody Screen Negative     T&S Expiration Date 12/14/2023 11:59:59 PM        No results found for: \"PSA\"     Lab Results:  CBC:  Lab Results - Last 18 Months   Lab Units 12/15/23  0904 12/11/23 0329 12/09/23 0420 12/08/23  0450 12/05/23  2342 09/14/23  1334 08/25/23  0928   WBC 10*3/mm3 11.79* 11.14* 8.15 10.07 6.75 6.08 7.70   HEMOGLOBIN g/dL 9.7* 10.1* 11.0* 10.4* 10.6* 10.7* 10.8*   HEMATOCRIT % 32.3* 34.6 35.3 35.0 36.0 34.0 34.2   PLATELETS 10*3/mm3 308 285 311 310 266 345 334   IRON mcg/dL  --   --   --   --   --  68  --       BMP/CMP:  Lab Results - Last 18 Months   Lab Units 12/15/23  0904 12/11/23  0329 12/09/23 0420 12/08/23  0450 12/05/23  2342 10/06/23  1022 08/25/23  0928 06/28/23  0847   SODIUM mmol/L 136 141 138 143 141  --  138 142   POTASSIUM mmol/L 4.2 4.5 4.5 3.8 3.7  --  3.4* 4.8   CHLORIDE mmol/L 103 107 104 106 105  --  100 106   CO2 mmol/L 25.0 27.0 23.0 27.0 24.0  --  29.0 27.0   BUN mg/dL 13 27* 21 21 18  --  11 15   CREATININE mg/dL 0.59 0.74 0.63 0.70 0.54* 0.80 0.78 0.82   CALCIUM mg/dL 9.0 8.5* 8.8 9.4 9.0  --  9.1 9.3     HEPATIC:  Lab Results - Last 18 Months   Lab Units 12/15/23  0904 12/08/23  0450 08/25/23  0928 06/28/23  0847 06/20/23  1529 03/01/23  0922 08/30/22  1045   ALT (SGPT) U/L 22 21 23 14 113* 11 12   AST (SGOT) U/L 34* 26 29 17 127* 17 16   ALK PHOS U/L 94 105 114 118* 159* 106 115 " "    THYROID:  Lab Results - Last 18 Months   Lab Units 03/01/23  0922   TSH uIU/mL 2.300     A1C:  Lab Results - Last 18 Months   Lab Units 03/01/23  0922   HEMOGLOBIN A1C % 5.40     PSA:No results for input(s): \"PSA\" in the last 48249 hours.    [facility]/lab/swingbed/[place]/12.16.23  CMP  AST 43  CBC Hb 10.6    Objective   /68   Pulse 91   Temp 96.9 °F (36.1 °C) (Temporal)   Resp 18   Ht 162.6 cm (64\")   Wt 80.7 kg (178 lb)   SpO2 98%   BMI 30.55 kg/m²   Body mass index is 30.55 kg/m².    Physical Exam  Vitals and nursing note reviewed.   Constitutional:       General: She is not in acute distress.     Appearance: She is obese.   HENT:      Head: Normocephalic.      Nose: Nose normal. No congestion or rhinorrhea.      Mouth/Throat:      Mouth: Mucous membranes are moist.   Eyes:      Extraocular Movements: Extraocular movements intact.      Conjunctiva/sclera: Conjunctivae normal.      Pupils: Pupils are equal, round, and reactive to light.   Cardiovascular:      Rate and Rhythm: Regular rhythm.      Pulses: Normal pulses.      Heart sounds: Normal heart sounds.   Pulmonary:      Breath sounds: Normal breath sounds.   Abdominal:      Palpations: Abdomen is soft.      Tenderness: There is no abdominal tenderness.   Musculoskeletal:         General: Tenderness (lower back) present. No deformity.      Cervical back: Neck supple.      Right lower leg: No edema.      Left lower leg: No edema.   Lymphadenopathy:      Cervical: No cervical adenopathy.   Skin:     General: Skin is dry.      Findings: No lesion.   Neurological:      Mental Status: She is alert and oriented to person, place, and time. Mental status is at baseline.       Assessment & Plan     1. Anemia, unspecified type    2. Post-operative state    3. Decreased activities of daily living (ADL)    4. Gait difficulty    5. Acute back pain, unspecified back location, unspecified back pain laterality    6. Adverse effect of narcotic, subsequent encounter  "     Rx: reviewed/changes:  No orders of the defined types were placed in this encounter.    LAB/Testing/Referrals: reviewed/orders:   Today:   Orders Placed This Encounter   Procedures    Comprehensive Metabolic Panel    Iron Profile    Reticulocytes    CBC & Differential     Usual:   same    Discussions:   Eval anemia  Keep apt neurosurgery; see if more PT recommended  Body mass index is 30.55 kg/m².      Non-smoker  Sedrick Leahy  reports that she quit smoking about 33 years ago. Her smoking use included cigarettes. She started smoking about 69 years ago. She has a 40.00 pack-year smoking history. She has never used smokeless tobacco..    There are no Patient Instructions on file for this visit.    Follow up: Return for lab today then lab/Dr Madden 3-4m.  Future Appointments   Date Time Provider Department Center   1/3/2024 12:45 PM Rick Estes,  [facility] NS PAD PAD   1/19/2024  1:00 PM Castillo Guzman APRN [facility] N PAD PAD   2/7/2024  1:00 PM [facility] PAD [facility] (OP) NEURO RM 2 (DIAL) [facility] PAD [facility] PAD   4/30/2024  1:30 PM Kiran Madden MD [facility] PC METR PAD   5/2/2024  8:30 AM [facility] [place] [facility] PAD   5/9/2024  1:30 PM Kiran Madden MD [facility] PC METR PAD              Current outpatient and discharge medications have been reconciled for the patient.    Transitional Care Management Certification  I certify that the following are true:  Communication was made within 2 business days of discharge.  Complexity of Medical Decision Making is moderate.  Face to face visit occurred within 13 days.    *Note: 55036 is for high complexity patients with a face to face visit within 7 days of discharge.  49976 is for high complexity patients with a face to face on days 8-14 post discharge or medium complexity with face to face visit within 14 days post discharge.

## 2024-01-03 ENCOUNTER — OFFICE VISIT (OUTPATIENT)
Dept: NEUROSURGERY | Facility: CLINIC | Age: 69
End: 2024-01-03
Payer: MEDICARE

## 2024-01-03 ENCOUNTER — TELEPHONE (OUTPATIENT)
Dept: NEUROLOGY | Facility: CLINIC | Age: 69
End: 2024-01-03
Payer: MEDICARE

## 2024-01-03 VITALS — HEIGHT: 64 IN | WEIGHT: 178 LBS | BODY MASS INDEX: 30.39 KG/M2

## 2024-01-03 DIAGNOSIS — M51.37 DEGENERATION OF LUMBAR OR LUMBOSACRAL INTERVERTEBRAL DISC: ICD-10-CM

## 2024-01-03 DIAGNOSIS — Z78.9 NON-SMOKER: ICD-10-CM

## 2024-01-03 DIAGNOSIS — E66.09 CLASS 1 OBESITY DUE TO EXCESS CALORIES WITHOUT SERIOUS COMORBIDITY WITH BODY MASS INDEX (BMI) OF 30.0 TO 30.9 IN ADULT: ICD-10-CM

## 2024-01-03 DIAGNOSIS — G89.29 CHRONIC LEFT SI JOINT PAIN: Primary | ICD-10-CM

## 2024-01-03 DIAGNOSIS — M53.3 CHRONIC LEFT SI JOINT PAIN: Primary | ICD-10-CM

## 2024-01-03 DIAGNOSIS — E66.3 OVERWEIGHT: ICD-10-CM

## 2024-01-03 LAB
ALBUMIN SERPL-MCNC: 3.9 G/DL (ref 3.5–5.2)
ALBUMIN/GLOB SERPL: 1.4 G/DL
ALP SERPL-CCNC: 152 U/L (ref 39–117)
ALT SERPL-CCNC: 12 U/L (ref 1–33)
AST SERPL-CCNC: 12 U/L (ref 1–32)
BASOPHILS # BLD AUTO: 0.04 10*3/MM3 (ref 0–0.2)
BASOPHILS NFR BLD AUTO: 0.5 % (ref 0–1.5)
BILIRUB SERPL-MCNC: 0.2 MG/DL (ref 0–1.2)
BUN SERPL-MCNC: 14 MG/DL (ref 8–23)
BUN/CREAT SERPL: 16.9 (ref 7–25)
CALCIUM SERPL-MCNC: 9.4 MG/DL (ref 8.6–10.5)
CHLORIDE SERPL-SCNC: 104 MMOL/L (ref 98–107)
CO2 SERPL-SCNC: 27.2 MMOL/L (ref 22–29)
CREAT SERPL-MCNC: 0.83 MG/DL (ref 0.57–1)
EGFRCR SERPLBLD CKD-EPI 2021: 76.4 ML/MIN/1.73
EOSINOPHIL # BLD AUTO: 0.06 10*3/MM3 (ref 0–0.4)
EOSINOPHIL NFR BLD AUTO: 0.7 % (ref 0.3–6.2)
ERYTHROCYTE [DISTWIDTH] IN BLOOD BY AUTOMATED COUNT: 15.3 % (ref 12.3–15.4)
GLOBULIN SER CALC-MCNC: 2.8 GM/DL
GLUCOSE SERPL-MCNC: 87 MG/DL (ref 65–99)
HCT VFR BLD AUTO: 37 % (ref 34–46.6)
HGB BLD-MCNC: 11.7 G/DL (ref 12–15.9)
IMM GRANULOCYTES # BLD AUTO: 0.01 10*3/MM3 (ref 0–0.05)
IMM GRANULOCYTES NFR BLD AUTO: 0.1 % (ref 0–0.5)
IRON SATN MFR SERPL: 8 % (ref 20–50)
IRON SERPL-MCNC: 39 MCG/DL (ref 37–145)
LYMPHOCYTES # BLD AUTO: 3.23 10*3/MM3 (ref 0.7–3.1)
LYMPHOCYTES NFR BLD AUTO: 39.7 % (ref 19.6–45.3)
MCH RBC QN AUTO: 27.7 PG (ref 26.6–33)
MCHC RBC AUTO-ENTMCNC: 31.6 G/DL (ref 31.5–35.7)
MCV RBC AUTO: 87.5 FL (ref 79–97)
MONOCYTES # BLD AUTO: 0.5 10*3/MM3 (ref 0.1–0.9)
MONOCYTES NFR BLD AUTO: 6.1 % (ref 5–12)
NEUTROPHILS # BLD AUTO: 4.3 10*3/MM3 (ref 1.7–7)
NEUTROPHILS NFR BLD AUTO: 52.9 % (ref 42.7–76)
NRBC BLD AUTO-RTO: 0 /100 WBC (ref 0–0.2)
PLATELET # BLD AUTO: 434 10*3/MM3 (ref 140–450)
POTASSIUM SERPL-SCNC: 4.7 MMOL/L (ref 3.5–5.2)
PROT SERPL-MCNC: 6.7 G/DL (ref 6–8.5)
RBC # BLD AUTO: 4.23 10*6/MM3 (ref 3.77–5.28)
RETICS/RBC NFR AUTO: 1.61 % (ref 0.7–1.9)
SODIUM SERPL-SCNC: 142 MMOL/L (ref 136–145)
TIBC SERPL-MCNC: 472 MCG/DL
UIBC SERPL-MCNC: 433 MCG/DL (ref 112–346)
WBC # BLD AUTO: 8.14 10*3/MM3 (ref 3.4–10.8)

## 2024-01-03 PROCEDURE — 99024 POSTOP FOLLOW-UP VISIT: CPT | Performed by: NEUROLOGICAL SURGERY

## 2024-01-03 NOTE — PROGRESS NOTES
"    Chief complaint:   Chief Complaint   Patient presents with    Post-op     3 week post op bilateral SI joint injections        Subjective     HPI: I had a chance to see Sedrick today in follow-up and to review her imaging studies of the lumbar spine.  She is doing quite well and no longer has any dorsiflexion weakness on the right.  She does still have some left sided pain which is likely sacroiliac in origin.    Review of Systems      Objective      Vital Signs  Ht 162.6 cm (64\")   Wt 80.7 kg (178 lb)   BMI 30.55 kg/m²     Physical Exam  Neurological:      Mental Status: She is oriented to person, place, and time.      Cranial Nerves: Cranial nerves 2-12 are intact.      Motor: Motor strength is normal.     Gait: Gait is intact.   Psychiatric:         Speech: Speech normal.         Neurologic Exam     Mental Status   Oriented to person, place, and time.   Attention: normal. Concentration: normal.   Speech: speech is normal   Level of consciousness: alert  Knowledge: good.   Normal comprehension.     Cranial Nerves   Cranial nerves II through XII intact.     Motor Exam     Strength   Strength 5/5 throughout.     Sensory Exam   Light touch normal.     Gait, Coordination, and Reflexes     Gait  Gait: normal      Imaging review: No new imaging studies        Assessment/Plan:   Status post transforaminal lumbar interbody fusion L4/5  Sacroiliitis    Dorsiflexion weakness on right has improved  I would like to get her set up with pain management to try a left sacroiliac joint injection to see if we can get her sacroiliac joint pain under control better.  We will follow-up with her in 6 to 8 weeks.  I look forward to seeing her at her next visit.    Patient is a nonsmoker  The patient's Body mass index is 30.55 kg/m².. BMI is within normal parameters. No follow-up required.    Diagnoses and all orders for this visit:    1. Non-smoker (Primary)    2. Chronic left SI joint pain  -     Ambulatory Referral to Pain " Management    3. Overweight    4. Class 1 obesity due to excess calories without serious comorbidity with body mass index (BMI) of 30.0 to 30.9 in adult        I discussed the patients findings and my recommendations with patient  Rick Estes DO  01/03/24  16:54 CST

## 2024-01-03 NOTE — TELEPHONE ENCOUNTER
Pt notified of appt cancellation & that a new appt will be given at a later date. Pt to call with any questions or concerns in the mean time.

## 2024-01-04 ENCOUNTER — PATIENT OUTREACH (OUTPATIENT)
Dept: CASE MANAGEMENT | Facility: OTHER | Age: 69
End: 2024-01-04
Payer: MEDICARE

## 2024-01-04 ENCOUNTER — TELEPHONE (OUTPATIENT)
Dept: NEUROLOGY | Facility: CLINIC | Age: 69
End: 2024-01-04
Payer: MEDICARE

## 2024-01-04 ENCOUNTER — TELEPHONE (OUTPATIENT)
Dept: FAMILY MEDICINE CLINIC | Facility: CLINIC | Age: 69
End: 2024-01-04
Payer: MEDICARE

## 2024-01-04 NOTE — OUTREACH NOTE
AMBULATORY CASE MANAGEMENT NOTE    Name and Relationship of Patient/Support Person: Sedrick Leahy - Self    Patient Outreach    Patient discharged from [facility] 12.20.23 for rehab following back surgery at [facility] 12.8.23. She reports she is doing well and did not need home health. She is able to perform HEP without difficulty. She wears back brace while out of bed and did not need any DME in the home. She denied needs at this time and has [facility] contact information for any future questions.         Shwetha RIVAS  Ambulatory Case Management    1/4/2024, 14:49 CST

## 2024-01-04 NOTE — TELEPHONE ENCOUNTER
Hub staff attempted to follow warm transfer process and was unsuccessful     Caller: Sedrick Leahy    Relationship to patient: Self    Best call back number: [phone]     Patient is needing: TO SPEAK WITH Apurva Martinez, ABOUT LAB RESULTS

## 2024-01-05 NOTE — TELEPHONE ENCOUNTER
CALLED PATIENT TO GET THEM RESCHEDULED FROM THEIR APPOINTMENT WITH [facility] ON 1/19/24 AS HE IS OUT FOR SURGERY. PATIENT SAID SHE HAD BACK SURGERY. WHICH IS WHAT SHE ORIGINALLY SAW US FOR AND THAT MAY HAVE FIXED HER ISSUES. I TOLD HER I WOULD GO AHEAD AND MAKE HER AN APPOINTMENT WITH [facility]ISE IN MARCH WHICH WILL BE AFTER SHE SEES NEUROSURGERY AND IF SHE DECIDES AFTER THEIR APPOINTMENT SHE DONT NEED US WE CAN JUST CANCEL. MADE APPOINTMENT FOR 3/7 @ 215. SHE IS AWARE OF DATE AND TIME.

## 2024-02-09 RX ORDER — AMITRIPTYLINE HYDROCHLORIDE 100 MG/1
TABLET ORAL
Qty: 180 TABLET | Refills: 0 | Status: SHIPPED | OUTPATIENT
Start: 2024-02-09

## 2024-02-28 ENCOUNTER — OFFICE VISIT (OUTPATIENT)
Dept: NEUROSURGERY | Facility: CLINIC | Age: 69
End: 2024-02-28
Payer: MEDICARE

## 2024-02-28 VITALS — WEIGHT: 178 LBS | HEIGHT: 64 IN | BODY MASS INDEX: 30.39 KG/M2

## 2024-02-28 DIAGNOSIS — M53.3 CHRONIC LEFT SI JOINT PAIN: ICD-10-CM

## 2024-02-28 DIAGNOSIS — G89.29 CHRONIC LEFT SI JOINT PAIN: ICD-10-CM

## 2024-02-28 DIAGNOSIS — E66.09 CLASS 1 OBESITY DUE TO EXCESS CALORIES WITH SERIOUS COMORBIDITY AND BODY MASS INDEX (BMI) OF 30.0 TO 30.9 IN ADULT: Primary | ICD-10-CM

## 2024-02-28 DIAGNOSIS — Z78.9 NON-SMOKER: ICD-10-CM

## 2024-02-28 PROCEDURE — 99024 POSTOP FOLLOW-UP VISIT: CPT | Performed by: NEUROLOGICAL SURGERY

## 2024-02-28 NOTE — PROGRESS NOTES
"    Chief complaint:   Chief Complaint   Patient presents with    Follow-up     11 week follow up bilateral SI Joint injections still going really good no pain        Subjective     HPI: I had a chance to see Sedrick today in follow-up she is doing very well at this time and really has no back pain to speak of and fortunately her sacroiliac joint pain has never returned following the injection.    Review of Systems      Objective      Vital Signs  Ht 162.6 cm (64\")   Wt 80.7 kg (178 lb)   BMI 30.55 kg/m²     Physical Exam  Neurological:      Mental Status: She is oriented to person, place, and time.      Cranial Nerves: Cranial nerves 2-12 are intact.      Motor: Motor strength is normal.     Gait: Gait is intact.   Psychiatric:         Speech: Speech normal.         Neurologic Exam     Mental Status   Oriented to person, place, and time.   Attention: normal. Concentration: normal.   Speech: speech is normal   Level of consciousness: alert  Knowledge: good.   Normal comprehension.     Cranial Nerves   Cranial nerves II through XII intact.     Motor Exam     Strength   Strength 5/5 throughout.     Sensory Exam   Light touch normal.     Gait, Coordination, and Reflexes     Gait  Gait: normal      Imaging review: No new imaging studies        Assessment/Plan:   Status post transforaminal lumbar interbody fusion L4/5    Patient is doing very well at this time and is approximately 11 weeks out of surgery.  I would like to see her back in 8 weeks to check on her progress.  I look forward to seeing her at her next visit.    Patient is a nonsmoker  The patient's Body mass index is 30.55 kg/m².. BMI is above normal parameters. Recommendations include: continue with current weight loss program    Diagnoses and all orders for this visit:    1. Class 1 obesity due to excess calories with serious comorbidity and body mass index (BMI) of 30.0 to 30.9 in adult (Primary)    2. Non-smoker        I discussed the patients findings " and my recommendations with patient  Rick Estes, DO  02/28/24  16:32 CST

## 2024-04-19 ENCOUNTER — TELEPHONE (OUTPATIENT)
Dept: FAMILY MEDICINE CLINIC | Facility: CLINIC | Age: 69
End: 2024-04-19
Payer: MEDICARE

## 2024-04-19 DIAGNOSIS — R07.81 RIB PAIN: Primary | ICD-10-CM

## 2024-04-19 NOTE — TELEPHONE ENCOUNTER
Patient called stated she fell in her barn and thinks she broke her ribs. Would like an xray order for massac

## 2024-04-29 ENCOUNTER — TELEPHONE (OUTPATIENT)
Dept: NEUROSURGERY | Facility: CLINIC | Age: 69
End: 2024-04-29
Payer: MEDICARE

## 2024-04-29 NOTE — TELEPHONE ENCOUNTER
Patient states you told her when she saw you last week that you were going to call her something and states she keeps checking with [facility] and nothing is there

## 2024-04-30 ENCOUNTER — OFFICE VISIT (OUTPATIENT)
Dept: FAMILY MEDICINE CLINIC | Facility: CLINIC | Age: 69
End: 2024-04-30
Payer: MEDICARE

## 2024-04-30 VITALS
HEART RATE: 89 BPM | TEMPERATURE: 97.3 F | HEIGHT: 64 IN | OXYGEN SATURATION: 96 % | SYSTOLIC BLOOD PRESSURE: 132 MMHG | WEIGHT: 179 LBS | BODY MASS INDEX: 30.56 KG/M2 | DIASTOLIC BLOOD PRESSURE: 68 MMHG

## 2024-04-30 DIAGNOSIS — E55.9 VITAMIN D DEFICIENCY: ICD-10-CM

## 2024-04-30 DIAGNOSIS — Z78.0 MENOPAUSE: ICD-10-CM

## 2024-04-30 DIAGNOSIS — D64.9 ANEMIA, UNSPECIFIED TYPE: ICD-10-CM

## 2024-04-30 DIAGNOSIS — R07.89 CHEST WALL PAIN: ICD-10-CM

## 2024-04-30 DIAGNOSIS — I10 PRIMARY HYPERTENSION: ICD-10-CM

## 2024-04-30 DIAGNOSIS — M79.671 INTRACTABLE RIGHT HEEL PAIN: ICD-10-CM
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
## 2024-04-30 DIAGNOSIS — E11.9 TYPE 2 DIABETES MELLITUS WITHOUT COMPLICATION, WITHOUT LONG-TERM CURRENT USE OF INSULIN: ICD-10-CM

## 2024-04-30 DIAGNOSIS — M51.37 DEGENERATION OF LUMBAR OR LUMBOSACRAL INTERVERTEBRAL DISC: Primary | ICD-10-CM

## 2024-04-30 DIAGNOSIS — Z12.31 ENCOUNTER FOR SCREENING MAMMOGRAM FOR BREAST CANCER: ICD-10-CM

## 2024-04-30 DIAGNOSIS — G89.29 CHRONIC BACK PAIN, UNSPECIFIED BACK LOCATION, UNSPECIFIED BACK PAIN LATERALITY: ICD-10-CM

## 2024-04-30 DIAGNOSIS — F32.A DEPRESSION, UNSPECIFIED DEPRESSION TYPE: ICD-10-CM

## 2024-04-30 DIAGNOSIS — E78.5 HYPERLIPIDEMIA, UNSPECIFIED HYPERLIPIDEMIA TYPE: ICD-10-CM

## 2024-04-30 DIAGNOSIS — M54.9 CHRONIC BACK PAIN, UNSPECIFIED BACK LOCATION, UNSPECIFIED BACK PAIN LATERALITY: ICD-10-CM

## 2024-04-30 PROCEDURE — 3078F DIAST BP <80 MM HG: CPT | Performed by: FAMILY MEDICINE

## 2024-04-30 PROCEDURE — 3075F SYST BP GE 130 - 139MM HG: CPT | Performed by: FAMILY MEDICINE

## 2024-04-30 PROCEDURE — 99214 OFFICE O/P EST MOD 30 MIN: CPT | Performed by: FAMILY MEDICINE

## 2024-04-30 RX ORDER — HYDROCODONE BITARTRATE AND ACETAMINOPHEN 5; 325 MG/1; MG/1
1 TABLET ORAL EVERY 6 HOURS PRN
Qty: 4 TABLET | Refills: 0 | Status: SHIPPED | OUTPATIENT
Start: 2024-04-30 | End: 2024-05-06

## 2024-04-30 NOTE — PROGRESS NOTES
AR[facility]”.   Subjective   Sedrick Leahy is a 68 y.o. female presenting with chief complaint of:   Chief Complaint   Patient presents with    Follow-up     6 month F/U      AWV 10.20.21  Last Completed Annual Wellness Visit       This patient has no relevant Health Maintenance data.             History of Present Illness :  Alone.   Here for review of chronic problems that includes Dm2 and others.    Has multiple chronic problems to consider that might have a bearing on today's issues;  an interval appointment.       Chronic/acute problems reviewed today:   1. Depression, unspecified depression type past significant  mood swings, down moods, nervousness, difficulty with concentration to function home/work.  Others close have not been concerned.  No suicide ideation/intent.  Rx helps     2. Chest wall pain see #3   3. Fall, initial encounter 2 weeks ago she lost her balance and fell onto a pallet on the left lower chest and bruising got a lot of pain.  Still present   4. Chronic back pain, unspecified back location, unspecified back pain laterality    5. Hyperlipidemia, unspecified hyperlipidemia type Chronic/stable.  Tolerated use of Rx with labs showing improved lipid values and tolerant liver labs. No muscle aches unexpected.      6. Primary hypertension Chronic/stable. Stable here past/no recent home blood pressures.  No significant chest pain, SOB, LE edema, orthopnea, near syncope, dizziness/light headness.   Recent Vitals         1/3/2024 2/28/2024 4/30/2024       BP: -- -- 132/68     Pulse: -- -- 89     Temp: -- -- 97.3 °F (36.3 °C)     Weight: 80.7 kg (178 lb) 80.7 kg (178 lb) 81.2 kg (179 lb)     BMI (Calculated): 30.5 30.5 30.7              7. Type 2 diabetes mellitus without complication, without long-term current use of insulin Chronic/stable  No problem/pattern hypoglycemia/hyperglycemia manifest by poly- dypsia, phagia, uria, or sweats, diaphoretic episodes, syncope/near.     8. Anemia, unspecified  type Chronic or past history/stable quite a while: This has been present before.    There has been GI evaulation in the past. There is no current melena, hematochezia. It has been benign to date and stable/treated/watching.  Contributing comorbidities to date: Once iron deficient..     9. Encounter for screening mammogram for breast cancer Chronic/ongoing yearly need to review for breast cancer.  No breast pain, masses, discharge.       10. Menopause Chronic/stable.  Last bone density/if below.   Has been offered Rx.  Ok further bone density screening when due.      11. Vitamin D deficiency chronic/variable up/down with past labs and/or risk to run low especially in winter. Lab monitored.      12. Intractable right heel pain chronic worse; despite injection by [facility] and going to Dr. Mendieta's office; she continues to have daily pain and even thinks there is a knot at the base of her left heel (see MRI-radiologist seems to think that the nodular issue is all inflammatory)     Has an/another acute issue today: None.    The following portions of the patient's history were reviewed and updated as appropriate: allergies, current medications, past family history, past medical history, past social history, past surgical history, and problem list.      Current Outpatient Medications:     acetaminophen (TYLENOL) 325 MG tablet, Take 2 tablets by mouth Every 4 (Four) Hours As Needed for Mild Pain., Disp: , Rfl:     amitriptyline (ELAVIL) 100 MG tablet, TAKE ONE (1) TO TWO (2) TABLETS AT BEDTIME AS NEEDED SLEEP, Disp: 180 tablet, Rfl: 0    atorvastatin (LIPITOR) 20 MG tablet, Take 1 tablet by mouth Daily., Disp: 90 tablet, Rfl: 5    cholecalciferol (VITAMIN D3) 25 MCG (1000 UT) tablet, Take 1 tablet by mouth Daily., Disp: , Rfl:     HYDROcodone-acetaminophen (Norco) 5-325 MG per tablet, Take 1 tablet by mouth Every 6 (Six) Hours As Needed for Moderate Pain for up to 6 days., Disp: 4 tablet, Rfl: 0    orphenadrine (NORFLEX) 100 MG  "12 hr tablet, Take 1 tablet by mouth 2 (Two) Times a Day., Disp: 30 tablet, Rfl: 0    polyethylene glycol (MIRALAX) 17 g packet, Take 17 g by mouth Daily As Needed (Use if senna-docusate is ineffective)., Disp: , Rfl:     pregabalin (LYRICA) 50 MG capsule, Take 1 capsule by mouth Every 8 (Eight) Hours., Disp: 90 capsule, Rfl: 0    No problems with medications.    Allergies   Allergen Reactions    Penicillins Swelling and Rash    Morphine GI Intolerance     Pt states morphine makes her sick and has \"had GERD surgery is unable to vomit\"       Review of Systems  GENERAL:  Inactive/slower with limits, speed, stamina for age and spinal pain. Sleep is ok. No fever now/recent.  SKIN: No rash/skin lesion of concern.   ENDO:  No syncope, near or diaphoretic sweaty spells.  HEENT: No recent head injury; or change occ headache.   No vision change.  No significant hearing loss.  Ears without pain/drainage.  No sore throat.  No significant nasal/sinus congestion/drainage. No epistaxis. Above.   CHEST: No chest wall tenderness or mass. No cough, without wheeze.  No SOB; no hemoptysis.  CV: No chest pain, palpitations, ankle edema.  GI: No heartburn, dysphagia.  No abdominal pain, diarrhea, constipation.  No rectal bleeding, or melena.    :  Voids without dysuria, same urinary incontinence but voids to completion.  ORTHO: No painful/swollen joints but various on /off sore.  Variable sore neck or back.  No acute neck or back pain without recent injury.  NEURO: No dizziness, weakness of extremities or significant UE/LE numbness/paresthesias.   PSYCH: since 2022? some memory loss.  Mood variable as occ anxious, depressed but/and not suicidal.  Tries to tolerate stress .  Screening:  No gyne BRIONNA/[facility]/1988   Mammogram: 7.12.23-[facility]  Bone density: 1.10.20 Bone d-deca/[facility]/T L3 -1.1 hip neck -2.0/to follow   Low dose CT chest:Tobacco-smoker/age 14/2 ppd/dc 1982 (9y)  NA  GI:   Colonoscopy/[facility]/6.6.16/5y  Cologuard " +/11.12.19  Colon-nl/[facility]/4.10.23/5y  EGD-irregular z/[facility]/4.10.23  Prostate: NA  Usual lab order  6m BMP, A1c  12m CBC, CMP, A1c, LIPID, TSH, Vit D    Copy/paste function used for ROS/exam AND each area of these were reviewed, updated, confirmed and supplemented as needed.  Data reviewed:   Recent admit/ER/MD visits: 1.2.24    1. Anemia, unspecified type    2. Post-operative state    3. Decreased activities of daily living (ADL)    4. Gait difficulty    5. Acute back pain, unspecified back location, unspecified back pain laterality    6. Adverse effect of narcotic, subsequent encounter       Rx: reviewed/changes:  No orders of the defined types were placed in this encounter.     LAB/Testing/Referrals: reviewed/orders:   Today:       Orders Placed This Encounter   Procedures    Comprehensive Metabolic Panel    Iron Profile    Reticulocytes    CBC & Differential       Last cardiac testing:   Echo:   Results for orders placed during the hospital encounter of 07/20/23    Adult Transthoracic Echo Complete W/ Cont if Necessary Per Protocol    Interpretation Summary    Left ventricular systolic function is normal. Left ventricular ejection fraction appears to be 56 - 60%.    Normal right ventricular cavity size and systolic function noted.    No significant valvular abnormalities identified on this study.    Radiology considered:   No radiology results for the last 90 days.    11.17.21 MRI R foot without  IMPRESSION:  1. Moderate changes of plantar fasciitis, including mild bone marrow  edema at the inferior calcaneus with enthesopathy and spurring, no  disruption of the plantar fascia is identified. The inflammation  primarily involves the medial cord of the plantar fascia. There are  subcutaneous inflammatory changes superficial to the heel spur with  nodular signal alteration. The area of nodularity measures about 14 x 11  mm. An inflammatory process is favored, however if the pain and a  palpable nodule persists  after treatment for plantar fasciitis, consider  repeat MRI with and without contrast.    Lab Results:  Results for orders placed or performed in visit on 01/02/24   Comprehensive Metabolic Panel    Specimen: Blood   Result Value Ref Range    Glucose 87 65 - 99 mg/dL    BUN 14 8 - 23 mg/dL    Creatinine 0.83 0.57 - 1.00 mg/dL    EGFR Result 76.4 >60.0 mL/min/1.73    BUN/Creatinine Ratio 16.9 7.0 - 25.0    Sodium 142 136 - 145 mmol/L    Potassium 4.7 3.5 - 5.2 mmol/L    Chloride 104 98 - 107 mmol/L    Total CO2 27.2 22.0 - 29.0 mmol/L    Calcium 9.4 8.6 - 10.5 mg/dL    Total Protein 6.7 6.0 - 8.5 g/dL    Albumin 3.9 3.5 - 5.2 g/dL    Globulin 2.8 gm/dL    A/G Ratio 1.4 g/dL    Total Bilirubin 0.2 0.0 - 1.2 mg/dL    Alkaline Phosphatase 152 (H) 39 - 117 U/L    AST (SGOT) 12 1 - 32 U/L    ALT (SGPT) 12 1 - 33 U/L   Iron Profile    Specimen: Blood   Result Value Ref Range    TIBC 472 mcg/dL    UIBC 433 (H) 112 - 346 mcg/dL    Iron 39 37 - 145 mcg/dL    Iron Saturation 8 (L) 20 - 50 %   Reticulocytes    Specimen: Blood   Result Value Ref Range    Reticulocyte Absolute 1.61 0.70 - 1.90 %   CBC & Differential    Specimen: Blood   Result Value Ref Range    WBC 8.14 3.40 - 10.80 10*3/mm3    RBC 4.23 3.77 - 5.28 10*6/mm3    Hemoglobin 11.7 (L) 12.0 - 15.9 g/dL    Hematocrit 37.0 34.0 - 46.6 %    MCV 87.5 79.0 - 97.0 fL    MCH 27.7 26.6 - 33.0 pg    MCHC 31.6 31.5 - 35.7 g/dL    RDW 15.3 12.3 - 15.4 %    Platelets 434 140 - 450 10*3/mm3    Neutrophil Rel % 52.9 42.7 - 76.0 %    Lymphocyte Rel % 39.7 19.6 - 45.3 %    Monocyte Rel % 6.1 5.0 - 12.0 %    Eosinophil Rel % 0.7 0.3 - 6.2 %    Basophil Rel % 0.5 0.0 - 1.5 %    Neutrophils Absolute 4.30 1.70 - 7.00 10*3/mm3    Lymphocytes Absolute 3.23 (H) 0.70 - 3.10 10*3/mm3    Monocytes Absolute 0.50 0.10 - 0.90 10*3/mm3    Eosinophils Absolute 0.06 0.00 - 0.40 10*3/mm3    Basophils Absolute 0.04 0.00 - 0.20 10*3/mm3    Immature Granulocyte Rel % 0.1 0.0 - 0.5 %    Immature Grans  "Absolute 0.01 0.00 - 0.05 10*3/mm3    nRBC 0.0 0.0 - 0.2 /100 WBC       A1C:  Lab Results - Last 18 Months   Lab Units 03/01/23  0922   HEMOGLOBIN A1C % 5.40     GLUCOSE:  Lab Results - Last 18 Months   Lab Units 01/02/24  1029 12/15/23  0904 12/11/23  0329 12/09/23  0420 12/08/23  0450 12/05/23  2342 08/25/23  0928   GLUCOSE mg/dL 87 107* 121* 156* 106* 107* 132*     LIPID:  Lab Results - Last 18 Months   Lab Units 03/01/23  0922   CHOLESTEROL mg/dL 201*   LDL CHOL mg/dL 120*   HDL CHOL mg/dL 70*   TRIGLYCERIDES mg/dL 61     PSA:No results found for: \"PSA\"      CBC:  Lab Results - Last 18 Months   Lab Units 01/02/24  1029 12/15/23  0904 12/11/23 0329 12/09/23 0420 12/08/23  0450 12/05/23  2342 09/14/23  1334   WBC 10*3/mm3 8.14 11.79* 11.14* 8.15 10.07 6.75 6.08   HEMOGLOBIN g/dL 11.7* 9.7* 10.1* 11.0* 10.4* 10.6* 10.7*   HEMATOCRIT % 37.0 32.3* 34.6 35.3 35.0 36.0 34.0   PLATELETS 10*3/mm3 434 308 285 311 310 266 345   IRON mcg/dL 39  --   --   --   --   --  68   VITAMIN B 12 pg/mL  --   --   --   --   --   --  394   FOLATE ng/mL  --   --   --   --   --   --  12.60     BMP/CMP:  Lab Results - Last 18 Months   Lab Units 01/02/24  1029 12/15/23  0904 12/11/23 0329 12/09/23  0420 12/08/23  0450 12/05/23  2342 10/06/23  1022 08/25/23  0928 06/28/23  0847 06/20/23  1529 03/01/23  0922   SODIUM mmol/L 142 136 141 138 143 141  --  138 142 139 142   POTASSIUM mmol/L 4.7 4.2 4.5 4.5 3.8 3.7  --  3.4* 4.8 4.0 4.6   CHLORIDE mmol/L 104 103 107 104 106 105  --  100 106 103 105   CO2 mmol/L 27.2 25.0 27.0 23.0 27.0 24.0  --  29.0 27.0 22.5 29.1*   GLUCOSE mg/dL 87 107* 121* 156* 106* 107*  --  132* 101* 122* 79   BUN mg/dL 14 13 27* 21 21 18  --  11 15 10 14   CREATININE mg/dL 0.83 0.59 0.74 0.63 0.70 0.54* 0.80 0.78 0.82 0.76 0.74   EGFR RESULT mL/min/1.73 76.4  --   --   --   --   --   --   --  77.5 84.9 88.3   CALCIUM mg/dL 9.4 9.0 8.5* 8.8 9.4 9.0  --  9.1 9.3 9.0 9.3       HEPATIC:  Lab Results - Last 18 Months   Lab " "Units 01/02/24  1029 12/15/23  0904 12/08/23  0450 08/25/23  0928 06/28/23  0847 06/20/23  1529 03/01/23  0922   ALT (SGPT) U/L 12 22 21 23 14 113* 11   AST (SGOT) U/L 12 34* 26 29 17 127* 17   ALK PHOS U/L 152* 94 105 114 118* 159* 106     HEPATITIS C ANTIBODY:   Lab Results   Component Value Date/Time    HEPCVIRUSABY <0.1 05/28/2019 07:09 AM     Vit D:  Lab Results - Last 18 Months   Lab Units 03/01/23 0922   VIT D 25 HYDROXY ng/ml 26.1*     THYROID:  Lab Results - Last 18 Months   Lab Units 03/01/23 0922   TSH uIU/mL 2.300       Objective   /68   Pulse 89   Temp 97.3 °F (36.3 °C)   Ht 162.6 cm (64\")   Wt 81.2 kg (179 lb)   SpO2 96%   BMI 30.73 kg/m²   Body mass index is 30.73 kg/m².    Recent Vitals         1/3/2024 2/28/2024 4/30/2024       BP: -- -- 132/68     Pulse: -- -- 89     Temp: -- -- 97.3 °F (36.3 °C)     Weight: 80.7 kg (178 lb) 80.7 kg (178 lb) 81.2 kg (179 lb)     BMI (Calculated): 30.5 30.5 30.7           Wt Readings from Last 15 Encounters:   04/30/24 1317 81.2 kg (179 lb)   02/28/24 1327 80.7 kg (178 lb)   01/03/24 1248 80.7 kg (178 lb)   01/02/24 1038 80.7 kg (178 lb)   12/08/23 0126 81.2 kg (179 lb)   12/05/23 2245 83.9 kg (185 lb)   10/31/23 1528 81.6 kg (180 lb)   10/20/23 1305 82.1 kg (181 lb)   10/12/23 0934 82.1 kg (181 lb)   09/14/23 1405 82.1 kg (181 lb)   08/24/23 1601 82.1 kg (181 lb)   07/20/23 1227 82.1 kg (181 lb)   06/28/23 0901 82.1 kg (181 lb)   06/20/23 1600 80.7 kg (178 lb)   05/11/23 1310 83 kg (183 lb)       Physical Exam  GENERAL:  Well nourished/developed in no acute distress.   SKIN: Turgor excellent, without wound, rash, lesion.  HEENT: Normal cephalic without trauma.  Pupils equal round reactive to light. Extraocular motions full without nystagmus.   External canals nonobstructive nontender without reddness. Tymphatic membranes brown with tyler structures intact.   Oral cavity without growths, exudates, and moist.  Posterior pharynx without mass, " obstruction, redness.  No thyromegaly, mass, tenderness, lymphadenopathy and supple.  CV: Regular rhythm.  No murmur, gallop,  edema. Posterior pulses intact.  No carotid bruits.  CHEST: Marked lateral L chest wall tenderness without mass.   LUNGS: Symmetric motion with clear to auscultation.   ABD: Soft, nontender without mass.   ORTHO: Symmetric extremities without swelling/point tenderness.  Full gross range of motion.  NEURO: CN 2-12 grossly intact.  Symmetric facies and UE/LE. 2-3/5 strength throughout. 1/4 x bicep knee equal reflexes.  Nonfocal use extremities. Speech clear. Intact light touch with monofilament, vibratory sensation with tuning fork; equal toes/distal feet.    PSYCH: Oriented x 3.  Pleasant calm, well kept.  Purposeful/directed conservation with intact short/long gross memory.    Assessment & Plan     1. Depression, unspecified depression type    2. Chest wall pain    3. Fall, initial encounter    4. Chronic back pain, unspecified back location, unspecified back pain laterality    5. Hyperlipidemia, unspecified hyperlipidemia type    6. Primary hypertension    7. Type 2 diabetes mellitus without complication, without long-term current use of insulin    8. Anemia, unspecified type    9. Encounter for screening mammogram for breast cancer    10. Menopause    11. Vitamin D deficiency    12. Intractable right heel pain        Data review above:   Discussions/medical decisions/reviews:  BP ok  Other vitals ok  Recent Vitals         1/3/2024 2/28/2024 4/30/2024       BP: -- -- 132/68     Pulse: -- -- 89     Temp: -- -- 97.3 °F (36.3 °C)     Weight: 80.7 kg (178 lb) 80.7 kg (178 lb) 81.2 kg (179 lb)     BMI (Calculated): 30.5 30.5 30.7           DM/A1c 5.4 3.1.23  DM/BS 87 1.2.24  Lipid  3.1.23; lipitor 20  PSA NA  CBC 11.7 1.2.24  Iron 68N 9.14.23; none  B12 394 9.14.23; none  Folate 12.6 9.14.23; none  Renal ok 1.2.24  Liver alk 152 1.2.24  Vit D 26.1 3.1.23  Thyroid TSH 2.3  3.1.23    Screening reviewed/updated mammogram/bone density 7.2024  Vaccines discussed (see below)   Plain film for L rib  Foot R is plantar fascitis; ? Mass issue but feel like radiology related to plantar fascitis  Back to [facility] to see what he thiks and ? Another injection    Follow up: Return for fasting lab when able; then lab/Dr Randhawa 6m.  Future Appointments   Date Time Provider Department Center   5/2/2024  8:30 AM [facility] [place]   10/30/2024  1:15 PM Bala Randhawa MD [facility]       Data review above:   Rx: reviewed and decisions:   Rx new/changes: none  No orders of the defined types were placed in this encounter.      Orders placed:   LAB/Testing/Referrals: reviewed/orders:   Today:   Orders Placed This Encounter   Procedures    Mammo Screening Digital Tomosynthesis Bilateral With CAD    DEXA Bone Density Axial    XR Ribs Left With PA Chest    Comprehensive Metabolic Panel    Lipid Panel    Hemoglobin A1c    TSH Rfx On Abnormal To Free T4    Microalbumin / Creatinine Urine Ratio - Urine, Clean Catch    Vitamin D,25-Hydroxy    Ambulatory Referral to Podiatry    CBC & Differential     Chronic/recurrent labs above or change to:   Same     Immunization History   Administered Date(s) Administered    COVID-19 (MODERNA) 1st,2nd,3rd Dose Monovalent 06/07/2021, 07/05/2021    Fluzone High Dose =>65 Years ([employer] ONLY) 11/08/2019, 10/10/2023    Fluzone High-Dose 65+yrs 09/09/2020, 10/20/2021    Pneumococcal Conjugate 13-Valent (PCV13) 10/22/2019, 11/08/2019    Pneumococcal, Unspecified 10/10/2023    Tdap 10/20/2021     We advised/reaffirmed our support/suggestion for staying complete with covid- covid boosters, seasonal flu/yearly and any missing vaccine from list we supplied; when cannot be given here we suggest contact with local health department office or pharmacy to review missing/needed vaccines and then bring nursing documentation for these vaccines to this office or call this  "information in. Shingles became \"free\" 1.1.23 for medicare insurance.    Health maintenance:   Body mass index is 30.73 kg/m².         Tobacco use reviewed:   Sedrick Leahy  reports that she quit smoking about 33 years ago. Her smoking use included cigarettes. She started smoking about 69 years ago. She has a 72.1 pack-year smoking history. She has never used smokeless tobacco.  There are no Patient Instructions on file for this visit.          "

## 2024-05-01 ENCOUNTER — HOSPITAL ENCOUNTER (OUTPATIENT)
Dept: GENERAL RADIOLOGY | Facility: HOSPITAL | Age: 69
Discharge: HOME | End: 2024-05-01
Admitting: FAMILY MEDICINE
Payer: MEDICARE

## 2024-05-01 DIAGNOSIS — R07.89 CHEST WALL PAIN: ICD-10-CM

## 2024-05-01 PROCEDURE — 71101 X-RAY EXAM UNILAT RIBS/CHEST: CPT

## 2024-05-17 DIAGNOSIS — D64.9 ANEMIA, UNSPECIFIED TYPE: Primary | ICD-10-CM

## 2024-05-17 DIAGNOSIS — R19.5 DARK STOOLS: ICD-10-CM

## 2024-05-18 LAB
FERRITIN SERPL-MCNC: 12.1 NG/ML (ref 13–150)
FOLATE SERPL-MCNC: >20 NG/ML (ref 4.78–24.2)
IRON SERPL-MCNC: 37 MCG/DL (ref 37–145)
RETICS/RBC NFR AUTO: 1.68 % (ref 0.7–1.9)
VIT B12 SERPL-MCNC: 333 PG/ML (ref 211–946)

## 2024-05-20 NOTE — PROGRESS NOTES
Patient notified of results, spoke with understanding, will repeat labs in 3 mo per Dr. Madden request

## 2024-06-04 RX ORDER — AMITRIPTYLINE HYDROCHLORIDE 100 MG/1
TABLET ORAL
Qty: 180 TABLET | Refills: 0 | Status: SHIPPED | OUTPATIENT
Start: 2024-06-04

## 2024-06-10 NOTE — PROGRESS NOTES
[facility] - PODIATRY    Today's Date: 06/14/2024     Patient Name: Sedrick Leahy  MRN: [id number]  CSN: [id number]  PCP: Kiran Madden MD  Referring Provider: Kiran Madden MD    SUBJECTIVE   No chief complaint on file.    HPI: Sedrick Leahy, a 68 y.o.female, comes to clinic as a(n) {new established:66312} patient {Clinic Presentation:13636}. {Med Hx:54554}. {Pod Subjective #1 (Optional):30989} {Pod Subjective #2 (Optional):57558}. {Pod Subjective #3 (Optional):95877}. Denies any constitutional symptoms. No other pedal complaints at this time.    Past Medical History:   Diagnosis Date    Arthritis     Diabetes mellitus     diet controlled    Hx of colonic polyp     Hypertension     Joint pain     Hip    Lower back pain     Neck pain     PONV (postoperative nausea and vomiting)      Past Surgical History:   Procedure Laterality Date    ANTERIOR CERVICAL DISCECTOMY W/ FUSION N/A 11/20/2020    Procedure: EXPLORATION OF FUSION C5-6, ANTERIOR CERVICAL DISCECTOMY FUSION C4-5, C6-7 REVISON ANTERIOR FUSION C5-6 WITH INSTRUMENTATION C4-7;  Surgeon: KEN Gilbert MD;  Location: [facility] OR;  Service: Orthopedic Spine;  Laterality: N/A;    APPENDECTOMY      COLONOSCOPY  06/06/2016    Normal exam repeat in 5 years    COLONOSCOPY N/A 01/14/2020    Diverticulosis repeat exam in 5 years    COLONOSCOPY W/ POLYPECTOMY  04/04/2012    Hyperplastic polyp cecum repeat exam in 1 year    ENDOSCOPY  08/14/2014    Very tortuous distal esophagus dilated 50 Fr, HH     ENDOSCOPY N/A 01/18/2021    A fundoplication was found, dilated    ENDOSCOPY  06/2023    HARDWARE REMOVAL N/A 11/20/2020    Procedure: REMOVAL OF INSTRUMENTATION;  Surgeon: KEN Gilbert MD;  Location: [facility] OR;  Service: Orthopedic Spine;  Laterality: N/A;    HYSTERECTOMY      Laparoscopic Hysterectomy bilateral salpingectomy for bleeding    LUMBAR LAMINECTOMY WITH FUSION Bilateral 12/11/2023    Procedure: LUMBAR LAMINECTOMY  TRANSFORAMINAL LUMBAR INTERBODY FUSION L4/5;  Surgeon: Rick Estes DO;  Location:  [facility] OR;  Service: Neurosurgery;  Laterality: Bilateral;    NECK SURGERY      NISSEN FUNDOPLICATION      POSTERIOR CERVICAL FUSION N/A 2020    Procedure: POSTERIOR SPINAL FUSION WITH INSTRUMENTATION C4-7;  Surgeon: KEN Gilbert MD;  Location:  [facility] OR;  Service: Orthopedic Spine;  Laterality: N/A;    SACROILIAC JOINT INJECTION Bilateral 2023    Procedure: SACROILIAC INJECTION, Bilateral;  Surgeon: Rick Estes DO;  Location:  [facility] OR;  Service: Neurosurgery;  Laterality: Bilateral;    STEROID INJECTION Left 2022    Procedure: LEFT HIP FLUROSCOPIC GUIDED CORTICOSTEROID INJECTION;  Surgeon: Herberth Skelton MD;  Location:  [facility] OR;  Service: Orthopedics;  Laterality: Left;    US GUIDED LYMPH NODE BIOPSY  2020     Family History   Problem Relation Age of Onset    No Known Problems Father     Colon cancer Mother     Colon polyps Mother     Diabetes Brother     Diabetes Brother     Diabetes Son     Diabetes Son     Heart attack Paternal Grandmother     Breast cancer Maternal Aunt     Lymphoma Maternal Aunt     Breast cancer Maternal Aunt     Lung cancer Maternal Aunt     Diabetes Maternal Aunt     Breast cancer Maternal Aunt      Social History     Socioeconomic History    Marital status:      Spouse name: Jason    Number of children: 2    Years of education: 12   Tobacco Use    Smoking status: Former     Current packs/day: 0.00     Average packs/day: 2.0 packs/day for 36.1 years (72.1 ttl pk-yrs)     Types: Cigarettes     Start date: 1954     Quit date: 7/10/1990     Years since quittin.9    Smokeless tobacco: Never   Vaping Use    Vaping status: Never Used   Substance and Sexual Activity    Alcohol use: Yes     Comment: rare    Drug use: No    Sexual activity: Defer     Partners: Male     Allergies   Allergen Reactions    Penicillins Swelling and Rash    Morphine GI  "Intolerance     Pt states morphine makes her sick and has \"had GERD surgery is unable to vomit\"     Current Outpatient Medications   Medication Sig Dispense Refill    acetaminophen (TYLENOL) 325 MG tablet Take 2 tablets by mouth Every 4 (Four) Hours As Needed for Mild Pain.      amitriptyline (ELAVIL) 100 MG tablet TAKE ONE (1) TO TWO (2) TABLETS AT BEDTIME AS NEEDED SLEEP 180 tablet 0    atorvastatin (LIPITOR) 20 MG tablet Take 1 tablet by mouth Daily. 90 tablet 5    cholecalciferol (VITAMIN D3) 25 MCG (1000 UT) tablet Take 1 tablet by mouth Daily.      orphenadrine (NORFLEX) 100 MG 12 hr tablet Take 1 tablet by mouth 2 (Two) Times a Day. 30 tablet 0    polyethylene glycol (MIRALAX) 17 g packet Take 17 g by mouth Daily As Needed (Use if senna-docusate is ineffective).      pregabalin (LYRICA) 50 MG capsule Take 1 capsule by mouth Every 8 (Eight) Hours. 90 capsule 0     No current facility-administered medications for this visit.     Review of Systems    OBJECTIVE   There were no vitals filed for this visit.    PHYSICAL EXAM  GEN:   Accompanied by {ACCOMPANYING PERSON:[id number]}.     Foot/Ankle Exam    RADIOLOGY/NUCLEAR:  No results found.    LABORATORY/CULTURE RESULTS:      PATHOLOGY RESULTS:       ASSESSMENT/PLAN     There are no diagnoses linked to this encounter.  Comprehensive lower extremity examination and evaluation was performed.  Discussed findings and treatment plan including risks, benefits, and treatment options with patient in detail. Patient agreed with treatment plan.  {Pod Plan #1:26395}   An After Visit Summary was printed and given to the patient at discharge, including (if requested) any available informative/educational handouts regarding diagnosis, treatment, or medications. All questions were answered to patient/family satisfaction. Should symptoms fail to improve or worsen they agree to call or return to clinic or to go to the Emergency Department. Discussed the importance of following up with " any needed screening tests/labs/specialist appointments and any requested follow-up recommended by me today. Importance of maintaining follow-up discussed and patient accepts that missed appointments can delay diagnosis and potentially lead to worsening of conditions.  No follow-ups on file., or sooner if acute issues arise.      This document has been electronically signed by Divine Richardson on Didi 10, 2024 09:22 CDT

## 2024-06-13 ENCOUNTER — TELEPHONE (OUTPATIENT)
Dept: PODIATRY | Facility: CLINIC | Age: 69
End: 2024-06-13
Payer: MEDICARE

## 2024-06-14 ENCOUNTER — OFFICE VISIT (OUTPATIENT)
Dept: PODIATRY | Facility: CLINIC | Age: 69
End: 2024-06-14
Payer: MEDICARE

## 2024-06-14 VITALS
WEIGHT: 181 LBS | BODY MASS INDEX: 30.9 KG/M2 | HEART RATE: 74 BPM | HEIGHT: 64 IN | SYSTOLIC BLOOD PRESSURE: 140 MMHG | DIASTOLIC BLOOD PRESSURE: 80 MMHG | OXYGEN SATURATION: 98 %

## 2024-06-14 DIAGNOSIS — M72.2 PLANTAR FASCIITIS, RIGHT: Primary | ICD-10-CM

## 2024-06-14 DIAGNOSIS — E11.9 TYPE 2 DIABETES MELLITUS WITHOUT COMPLICATION, WITHOUT LONG-TERM CURRENT USE OF INSULIN: ICD-10-CM

## 2024-06-14 DIAGNOSIS — G89.29 CHRONIC HEEL PAIN, RIGHT: ICD-10-CM

## 2024-06-14 DIAGNOSIS — M79.671 CHRONIC HEEL PAIN, RIGHT: ICD-10-CM

## 2024-06-14 RX ORDER — PREDNISONE 5 MG/1
1 TABLET ORAL TAKE AS DIRECTED
Qty: 1 EACH | Refills: 0 | Status: SHIPPED | OUTPATIENT
Start: 2024-06-14

## 2024-06-14 NOTE — PROGRESS NOTES
[facility] - PODIATRY    Today's Date: 06/14/24    Patient Name: Sedrick Leahy  MRN: [id number]  CSN: [id number]  PCP: Kiran Madden MD  Referring Provider: Kiran Madden MD    SUBJECTIVE     Chief Complaint   Patient presents with    Right Foot - Pain     Patient is having right heel pain [place] Kiran Stephon 04/30/2024. NO RECENT IMAGING - NO SX     HPI: Sedrick Leahy, a 68 y.o.female, comes to clinic as a(n) established patient complaining of foot pain. Patient has h/o Arthritis, diabetes, hypertension, low back pain, neck pain .  Patient presents complaining of ongoing and chronic pain to her right heel.  This is similar to her previous evaluation roughly 3 years ago.  She did not follow-up due to death in the family.  Previously had an MRI performed with plantar fasciitis and concerning for soft tissue induration.  There was a suggestion of a follow-up MRI but this has not been performed.  Notes pain with every step.  Admits pain at 9/10 level and described as burning, aching, nagging and sharp. Relates previous treatment(s) including steroid injection, icing, OTC inserts . Denies any constitutional symptoms. No other pedal complaints at this time.    Past Medical History:   Diagnosis Date    Arthritis     Diabetes mellitus     diet controlled    Hx of colonic polyp     Hypertension     Joint pain     Hip    Lower back pain     Neck pain     PONV (postoperative nausea and vomiting)      Past Surgical History:   Procedure Laterality Date    ANTERIOR CERVICAL DISCECTOMY W/ FUSION N/A 11/20/2020    Procedure: EXPLORATION OF FUSION C5-6, ANTERIOR CERVICAL DISCECTOMY FUSION C4-5, C6-7 REVISON ANTERIOR FUSION C5-6 WITH INSTRUMENTATION C4-7;  Surgeon: KEN Gilbert MD;  Location: [facility];  Service: Orthopedic Spine;  Laterality: N/A;    APPENDECTOMY      COLONOSCOPY  06/06/2016    Normal exam repeat in 5 years    COLONOSCOPY N/A 01/14/2020    Diverticulosis repeat exam in 5  years    COLONOSCOPY W/ POLYPECTOMY  04/04/2012    Hyperplastic polyp cecum repeat exam in 1 year    ENDOSCOPY  08/14/2014    Very tortuous distal esophagus dilated 50 Fr, HH     ENDOSCOPY N/A 01/18/2021    A fundoplication was found, dilated    ENDOSCOPY  06/2023    HARDWARE REMOVAL N/A 11/20/2020    Procedure: REMOVAL OF INSTRUMENTATION;  Surgeon: KEN Gilbert MD;  Location:  [facility] OR;  Service: Orthopedic Spine;  Laterality: N/A;    HYSTERECTOMY      Laparoscopic Hysterectomy bilateral salpingectomy for bleeding    LUMBAR LAMINECTOMY WITH FUSION Bilateral 12/11/2023    Procedure: LUMBAR LAMINECTOMY TRANSFORAMINAL LUMBAR INTERBODY FUSION L4/5;  Surgeon: Rick Estes DO;  Location:  [facility] OR;  Service: Neurosurgery;  Laterality: Bilateral;    NECK SURGERY      NISSEN FUNDOPLICATION      POSTERIOR CERVICAL FUSION N/A 12/03/2020    Procedure: POSTERIOR SPINAL FUSION WITH INSTRUMENTATION C4-7;  Surgeon: KEN Gilbert MD;  Location:  [facility] OR;  Service: Orthopedic Spine;  Laterality: N/A;    SACROILIAC JOINT INJECTION Bilateral 12/14/2023    Procedure: SACROILIAC INJECTION, Bilateral;  Surgeon: Rick Estes DO;  Location:  [facility] OR;  Service: Neurosurgery;  Laterality: Bilateral;    STEROID INJECTION Left 06/30/2022    Procedure: LEFT HIP FLUROSCOPIC GUIDED CORTICOSTEROID INJECTION;  Surgeon: Herberth Skelton MD;  Location:  [facility] OR;  Service: Orthopedics;  Laterality: Left;    US GUIDED LYMPH NODE BIOPSY  08/03/2020     Family History   Problem Relation Age of Onset    No Known Problems Father     Colon cancer Mother     Colon polyps Mother     Diabetes Brother     Diabetes Brother     Diabetes Son     Diabetes Son     Heart attack Paternal Grandmother     Breast cancer Maternal Aunt     Lymphoma Maternal Aunt     Breast cancer Maternal Aunt     Lung cancer Maternal Aunt     Diabetes Maternal Aunt     Breast cancer Maternal Aunt      Social History     Socioeconomic History    Marital status:  "     Spouse name: Jason    Number of children: 2    Years of education: 12   Tobacco Use    Smoking status: Former     Current packs/day: 0.00     Average packs/day: 2.0 packs/day for 36.1 years (72.1 ttl pk-yrs)     Types: Cigarettes     Start date: 1954     Quit date: 7/10/1990     Years since quittin.9    Smokeless tobacco: Never   Vaping Use    Vaping status: Never Used   Substance and Sexual Activity    Alcohol use: Yes     Comment: rare    Drug use: No    Sexual activity: Defer     Partners: Male     Allergies   Allergen Reactions    Penicillins Swelling and Rash    Morphine GI Intolerance     Pt states morphine makes her sick and has \"had GERD surgery is unable to vomit\"     Current Outpatient Medications   Medication Sig Dispense Refill    acetaminophen (TYLENOL) 325 MG tablet Take 2 tablets by mouth Every 4 (Four) Hours As Needed for Mild Pain.      amitriptyline (ELAVIL) 100 MG tablet TAKE ONE (1) TO TWO (2) TABLETS AT BEDTIME AS NEEDED SLEEP 180 tablet 0    atorvastatin (LIPITOR) 20 MG tablet Take 1 tablet by mouth Daily. 90 tablet 5    cholecalciferol (VITAMIN D3) 25 MCG (1000 UT) tablet Take 1 tablet by mouth Daily.      orphenadrine (NORFLEX) 100 MG 12 hr tablet Take 1 tablet by mouth 2 (Two) Times a Day. 30 tablet 0    polyethylene glycol (MIRALAX) 17 g packet Take 17 g by mouth Daily As Needed (Use if senna-docusate is ineffective).      pregabalin (LYRICA) 50 MG capsule Take 1 capsule by mouth Every 8 (Eight) Hours. 90 capsule 0    predniSONE 5 MG (21) tablet therapy pack dose pack Take 1 tablet by mouth Take As Directed. Take as directed on package instructions. 1 each 0     No current facility-administered medications for this visit.     Review of Systems   Constitutional:  Negative for chills and fever.   HENT:  Negative for congestion.    Respiratory:  Negative for shortness of breath.    Cardiovascular:  Negative for chest pain and leg swelling.   Gastrointestinal:  Negative " for constipation, diarrhea, nausea and vomiting.   Musculoskeletal:  Positive for arthralgias. Negative for myalgias.   Skin:  Negative for wound.   Neurological:  Negative for numbness.       OBJECTIVE     Vitals:    06/14/24 1459   BP: 140/80   Pulse: 74   SpO2: 98%       PHYSICAL EXAM  GEN:   Accompanied by none.     Foot/Ankle Exam    GENERAL  Appearance:  appears stated age  Orientation:  AAOx3  Affect:  appropriate  Gait:  unimpaired  Assistance:  independent  Right shoe gear: casual shoe  Left shoe gear: casual shoe    VASCULAR     Right Foot Vascularity   Dorsalis pedis:  2+  Posterior tibial:  2+  Skin temperature:  warm  Edema grading:  None  CFT:  3  Pedal hair growth:  Present  Varicosities:  none     Left Foot Vascularity   Dorsalis pedis:  2+  Posterior tibial:  2+  Skin temperature:  warm  Edema grading:  None  CFT:  3  Pedal hair growth:  Present  Varicosities:  none     NEUROLOGIC     Right Foot Neurologic   Normal sensation    Light touch sensation: normal  Vibratory sensation: normal  Hot/Cold sensation: normal     Left Foot Neurologic   Normal sensation    Light touch sensation: normal  Vibratory sensation: normal  Hot/Cold sensation:  normal    MUSCULOSKELETAL     Right Foot Musculoskeletal   Ecchymosis:  none  Tenderness:  plantar fascia tenderness and retrocalcaneal bursa tenderness    Arch:  Normal     Left Foot Musculoskeletal   Ecchymosis:  none  Tenderness:  none  Arch:  Normal    MUSCLE STRENGTH     Right Foot Muscle Strength   Foot dorsiflexion:  5  Foot plantar flexion:  5  Foot inversion:  5  Foot eversion:  5     Left Foot Muscle Strength   Foot dorsiflexion:  5  Foot plantar flexion:  5  Foot inversion:  5  Foot eversion:  5    RANGE OF MOTION     Right Foot Range of Motion   Foot and ankle ROM within normal limits    Ankle dorsiflexion: 5     Left Foot Range of Motion   Foot and ankle ROM within normal limits      DERMATOLOGIC      Right Foot Dermatologic   Skin  Right foot skin is  intact.      Left Foot Dermatologic   Skin  Left foot skin is intact.     Image:       RADIOLOGY/NUCLEAR:  No results found.      LABORATORY/CULTURE RESULTS:      PATHOLOGY RESULTS:       ASSESSMENT/PLAN     Diagnoses and all orders for this visit:    1. Plantar fasciitis, right (Primary)    2. Chronic heel pain, right  -     MRI Ankle Right Without Contrast; Future    3. Type 2 diabetes mellitus without complication, without long-term current use of insulin    Other orders  -     predniSONE 5 MG (21) tablet therapy pack dose pack; Take 1 tablet by mouth Take As Directed. Take as directed on package instructions.  Dispense: 1 each; Refill: 0      Comprehensive lower extremity examination and evaluation was performed.  Discussed findings and treatment plan including risks, benefits, and treatment options with patient in detail. Patient agreed with treatment plan.  Reviewed previous MRI.  Will order follow-up MRI.  Findings remain consistent with plantar fasciitis but possible STM.    The chronic nature, discussed potential surgical intervention pending results of MRI.  An After Visit Summary was printed and given to the patient at discharge, including (if requested) any available informative/educational handouts regarding diagnosis, treatment, or medications. All questions were answered to patient/family satisfaction. Should symptoms fail to improve or worsen they agree to call or return to clinic or to go to the Emergency Department. Discussed the importance of following up with any needed screening tests/labs/specialist appointments and any requested follow-up recommended by me today. Importance of maintaining follow-up discussed and patient accepts that missed appointments can delay diagnosis and potentially lead to worsening of conditions.  Return in about 5 weeks (around 7/19/2024)., or sooner if acute issues arise.    Lab Frequency Next Occurrence   Follow Anesthesia Guidelines / Standing Orders Once 12/04/2019    Obtain Informed Consent Once 12/09/2019   Follow Anesthesia Guidelines / Standing Orders Once 01/04/2021   Obtain Informed Consent Once 01/09/2021   Diet: Once 01/18/2021   Advance Diet as Tolerated Once 01/18/2021   Mammo Screening Bilateral With CAD Once 10/20/2022       This document has been electronically signed by Sai Mendieta DPM on June 14, 2024 16:50 CDT

## 2024-07-12 ENCOUNTER — HOSPITAL ENCOUNTER (OUTPATIENT)
Dept: MRI IMAGING | Facility: HOSPITAL | Age: 69
Discharge: HOME | End: 2024-07-12
Payer: MEDICARE

## 2024-07-12 DIAGNOSIS — G89.29 CHRONIC HEEL PAIN, RIGHT: ICD-10-CM

## 2024-07-12 DIAGNOSIS — M79.671 CHRONIC HEEL PAIN, RIGHT: ICD-10-CM

## 2024-07-12 PROCEDURE — 73721 MRI JNT OF LWR EXTRE W/O DYE: CPT

## 2024-07-16 ENCOUNTER — TELEPHONE (OUTPATIENT)
Dept: FAMILY MEDICINE CLINIC | Facility: CLINIC | Age: 69
End: 2024-07-16
Payer: MEDICARE

## 2024-07-16 DIAGNOSIS — Z12.31 ENCOUNTER FOR SCREENING MAMMOGRAM FOR BREAST CANCER: Primary | ICD-10-CM

## 2024-07-16 DIAGNOSIS — R92.8 ABNORMAL MAMMOGRAM: ICD-10-CM

## 2024-07-16 NOTE — TELEPHONE ENCOUNTER
Ada from [facility] radiology called and stated after pt mammogram being completed, they are needing orders for Left breast diagnostic and left breast ultra sound. -please advise

## 2024-07-22 ENCOUNTER — TELEPHONE (OUTPATIENT)
Age: 69
End: 2024-07-22
Payer: MEDICARE

## 2024-07-22 NOTE — PROGRESS NOTES
[facility] - PODIATRY    Today's Date: 07/23/24    Patient Name: Sedrick Leahy  MRN: [id number]  CSN: [id number]  PCP: Kiran Madden MD  Referring Provider: No ref. provider found    SUBJECTIVE     Chief Complaint   Patient presents with    Follow-up     Kiran Madden, 04/03/24 5 WK FU-pt states she is here today for recheck on plantar fasciitis states it still causing pain no changes-pt reports pain level 8/10     HPI: Sedrick Leahy, a 69 y.o.female, comes to clinic as a(n) established patient complaining of foot pain. Patient has h/o Arthritis, diabetes, hypertension, low back pain, neck pain .  Patient presents complaining of ongoing and chronic pain to her right heel.  This is similar to her previous evaluation roughly 3 years ago.  She did not follow-up due to death in the family.  Previously had an MRI performed with plantar fasciitis and concerning for soft tissue induration.  Patient has had updated imaging.  Notes pain with every step.-Relates that she did take a steroid pack with minimal improvement.  Complains of increasing pain at her right midfoot with a dorsal prominence.  Admits pain at 8/10 level and described as burning, aching, nagging and sharp. Relates previous treatment(s) including steroid injection, icing, OTC inserts . Denies any constitutional symptoms. No other pedal complaints at this time.    Past Medical History:   Diagnosis Date    Arthritis     Diabetes mellitus     diet controlled    Hx of colonic polyp     Hypertension     Joint pain     Hip    Lower back pain     Neck pain     PONV (postoperative nausea and vomiting)      Past Surgical History:   Procedure Laterality Date    ANTERIOR CERVICAL DISCECTOMY W/ FUSION N/A 11/20/2020    Procedure: EXPLORATION OF FUSION C5-6, ANTERIOR CERVICAL DISCECTOMY FUSION C4-5, C6-7 REVISON ANTERIOR FUSION C5-6 WITH INSTRUMENTATION C4-7;  Surgeon: KEN Gilbert MD;  Location: [facility];  Service: Orthopedic  Spine;  Laterality: N/A;    APPENDECTOMY      COLONOSCOPY  06/06/2016    Normal exam repeat in 5 years    COLONOSCOPY N/A 01/14/2020    Diverticulosis repeat exam in 5 years    COLONOSCOPY W/ POLYPECTOMY  04/04/2012    Hyperplastic polyp cecum repeat exam in 1 year    ENDOSCOPY  08/14/2014    Very tortuous distal esophagus dilated 50 Fr, HH     ENDOSCOPY N/A 01/18/2021    A fundoplication was found, dilated    ENDOSCOPY  06/2023    HARDWARE REMOVAL N/A 11/20/2020    Procedure: REMOVAL OF INSTRUMENTATION;  Surgeon: KEN Gilbert MD;  Location:  [facility];  Service: Orthopedic Spine;  Laterality: N/A;    HYSTERECTOMY      Laparoscopic Hysterectomy bilateral salpingectomy for bleeding    LUMBAR LAMINECTOMY WITH FUSION Bilateral 12/11/2023    Procedure: LUMBAR LAMINECTOMY TRANSFORAMINAL LUMBAR INTERBODY FUSION L4/5;  Surgeon: Rick Estes DO;  Location:  [facility];  Service: Neurosurgery;  Laterality: Bilateral;    NECK SURGERY      NISSEN FUNDOPLICATION      POSTERIOR CERVICAL FUSION N/A 12/03/2020    Procedure: POSTERIOR SPINAL FUSION WITH INSTRUMENTATION C4-7;  Surgeon: KEN Gilbert MD;  Location:  [facility];  Service: Orthopedic Spine;  Laterality: N/A;    SACROILIAC JOINT INJECTION Bilateral 12/14/2023    Procedure: SACROILIAC INJECTION, Bilateral;  Surgeon: Rick Estes DO;  Location:  [facility];  Service: Neurosurgery;  Laterality: Bilateral;    STEROID INJECTION Left 06/30/2022    Procedure: LEFT HIP FLUROSCOPIC GUIDED CORTICOSTEROID INJECTION;  Surgeon: Herberth Skelton MD;  Location:  [facility];  Service: Orthopedics;  Laterality: Left;    US GUIDED LYMPH NODE BIOPSY  08/03/2020     Family History   Problem Relation Age of Onset    No Known Problems Father     Colon cancer Mother     Colon polyps Mother     Diabetes Brother     Diabetes Brother     Diabetes Son     Diabetes Son     Heart attack Paternal Grandmother     Breast cancer Maternal Aunt     Lymphoma Maternal Aunt     Breast cancer  "Maternal Aunt     Lung cancer Maternal Aunt     Diabetes Maternal Aunt     Breast cancer Maternal Aunt      Social History     Socioeconomic History    Marital status:      Spouse name: Jason    Number of children: 2    Years of education: 12   Tobacco Use    Smoking status: Former     Current packs/day: 0.00     Average packs/day: 2.0 packs/day for 36.1 years (72.1 ttl pk-yrs)     Types: Cigarettes     Start date: 1954     Quit date: 7/10/1990     Years since quittin.0     Passive exposure: Past    Smokeless tobacco: Never   Vaping Use    Vaping status: Never Used   Substance and Sexual Activity    Alcohol use: Yes     Comment: rare    Drug use: No    Sexual activity: Defer     Partners: Male     Allergies   Allergen Reactions    Penicillins Swelling and Rash    Morphine GI Intolerance     Pt states morphine makes her sick and has \"had GERD surgery is unable to vomit\"     Current Outpatient Medications   Medication Sig Dispense Refill    acetaminophen (TYLENOL) 325 MG tablet Take 2 tablets by mouth Every 4 (Four) Hours As Needed for Mild Pain.      amitriptyline (ELAVIL) 100 MG tablet TAKE ONE (1) TO TWO (2) TABLETS AT BEDTIME AS NEEDED SLEEP 180 tablet 0    cholecalciferol (VITAMIN D3) 25 MCG (1000 UT) tablet Take 1 tablet by mouth Daily.      orphenadrine (NORFLEX) 100 MG 12 hr tablet Take 1 tablet by mouth 2 (Two) Times a Day. 30 tablet 0    polyethylene glycol (MIRALAX) 17 g packet Take 17 g by mouth Daily As Needed (Use if senna-docusate is ineffective).      atorvastatin (LIPITOR) 20 MG tablet Take 1 tablet by mouth Daily. (Patient not taking: Reported on 2024) 90 tablet 5    pregabalin (LYRICA) 50 MG capsule Take 1 capsule by mouth Every 8 (Eight) Hours. (Patient not taking: Reported on 2024) 90 capsule 0     No current facility-administered medications for this visit.     Review of Systems   Constitutional:  Negative for chills and fever.   HENT:  Negative for congestion.  "   Respiratory:  Negative for shortness of breath.    Cardiovascular:  Negative for chest pain and leg swelling.   Gastrointestinal:  Negative for constipation, diarrhea, nausea and vomiting.   Musculoskeletal:  Positive for arthralgias. Negative for myalgias.   Skin:  Negative for wound.   Neurological:  Negative for numbness.       OBJECTIVE     Vitals:    07/23/24 1345   BP: 122/78   Pulse: 67   SpO2: 94%         PHYSICAL EXAM  GEN:   Accompanied by none.     Physical Exam  Vitals reviewed.   Constitutional:       Appearance: Normal appearance. She is well-developed.   HENT:      Head: Normocephalic and atraumatic.      Right Ear: Tympanic membrane normal.      Left Ear: Tympanic membrane normal.      Nose: Nose normal.      Mouth/Throat:      Pharynx: Oropharynx is clear.   Eyes:      Extraocular Movements: Extraocular movements intact.      Pupils: Pupils are equal, round, and reactive to light.   Cardiovascular:      Rate and Rhythm: Normal rate and regular rhythm.      Pulses: Normal pulses.           Dorsalis pedis pulses are 2+ on the right side and 2+ on the left side.        Posterior tibial pulses are 2+ on the right side and 2+ on the left side.      Heart sounds: Normal heart sounds.   Pulmonary:      Effort: Pulmonary effort is normal.      Breath sounds: Normal breath sounds.   Abdominal:      General: Bowel sounds are normal.      Palpations: Abdomen is soft.   Musculoskeletal:      Cervical back: Normal range of motion and neck supple.   Feet:      Right foot:      Skin integrity: Warmth present.      Left foot:      Skin integrity: Warmth present.   Neurological:      General: No focal deficit present.      Mental Status: She is alert and oriented to person, place, and time. Mental status is at baseline.   Psychiatric:         Mood and Affect: Mood normal.         Behavior: Behavior normal.         Thought Content: Thought content normal.         Judgment: Judgment normal.          Foot/Ankle  Exam    GENERAL  Appearance:  appears stated age  Orientation:  AAOx3  Affect:  appropriate  Gait:  antalgic  Assistance:  independent  Right shoe gear: casual shoe  Left shoe gear: casual shoe    VASCULAR     Right Foot Vascularity   Dorsalis pedis:  2+  Posterior tibial:  2+  Skin temperature:  warm  Edema grading:  Trace  CFT:  3  Pedal hair growth:  Present  Varicosities:  none     Left Foot Vascularity   Dorsalis pedis:  2+  Posterior tibial:  2+  Skin temperature:  warm  Edema grading:  Trace  CFT:  3  Pedal hair growth:  Present  Varicosities:  none     NEUROLOGIC     Right Foot Neurologic   Normal sensation    Light touch sensation: normal  Vibratory sensation: normal  Hot/Cold sensation: normal     Left Foot Neurologic   Normal sensation    Light touch sensation: normal  Vibratory sensation: normal  Hot/Cold sensation:  normal    MUSCULOSKELETAL     Right Foot Musculoskeletal   Ecchymosis:  none  Tenderness:  dorsal foot, naviculocuneiform joint tenderness, plantar fascia tenderness and retrocalcaneal bursa tenderness    Arch:  Normal     Left Foot Musculoskeletal   Ecchymosis:  none  Tenderness:  none  Arch:  Normal    MUSCLE STRENGTH     Right Foot Muscle Strength   Foot dorsiflexion:  5  Foot plantar flexion:  5  Foot inversion:  5  Foot eversion:  5     Left Foot Muscle Strength   Foot dorsiflexion:  5  Foot plantar flexion:  5  Foot inversion:  5  Foot eversion:  5    RANGE OF MOTION     Right Foot Range of Motion   Ankle dorsiflexion: 5 decreased      Left Foot Range of Motion   Foot and ankle ROM within normal limits      DERMATOLOGIC      Right Foot Dermatologic   Skin  Right foot skin is intact.      Left Foot Dermatologic   Skin  Left foot skin is intact.     Image:       RADIOLOGY/NUCLEAR:  MRI Ankle Right Without Contrast    Result Date: 7/12/2024  Narrative: EXAMINATION: MRI ANKLE RIGHT WO CONTRAST-  7/12/2024 1:59 PM  INDICATION: heel pain; M79.671-Pain in right foot; G89.29-Other chronic pain   COMPARISON: Right foot MRI 11/17/2021.  TECHNIQUE: Multiplanar, multiphasic MR images of the RIGHT ankle were obtained without contrast.  FINDGINGS:  LIGAMENTS Tibiofibular ligaments: Intact Talofibular ligaments: Intact Calcaneofibular ligament: Intact Deltoid ligament: Intact Spring ligament: Intact  TENDONS Extensor tendons: Intact  Tibialis posterior: Intact Flexor digitorum longus: Intact Flexor hallucis longus: Intact  Peroneus longus: Intact Peroneus brevis: Intact  Achilles tendon: Intact Plantar fascia: There is moderate T2 intermediate signal abnormality predominating within the central band of the plantar fascia at its origin on the calcaneus with associated calcaneal enthesophyte. There is mild underlying subcortical edema within the plantar aspect of the adjacent calcaneus.  Sinus tarsi: Unremarkable.  Tarsal tunnel: Preserved.  Musculature: No edema or atrophy.   Articulations: Mild subcortical T2 hyperintense signal about the lateral aspect of the talar dome without a discrete overlying high-grade cartilage defect. Multifocal midfoot arthrosis with scattered subcortical reactive marrow changes. This is most notable about the navicular bone and medial cuneiform with underlying subcortical cystic change. No definite evidence of cortical collapse or avascular necrosis. Small volume posterior subtalar joint fluid.  Osseous structures: No visible fracture or AVN.   Other/Soft Tissues: Mild circumferential subcutaneous fat soft tissue edema, most notable about the lateral malleolus. No underlying fluid collection.  IMPRESSION  Findings compatible with moderate plantar fasciitis predominantly involving the central band with reactive subcortical marrow edema within the adjacent calcaneus. No fluid signal tendon tear.  Multifocal degenerative changes about the midfoot most notable about the navicular and medial cuneiform. No obvious fracture or definite AVN.  Mild subcutaneous edema most notable about the  lateral malleolus is nonspecific but favored reactive. No underlying fluid collection.    This report was signed and finalized on 7/12/2024 2:10 PM by Juwan Roca.         LABORATORY/CULTURE RESULTS:      PATHOLOGY RESULTS:       ASSESSMENT/PLAN     Diagnoses and all orders for this visit:    1. Plantar fasciitis, right (Primary)  -     Case Request; Standing  -     CBC & Differential; Future  -     Basic Metabolic Panel; Future  -     ECG 12 Lead; Future  -     XR chest 2 vw; Future  -     clindamycin (CLEOCIN) 900 mg in dextrose (D5W) 5 % 100 mL IVPB  -     Case Request    2. Chronic heel pain, right  -     Case Request; Standing  -     CBC & Differential; Future  -     Basic Metabolic Panel; Future  -     ECG 12 Lead; Future  -     XR chest 2 vw; Future  -     clindamycin (CLEOCIN) 900 mg in dextrose (D5W) 5 % 100 mL IVPB  -     Case Request    3. Gastrocnemius equinus, right  -     Case Request; Standing  -     CBC & Differential; Future  -     Basic Metabolic Panel; Future  -     ECG 12 Lead; Future  -     XR chest 2 vw; Future  -     clindamycin (CLEOCIN) 900 mg in dextrose (D5W) 5 % 100 mL IVPB  -     Case Request    4. Arthrosis of midfoot, right  -     Case Request; Standing  -     CBC & Differential; Future  -     Basic Metabolic Panel; Future  -     ECG 12 Lead; Future  -     XR chest 2 vw; Future  -     clindamycin (CLEOCIN) 900 mg in dextrose (D5W) 5 % 100 mL IVPB  -     Case Request    5. Foot pain, right    Other orders  -     Follow Anesthesia Guidelines / Protocol; Future  -     Follow Anesthesia Guidelines / Protocol; Standing  -     Obtain Informed Consent; Future  -     Provide Instructions to Patient Regarding NPO Status; Future  -     Chlorhexidine Skin Prep - Educate and Review With Patient; Future  -     Verify NPO Status; Standing  -     Obtain Informed Consent (If Not Done Inpatient or PAT); Standing  -     Instructions on coughing, deep breathing, and incentive spirometry.; Standing  -      Notify Provider - Standard; Standing  -     Provide NPO Instructions to Patient        Comprehensive lower extremity examination and evaluation was performed.  Discussed findings and treatment plan including risks, benefits, and treatment options with patient in detail. Patient agreed with treatment plan.  Reviewed updated MRI with patient consistent with plantar fasciitis as well as midfoot arthritis at the naviculocuneiform joint..    Discussed continued conservative treatment versus surgical intervention.  Patient feels that she has exhausted conservative therapy and now is interested in surgical correction.  Believe the best course of action would be to proceed with NaviculoCuneiform Joint Arthrodesis, Bone Graft [place], Endoscopic Gastrocnemius Recession, Endoscopic Plantar Fasciotomy - Right Foot.  Patient is in agreement.  All options, benefits, and risks associate with surgery have been discussed with the patient including but not limited to: Standard risk of anesthesia, pain, bleeding, infection, nonhealing/dehiscence, nonunion, malunion, deformity, loss of limb or life.  Pre and postoperative course were discussed in detail including a minimum 3 to 6 weeks nonweightbearing status postoperatively.  No guarantees were inferred.  An After Visit Summary was printed and given to the patient at discharge, including (if requested) any available informative/educational handouts regarding diagnosis, treatment, or medications. All questions were answered to patient/family satisfaction. Should symptoms fail to improve or worsen they agree to call or return to clinic or to go to the Emergency Department. Discussed the importance of following up with any needed screening tests/labs/specialist appointments and any requested follow-up recommended by me today. Importance of maintaining follow-up discussed and patient accepts that missed appointments can delay diagnosis and potentially lead to worsening of  conditions.  Return for Post-Op appointment., or sooner if acute issues arise.    Lab Frequency Next Occurrence   Follow Anesthesia Guidelines / Standing Orders Once 12/04/2019   Obtain Informed Consent Once 12/09/2019   Follow Anesthesia Guidelines / Standing Orders Once 01/04/2021   Obtain Informed Consent Once 01/09/2021   Diet: Once 01/18/2021   Advance Diet as Tolerated Once 01/18/2021   Mammo Screening Bilateral With CAD Once 10/20/2022       This document has been electronically signed by Sai Mendieta DPM on July 23, 2024 21:24 CDT

## 2024-07-23 ENCOUNTER — OFFICE VISIT (OUTPATIENT)
Age: 69
End: 2024-07-23
Payer: MEDICARE

## 2024-07-23 VITALS
HEART RATE: 67 BPM | DIASTOLIC BLOOD PRESSURE: 78 MMHG | OXYGEN SATURATION: 94 % | WEIGHT: 178 LBS | HEIGHT: 64 IN | SYSTOLIC BLOOD PRESSURE: 122 MMHG | BODY MASS INDEX: 30.39 KG/M2

## 2024-07-23 DIAGNOSIS — M62.461 GASTROCNEMIUS EQUINUS, RIGHT: ICD-10-CM

## 2024-07-23 DIAGNOSIS — M19.071 ARTHROSIS OF MIDFOOT, RIGHT: ICD-10-CM

## 2024-07-23 DIAGNOSIS — M79.671 FOOT PAIN, RIGHT: ICD-10-CM

## 2024-07-23 DIAGNOSIS — M72.2 PLANTAR FASCIITIS, RIGHT: Primary | ICD-10-CM

## 2024-07-23 DIAGNOSIS — G89.29 CHRONIC HEEL PAIN, RIGHT: ICD-10-CM

## 2024-07-23 DIAGNOSIS — M79.671 CHRONIC HEEL PAIN, RIGHT: ICD-10-CM

## 2024-07-23 PROCEDURE — 3078F DIAST BP <80 MM HG: CPT | Performed by: PODIATRIST

## 2024-07-23 PROCEDURE — 1160F RVW MEDS BY RX/DR IN RCRD: CPT | Performed by: PODIATRIST

## 2024-07-23 PROCEDURE — 99214 OFFICE O/P EST MOD 30 MIN: CPT | Performed by: PODIATRIST

## 2024-07-23 PROCEDURE — 3074F SYST BP LT 130 MM HG: CPT | Performed by: PODIATRIST

## 2024-07-23 PROCEDURE — 1159F MED LIST DOCD IN RCRD: CPT | Performed by: PODIATRIST

## 2024-07-24 NOTE — H&P
[facility] - PODIATRY    Today's Date: 07/23/24    Patient Name: Sedrick Leahy  MRN: [id number]  CSN: [id number]  PCP: Kiran Madden MD  Referring Provider: No ref. provider found    SUBJECTIVE     Chief Complaint   Patient presents with    Follow-up     Kiran Madden, 04/03/24 5 WK FU-pt states she is here today for recheck on plantar fasciitis states it still causing pain no changes-pt reports pain level 8/10     HPI: Sedrick Leahy, a 69 y.o.female, comes to clinic as a(n) established patient complaining of foot pain. Patient has h/o Arthritis, diabetes, hypertension, low back pain, neck pain .  Patient presents complaining of ongoing and chronic pain to her right heel.  This is similar to her previous evaluation roughly 3 years ago.  She did not follow-up due to death in the family.  Previously had an MRI performed with plantar fasciitis and concerning for soft tissue induration.  Patient has had updated imaging.  Notes pain with every step.-Relates that she did take a steroid pack with minimal improvement.  Complains of increasing pain at her right midfoot with a dorsal prominence.  Admits pain at 8/10 level and described as burning, aching, nagging and sharp. Relates previous treatment(s) including steroid injection, icing, OTC inserts . Denies any constitutional symptoms. No other pedal complaints at this time.    Past Medical History:   Diagnosis Date    Arthritis     Diabetes mellitus     diet controlled    Hx of colonic polyp     Hypertension     Joint pain     Hip    Lower back pain     Neck pain     PONV (postoperative nausea and vomiting)      Past Surgical History:   Procedure Laterality Date    ANTERIOR CERVICAL DISCECTOMY W/ FUSION N/A 11/20/2020    Procedure: EXPLORATION OF FUSION C5-6, ANTERIOR CERVICAL DISCECTOMY FUSION C4-5, C6-7 REVISON ANTERIOR FUSION C5-6 WITH INSTRUMENTATION C4-7;  Surgeon: KEN Gilbert MD;  Location: [facility];  Service: Orthopedic  Spine;  Laterality: N/A;    APPENDECTOMY      COLONOSCOPY  06/06/2016    Normal exam repeat in 5 years    COLONOSCOPY N/A 01/14/2020    Diverticulosis repeat exam in 5 years    COLONOSCOPY W/ POLYPECTOMY  04/04/2012    Hyperplastic polyp cecum repeat exam in 1 year    ENDOSCOPY  08/14/2014    Very tortuous distal esophagus dilated 50 Fr, HH     ENDOSCOPY N/A 01/18/2021    A fundoplication was found, dilated    ENDOSCOPY  06/2023    HARDWARE REMOVAL N/A 11/20/2020    Procedure: REMOVAL OF INSTRUMENTATION;  Surgeon: KEN Gilbert MD;  Location:  [facility];  Service: Orthopedic Spine;  Laterality: N/A;    HYSTERECTOMY      Laparoscopic Hysterectomy bilateral salpingectomy for bleeding    LUMBAR LAMINECTOMY WITH FUSION Bilateral 12/11/2023    Procedure: LUMBAR LAMINECTOMY TRANSFORAMINAL LUMBAR INTERBODY FUSION L4/5;  Surgeon: Rick Estes DO;  Location:  [facility];  Service: Neurosurgery;  Laterality: Bilateral;    NECK SURGERY      NISSEN FUNDOPLICATION      POSTERIOR CERVICAL FUSION N/A 12/03/2020    Procedure: POSTERIOR SPINAL FUSION WITH INSTRUMENTATION C4-7;  Surgeon: KEN Gilbert MD;  Location:  [facility];  Service: Orthopedic Spine;  Laterality: N/A;    SACROILIAC JOINT INJECTION Bilateral 12/14/2023    Procedure: SACROILIAC INJECTION, Bilateral;  Surgeon: Rick Estes DO;  Location:  [facility];  Service: Neurosurgery;  Laterality: Bilateral;    STEROID INJECTION Left 06/30/2022    Procedure: LEFT HIP FLUROSCOPIC GUIDED CORTICOSTEROID INJECTION;  Surgeon: Herberth Skelton MD;  Location:  [facility];  Service: Orthopedics;  Laterality: Left;    US GUIDED LYMPH NODE BIOPSY  08/03/2020     Family History   Problem Relation Age of Onset    No Known Problems Father     Colon cancer Mother     Colon polyps Mother     Diabetes Brother     Diabetes Brother     Diabetes Son     Diabetes Son     Heart attack Paternal Grandmother     Breast cancer Maternal Aunt     Lymphoma Maternal Aunt     Breast cancer  "Maternal Aunt     Lung cancer Maternal Aunt     Diabetes Maternal Aunt     Breast cancer Maternal Aunt      Social History     Socioeconomic History    Marital status:      Spouse name: Jason    Number of children: 2    Years of education: 12   Tobacco Use    Smoking status: Former     Current packs/day: 0.00     Average packs/day: 2.0 packs/day for 36.1 years (72.1 ttl pk-yrs)     Types: Cigarettes     Start date: 1954     Quit date: 7/10/1990     Years since quittin.0     Passive exposure: Past    Smokeless tobacco: Never   Vaping Use    Vaping status: Never Used   Substance and Sexual Activity    Alcohol use: Yes     Comment: rare    Drug use: No    Sexual activity: Defer     Partners: Male     Allergies   Allergen Reactions    Penicillins Swelling and Rash    Morphine GI Intolerance     Pt states morphine makes her sick and has \"had GERD surgery is unable to vomit\"     Current Outpatient Medications   Medication Sig Dispense Refill    acetaminophen (TYLENOL) 325 MG tablet Take 2 tablets by mouth Every 4 (Four) Hours As Needed for Mild Pain.      amitriptyline (ELAVIL) 100 MG tablet TAKE ONE (1) TO TWO (2) TABLETS AT BEDTIME AS NEEDED SLEEP 180 tablet 0    cholecalciferol (VITAMIN D3) 25 MCG (1000 UT) tablet Take 1 tablet by mouth Daily.      orphenadrine (NORFLEX) 100 MG 12 hr tablet Take 1 tablet by mouth 2 (Two) Times a Day. 30 tablet 0    polyethylene glycol (MIRALAX) 17 g packet Take 17 g by mouth Daily As Needed (Use if senna-docusate is ineffective).      atorvastatin (LIPITOR) 20 MG tablet Take 1 tablet by mouth Daily. (Patient not taking: Reported on 2024) 90 tablet 5    pregabalin (LYRICA) 50 MG capsule Take 1 capsule by mouth Every 8 (Eight) Hours. (Patient not taking: Reported on 2024) 90 capsule 0     No current facility-administered medications for this visit.     Review of Systems   Constitutional:  Negative for chills and fever.   HENT:  Negative for congestion.  "   Respiratory:  Negative for shortness of breath.    Cardiovascular:  Negative for chest pain and leg swelling.   Gastrointestinal:  Negative for constipation, diarrhea, nausea and vomiting.   Musculoskeletal:  Positive for arthralgias. Negative for myalgias.   Skin:  Negative for wound.   Neurological:  Negative for numbness.       OBJECTIVE     Vitals:    07/23/24 1345   BP: 122/78   Pulse: 67   SpO2: 94%         PHYSICAL EXAM  GEN:   Accompanied by none.     Physical Exam  Vitals reviewed.   Constitutional:       Appearance: Normal appearance. She is well-developed.   HENT:      Head: Normocephalic and atraumatic.      Right Ear: Tympanic membrane normal.      Left Ear: Tympanic membrane normal.      Nose: Nose normal.      Mouth/Throat:      Pharynx: Oropharynx is clear.   Eyes:      Extraocular Movements: Extraocular movements intact.      Pupils: Pupils are equal, round, and reactive to light.   Cardiovascular:      Rate and Rhythm: Normal rate and regular rhythm.      Pulses: Normal pulses.           Dorsalis pedis pulses are 2+ on the right side and 2+ on the left side.        Posterior tibial pulses are 2+ on the right side and 2+ on the left side.      Heart sounds: Normal heart sounds.   Pulmonary:      Effort: Pulmonary effort is normal.      Breath sounds: Normal breath sounds.   Abdominal:      General: Bowel sounds are normal.      Palpations: Abdomen is soft.   Musculoskeletal:      Cervical back: Normal range of motion and neck supple.   Feet:      Right foot:      Skin integrity: Warmth present.      Left foot:      Skin integrity: Warmth present.   Neurological:      General: No focal deficit present.      Mental Status: She is alert and oriented to person, place, and time. Mental status is at baseline.   Psychiatric:         Mood and Affect: Mood normal.         Behavior: Behavior normal.         Thought Content: Thought content normal.         Judgment: Judgment normal.          Foot/Ankle  Exam    GENERAL  Appearance:  appears stated age  Orientation:  AAOx3  Affect:  appropriate  Gait:  antalgic  Assistance:  independent  Right shoe gear: casual shoe  Left shoe gear: casual shoe    VASCULAR     Right Foot Vascularity   Dorsalis pedis:  2+  Posterior tibial:  2+  Skin temperature:  warm  Edema grading:  Trace  CFT:  3  Pedal hair growth:  Present  Varicosities:  none     Left Foot Vascularity   Dorsalis pedis:  2+  Posterior tibial:  2+  Skin temperature:  warm  Edema grading:  Trace  CFT:  3  Pedal hair growth:  Present  Varicosities:  none     NEUROLOGIC     Right Foot Neurologic   Normal sensation    Light touch sensation: normal  Vibratory sensation: normal  Hot/Cold sensation: normal     Left Foot Neurologic   Normal sensation    Light touch sensation: normal  Vibratory sensation: normal  Hot/Cold sensation:  normal    MUSCULOSKELETAL     Right Foot Musculoskeletal   Ecchymosis:  none  Tenderness:  dorsal foot, naviculocuneiform joint tenderness, plantar fascia tenderness and retrocalcaneal bursa tenderness    Arch:  Normal     Left Foot Musculoskeletal   Ecchymosis:  none  Tenderness:  none  Arch:  Normal    MUSCLE STRENGTH     Right Foot Muscle Strength   Foot dorsiflexion:  5  Foot plantar flexion:  5  Foot inversion:  5  Foot eversion:  5     Left Foot Muscle Strength   Foot dorsiflexion:  5  Foot plantar flexion:  5  Foot inversion:  5  Foot eversion:  5    RANGE OF MOTION     Right Foot Range of Motion   Ankle dorsiflexion: 5 decreased      Left Foot Range of Motion   Foot and ankle ROM within normal limits      DERMATOLOGIC      Right Foot Dermatologic   Skin  Right foot skin is intact.      Left Foot Dermatologic   Skin  Left foot skin is intact.     Image:       RADIOLOGY/NUCLEAR:  MRI Ankle Right Without Contrast    Result Date: 7/12/2024  Narrative: EXAMINATION: MRI ANKLE RIGHT WO CONTRAST-  7/12/2024 1:59 PM  INDICATION: heel pain; M79.671-Pain in right foot; G89.29-Other chronic pain   COMPARISON: Right foot MRI 11/17/2021.  TECHNIQUE: Multiplanar, multiphasic MR images of the RIGHT ankle were obtained without contrast.  FINDGINGS:  LIGAMENTS Tibiofibular ligaments: Intact Talofibular ligaments: Intact Calcaneofibular ligament: Intact Deltoid ligament: Intact Spring ligament: Intact  TENDONS Extensor tendons: Intact  Tibialis posterior: Intact Flexor digitorum longus: Intact Flexor hallucis longus: Intact  Peroneus longus: Intact Peroneus brevis: Intact  Achilles tendon: Intact Plantar fascia: There is moderate T2 intermediate signal abnormality predominating within the central band of the plantar fascia at its origin on the calcaneus with associated calcaneal enthesophyte. There is mild underlying subcortical edema within the plantar aspect of the adjacent calcaneus.  Sinus tarsi: Unremarkable.  Tarsal tunnel: Preserved.  Musculature: No edema or atrophy.   Articulations: Mild subcortical T2 hyperintense signal about the lateral aspect of the talar dome without a discrete overlying high-grade cartilage defect. Multifocal midfoot arthrosis with scattered subcortical reactive marrow changes. This is most notable about the navicular bone and medial cuneiform with underlying subcortical cystic change. No definite evidence of cortical collapse or avascular necrosis. Small volume posterior subtalar joint fluid.  Osseous structures: No visible fracture or AVN.   Other/Soft Tissues: Mild circumferential subcutaneous fat soft tissue edema, most notable about the lateral malleolus. No underlying fluid collection.  IMPRESSION  Findings compatible with moderate plantar fasciitis predominantly involving the central band with reactive subcortical marrow edema within the adjacent calcaneus. No fluid signal tendon tear.  Multifocal degenerative changes about the midfoot most notable about the navicular and medial cuneiform. No obvious fracture or definite AVN.  Mild subcutaneous edema most notable about the  lateral malleolus is nonspecific but favored reactive. No underlying fluid collection.    This report was signed and finalized on 7/12/2024 2:10 PM by Juwan Roca.         LABORATORY/CULTURE RESULTS:      PATHOLOGY RESULTS:       ASSESSMENT/PLAN     Diagnoses and all orders for this visit:    1. Plantar fasciitis, right (Primary)  -     Case Request; Standing  -     CBC & Differential; Future  -     Basic Metabolic Panel; Future  -     ECG 12 Lead; Future  -     XR chest 2 vw; Future  -     clindamycin (CLEOCIN) 900 mg in dextrose (D5W) 5 % 100 mL IVPB  -     Case Request    2. Chronic heel pain, right  -     Case Request; Standing  -     CBC & Differential; Future  -     Basic Metabolic Panel; Future  -     ECG 12 Lead; Future  -     XR chest 2 vw; Future  -     clindamycin (CLEOCIN) 900 mg in dextrose (D5W) 5 % 100 mL IVPB  -     Case Request    3. Gastrocnemius equinus, right  -     Case Request; Standing  -     CBC & Differential; Future  -     Basic Metabolic Panel; Future  -     ECG 12 Lead; Future  -     XR chest 2 vw; Future  -     clindamycin (CLEOCIN) 900 mg in dextrose (D5W) 5 % 100 mL IVPB  -     Case Request    4. Arthrosis of midfoot, right  -     Case Request; Standing  -     CBC & Differential; Future  -     Basic Metabolic Panel; Future  -     ECG 12 Lead; Future  -     XR chest 2 vw; Future  -     clindamycin (CLEOCIN) 900 mg in dextrose (D5W) 5 % 100 mL IVPB  -     Case Request    5. Foot pain, right    Other orders  -     Follow Anesthesia Guidelines / Protocol; Future  -     Follow Anesthesia Guidelines / Protocol; Standing  -     Obtain Informed Consent; Future  -     Provide Instructions to Patient Regarding NPO Status; Future  -     Chlorhexidine Skin Prep - Educate and Review With Patient; Future  -     Verify NPO Status; Standing  -     Obtain Informed Consent (If Not Done Inpatient or PAT); Standing  -     Instructions on coughing, deep breathing, and incentive spirometry.; Standing  -      Notify Provider - Standard; Standing  -     Provide NPO Instructions to Patient        Comprehensive lower extremity examination and evaluation was performed.  Discussed findings and treatment plan including risks, benefits, and treatment options with patient in detail. Patient agreed with treatment plan.  Reviewed updated MRI with patient consistent with plantar fasciitis as well as midfoot arthritis at the naviculocuneiform joint..    Discussed continued conservative treatment versus surgical intervention.  Patient feels that she has exhausted conservative therapy and now is interested in surgical correction.  Believe the best course of action would be to proceed with NaviculoCuneiform Joint Arthrodesis, Bone Graft Rose, Endoscopic Gastrocnemius Recession, Endoscopic Plantar Fasciotomy - Right Foot.  Patient is in agreement.  All options, benefits, and risks associate with surgery have been discussed with the patient including but not limited to: Standard risk of anesthesia, pain, bleeding, infection, nonhealing/dehiscence, nonunion, malunion, deformity, loss of limb or life.  Pre and postoperative course were discussed in detail including a minimum 3 to 6 weeks nonweightbearing status postoperatively.  No guarantees were inferred.  An After Visit Summary was printed and given to the patient at discharge, including (if requested) any available informative/educational handouts regarding diagnosis, treatment, or medications. All questions were answered to patient/family satisfaction. Should symptoms fail to improve or worsen they agree to call or return to clinic or to go to the Emergency Department. Discussed the importance of following up with any needed screening tests/labs/specialist appointments and any requested follow-up recommended by me today. Importance of maintaining follow-up discussed and patient accepts that missed appointments can delay diagnosis and potentially lead to worsening of  conditions.  No follow-ups on file., or sooner if acute issues arise.    Lab Frequency Next Occurrence   Follow Anesthesia Guidelines / Standing Orders Once 12/04/2019   Obtain Informed Consent Once 12/09/2019   Follow Anesthesia Guidelines / Standing Orders Once 01/04/2021   Obtain Informed Consent Once 01/09/2021   Diet: Once 01/18/2021   Advance Diet as Tolerated Once 01/18/2021   Mammo Screening Bilateral With CAD Once 10/20/2022       This document has been electronically signed by Sai Mendieta DPM on July 23, 2024 21:23 CDT

## 2024-08-01 NOTE — ED PROVIDER NOTES
Subjective   Ms. Leahy is a 65-year-old female with a history significant for cervical fusion surgery back in November and December 2020 with Dr. Gilbert.  She presents today with a 1 week history of right shoulder pain.  Is not the shoulder joint per se but rather the area overlying the posterior right shoulder (in the distribution of C4).  She states that it came on rather abruptly a week ago and has been unremitting ever since.  Is not worse with movement of the arm nor of the neck.  She denies fever or other systemic symptoms.  She has had no focal neurologic symptoms otherwise.          Review of Systems   All other systems reviewed and are negative.      Past Medical History:   Diagnosis Date   • Arthritis    • Diabetes mellitus (CMS/HCC)     diet controlled   • Hx of colonic polyp    • Hypertension    • Lower back pain    • Neck pain    • PONV (postoperative nausea and vomiting)        Allergies   Allergen Reactions   • Penicillins Swelling and Rash       Past Surgical History:   Procedure Laterality Date   • ANTERIOR CERVICAL DISCECTOMY W/ FUSION N/A 11/20/2020    Procedure: EXPLORATION OF FUSION C5-6, ANTERIOR CERVICAL DISCECTOMY FUSION C4-5, C6-7 REVISON ANTERIOR FUSION C5-6 WITH INSTRUMENTATION C4-7;  Surgeon: KEN Gilbert MD;  Location:  [facility];  Service: Orthopedic Spine;  Laterality: N/A;   • APPENDECTOMY     • COLONOSCOPY  06/06/2016    Normal exam repeat in 5 years   • COLONOSCOPY N/A 1/14/2020    Diverticulosis repeat exam in 5 years   • COLONOSCOPY W/ POLYPECTOMY  04/04/2012    Hyperplastic polyp cecum repeat exam in 1 year   • ENDOSCOPY  08/14/2014    Very tortuous distal esophagus dilated 50 Fr, HH    • ENDOSCOPY N/A 1/18/2021    A fundoplication was found, dilated   • HARDWARE REMOVAL N/A 11/20/2020    Procedure: REMOVAL OF INSTRUMENTATION;  Surgeon: KEN Gilbert MD;  Location:  [facility];  Service: Orthopedic Spine;  Laterality: N/A;   • HYSTERECTOMY     • NECK SURGERY     •  NISSEN FUNDOPLICATION     • POSTERIOR CERVICAL FUSION N/A 12/3/2020    Procedure: POSTERIOR SPINAL FUSION WITH INSTRUMENTATION C4-7;  Surgeon: KEN Gilbert MD;  Location: [facility];  Service: Orthopedic Spine;  Laterality: N/A;   • US GUIDED LYMPH NODE BIOPSY  8/3/2020       Family History   Problem Relation Age of Onset   • Colon cancer Mother    • Colon polyps Mother    • No Known Problems Father    • Diabetes Brother    • Diabetes Son    • Breast cancer Maternal Aunt    • Heart attack Paternal Grandmother    • Lymphoma Maternal Aunt    • Lung cancer Maternal Aunt    • Cancer Maternal Aunt    • Diabetes Maternal Aunt    • Diabetes Son    • Diabetes Brother        Social History     Socioeconomic History   • Marital status:      Spouse name: Jason   • Number of children: 2   • Years of education: 12   • Highest education level: Not on file   Tobacco Use   • Smoking status: Former Smoker     Packs/day: 2.00     Types: Cigarettes     Start date: 1954     Quit date: 7/10/1990     Years since quittin.7   • Smokeless tobacco: Never Used   Vaping Use   • Vaping Use: Never used   Substance and Sexual Activity   • Alcohol use: No   • Drug use: No   • Sexual activity: Defer           Objective   Physical Exam  Vitals and nursing note reviewed.   Constitutional:       General: She is in acute distress.      Appearance: She is well-developed.   HENT:      Head: Normocephalic and atraumatic.      Right Ear: External ear normal.      Left Ear: External ear normal.      Nose: Nose normal.      Mouth/Throat:      Mouth: Mucous membranes are moist.      Pharynx: Oropharynx is clear.   Eyes:      Conjunctiva/sclera: Conjunctivae normal.   Cardiovascular:      Rate and Rhythm: Normal rate and regular rhythm.      Heart sounds: Normal heart sounds.   Pulmonary:      Effort: Pulmonary effort is normal.      Breath sounds: Normal breath sounds.   Abdominal:      General: Bowel sounds are normal.       Palpations: Abdomen is soft.   Musculoskeletal:         General: Normal range of motion.      Cervical back: Normal range of motion and neck supple.   Skin:     General: Skin is warm and dry.      Capillary Refill: Capillary refill takes less than 2 seconds.   Neurological:      Mental Status: She is alert and oriented to person, place, and time.   Psychiatric:         Behavior: Behavior normal.         Thought Content: Thought content normal.         Judgment: Judgment normal.         Procedures           ED Course                                           MDM  Number of Diagnoses or Management Options     Amount and/or Complexity of Data Reviewed  Clinical lab tests: reviewed and ordered  Tests in the radiology section of CPT®: reviewed and ordered    Patient Progress  Patient progress: stable      Final diagnoses:   Cervical disc disorder at C4-C5 level with radiculopathy       ED Disposition  ED Disposition     ED Disposition Condition Comment    Discharge Stable           KEN Gilbert MD  [address] [place] [address] [phone]               Medication List      New Prescriptions    HYDROmorphone 2 MG tablet  Commonly known as: DILAUDID  Take 1 tablet by mouth Every 4 (Four) Hours As Needed for Moderate Pain .     ondansetron 4 MG tablet  Commonly known as: ZOFRAN  Take 1 tablet by mouth Every 6 (Six) Hours As Needed for Nausea or Vomiting.     predniSONE 20 MG tablet  Commonly known as: DELTASONE  Take 2 tablets by mouth Daily.           Where to Get Your Medications      These medications were sent to [facility] - [place], [place] - [address] [place] [address] [phone]  - [phone] FX  [address] [place] [address]    Phone: [phone]   · HYDROmorphone 2 MG tablet  · ondansetron 4 MG tablet  · predniSONE 20 MG tablet       The patient's MRI strongly suggested a C4 radiculopathy and that fits perfectly with the patient's symptomology.  I discussed the case with Arnel from   [facility]ladimir's office (Dr. Gilbert is out of town) who suggested we could either try and get the patient home to follow-up with Dr. Gunter early next week or towards one of the neurosurgeons if the patient does not respond to treatment in the ED.  Eventually the patient did respond to pain medicine and IV Decadron and prefers to stay with Dr. Gilbert and try to go home.  I informed Arnel of this and he will make sure that the office gets in touch with her tonight to arrange for an early visit next week.  She also understands that if the pain becomes too bad again she can come to the ER and we can readdress the situation.  The patient is discharged in improved and stable condition.     Juaquin Caldera MD  04/08/21 [id number]     Former smoker

## 2024-08-20 ENCOUNTER — TELEPHONE (OUTPATIENT)
Age: 69
End: 2024-08-20
Payer: MEDICARE

## 2024-08-20 NOTE — TELEPHONE ENCOUNTER
Called patient she is wanting to discuss with her  after answering a fee of her questions and she will call back to schedule.

## 2024-09-09 PROBLEM — M79.671 CHRONIC HEEL PAIN, RIGHT: Status: ACTIVE | Noted: 2024-07-23

## 2024-09-09 PROBLEM — M19.071 ARTHROSIS OF MIDFOOT, RIGHT: Status: ACTIVE | Noted: 2024-07-23

## 2024-09-09 PROBLEM — M62.461 GASTROCNEMIUS EQUINUS, RIGHT: Status: ACTIVE | Noted: 2024-07-23

## 2024-09-09 PROBLEM — M72.2 PLANTAR FASCIITIS, RIGHT: Status: ACTIVE | Noted: 2024-07-23

## 2024-09-09 PROBLEM — G89.29 CHRONIC HEEL PAIN, RIGHT: Status: ACTIVE | Noted: 2024-07-23

## 2024-09-11 ENCOUNTER — TELEPHONE (OUTPATIENT)
Age: 69
End: 2024-09-11
Payer: MEDICARE

## 2024-09-11 NOTE — TELEPHONE ENCOUNTER
Called patient and went over surgery information date time and instructions were given. Pt verbalized understanding.

## 2024-10-07 RX ORDER — AMITRIPTYLINE HYDROCHLORIDE 100 MG/1
TABLET ORAL
Qty: 180 TABLET | Refills: 0 | Status: SHIPPED | OUTPATIENT
Start: 2024-10-07

## 2024-10-30 ENCOUNTER — HOSPITAL ENCOUNTER (OUTPATIENT)
Dept: GENERAL RADIOLOGY | Facility: HOSPITAL | Age: 69
Discharge: HOME | End: 2024-10-30
Payer: MEDICARE

## 2024-10-30 ENCOUNTER — PRE-ADMISSION TESTING (OUTPATIENT)
Dept: PREADMISSION TESTING | Facility: HOSPITAL | Age: 69
End: 2024-10-30
Payer: MEDICARE

## 2024-10-30 VITALS
OXYGEN SATURATION: 98 % | SYSTOLIC BLOOD PRESSURE: 138 MMHG | DIASTOLIC BLOOD PRESSURE: 65 MMHG | RESPIRATION RATE: 18 BRPM | HEIGHT: 64 IN | HEART RATE: 85 BPM | WEIGHT: 180.78 LBS | BODY MASS INDEX: 30.86 KG/M2

## 2024-10-30 DIAGNOSIS — M19.071 ARTHROSIS OF MIDFOOT, RIGHT: ICD-10-CM

## 2024-10-30 DIAGNOSIS — M79.671 CHRONIC HEEL PAIN, RIGHT: ICD-10-CM

## 2024-10-30 DIAGNOSIS — M62.461 GASTROCNEMIUS EQUINUS, RIGHT: ICD-10-CM

## 2024-10-30 DIAGNOSIS — G89.29 CHRONIC HEEL PAIN, RIGHT: ICD-10-CM

## 2024-10-30 DIAGNOSIS — M72.2 PLANTAR FASCIITIS, RIGHT: ICD-10-CM

## 2024-10-30 LAB
ANION GAP SERPL CALCULATED.3IONS-SCNC: 11 MMOL/L (ref 5–15)
BASOPHILS # BLD AUTO: 0.03 10*3/MM3 (ref 0–0.2)
BASOPHILS NFR BLD AUTO: 0.5 % (ref 0–1.5)
BUN SERPL-MCNC: 16 MG/DL (ref 8–23)
BUN/CREAT SERPL: 22.5 (ref 7–25)
CALCIUM SPEC-SCNC: 9.1 MG/DL (ref 8.6–10.5)
CHLORIDE SERPL-SCNC: 105 MMOL/L (ref 98–107)
CO2 SERPL-SCNC: 25 MMOL/L (ref 22–29)
CREAT SERPL-MCNC: 0.71 MG/DL (ref 0.57–1)
DEPRECATED RDW RBC AUTO: 51.8 FL (ref 37–54)
EGFRCR SERPLBLD CKD-EPI 2021: 91.6 ML/MIN/1.73
EOSINOPHIL # BLD AUTO: 0.12 10*3/MM3 (ref 0–0.4)
EOSINOPHIL NFR BLD AUTO: 1.9 % (ref 0.3–6.2)
ERYTHROCYTE [DISTWIDTH] IN BLOOD BY AUTOMATED COUNT: 15.7 % (ref 12.3–15.4)
GLUCOSE SERPL-MCNC: 109 MG/DL (ref 65–99)
HCT VFR BLD AUTO: 38.7 % (ref 34–46.6)
HGB BLD-MCNC: 11.9 G/DL (ref 12–15.9)
IMM GRANULOCYTES # BLD AUTO: 0.01 10*3/MM3 (ref 0–0.05)
IMM GRANULOCYTES NFR BLD AUTO: 0.2 % (ref 0–0.5)
LYMPHOCYTES # BLD AUTO: 2.64 10*3/MM3 (ref 0.7–3.1)
LYMPHOCYTES NFR BLD AUTO: 42.7 % (ref 19.6–45.3)
MCH RBC QN AUTO: 27.5 PG (ref 26.6–33)
MCHC RBC AUTO-ENTMCNC: 30.7 G/DL (ref 31.5–35.7)
MCV RBC AUTO: 89.6 FL (ref 79–97)
MONOCYTES # BLD AUTO: 0.41 10*3/MM3 (ref 0.1–0.9)
MONOCYTES NFR BLD AUTO: 6.6 % (ref 5–12)
NEUTROPHILS NFR BLD AUTO: 2.97 10*3/MM3 (ref 1.7–7)
NEUTROPHILS NFR BLD AUTO: 48.1 % (ref 42.7–76)
NRBC BLD AUTO-RTO: 0 /100 WBC (ref 0–0.2)
PLATELET # BLD AUTO: 294 10*3/MM3 (ref 140–450)
PMV BLD AUTO: 10 FL (ref 6–12)
POTASSIUM SERPL-SCNC: 4.3 MMOL/L (ref 3.5–5.2)
QT INTERVAL: 372 MS
QTC INTERVAL: 432 MS
RBC # BLD AUTO: 4.32 10*6/MM3 (ref 3.77–5.28)
SODIUM SERPL-SCNC: 141 MMOL/L (ref 136–145)
WBC NRBC COR # BLD AUTO: 6.18 10*3/MM3 (ref 3.4–10.8)

## 2024-10-30 PROCEDURE — 85025 COMPLETE CBC W/AUTO DIFF WBC: CPT

## 2024-10-30 PROCEDURE — 93005 ELECTROCARDIOGRAM TRACING: CPT

## 2024-10-30 PROCEDURE — 36415 COLL VENOUS BLD VENIPUNCTURE: CPT

## 2024-10-30 PROCEDURE — 71046 X-RAY EXAM CHEST 2 VIEWS: CPT

## 2024-10-30 PROCEDURE — 80048 BASIC METABOLIC PNL TOTAL CA: CPT

## 2024-10-30 NOTE — DISCHARGE INSTRUCTIONS
Preparing for Surgery  Follow these instructions before the procedure:  Several days or weeks before your procedure    Ask your health care provider about:  Changing or stopping your regular medicines. This is especially important if you are taking diabetes medicines or blood thinners.  Taking medicines such as aspirin and ibuprofen. These medicines can thin your blood. Do not take these medicines unless your health care provider tells you to take them.  Taking over-the-counter medicines, vitamins, herbs, and supplements.    Contact your surgeon if you:  Develop a fever of more than 100.4°F (38°C) or other feelings of illness during the 48 hours before your surgery.  Have symptoms that get worse.  Have questions or concerns about your surgery.  If you are going home the same day of your surgery you will need to arrange for a responsible adult, age 18 years old or older, to drive you home from the hospital and stay with you for 24 hours. Verification of the  will be made prior to any procedure requiring sedation. You may not go home in a taxi or any form of public transportation by yourself.     Day before your procedure  24 hours before your procedure DO NOT drink alcoholic beverages or smoke.  You may be asked to shower with a germ-killing soap.  Day of your procedure     8 hours before your scheduled arrival time, STOP all food, any dairy products, and full liquids. This includes hard candy, chewing gum or mints. This is extremely important to prevent serious complications.     Up to 2 hours before your scheduled arrival time, you may have clear liquids no cream, powder, or pulp of any kind. Safe options are water, black coffee, plain tea, soda, Gatorade/Powerade, clear broth, apple juice.    2 hours before your scheduled arrival time, STOP drinking clear liquids.    You may need to take another shower with a germ-killing soap before you leave home in the morning. Do not use perfumes, colognes, or body  lotions.  Wear comfortable loose-fitting clothing.  Remove all jewelry including body piercing and rings, dark colored nail polish, and make up prior to arrival at the hospital. Leave all valuables at home.   Bring your hearing aids if you rely on them.  Do not wear contact lenses. If you wear eyeglasses remember to bring a case to store them in while you are in surgery.  Do not use denture adhesives since you will be asked to remove them during your surgery.    You do not need to bring your home medications into the hospital.   Bring your sleep apnea device with you on the day of your surgery (if this applies to you).  If you wear portable oxygen, bring it with you.   If you are staying overnight, you may bring a bag of items you may need such as slippers, robe and a change of clothes for your discharge. You may want to leave these items in the car until you are ready for them since your family will take your belongings when you leave the pre-operative area.  Arrive at the hospital as scheduled by the office. You will be asked to arrive 2 hours prior to your surgery time in order to prepare for your procedure.  When you arrive at the hospital  Go to the registration desk located at the main entrance of the hospital.  After registration is completed, you will be given a beeper and a sticker sheet. Take the stickers to Outpatient Surgery and place in the tray at the end of the desk to notify the staff that you have arrived and registered.   Return to the lobby to wait. You are not always called back according to the time of arrival but rather the time your doctor will be ready.  When your beeper lights up and vibrates proceed through the double doors, under the stairs, and a member of the Outpatient Surgery staff will escort you to your preoperative room.   How to Use Chlorhexidine Before Surgery  Chlorhexidine gluconate (CHG) is a germ-killing (antiseptic) solution that is used to clean the skin. It can get rid of  the bacteria that normally live on the skin and can keep them away for about 24 hours. To clean your skin with CHG, you may be given:  A CHG solution to use in the shower or as part of a sponge bath.  A prepackaged cloth that contains CHG.  Cleaning your skin with CHG may help lower the risk for infection:  While you are staying in the intensive care unit of the hospital.  If you have a vascular access, such as a central line, to provide short-term or long-term access to your veins.  If you have a catheter to drain urine from your bladder.  If you are on a ventilator. A ventilator is a machine that helps you breathe by moving air in and out of your lungs.  After surgery.  What are the risks?  Risks of using CHG include:  A skin reaction.  Hearing loss, if CHG gets in your ears and you have a perforated eardrum.  Eye injury, if CHG gets in your eyes and is not rinsed out.  The CHG product catching fire.  Make sure that you avoid smoking and flames after applying CHG to your skin.  Do not use CHG:  If you have a chlorhexidine allergy or have previously reacted to chlorhexidine.  On babies younger than 2 months of age.  How to use CHG solution  Use CHG only as told by your health care provider, and follow the instructions on the label.  Use the full amount of CHG as directed. Usually, this is one bottle.  During a shower    Follow these steps when using CHG solution during a shower (unless your health care provider gives you different instructions):  Start the shower.  Use your normal soap and shampoo to wash your face and hair.  Turn off the shower or move out of the shower stream.  Pour the CHG onto a clean washcloth. Do not use any type of brush or rough-edged sponge.  Starting at your neck, lather your body down to your toes. Make sure you follow these instructions:  If you will be having surgery, pay special attention to the part of your body where you will be having surgery. Scrub this area for at least 1  minute.  Do not use CHG on your head or face. If the solution gets into your ears or eyes, rinse them well with water.  Avoid your genital area.  Avoid any areas of skin that have broken skin, cuts, or scrapes.  Scrub your back and under your arms. Make sure to wash skin folds.  Let the lather sit on your skin for 1-2 minutes or as long as told by your health care provider.  Thoroughly rinse your entire body in the shower. Make sure that all body creases and crevices are rinsed well.  Dry off with a clean towel. Do not put any substances on your body afterward--such as powder, lotion, or perfume--unless you are told to do so by your health care provider. Only use lotions that are recommended by the .  Put on clean clothes or pajamas.  If it is the night before your surgery, sleep in clean sheets.     During a sponge bath  Follow these steps when using CHG solution during a sponge bath (unless your health care provider gives you different instructions):  Use your normal soap and shampoo to wash your face and hair.  Pour the CHG onto a clean washcloth.  Starting at your neck, lather your body down to your toes. Make sure you follow these instructions:  If you will be having surgery, pay special attention to the part of your body where you will be having surgery. Scrub this area for at least 1 minute.  Do not use CHG on your head or face. If the solution gets into your ears or eyes, rinse them well with water.  Avoid your genital area.  Avoid any areas of skin that have broken skin, cuts, or scrapes.  Scrub your back and under your arms. Make sure to wash skin folds.  Let the lather sit on your skin for 1-2 minutes or as long as told by your health care provider.  Using a different clean, wet washcloth, thoroughly rinse your entire body. Make sure that all body creases and crevices are rinsed well.  Dry off with a clean towel. Do not put any substances on your body afterward--such as powder, lotion, or  perfume--unless you are told to do so by your health care provider. Only use lotions that are recommended by the .  Put on clean clothes or pajamas.  If it is the night before your surgery, sleep in clean sheets.  How to use CHG prepackaged cloths  Only use CHG cloths as told by your health care provider, and follow the instructions on the label.  Use the CHG cloth on clean, dry skin.  Do not use the CHG cloth on your head or face unless your health care provider tells you to.  When washing with the CHG cloth:  Avoid your genital area.  Avoid any areas of skin that have broken skin, cuts, or scrapes.  Before surgery    Follow these steps when using a CHG cloth to clean before surgery (unless your health care provider gives you different instructions):  Using the CHG cloth, vigorously scrub the part of your body where you will be having surgery. Scrub using a back-and-forth motion for 3 minutes. The area on your body should be completely wet with CHG when you are done scrubbing.  Do not rinse. Discard the cloth and let the area air-dry. Do not put any substances on the area afterward, such as powder, lotion, or perfume.  Put on clean clothes or pajamas.  If it is the night before your surgery, sleep in clean sheets.     For general bathing  Follow these steps when using CHG cloths for general bathing (unless your health care provider gives you different instructions).  Use a separate CHG cloth for each area of your body. Make sure you wash between any folds of skin and between your fingers and toes. Wash your body in the following order, switching to a new cloth after each step:  The front of your neck, shoulders, and chest.  Both of your arms, under your arms, and your hands.  Your stomach and groin area, avoiding the genitals.  Your right leg and foot.  Your left leg and foot.  The back of your neck, your back, and your buttocks.  Do not rinse. Discard the cloth and let the area air-dry. Do not put any  substances on your body afterward--such as powder, lotion, or perfume--unless you are told to do so by your health care provider. Only use lotions that are recommended by the .  Put on clean clothes or pajamas.  Contact a health care provider if:  Your skin gets irritated after scrubbing.  You have questions about using your solution or cloth.  You swallow any chlorhexidine. Call your local poison control center (1-718.800.8316 in the [place]  Get help right away if:  Your eyes itch badly, or they become very red or swollen.  Your skin itches badly and is red or swollen.  Your hearing changes.  You have trouble seeing.  You have swelling or tingling in your mouth or throat.  You have trouble breathing.  These symptoms may represent a serious problem that is an emergency. Do not wait to see if the symptoms will go away. Get medical help right away. Call your local emergency services (474 in the [place] Do not drive yourself to the hospital.  Summary  Chlorhexidine gluconate (CHG) is a germ-killing (antiseptic) solution that is used to clean the skin. Cleaning your skin with CHG may help to lower your risk for infection.  You may be given CHG to use for bathing. It may be in a bottle or in a prepackaged cloth to use on your skin. Carefully follow your health care provider's instructions and the instructions on the product label.  Do not use CHG if you have a chlorhexidine allergy.  Contact your health care provider if your skin gets irritated after scrubbing.  This information is not intended to replace advice given to you by your health care provider. Make sure you discuss any questions you have with your health care provider.  Document Revised: 04/17/2023 Document Reviewed: 02/28/2022  [employer] Patient Education © 2023 [employer]

## 2024-11-04 ENCOUNTER — TELEPHONE (OUTPATIENT)
Age: 69
End: 2024-11-04
Payer: MEDICARE

## 2024-11-04 NOTE — TELEPHONE ENCOUNTER
Called patient to remind them to be a patient registration for surgery on 11/06 with an arrival time of 0700. Left message for patient.    Patient called back and confirmed.

## 2024-11-06 ENCOUNTER — ANESTHESIA EVENT (OUTPATIENT)
Dept: PERIOP | Facility: HOSPITAL | Age: 69
End: 2024-11-06
Payer: MEDICARE

## 2024-11-06 ENCOUNTER — ANESTHESIA (OUTPATIENT)
Dept: PERIOP | Facility: HOSPITAL | Age: 69
End: 2024-11-06
Payer: MEDICARE

## 2024-11-06 ENCOUNTER — HOSPITAL ENCOUNTER (OUTPATIENT)
Facility: HOSPITAL | Age: 69
Setting detail: HOSPITAL OUTPATIENT SURGERY
Discharge: HOME | End: 2024-11-06
Attending: PODIATRIST | Admitting: PODIATRIST
Payer: MEDICARE

## 2024-11-06 ENCOUNTER — APPOINTMENT (OUTPATIENT)
Dept: GENERAL RADIOLOGY | Facility: HOSPITAL | Age: 69
End: 2024-11-06
Payer: MEDICARE

## 2024-11-06 VITALS
RESPIRATION RATE: 16 BRPM | HEART RATE: 83 BPM | DIASTOLIC BLOOD PRESSURE: 67 MMHG | OXYGEN SATURATION: 94 % | SYSTOLIC BLOOD PRESSURE: 134 MMHG | TEMPERATURE: 97.3 F

## 2024-11-06 DIAGNOSIS — M79.671 CHRONIC HEEL PAIN, RIGHT: ICD-10-CM

## 2024-11-06 DIAGNOSIS — G89.29 CHRONIC HEEL PAIN, RIGHT: ICD-10-CM

## 2024-11-06 DIAGNOSIS — M72.2 PLANTAR FASCIITIS, RIGHT: ICD-10-CM

## 2024-11-06 DIAGNOSIS — M62.461 GASTROCNEMIUS EQUINUS, RIGHT: ICD-10-CM

## 2024-11-06 DIAGNOSIS — M19.071 ARTHROSIS OF MIDFOOT, RIGHT: ICD-10-CM

## 2024-11-06 LAB
GLUCOSE BLDC GLUCOMTR-MCNC: 128 MG/DL (ref 70–130)
QT INTERVAL: 372 MS

## 2024-11-06 PROCEDURE — 82948 REAGENT STRIP/BLOOD GLUCOSE: CPT

## 2024-11-06 PROCEDURE — 25810000003 LACTATED RINGERS PER 1000 ML: Performed by: PODIATRIST

## 2024-11-06 PROCEDURE — 20900 REMOVAL OF BONE FOR GRAFT: CPT | Performed by: PODIATRIST

## 2024-11-06 PROCEDURE — 28899 UNLISTED PX FOOT/TOES: CPT | Performed by: PODIATRIST

## 2024-11-06 PROCEDURE — 28730 FUSION OF FOOT BONES: CPT | Performed by: PODIATRIST

## 2024-11-06 PROCEDURE — 25010000002 CLINDAMYCIN 900 MG/50ML SOLUTION: Performed by: PODIATRIST

## 2024-11-06 PROCEDURE — C1713 ANCHOR/SCREW BN/BN,TIS/BN: HCPCS | Performed by: PODIATRIST

## 2024-11-06 PROCEDURE — 73630 X-RAY EXAM OF FOOT: CPT

## 2024-11-06 PROCEDURE — 25010000002 FENTANYL CITRATE (PF) 50 MCG/ML SOLUTION: Performed by: ANESTHESIOLOGY

## 2024-11-06 PROCEDURE — 25010000002 PROPOFOL 10 MG/ML EMULSION

## 2024-11-06 PROCEDURE — 25010000002 ONDANSETRON PER 1 MG: Performed by: ANESTHESIOLOGY

## 2024-11-06 PROCEDURE — 25010000002 VASOPRESSIN 20 UNIT/ML SOLUTION

## 2024-11-06 PROCEDURE — 25010000002 ROPIVACAINE PER 1 MG: Performed by: ANESTHESIOLOGY

## 2024-11-06 PROCEDURE — S0260 H&P FOR SURGERY: HCPCS | Performed by: PODIATRIST

## 2024-11-06 PROCEDURE — 25010000002 DEXAMETHASONE PER 1 MG: Performed by: ANESTHESIOLOGY

## 2024-11-06 PROCEDURE — 29893 ENDOSCOPIC PLANTR FASCIOTOMY: CPT | Performed by: PODIATRIST

## 2024-11-06 PROCEDURE — 25010000002 LIDOCAINE PF 2% 2 % SOLUTION

## 2024-11-06 PROCEDURE — 25010000002 DROPERIDOL PER 5 MG

## 2024-11-06 PROCEDURE — 25010000002 FENTANYL CITRATE (PF) 100 MCG/2ML SOLUTION

## 2024-11-06 PROCEDURE — 25010000002 ONDANSETRON PER 1 MG

## 2024-11-06 DEVICE — 3.5 X 30 MM R3CON NON-LOCKING PLATE SCREW
Type: IMPLANTABLE DEVICE | Site: FOOT | Status: FUNCTIONAL
Brand: GORILLA PLATING SYSTEM

## 2024-11-06 DEVICE — GORILLA, PLATE, NC FUSION MEDIAL COLUMN, 1.5MM THK, RIGHT, MEDIUM, TIA
Type: IMPLANTABLE DEVICE | Site: FOOT | Status: FUNCTIONAL
Brand: BABY GORILLA/GORILLA PLATING SYSTEM

## 2024-11-06 DEVICE — 3.5 X 30 MM R3CON LOCKING PLATE SCREW
Type: IMPLANTABLE DEVICE | Site: FOOT | Status: FUNCTIONAL
Brand: GORILLA PLATING SYSTEM

## 2024-11-06 DEVICE — 3.5 X 28 MM R3CON LOCKING PLATE SCREW
Type: IMPLANTABLE DEVICE | Site: FOOT | Status: FUNCTIONAL
Brand: GORILLA PLATING SYSTEM

## 2024-11-06 DEVICE — MINI MONSTER HEADED, SHORT THREAD, 3.5 X 38MM
Type: IMPLANTABLE DEVICE | Site: FOOT | Status: FUNCTIONAL
Brand: MONSTER SCREW SYSTEM

## 2024-11-06 DEVICE — 3.5 X 34 MM R3CON NON-LOCKING PLATE SCREW
Type: IMPLANTABLE DEVICE | Site: FOOT | Status: FUNCTIONAL
Brand: GORILLA PLATING SYSTEM

## 2024-11-06 RX ORDER — BUPIVACAINE HCL/0.9 % NACL/PF 0.125 %
PLASTIC BAG, INJECTION (ML) EPIDURAL AS NEEDED
Status: DISCONTINUED | OUTPATIENT
Start: 2024-11-06 | End: 2024-11-06 | Stop reason: SURG

## 2024-11-06 RX ORDER — LIDOCAINE HYDROCHLORIDE 20 MG/ML
INJECTION, SOLUTION EPIDURAL; INFILTRATION; INTRACAUDAL; PERINEURAL AS NEEDED
Status: DISCONTINUED | OUTPATIENT
Start: 2024-11-06 | End: 2024-11-06 | Stop reason: SURG

## 2024-11-06 RX ORDER — DOCUSATE SODIUM 100 MG/1
100 CAPSULE, LIQUID FILLED ORAL DAILY PRN
Qty: 7 CAPSULE | Refills: 0 | Status: SHIPPED | OUTPATIENT
Start: 2024-11-06

## 2024-11-06 RX ORDER — FENTANYL CITRATE 50 UG/ML
50 INJECTION, SOLUTION INTRAMUSCULAR; INTRAVENOUS
Status: DISCONTINUED | OUTPATIENT
Start: 2024-11-06 | End: 2024-11-06 | Stop reason: HOSPADM

## 2024-11-06 RX ORDER — ROCURONIUM BROMIDE 10 MG/ML
INJECTION, SOLUTION INTRAVENOUS AS NEEDED
Status: DISCONTINUED | OUTPATIENT
Start: 2024-11-06 | End: 2024-11-06 | Stop reason: SURG

## 2024-11-06 RX ORDER — ASPIRIN 325 MG
325 TABLET ORAL DAILY
Qty: 42 TABLET | Refills: 0 | Status: SHIPPED | OUTPATIENT
Start: 2024-11-06

## 2024-11-06 RX ORDER — ONDANSETRON 4 MG/1
4 TABLET, FILM COATED ORAL EVERY 8 HOURS PRN
Qty: 21 TABLET | Refills: 0 | Status: SHIPPED | OUTPATIENT
Start: 2024-11-06

## 2024-11-06 RX ORDER — FENTANYL CITRATE 50 UG/ML
INJECTION, SOLUTION INTRAMUSCULAR; INTRAVENOUS AS NEEDED
Status: DISCONTINUED | OUTPATIENT
Start: 2024-11-06 | End: 2024-11-06 | Stop reason: SURG

## 2024-11-06 RX ORDER — ONDANSETRON 2 MG/ML
4 INJECTION INTRAMUSCULAR; INTRAVENOUS ONCE AS NEEDED
Status: COMPLETED | OUTPATIENT
Start: 2024-11-06 | End: 2024-11-06

## 2024-11-06 RX ORDER — SUCCINYLCHOLINE/SOD CL,ISO/PF 200MG/10ML
SYRINGE (ML) INTRAVENOUS AS NEEDED
Status: DISCONTINUED | OUTPATIENT
Start: 2024-11-06 | End: 2024-11-06 | Stop reason: SURG

## 2024-11-06 RX ORDER — LIDOCAINE HYDROCHLORIDE 10 MG/ML
0.5 INJECTION, SOLUTION EPIDURAL; INFILTRATION; INTRACAUDAL; PERINEURAL ONCE AS NEEDED
Status: DISCONTINUED | OUTPATIENT
Start: 2024-11-06 | End: 2024-11-06 | Stop reason: HOSPADM

## 2024-11-06 RX ORDER — DROPERIDOL 2.5 MG/ML
INJECTION, SOLUTION INTRAMUSCULAR; INTRAVENOUS AS NEEDED
Status: DISCONTINUED | OUTPATIENT
Start: 2024-11-06 | End: 2024-11-06 | Stop reason: SURG

## 2024-11-06 RX ORDER — ONDANSETRON 2 MG/ML
INJECTION INTRAMUSCULAR; INTRAVENOUS AS NEEDED
Status: DISCONTINUED | OUTPATIENT
Start: 2024-11-06 | End: 2024-11-06 | Stop reason: SURG

## 2024-11-06 RX ORDER — HYDROCODONE BITARTRATE AND ACETAMINOPHEN 5; 325 MG/1; MG/1
1 TABLET ORAL EVERY 4 HOURS PRN
Status: DISCONTINUED | OUTPATIENT
Start: 2024-11-06 | End: 2024-11-06 | Stop reason: HOSPADM

## 2024-11-06 RX ORDER — FENTANYL CITRATE 50 UG/ML
25 INJECTION, SOLUTION INTRAMUSCULAR; INTRAVENOUS
Status: DISCONTINUED | OUTPATIENT
Start: 2024-11-06 | End: 2024-11-06 | Stop reason: HOSPADM

## 2024-11-06 RX ORDER — DEXAMETHASONE SODIUM PHOSPHATE 4 MG/ML
4 INJECTION, SOLUTION INTRA-ARTICULAR; INTRALESIONAL; INTRAMUSCULAR; INTRAVENOUS; SOFT TISSUE ONCE AS NEEDED
Status: COMPLETED | OUTPATIENT
Start: 2024-11-06 | End: 2024-11-06

## 2024-11-06 RX ORDER — ROPIVACAINE HYDROCHLORIDE 5 MG/ML
INJECTION, SOLUTION EPIDURAL; INFILTRATION; PERINEURAL
Status: COMPLETED | OUTPATIENT
Start: 2024-11-06 | End: 2024-11-06

## 2024-11-06 RX ORDER — SODIUM CHLORIDE, SODIUM LACTATE, POTASSIUM CHLORIDE, CALCIUM CHLORIDE 600; 310; 30; 20 MG/100ML; MG/100ML; MG/100ML; MG/100ML
1000 INJECTION, SOLUTION INTRAVENOUS CONTINUOUS
Status: DISCONTINUED | OUTPATIENT
Start: 2024-11-06 | End: 2024-11-06 | Stop reason: HOSPADM

## 2024-11-06 RX ORDER — OXYCODONE AND ACETAMINOPHEN 7.5; 325 MG/1; MG/1
1 TABLET ORAL EVERY 6 HOURS PRN
Qty: 28 TABLET | Refills: 0 | Status: SHIPPED | OUTPATIENT
Start: 2024-11-06

## 2024-11-06 RX ORDER — IBUPROFEN 600 MG/1
600 TABLET, FILM COATED ORAL EVERY 6 HOURS PRN
Status: DISCONTINUED | OUTPATIENT
Start: 2024-11-06 | End: 2024-11-06 | Stop reason: HOSPADM

## 2024-11-06 RX ORDER — CLINDAMYCIN PHOSPHATE 900 MG/50ML
900 INJECTION, SOLUTION INTRAVENOUS ONCE
Status: COMPLETED | OUTPATIENT
Start: 2024-11-06 | End: 2024-11-06

## 2024-11-06 RX ORDER — FLUMAZENIL 0.1 MG/ML
0.2 INJECTION INTRAVENOUS AS NEEDED
Status: DISCONTINUED | OUTPATIENT
Start: 2024-11-06 | End: 2024-11-06 | Stop reason: HOSPADM

## 2024-11-06 RX ORDER — NALOXONE HCL 0.4 MG/ML
0.4 VIAL (ML) INJECTION AS NEEDED
Status: DISCONTINUED | OUTPATIENT
Start: 2024-11-06 | End: 2024-11-06 | Stop reason: HOSPADM

## 2024-11-06 RX ORDER — HYDROCODONE BITARTRATE AND ACETAMINOPHEN 10; 325 MG/1; MG/1
1 TABLET ORAL EVERY 4 HOURS PRN
Status: DISCONTINUED | OUTPATIENT
Start: 2024-11-06 | End: 2024-11-06 | Stop reason: HOSPADM

## 2024-11-06 RX ORDER — DROPERIDOL 2.5 MG/ML
0.62 INJECTION, SOLUTION INTRAMUSCULAR; INTRAVENOUS ONCE AS NEEDED
Status: DISCONTINUED | OUTPATIENT
Start: 2024-11-06 | End: 2024-11-06 | Stop reason: HOSPADM

## 2024-11-06 RX ORDER — DEXTROSE MONOHYDRATE 25 G/50ML
12.5 INJECTION, SOLUTION INTRAVENOUS AS NEEDED
Status: DISCONTINUED | OUTPATIENT
Start: 2024-11-06 | End: 2024-11-06 | Stop reason: HOSPADM

## 2024-11-06 RX ORDER — SODIUM CHLORIDE 0.9 % (FLUSH) 0.9 %
3 SYRINGE (ML) INJECTION AS NEEDED
Status: DISCONTINUED | OUTPATIENT
Start: 2024-11-06 | End: 2024-11-06 | Stop reason: HOSPADM

## 2024-11-06 RX ORDER — OXYCODONE AND ACETAMINOPHEN 5; 325 MG/1; MG/1
1 TABLET ORAL ONCE AS NEEDED
Status: DISCONTINUED | OUTPATIENT
Start: 2024-11-06 | End: 2024-11-06 | Stop reason: HOSPADM

## 2024-11-06 RX ORDER — LIDOCAINE HYDROCHLORIDE 40 MG/ML
SOLUTION TOPICAL AS NEEDED
Status: DISCONTINUED | OUTPATIENT
Start: 2024-11-06 | End: 2024-11-06 | Stop reason: SURG

## 2024-11-06 RX ORDER — MAGNESIUM HYDROXIDE 1200 MG/15ML
LIQUID ORAL AS NEEDED
Status: DISCONTINUED | OUTPATIENT
Start: 2024-11-06 | End: 2024-11-06 | Stop reason: HOSPADM

## 2024-11-06 RX ORDER — SODIUM CHLORIDE 0.9 % (FLUSH) 0.9 %
10 SYRINGE (ML) INJECTION EVERY 12 HOURS SCHEDULED
Status: DISCONTINUED | OUTPATIENT
Start: 2024-11-06 | End: 2024-11-06 | Stop reason: HOSPADM

## 2024-11-06 RX ORDER — FENTANYL CITRATE 50 UG/ML
50 INJECTION, SOLUTION INTRAMUSCULAR; INTRAVENOUS ONCE
Status: COMPLETED | OUTPATIENT
Start: 2024-11-06 | End: 2024-11-06

## 2024-11-06 RX ORDER — SODIUM CHLORIDE 0.9 % (FLUSH) 0.9 %
10 SYRINGE (ML) INJECTION AS NEEDED
Status: DISCONTINUED | OUTPATIENT
Start: 2024-11-06 | End: 2024-11-06 | Stop reason: HOSPADM

## 2024-11-06 RX ORDER — LABETALOL HYDROCHLORIDE 5 MG/ML
5 INJECTION, SOLUTION INTRAVENOUS
Status: DISCONTINUED | OUTPATIENT
Start: 2024-11-06 | End: 2024-11-06 | Stop reason: HOSPADM

## 2024-11-06 RX ORDER — PROPOFOL 10 MG/ML
VIAL (ML) INTRAVENOUS AS NEEDED
Status: DISCONTINUED | OUTPATIENT
Start: 2024-11-06 | End: 2024-11-06 | Stop reason: SURG

## 2024-11-06 RX ADMIN — ONDANSETRON 4 MG: 2 INJECTION INTRAMUSCULAR; INTRAVENOUS at 11:49

## 2024-11-06 RX ADMIN — LIDOCAINE HYDROCHLORIDE 1 EACH: 40 SOLUTION TOPICAL at 09:42

## 2024-11-06 RX ADMIN — ROPIVACAINE HYDROCHLORIDE 20 ML: 5 INJECTION EPIDURAL; INFILTRATION; PERINEURAL at 08:59

## 2024-11-06 RX ADMIN — CLINDAMYCIN PHOSPHATE 900 MG: 900 INJECTION, SOLUTION INTRAVENOUS at 09:50

## 2024-11-06 RX ADMIN — SODIUM CHLORIDE, POTASSIUM CHLORIDE, SODIUM LACTATE AND CALCIUM CHLORIDE 1000 ML: 600; 310; 30; 20 INJECTION, SOLUTION INTRAVENOUS at 07:15

## 2024-11-06 RX ADMIN — Medication 100 MG: at 09:42

## 2024-11-06 RX ADMIN — FENTANYL CITRATE 50 MCG: 50 INJECTION, SOLUTION INTRAMUSCULAR; INTRAVENOUS at 09:36

## 2024-11-06 RX ADMIN — LIDOCAINE HYDROCHLORIDE 80 MG: 20 INJECTION, SOLUTION EPIDURAL; INFILTRATION; INTRACAUDAL; PERINEURAL at 09:42

## 2024-11-06 RX ADMIN — DROPERIDOL 0.62 MG: 2.5 INJECTION, SOLUTION INTRAMUSCULAR; INTRAVENOUS at 09:36

## 2024-11-06 RX ADMIN — PROPOFOL 150 MG: 10 INJECTION, EMULSION INTRAVENOUS at 09:42

## 2024-11-06 RX ADMIN — Medication 100 MCG: at 10:12

## 2024-11-06 RX ADMIN — ROPIVACAINE HYDROCHLORIDE 20 ML: 5 INJECTION, SOLUTION EPIDURAL; INFILTRATION; PERINEURAL at 09:02

## 2024-11-06 RX ADMIN — Medication 100 MCG: at 10:06

## 2024-11-06 RX ADMIN — FENTANYL CITRATE 50 MCG: 50 INJECTION, SOLUTION INTRAMUSCULAR; INTRAVENOUS at 08:56

## 2024-11-06 RX ADMIN — ROCURONIUM 5 MG: 50 INJECTION, SOLUTION INTRAVENOUS at 09:42

## 2024-11-06 RX ADMIN — DEXAMETHASONE SODIUM PHOSPHATE 4 MG: 4 INJECTION, SOLUTION INTRA-ARTICULAR; INTRALESIONAL; INTRAMUSCULAR; INTRAVENOUS; SOFT TISSUE at 08:52

## 2024-11-06 RX ADMIN — FENTANYL CITRATE 25 MCG: 50 INJECTION, SOLUTION INTRAMUSCULAR; INTRAVENOUS at 11:17

## 2024-11-06 RX ADMIN — PROPOFOL 100 MCG/KG/MIN: 10 INJECTION, EMULSION INTRAVENOUS at 09:43

## 2024-11-06 RX ADMIN — ONDANSETRON 4 MG: 2 INJECTION INTRAMUSCULAR; INTRAVENOUS at 09:26

## 2024-11-06 NOTE — ANESTHESIA PROCEDURE NOTES
Peripheral Block      Patient reassessed immediately prior to procedure    Patient location during procedure: holding area  Start time: 11/6/2024 9:00 AM  Stop time: 11/6/2024 9:02 AM  Reason for block: procedure for pain, at surgeon's request, post-op pain management and Requested by   Performed by  Anesthesiologist: Doug Bernal MD  Preanesthetic Checklist  Completed: patient identified, IV checked, site marked, risks and benefits discussed, surgical consent, monitors and equipment checked, pre-op evaluation and timeout performed  Prep:  Prep: ChloraPrep  Patient monitoring: blood pressure monitoring, continuous pulse oximetry and EKG  Procedure    Sedation: yes    Guidance:ultrasound guided and Sciatic nerve identified and local anesthetic seen surrounding nerve    ULTRASOUND INTERPRETATION.  Using ultrasound guidance a 20 G gauge needle was placed in close proximity to the nerve, at which point, under ultrasound guidance anesthetic was injected in the area of the nerve and spread of the anesthesia was seen on ultrasound in close proximity thereto.  There were no abnormalities seen on ultrasound; a digital image was taken; and the patient tolerated the procedure with no complications. Images:still images obtained, printed/placed on chart (picture printed and placed in patients chart)  Loss of twitch: 0.5 mA  Laterality:right  Block Type:sciatic and popliteal  Injection Technique:single-shot  Needle Type:echogenic      Medications Used: ropivacaine (NAROPIN) injection 0.5 % - Injection   20 mL - 11/6/2024 9:02:00 AM      Post Assessment  Injection Assessment: negative aspiration for heme, no paresthesia on injection and incremental injection  Patient Tolerance:comfortable throughout block  Complications:no  Performed by: Doug Bernal MD

## 2024-11-06 NOTE — H&P
[facility] - PODIATRY    Today's Date: 11/06/24    Patient Name: Sedrick Leahy  MRN: [id number]  CSN: [id number]  PCP: Bala Randhawa MD  Referring Provider: Sai Mendieta DPM    SUBJECTIVE     No chief complaint on file.    HPI: Sedrick Leahy, a 69 y.o.female, comes to clinic as a(n) established patient complaining of foot pain. Patient has h/o Arthritis, diabetes, hypertension, low back pain, neck pain .  Patient presents complaining of ongoing and chronic pain to her right heel.  This is similar to her previous evaluation roughly 3 years ago.  She did not follow-up due to death in the family.  Previously had an MRI performed with plantar fasciitis and concerning for soft tissue induration.  Patient has had updated imaging.  Notes pain with every step.-Relates that she did take a steroid pack with minimal improvement.  Complains of increasing pain at her right midfoot with a dorsal prominence.  Admits pain at 8/10 level and described as burning, aching, nagging and sharp. Relates previous treatment(s) including steroid injection, icing, OTC inserts . Denies any constitutional symptoms. No other pedal complaints at this time.    Past Medical History:   Diagnosis Date    Arthritis     Diabetes mellitus     diet controlled    Foot pain, right     around 1 year off and on    GERD (gastroesophageal reflux disease)     Hx of colonic polyp     Hypertension     Joint pain     Hip    Lower back pain     Neck pain     PONV (postoperative nausea and vomiting)      Past Surgical History:   Procedure Laterality Date    ANTERIOR CERVICAL DISCECTOMY W/ FUSION N/A 11/20/2020    Procedure: EXPLORATION OF FUSION C5-6, ANTERIOR CERVICAL DISCECTOMY FUSION C4-5, C6-7 REVISON ANTERIOR FUSION C5-6 WITH INSTRUMENTATION C4-7;  Surgeon: KEN Gilbert MD;  Location: [facility];  Service: Orthopedic Spine;  Laterality: N/A;    APPENDECTOMY      COLONOSCOPY  06/06/2016    Normal exam repeat in 5 years     COLONOSCOPY N/A 01/14/2020    Diverticulosis repeat exam in 5 years    COLONOSCOPY W/ POLYPECTOMY  04/04/2012    Hyperplastic polyp cecum repeat exam in 1 year    ENDOSCOPY  08/14/2014    Very tortuous distal esophagus dilated 50 Fr, HH     ENDOSCOPY N/A 01/18/2021    A fundoplication was found, dilated    ENDOSCOPY  06/2023    GALLBLADDER SURGERY      HARDWARE REMOVAL N/A 11/20/2020    Procedure: REMOVAL OF INSTRUMENTATION;  Surgeon: KEN Gilbert MD;  Location:  [facility];  Service: Orthopedic Spine;  Laterality: N/A;    HYSTERECTOMY      Laparoscopic Hysterectomy bilateral salpingectomy for bleeding    LUMBAR LAMINECTOMY WITH FUSION Bilateral 12/11/2023    Procedure: LUMBAR LAMINECTOMY TRANSFORAMINAL LUMBAR INTERBODY FUSION L4/5;  Surgeon: Rick Estes DO;  Location:  [facility];  Service: Neurosurgery;  Laterality: Bilateral;    NECK SURGERY      NISSEN FUNDOPLICATION      POSTERIOR CERVICAL FUSION N/A 12/03/2020    Procedure: POSTERIOR SPINAL FUSION WITH INSTRUMENTATION C4-7;  Surgeon: KEN Gilbert MD;  Location:  [facility];  Service: Orthopedic Spine;  Laterality: N/A;    SACROILIAC JOINT INJECTION Bilateral 12/14/2023    Procedure: SACROILIAC INJECTION, Bilateral;  Surgeon: Rick Estes DO;  Location:  [facility];  Service: Neurosurgery;  Laterality: Bilateral;    STEROID INJECTION Left 06/30/2022    Procedure: LEFT HIP FLUROSCOPIC GUIDED CORTICOSTEROID INJECTION;  Surgeon: Herberth Skelton MD;  Location:  [facility];  Service: Orthopedics;  Laterality: Left;    US GUIDED LYMPH NODE BIOPSY  08/03/2020     Family History   Problem Relation Age of Onset    No Known Problems Father     Colon cancer Mother     Colon polyps Mother     Diabetes Brother     Diabetes Brother     Diabetes Son     Diabetes Son     Heart attack Paternal Grandmother     Breast cancer Maternal Aunt     Lymphoma Maternal Aunt     Breast cancer Maternal Aunt     Lung cancer Maternal Aunt     Diabetes Maternal Aunt     Breast  "cancer Maternal Aunt      Social History     Socioeconomic History    Marital status:      Spouse name: Jason    Number of children: 2    Years of education: 12   Tobacco Use    Smoking status: Former     Current packs/day: 0.00     Average packs/day: 2.0 packs/day for 36.1 years (72.1 ttl pk-yrs)     Types: Cigarettes     Start date: 1954     Quit date: 7/10/1990     Years since quittin.3     Passive exposure: Past    Smokeless tobacco: Never   Vaping Use    Vaping status: Never Used   Substance and Sexual Activity    Alcohol use: Yes     Comment: rare    Drug use: No    Sexual activity: Defer     Partners: Male     Allergies   Allergen Reactions    Penicillins Swelling and Rash    Morphine GI Intolerance     Pt states morphine makes her sick and has \"had GERD surgery is unable to vomit\"     Current Facility-Administered Medications   Medication Dose Route Frequency Provider Last Rate Last Admin    clindamycin (CLEOCIN) 900 mg in dextrose 5% 50 mL IVPB (premix)  900 mg Intravenous Once Anam, Sai A, DPM        lactated ringers infusion 1,000 mL  1,000 mL Intravenous Continuous Anam, Sai A, DPM        lidocaine PF 1% (XYLOCAINE) injection 0.5 mL  0.5 mL Intradermal Once PRN Anam, [place] A, DPM        sodium chloride 0.9 % flush 3 mL  3 mL Intravenous PRN Anam, [place] A, DPM         Review of Systems   Constitutional:  Negative for chills and fever.   HENT:  Negative for congestion.    Respiratory:  Negative for shortness of breath.    Cardiovascular:  Negative for chest pain and leg swelling.   Gastrointestinal:  Negative for constipation, diarrhea, nausea and vomiting.   Musculoskeletal:  Positive for arthralgias. Negative for myalgias.   Skin:  Negative for wound.   Neurological:  Negative for numbness.       OBJECTIVE     Vitals:    24 0646   BP: 123/75   Pulse: 85   Resp: 16   Temp: 97 °F (36.1 °C)   SpO2: 97%         PHYSICAL EXAM  GEN:   Accompanied by none.   "   Physical Exam  Vitals reviewed.   Constitutional:       Appearance: Normal appearance. She is well-developed.   HENT:      Head: Normocephalic and atraumatic.      Right Ear: Tympanic membrane normal.      Left Ear: Tympanic membrane normal.      Nose: Nose normal.      Mouth/Throat:      Pharynx: Oropharynx is clear.   Eyes:      Extraocular Movements: Extraocular movements intact.      Pupils: Pupils are equal, round, and reactive to light.   Cardiovascular:      Rate and Rhythm: Normal rate and regular rhythm.      Pulses: Normal pulses.           Dorsalis pedis pulses are 2+ on the right side and 2+ on the left side.        Posterior tibial pulses are 2+ on the right side and 2+ on the left side.      Heart sounds: Normal heart sounds.   Pulmonary:      Effort: Pulmonary effort is normal.      Breath sounds: Normal breath sounds.   Abdominal:      General: Bowel sounds are normal.      Palpations: Abdomen is soft.   Musculoskeletal:      Cervical back: Normal range of motion and neck supple.   Feet:      Right foot:      Skin integrity: Warmth present.      Left foot:      Skin integrity: Warmth present.   Neurological:      General: No focal deficit present.      Mental Status: She is alert and oriented to person, place, and time. Mental status is at baseline.   Psychiatric:         Mood and Affect: Mood normal.         Behavior: Behavior normal.         Thought Content: Thought content normal.         Judgment: Judgment normal.          Foot/Ankle Exam    GENERAL  Appearance:  appears stated age  Orientation:  AAOx3  Affect:  appropriate  Gait:  antalgic  Assistance:  independent  Right shoe gear: casual shoe  Left shoe gear: casual shoe    VASCULAR     Right Foot Vascularity   Dorsalis pedis:  2+  Posterior tibial:  2+  Skin temperature:  warm  Edema grading:  Trace  CFT:  3  Pedal hair growth:  Present  Varicosities:  none     Left Foot Vascularity   Dorsalis pedis:  2+  Posterior tibial:  2+  Skin  temperature:  warm  Edema grading:  Trace  CFT:  3  Pedal hair growth:  Present  Varicosities:  none     NEUROLOGIC     Right Foot Neurologic   Normal sensation    Light touch sensation: normal  Vibratory sensation: normal  Hot/Cold sensation: normal     Left Foot Neurologic   Normal sensation    Light touch sensation: normal  Vibratory sensation: normal  Hot/Cold sensation:  normal    MUSCULOSKELETAL     Right Foot Musculoskeletal   Ecchymosis:  none  Tenderness:  dorsal foot, naviculocuneiform joint tenderness, plantar fascia tenderness and retrocalcaneal bursa tenderness    Arch:  Normal     Left Foot Musculoskeletal   Ecchymosis:  none  Tenderness:  none  Arch:  Normal    MUSCLE STRENGTH     Right Foot Muscle Strength   Foot dorsiflexion:  5  Foot plantar flexion:  5  Foot inversion:  5  Foot eversion:  5     Left Foot Muscle Strength   Foot dorsiflexion:  5  Foot plantar flexion:  5  Foot inversion:  5  Foot eversion:  5    RANGE OF MOTION     Right Foot Range of Motion   Ankle dorsiflexion: 5 decreased      Left Foot Range of Motion   Foot and ankle ROM within normal limits      DERMATOLOGIC      Right Foot Dermatologic   Skin  Right foot skin is intact.      Left Foot Dermatologic   Skin  Left foot skin is intact.     Image:       RADIOLOGY/NUCLEAR:  XR chest 2 vw    Result Date: 10/30/2024  Narrative: EXAMINATION: XR CHEST 2 VW-  10/30/2024 9:39 AM  HISTORY: pre-op; M72.2-Plantar fascial fibromatosis; M79.671-Pain in right foot; G89.29-Other chronic pain; M62.461-Contracture of muscle, right lower leg; M19.071-Primary osteoarthritis, right ankle and foot  2 view chest x-ray.  COMPARISON: 5/1/2024 and 8/25/2023.  Heart size is normal. The mediastinum is within normal limits.  The lungs are mildly hyperexpanded with no pneumonia or pneumothorax. Mild chronic appearing interstitial disease with a few calcified granulomas. No congestive failure changes.  Thoracic spine moderate degenerative disc and endplate  change.      Impression: 1. No acute disease.      This report was signed and finalized on 10/30/2024 10:54 AM by Dr. Collins Vargas MD.         LABORATORY/CULTURE RESULTS:      PATHOLOGY RESULTS:       ASSESSMENT/PLAN     Diagnoses and all orders for this visit:    1. Plantar fasciitis, right  -     clindamycin (CLEOCIN) 900 mg in dextrose 5% 50 mL IVPB (premix)    2. Chronic heel pain, right  -     clindamycin (CLEOCIN) 900 mg in dextrose 5% 50 mL IVPB (premix)    3. Gastrocnemius equinus, right  -     clindamycin (CLEOCIN) 900 mg in dextrose 5% 50 mL IVPB (premix)    4. Arthrosis of midfoot, right  -     clindamycin (CLEOCIN) 900 mg in dextrose 5% 50 mL IVPB (premix)    Other orders  -     Follow Anesthesia Guidelines / Protocol; Standing  -     Verify NPO Status; Standing  -     Obtain Informed Consent (If Not Done Inpatient or PAT); Standing  -     Instructions on coughing, deep breathing, and incentive spirometry.; Standing  -     Notify Provider - Standard; Standing  -     Follow Anesthesia Guidelines / Protocol  -     Verify NPO Status  -     Obtain Informed Consent (If Not Done Inpatient or PAT)  -     Instructions on coughing, deep breathing, and incentive spirometry.  -     Instructions on coughing, deep breathing, and incentive spirometry.  -     Instructions on coughing, deep breathing, and incentive spirometry.  -     Instructions on coughing, deep breathing, and incentive spirometry.  -     Instructions on coughing, deep breathing, and incentive spirometry.  -     Notify Provider - Standard  -     Follow Anesthesia Guidelines / Protocol; Standing  -     Insert Peripheral IV; Standing  -     lidocaine PF 1% (XYLOCAINE) injection 0.5 mL  -     Maintain IV Access; Standing  -     sodium chloride 0.9 % flush 3 mL  -     lactated ringers infusion 1,000 mL  -     Follow Anesthesia Guidelines / Protocol  -     Insert Peripheral IV  -     Maintain IV Access        Comprehensive lower extremity examination and  evaluation was performed.  Discussed findings and treatment plan including risks, benefits, and treatment options with patient in detail. Patient agreed with treatment plan.  Reviewed updated MRI with patient consistent with plantar fasciitis as well as midfoot arthritis at the naviculocuneiform joint..    Discussed continued conservative treatment versus surgical intervention.  Patient feels that she has exhausted conservative therapy and now is interested in surgical correction.  Believe the best course of action would be to proceed with NaviculoCuneiform Joint Arthrodesis, Bone Graft [place], Endoscopic Gastrocnemius Recession, Endoscopic Plantar Fasciotomy - Right Foot.  Patient is in agreement.  All options, benefits, and risks associate with surgery have been discussed with the patient including but not limited to: Standard risk of anesthesia, pain, bleeding, infection, nonhealing/dehiscence, nonunion, malunion, deformity, loss of limb or life.  Pre and postoperative course were discussed in detail including a minimum 3 to 6 weeks nonweightbearing status postoperatively.  No guarantees were inferred.  An After Visit Summary was printed and given to the patient at discharge, including (if requested) any available informative/educational handouts regarding diagnosis, treatment, or medications. All questions were answered to patient/family satisfaction. Should symptoms fail to improve or worsen they agree to call or return to clinic or to go to the Emergency Department. Discussed the importance of following up with any needed screening tests/labs/specialist appointments and any requested follow-up recommended by me today. Importance of maintaining follow-up discussed and patient accepts that missed appointments can delay diagnosis and potentially lead to worsening of conditions.  No follow-ups on file., or sooner if acute issues arise.    Lab Frequency Next Occurrence   Follow Anesthesia Guidelines / Standing  Orders Once 12/04/2019   Obtain Informed Consent Once 12/09/2019   Follow Anesthesia Guidelines / Standing Orders Once 01/04/2021   Obtain Informed Consent Once 01/09/2021   Diet: Once 01/18/2021   Advance Diet as Tolerated Once 01/18/2021   Mammo Screening Bilateral With CAD Once 10/20/2022       This document has been electronically signed by Sai Mendieta DPM on November 6, 2024 07:03 CST

## 2024-11-06 NOTE — ANESTHESIA POSTPROCEDURE EVALUATION
Patient: Sedrick Leahy    Procedure Summary       Date: 11/06/24 Room / Location:  [facility]  [facility]    Anesthesia Start: 0935 Anesthesia Stop: 1138    Procedures:       NaviculoCuneiform Joint Arthrodesis, Bone Graft [place], Endoscopic Gastrocnemius Recession, Endoscopic Plantar Fasciotomy - Right Foot (Right: Foot)      Bone Graft [place] (Right: Foot)      Endoscopic Gastrocnemius Recession, Endoscopic Plantar Fasciotomy - Right Foot (Right: Ankle)      Endoscopic Plantar Fasciotomy - Right Foot (Right: Foot) Diagnosis:       Plantar fasciitis, right      Chronic heel pain, right      Gastrocnemius equinus, right      Arthrosis of midfoot, right      (Plantar fasciitis, right [M72.2])      (Chronic heel pain, right [M79.671, G89.29])      (Gastrocnemius equinus, right [M62.461])      (Arthrosis of midfoot, right [M19.071])    Surgeons: Sai Mendieta DPM Provider: Juaquin Shearer CRNA    Anesthesia Type: general with block ASA Status: 2            Anesthesia Type: general with block    Vitals  Vitals Value Taken Time   /72 11/06/24 1220   Temp 97.3 °F (36.3 °C) 11/06/24 1132   Pulse 79 11/06/24 1220   Resp 16 11/06/24 1220   SpO2 96 % 11/06/24 1220           Post Anesthesia Care and Evaluation    Patient location during evaluation: PACU  Patient participation: complete - patient participated  Level of consciousness: awake and alert  Pain management: adequate    Airway patency: patent  Anesthetic complications: No anesthetic complications    Cardiovascular status: acceptable  Respiratory status: acceptable  Hydration status: acceptable    Comments: Blood pressure 134/67, pulse 83, temperature 97.3 °F (36.3 °C), temperature source Temporal, resp. rate 16, SpO2 94%, not currently breastfeeding.    Pt discharged from PACU based on selina score >8  No anesthesia care post op

## 2024-11-06 NOTE — OP NOTE
POSTOPERATIVE NOTE  Patient: Sedrick Leahy  MRN: [id number]    YOB: 1955  Age: 69 y.o.  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   Same as pre-operative    Operative Procedures:  Endoscopic Gastrocnemius Recession - Right Foot  Endoscopic Plantar Fasciotomy - Right Foot  Bone Graft [place] (small) - Right Foot  NaviculoCuneiform Joint Arthrodesis - Right Foot    Surgeon: Surgeons and Role:     * Sai Mendieta DPM - Primary    Anesthesia: General with Block     Hemostasis: Anatomic Dissection, Thigh Tourniquet, Electric cauterization    Estimated Blood Loss: minimal    Pathology:   None    Materials:   Implant Name Type Inv. Item Serial No.  Lot No. LRB No. Used Action   PLT FUSN FT/ANKL GORILLA NC 1.5MM MED RT - [id number] Implant PLT FUSN FT/ANKL [place]OR[place]A NC 1.5MM MED RT  PARAGON 28  Right 1 Implanted   SCRW ROXANA MINI MONSTER SHT THRD 3.5X38MM - [id number] Implant SCRW ROXANA MINI MONSTER SHT THRD 3.5X38MM  PARAGON 28  Right 1 Implanted   SCRW PLT R3CON LK 3.5X28MM - [id number] Implant SCRW PLT R3CON LK 3.5X28MM  PARAGON 28  Right 1 Implanted   SCRW PLT R3CON NL 3.5X34MM - [id number] Implant SCRW PLT R3CON NL 3.5X34MM  PARAGON 28  Right 1 Implanted   SCRW PLT R3CON LK 3.5X30MM - [id number] Implant SCRW PLT R3CON LK 3.5X30MM  PARAGON 28  Right 1 Implanted   SCRW PLT R3CON NL 3.5X30MM - [id number] Implant SCRW PLT R3CON NL 3.5X30MM  PARAGON 28  Right 1 Implanted       Injectables: None    Fluids: See anesthesia log.    Drains: None    Complications: None    Postoperative Condition: Stable. Patient tolerated procedure and anesthesia well. Patient left the operating room with vital signs stable and vascular  status intact.     Operative Findings: Consistent with preoperative diagnosis.    Indications for Procedure: This 69 y.o. patient presents with chronic pain to left lower extremity due to underlying deformities and arthritis.  Patient states that they have failed conservative therapy and opts for surgical intervention at this time. The patient has been NPO for greater than 8 hours. The patient is ready for surgical intervention.    DESCRIPTION OF PROCEDURE  While in preoperative holding, patient was given a popliteal block by anesthesia.  Under mild sedation, patient was brought into the operating room and placed on the operating room table in supine position. Preoperative antibiotic was given. A pneumatic tourniquet was placed about the patient's right thigh. The patient was placed under General anesthesia, then local block was performed at the surgical site using the above mentioned local anesthesia. The foot and ankle were then scrubbed, prepped and draped in the usual aseptic manner. Using an Esmarch, the foot and ankle were exsanguinated and tourniquet was inflated.    Attention was then directed to the right posterior mid calf where a stab incision was made roughly 3 fingerbreadths below the gastrocnemius muscle belly.  A probe was then fed along the surface of the gastrocnemius tendon to the lateral calf where a another stab incision was made.  A trocar was introduced.  Several swabs were passed within to clear the site for introduction of the scope.  With the foot in a dorsiflexed position, the gastrocnemius and plantaris tendons were resected in a horizontal manner utilizing an Arthrex scope Kit.  Upon completion of the resection, there is increased range of motion at the ankle joint.  The wounds were then flushed with copious amounts of sterile saline.  Subcutaneous tissues reapproximated utilizing 3-0 Vicryl.  Skin was reapproximated coapted utilizing 3-0 nylon in horizontal mattress suturing technique.         Attention was then directed to the right medial heel where a stab incision was made roughly 5 cm distal to the posterior heel.  Next a probe was positioned through the plantar fat pad superficial to the plantar fascia and tented to the lateral foot where another stab incision was made.  Next the cannula was then positioned from the lateral aspect back across medially.  Several Q-tips were then fed through the cannula to clear any debris.  The scope was positioned laterally and the medial band of the plantar fascia was identified.  Next utilizing a curved blade, the medial band of the plantar fascia was released with the foot in a plantarflexed position to add tension.  Upon completion of the procedure, decreased tension along the plantar fascia was appreciated.  The fascia was then inspected for any additional pathologic tissue and none found.  Of note, the medial band was noted to be moderately thickened.  The wound was then flushed with copious amounts sterile saline.  Subcutaneous tissues were reapproximated utilizing 3-0 Vicryl.  Skin was reapproximated coapted utilizing 3-0 Nylon in horizontal mattress suturing technique.       Attention was then directed to the right lateral calcaneus where a 1 cm oblique linear incision was made.  Incision was deepened through the subcutaneous tissues using sharp and blunt dissection.  Care was taken to identify retract all vital neural and vascular structures.  All bleeders were ligated and cauterized necessary.  The dissection was continued down to bone.  Next a Frederick elevator was utilized to free periosteum from the bone.  Next utilizing an [place] bone harvester, several passes were made into the lateral calcaneus to obtain roughly 3 cc of cancellous bone.  The bone was placed on the back table to be used at a later time.  The wound was then flushed with copious amounts sterile saline.  Deep and subcutaneous tissues reapproximated utilizing 3-0 Vicryl.  Skin was  reapproximated coapted utilizing 3-0 nylon in horizontal mattress suturing technique.      Attention was then directed to the right medial midfoot where a linear incision was made along the naviculocuneiform joint between the anterior tibialis and posterior tibialis tendons.  The incision was deepened to the subcutaneous tissues using sharp and blunt dissection.  Care was taken to identify and retract all vital neural and vascular structures.  All bleeders were ligated and cauterized as necessary.  Next a periosteal incision was made between the anterior tibialis and posterior tibialis tendons thus exposing the navicular cuneiform joint.  There was mild dorsal spurring around the joint which was resected utilizing a rongeur and then smoothed utilizing hand rasp.  Utilizing a Hintermann type retractor, the joint was further opened for access.  Utilizing a combination of curettes, rongeur, and bone bur, the articular cartilage was fully removed.  The site was flushed with copious amounts of sterile normal saline.  Both sides were subchondrally drilled.  The previously acquired bone graft was then placed within the fusion site and the distractor was removed.  The joint was reapproximated and temporary fixation placed.  Next a Lehr 28 naviculocuneiform fusion plate was applied along the medial aspect with the above noted plate and screws in the usual manner.  Upon completion, stable fixation was obtained.  Fluoroscopy was utilized during the procedure to confirm location and placement.  The foot was placed into a range of motion and the navicular cuneiform joint was noted to be stable and well-approximated.  No further intervention was needed at this time.  The wound was then flushed with copious amounts of sterile normal saline.  Deep and subcutaneous tissues were reapproximated utilizing 3-0 Vicryl.  Skin was reapproximated coapted utilizing 3-0 nylon in horizontal mattress suturing technique.     The tourniquet  was deflated.  Hemostasis had been obtained.  Capillary fill was noted to all toes.     All counts were correct.     A bandage consisting of Adaptic, 4 x 4's, Kerlix, Coban was applied.  Additional cast padding and a fiberglass posterior splint were applied and held in place with an Ace bandage.     The patient tolerated the procedures and anesthesia well.  They were transferred to the recovery room with vital signs stable and vascular status intact to the right lower extremity.  After a period of postoperative monitoring, the patient will be discharged to home with oral and written postoperative instructions.  They will follow-up in office within 1 week.  They are to be nonweightbearing to the surgical foot.

## 2024-11-06 NOTE — ANESTHESIA PROCEDURE NOTES
Peripheral Block      Patient reassessed immediately prior to procedure    Patient location during procedure: holding area  Start time: 11/6/2024 8:58 AM  Stop time: 11/6/2024 8:59 AM  Reason for block: procedure for pain, at surgeon's request, post-op pain management and Requested by   Performed by  Anesthesiologist: Doug Bernal MD  Preanesthetic Checklist  Completed: patient identified, IV checked, site marked, risks and benefits discussed, surgical consent, monitors and equipment checked, pre-op evaluation and timeout performed  Prep:  Pt Position: supine  Prep: ChloraPrep  Patient monitoring: blood pressure monitoring, continuous pulse oximetry and EKG  Procedure    Sedation: yes    Guidance:ultrasound guided and femoral artery identified in adductor canal and local anesthetic seen surrounding artery    ULTRASOUND INTERPRETATION.  Using ultrasound guidance a 20 G gauge needle was placed in close proximity to the nerve, at which point, under ultrasound guidance anesthetic was injected in the area of the nerve and spread of the anesthesia was seen on ultrasound in close proximity thereto.  There were no abnormalities seen on ultrasound; a digital image was taken; and the patient tolerated the procedure with no complications. Images:still images obtained (picture printed and placed in patients chart)    Laterality:right  Block Type:adductor canal block  Injection Technique:single-shot  Needle Type:echogenic      Medications Used: ropivacaine (NAROPIN) injection 0.5 % - Injection   20 mL - 11/6/2024 8:59:00 AM      Post Assessment  Injection Assessment: negative aspiration for heme, no paresthesia on injection and incremental injection  Patient Tolerance:comfortable throughout block  Complications:no  Performed by: Doug Bernal MD

## 2024-11-06 NOTE — ANESTHESIA PROCEDURE NOTES
Airway  Urgency: elective    Date/Time: 11/6/2024 9:42 AM  Airway not difficult    General Information and Staff    Patient location during procedure: OR  SRNA: Lucero Jha SRNA  Indications and Patient Condition  Indications for airway management: airway protection    Preoxygenated: yes  Mask difficulty assessment: 0 - not attempted    Final Airway Details  Final airway type: endotracheal airway      Successful airway: ETT  Cuffed: yes   Successful intubation technique: video laryngoscopy  Facilitating devices/methods: cricoid pressure  Endotracheal tube insertion site: oral  Blade: Gomez  Blade size: 3  ETT size (mm): 6.5  Cormack-Lehane Classification: grade I - full view of glottis  Placement verified by: chest auscultation and capnometry   Measured from: gums  ETT/EBT to gums (cm): 20  Number of attempts at approach: 1  Assessment: lips, teeth, and gum same as pre-op and atraumatic intubation

## 2024-11-06 NOTE — BRIEF OP NOTE
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]    Procedure/CPT® Codes:        Procedure(s):  Endoscopic Gastrocnemius Recession - Right Foot  Endoscopic Plantar Fasciotomy - Right Foot  Bone Graft [place] (small) - Right Foot  NaviculoCuneiform Joint Arthrodesis - Right Foot            Surgeon(s):  Sai Mendieta DPM    Anesthesia: General with Block    Staff:   Circulator: Theodora De Jesus RN  Scrub Person: Bernadette Ann; Juanito Brgag  Vendor Representative: Dell Gamez; Jaiden Higgins         Estimated Blood Loss: minimal    Urine Voided: * No values recorded between 11/6/2024  9:35 AM and 11/6/2024 11:31 AM *    Specimens:                None          Drains: * No LDAs found *    Findings: Consistent with pre-operative diagnoses.         Complications: None          Sai Mendieta DPM     Date: 11/6/2024  Time: 11:44 CST

## 2024-11-06 NOTE — ANESTHESIA PREPROCEDURE EVALUATION
Anesthesia Evaluation     Patient summary reviewed   history of anesthetic complications:  PONV  NPO Solid Status: > 8 hours             Airway   Mallampati: II  Dental      Pulmonary    (-) COPD, asthma, sleep apnea, not a smoker  Cardiovascular   Exercise tolerance: good (4-7 METS)    (+) hyperlipidemia  (-) pacemaker, past MI, angina, cardiac stents      Neuro/Psych  (-) seizures, TIA, CVA  GI/Hepatic/Renal/Endo    (+) obesity  (-) GERD, liver disease, no renal disease, diabetes    Musculoskeletal     Abdominal    Substance History      OB/GYN          Other                    Anesthesia Plan    ASA 2     general with block     intravenous induction     Anesthetic plan, risks, benefits, and alternatives have been provided, discussed and informed consent has been obtained with: patient.    CODE STATUS:

## 2024-11-07 ENCOUNTER — TELEPHONE (OUTPATIENT)
Age: 69
End: 2024-11-07
Payer: MEDICARE

## 2024-11-07 NOTE — TELEPHONE ENCOUNTER
Spoke with pt post op, doing good, did slip and fall a little trying to go to bathroom last night but did not land on surgical foot. Staying off and elevating and icing behind knee

## 2024-11-11 NOTE — PROGRESS NOTES
[facility] - PODIATRY    Today's Date: 11/12/24    Patient Name: Sedrick Leahy  MRN: [id number]  [facility]: [id number]  PCP: Bala Randhawa MD  Referring Provider: No ref. provider found    SUBJECTIVE     Chief Complaint   Patient presents with    Follow-up     Chano Kiran Stephon, 04/03/24 1 WEEK POST OP surgery on 11/06     HPI: Sedrick Leahy, a 69 y.o.female, comes to clinic as a(n) established patient for post-op appt 1 weeks s/p Right gastrocnemius recession, plantar fasciotomy,naviculocuneiform arthrodesis, and bone graft harvest . Patient has h/o Arthritis, diabetes, hypertension, low back pain, neck pain .  Patient presents complaining of ongoing and chronic pain to her right heel.  This is similar to her previous evaluation roughly 3 years ago.  She did not follow-up due to death in the family.  Previously had an MRI performed with plantar fasciitis and concerning for soft tissue induration.  Patient has had updated imaging.  Notes pain with every step.-Relates that she did take a steroid pack with minimal improvement.  Complains of increasing pain at her right midfoot with a dorsal prominence.  Admits pain at 8/10 level and described as burning, aching, nagging and sharp. Relates previous treatment(s) including steroid injection, icing, OTC inserts . Denies any constitutional symptoms. No other pedal complaints at this time.    Past Medical History:   Diagnosis Date    Arthritis     Diabetes mellitus     diet controlled    Foot pain, right     around 1 year off and on    GERD (gastroesophageal reflux disease)     Hx of colonic polyp     Hypertension     Joint pain     Hip    Lower back pain     Neck pain     PONV (postoperative nausea and vomiting)      Past Surgical History:   Procedure Laterality Date    ANTERIOR CERVICAL DISCECTOMY W/ FUSION N/A 11/20/2020    Procedure: EXPLORATION OF FUSION C5-6, ANTERIOR CERVICAL DISCECTOMY FUSION C4-5, C6-7 REVISON ANTERIOR FUSION C5-6 WITH  INSTRUMENTATION C4-7;  Surgeon: KEN Gilbert MD;  Location:  [facility] OR;  Service: Orthopedic Spine;  Laterality: N/A;    APPENDECTOMY      COLONOSCOPY  06/06/2016    Normal exam repeat in 5 years    COLONOSCOPY N/A 01/14/2020    Diverticulosis repeat exam in 5 years    COLONOSCOPY W/ POLYPECTOMY  04/04/2012    Hyperplastic polyp cecum repeat exam in 1 year    ENDOSCOPY  08/14/2014    Very tortuous distal esophagus dilated 50 Fr, HH     ENDOSCOPY N/A 01/18/2021    A fundoplication was found, dilated    ENDOSCOPY  06/2023    FOOT FUSION Right 11/6/2024    Procedure: NaviculoCuneiform Joint Arthrodesis, Bone Graft Holmen, Endoscopic Gastrocnemius Recession, Endoscopic Plantar Fasciotomy - Right Foot;  Surgeon: Sai Mendieta DPM;  Location:  [facility];  Service: Podiatry;  Laterality: Right;    FOOT NAVICULAR EXCISION OR BONE GRAFT Right 11/6/2024    Procedure: Bone Graft Holmen;  Surgeon: Sai Mendieta DPM;  Location:  [facility];  Service: Podiatry;  Laterality: Right;    GALLBLADDER SURGERY      HARDWARE REMOVAL N/A 11/20/2020    Procedure: REMOVAL OF INSTRUMENTATION;  Surgeon: KEN Gilbert MD;  Location:  [facility];  Service: Orthopedic Spine;  Laterality: N/A;    HERNIA REPAIR      HYSTERECTOMY      Laparoscopic Hysterectomy bilateral salpingectomy for bleeding    LUMBAR LAMINECTOMY WITH FUSION Bilateral 12/11/2023    Procedure: LUMBAR LAMINECTOMY TRANSFORAMINAL LUMBAR INTERBODY FUSION L4/5;  Surgeon: Rick Estes DO;  Location:  [facility];  Service: Neurosurgery;  Laterality: Bilateral;    NECK SURGERY      NISSEN FUNDOPLICATION      PLANTAR FASCIA RELEASE Right 11/6/2024    Procedure: Endoscopic Plantar Fasciotomy - Right Foot;  Surgeon: Sai Mendieta DPM;  Location:  [facility];  Service: Podiatry;  Laterality: Right;    POSTERIOR CERVICAL FUSION N/A 12/03/2020    Procedure: POSTERIOR SPINAL FUSION WITH INSTRUMENTATION C4-7;  Surgeon: KEN Gilbert MD;  Location:  [facility];   "Service: Orthopedic Spine;  Laterality: N/A;    RECESSION GASTROCNEMIUS Right 2024    Procedure: Endoscopic Gastrocnemius Recession, Endoscopic Plantar Fasciotomy - Right Foot;  Surgeon: Sai Mendieta DPM;  Location:  [facility];  Service: Podiatry;  Laterality: Right;    SACROILIAC JOINT INJECTION Bilateral 2023    Procedure: SACROILIAC INJECTION, Bilateral;  Surgeon: Rick Estes DO;  Location:  [facility];  Service: Neurosurgery;  Laterality: Bilateral;    STEROID INJECTION Left 2022    Procedure: LEFT HIP FLUROSCOPIC GUIDED CORTICOSTEROID INJECTION;  Surgeon: Herberth Skelton MD;  Location:  [facility];  Service: Orthopedics;  Laterality: Left;    US GUIDED LYMPH NODE BIOPSY  2020     Family History   Problem Relation Age of Onset    No Known Problems Father     Colon cancer Mother     Colon polyps Mother     Diabetes Brother     Diabetes Brother     Diabetes Son     Diabetes Son     Heart attack Paternal Grandmother     Breast cancer Maternal Aunt     Lymphoma Maternal Aunt     Breast cancer Maternal Aunt     Lung cancer Maternal Aunt     Diabetes Maternal Aunt     Breast cancer Maternal Aunt      Social History     Socioeconomic History    Marital status:      Spouse name: Jason    Number of children: 2    Years of education: 12   Tobacco Use    Smoking status: Former     Current packs/day: 0.00     Average packs/day: 2.0 packs/day for 36.1 years (72.1 ttl pk-yrs)     Types: Cigarettes     Start date: 1954     Quit date: 7/10/1990     Years since quittin.3     Passive exposure: Past    Smokeless tobacco: Never   Vaping Use    Vaping status: Never Used   Substance and Sexual Activity    Alcohol use: Yes     Comment: rare    Drug use: No    Sexual activity: Defer     Partners: Male     Allergies   Allergen Reactions    Penicillins Swelling and Rash    Morphine GI Intolerance     Pt states morphine makes her sick and has \"had GERD surgery is unable to vomit\"     Current " Outpatient Medications   Medication Sig Dispense Refill    acetaminophen (TYLENOL) 325 MG tablet Take 2 tablets by mouth Every 4 (Four) Hours As Needed for Mild Pain.      amitriptyline (ELAVIL) 100 MG tablet TAKE ONE (1) TO TWO (2) TABLETS AT BEDTIME AS NEEDED SLEEP (Patient taking differently: Take 1 tablet by mouth At Night As Needed for Sleep. TAKE ONE (1) TO TWO (2) TABLETS AT BEDTIME AS NEEDED SLEEP) 180 tablet 0    aspirin 325 MG tablet Take 1 tablet by mouth Daily. 42 tablet 0    atorvastatin (LIPITOR) 20 MG tablet Take 1 tablet by mouth Daily. 90 tablet 5    docusate sodium (COLACE) 100 MG capsule Take 1 capsule by mouth Daily As Needed for Constipation. 7 capsule 0    naloxone (NARCAN) 4 MG/0.1ML nasal spray Call 911. Don't prime. Bogart in 1 nostril for overdose. Repeat in 2-3 minutes in other nostril if no or minimal breathing/responsiveness. 2 each 0    ondansetron (Zofran) 4 MG tablet Take 1 tablet by mouth Every 8 (Eight) Hours As Needed for Nausea or Vomiting. 21 tablet 0    oxyCODONE-acetaminophen (PERCOCET) 7.5-325 MG per tablet Take 1 tablet by mouth Every 6 (Six) Hours As Needed (Pain). 28 tablet 0    polyethylene glycol (MIRALAX) 17 g packet Take 17 g by mouth Daily As Needed (Use if senna-docusate is ineffective).       No current facility-administered medications for this visit.     Review of Systems   Constitutional:  Negative for chills and fever.   HENT:  Negative for congestion.    Respiratory:  Negative for shortness of breath.    Cardiovascular:  Negative for chest pain and leg swelling.   Gastrointestinal:  Negative for constipation, diarrhea, nausea and vomiting.   Musculoskeletal:  Positive for arthralgias. Negative for myalgias.   Skin:  Negative for wound.   Neurological:  Negative for numbness.       OBJECTIVE     Vitals:    11/12/24 1411   BP: 130/70   Pulse: 72   Resp: 18           PHYSICAL EXAM  GEN:   Accompanied by none.     Physical Exam  Vitals reviewed.   Constitutional:        Appearance: Normal appearance. She is well-developed.   HENT:      Head: Normocephalic and atraumatic.      Right Ear: Tympanic membrane normal.      Left Ear: Tympanic membrane normal.      Nose: Nose normal.      Mouth/Throat:      Pharynx: Oropharynx is clear.   Eyes:      Extraocular Movements: Extraocular movements intact.      Pupils: Pupils are equal, round, and reactive to light.   Cardiovascular:      Rate and Rhythm: Normal rate and regular rhythm.      Pulses: Normal pulses.           Dorsalis pedis pulses are 2+ on the right side and 2+ on the left side.        Posterior tibial pulses are 2+ on the right side and 2+ on the left side.      Heart sounds: Normal heart sounds.   Pulmonary:      Effort: Pulmonary effort is normal.      Breath sounds: Normal breath sounds.   Abdominal:      General: Bowel sounds are normal.      Palpations: Abdomen is soft.   Musculoskeletal:      Cervical back: Normal range of motion and neck supple.   Feet:      Right foot:      Skin integrity: Warmth present.      Left foot:      Skin integrity: Warmth present.   Neurological:      General: No focal deficit present.      Mental Status: She is alert and oriented to person, place, and time. Mental status is at baseline.   Psychiatric:         Mood and Affect: Mood normal.         Behavior: Behavior normal.         Thought Content: Thought content normal.         Judgment: Judgment normal.          Foot/Ankle Exam    GENERAL  Appearance:  appears stated age  Orientation:  AAOx3  Affect:  appropriate  Gait:  antalgic  Assistance:  independent  Right shoe gear: casual shoe  Left shoe gear: casual shoe    VASCULAR     Right Foot Vascularity   Dorsalis pedis:  2+  Posterior tibial:  2+  Skin temperature:  warm  Edema grading:  Trace  CFT:  3  Pedal hair growth:  Present  Varicosities:  none     Left Foot Vascularity   Dorsalis pedis:  2+  Posterior tibial:  2+  Skin temperature:  warm  Edema grading:  Trace  CFT:  3  Pedal hair  growth:  Present  Varicosities:  none     NEUROLOGIC     Right Foot Neurologic   Normal sensation    Light touch sensation: normal  Vibratory sensation: normal  Hot/Cold sensation: normal     Left Foot Neurologic   Normal sensation    Light touch sensation: normal  Vibratory sensation: normal  Hot/Cold sensation:  normal    MUSCULOSKELETAL     Right Foot Musculoskeletal   Ecchymosis:  none  Tenderness:  dorsal foot, naviculocuneiform joint tenderness, plantar fascia tenderness and retrocalcaneal bursa tenderness   (Moderately to incision sites)  Arch:  Normal     Left Foot Musculoskeletal   Ecchymosis:  none  Tenderness:  none  Arch:  Normal    MUSCLE STRENGTH     Right Foot Muscle Strength   Foot dorsiflexion:  5  Foot plantar flexion:  5  Foot inversion:  5  Foot eversion:  5     Left Foot Muscle Strength   Foot dorsiflexion:  5  Foot plantar flexion:  5  Foot inversion:  5  Foot eversion:  5    RANGE OF MOTION     Right Foot Range of Motion   Ankle dorsiflexion: 5 decreased      Left Foot Range of Motion   Foot and ankle ROM within normal limits      DERMATOLOGIC      Right Foot Dermatologic   Skin  Right foot skin is intact.      Left Foot Dermatologic   Skin  Left foot skin is intact.      Right foot additional comments: Dressing removed for evaluation of incision sites.  There is slight swelling noted along incision line to naviculocuneiform arthrodesis site.  All other incisions with sutures intact, healing well and without SOI present today.      RADIOLOGY/NUCLEAR:  XR Foot 3+ View Right    Result Date: 11/6/2024  Narrative: XR FOOT 3+ VW RIGHT- 11/6/2024 10:42 AM  HISTORY: post-op; M72.2-Plantar fascial fibromatosis; M79.671-Pain in right foot; G89.29-Other chronic pain; M62.461-Contracture of muscle, right lower leg; M19.071-Primary osteoarthritis, right ankle and foot  COMPARISON: None available  FINDINGS: Frontal, lateral and oblique radiographs of the foot were provided for review.  There is  cuneiform/navicular joint arthrodesis and no hardware complication is seen. There is overlying cast material. Bone graft harvest site at the calcaneus is noted. There is a calcaneal spur and enthesophyte and degenerative change at the midfoot.      Impression:  1. Postoperative changes of cuneiform/navicular joint arthrodesis and calcaneal spur and enthesophyte.  This report was signed and finalized on 11/6/2024 1:42 PM by Bandar Sharma.      Peripheral Block    Result Date: 11/6/2024  Narrative: Doug Bernal MD     11/6/2024  9:02 AM Peripheral Block Patient reassessed immediately prior to procedure Patient location during procedure: holding area Start time: 11/6/2024 9:00 AM Stop time: 11/6/2024 9:02 AM Reason for block: procedure for pain, at surgeon's request, post-op pain management and Requested by  Performed by Anesthesiologist: Doug Bernal MD Preanesthetic Checklist Completed: patient identified, IV checked, site marked, risks and benefits discussed, surgical consent, monitors and equipment checked, pre-op evaluation and timeout performed Prep: Prep: ChloraPrep Patient monitoring: blood pressure monitoring, continuous pulse oximetry and EKG Procedure Sedation: yes Guidance:ultrasound guided and Sciatic nerve identified and local anesthetic seen surrounding nerve ULTRASOUND INTERPRETATION.  Using ultrasound guidance a 20 G gauge needle was placed in close proximity to the nerve, at which point, under ultrasound guidance anesthetic was injected in the area of the nerve and spread of the anesthesia was seen on ultrasound in close proximity thereto.  There were no abnormalities seen on ultrasound; a digital image was taken; and the patient tolerated the procedure with no complications. Images:still images obtained, printed/placed on chart (picture printed and placed in patients chart) Loss of twitch: 0.5 mA Laterality:right Block Type:sciatic and popliteal Injection  Technique:single-shot Needle Type:echogenic Medications Used: ropivacaine (NAROPIN) injection 0.5 % - Injection  20 mL - 11/6/2024 9:02:00 AM Post Assessment Injection Assessment: negative aspiration for heme, no paresthesia on injection and incremental injection Patient Tolerance:comfortable throughout block Complications:no Performed by: Doug Bernal MD     Peripheral Block    Result Date: 11/6/2024  Narrative: Doug Bernal MD     11/6/2024  9:02 AM Peripheral Block Patient reassessed immediately prior to procedure Patient location during procedure: holding area Start time: 11/6/2024 8:58 AM Stop time: 11/6/2024 8:59 AM Reason for block: procedure for pain, at surgeon's request, post-op pain management and Requested by  Performed by Anesthesiologist: Doug Bernal MD Preanesthetic Checklist Completed: patient identified, IV checked, site marked, risks and benefits discussed, surgical consent, monitors and equipment checked, pre-op evaluation and timeout performed Prep: Pt Position: supine Prep: ChloraPrep Patient monitoring: blood pressure monitoring, continuous pulse oximetry and EKG Procedure Sedation: yes Guidance:ultrasound guided and femoral artery identified in adductor canal and local anesthetic seen surrounding artery ULTRASOUND INTERPRETATION.  Using ultrasound guidance a 20 G gauge needle was placed in close proximity to the nerve, at which point, under ultrasound guidance anesthetic was injected in the area of the nerve and spread of the anesthesia was seen on ultrasound in close proximity thereto.  There were no abnormalities seen on ultrasound; a digital image was taken; and the patient tolerated the procedure with no complications. Images:still images obtained (picture printed and placed in patients chart) Laterality:right Block Type:adductor canal block Injection Technique:single-shot Needle Type:echogenic Medications Used: ropivacaine (NAROPIN) injection  0.5 % - Injection  20 mL - 11/6/2024 8:59:00 AM Post Assessment Injection Assessment: negative aspiration for heme, no paresthesia on injection and incremental injection Patient Tolerance:comfortable throughout block Complications:no Performed by: Doug Bernal MD     XR chest 2 vw    Result Date: 10/30/2024  Narrative: EXAMINATION: XR CHEST 2 VW-  10/30/2024 9:39 AM  HISTORY: pre-op; M72.2-Plantar fascial fibromatosis; M79.671-Pain in right foot; G89.29-Other chronic pain; M62.461-Contracture of muscle, right lower leg; M19.071-Primary osteoarthritis, right ankle and foot  2 view chest x-ray.  COMPARISON: 5/1/2024 and 8/25/2023.  Heart size is normal. The mediastinum is within normal limits.  The lungs are mildly hyperexpanded with no pneumonia or pneumothorax. Mild chronic appearing interstitial disease with a few calcified granulomas. No congestive failure changes.  Thoracic spine moderate degenerative disc and endplate change.      Impression: 1. No acute disease.      This report was signed and finalized on 10/30/2024 10:54 AM by Dr. Collins Vargas MD.         LABORATORY/CULTURE RESULTS:      PATHOLOGY RESULTS:       ASSESSMENT/PLAN     Diagnoses and all orders for this visit:    1. S/P foot surgery, right (Primary)        Comprehensive lower extremity examination and evaluation was performed.  Discussed findings and treatment plan including risks, benefits, and treatment options with patient in detail. Patient agreed with treatment plan.  Reviewed pre and postop x-rays with patient in office today.  Dressing removed for evaluation- There is minimal swelling noted to incision sites, no drainage or active SOI present today.  Reapplied similar dressing in office today following application of fiberglass splint.  Dressing to be left clean dry and intact until next follow-up appointment.  Patient to remain NWB for the next 5 weeks.  Patient to return in 2 weeks for next follow-up. Sutures likely to be  removed at that visit.  Encouraged patient to call sooner with questions or concerns.       An After Visit Summary was printed and given to the patient at discharge, including (if requested) any available informative/educational handouts regarding diagnosis, treatment, or medications. All questions were answered to patient/family satisfaction. Should symptoms fail to improve or worsen they agree to call or return to clinic or to go to the Emergency Department. Discussed the importance of following up with any needed screening tests/labs/specialist appointments and any requested follow-up recommended by me today. Importance of maintaining follow-up discussed and patient accepts that missed appointments can delay diagnosis and potentially lead to worsening of conditions.  Return in about 2 weeks (around 11/26/2024) for Follow-up with APRN, Follow-up in [facility] or sooner if acute issues arise.        This document has been electronically signed by HECTOR Vergara on November 12, 2024 16:20 CST

## 2024-11-12 ENCOUNTER — OFFICE VISIT (OUTPATIENT)
Age: 69
End: 2024-11-12
Payer: MEDICARE

## 2024-11-12 VITALS
HEIGHT: 65 IN | DIASTOLIC BLOOD PRESSURE: 70 MMHG | BODY MASS INDEX: 29.49 KG/M2 | WEIGHT: 177 LBS | SYSTOLIC BLOOD PRESSURE: 130 MMHG | RESPIRATION RATE: 18 BRPM | HEART RATE: 72 BPM

## 2024-11-12 DIAGNOSIS — Z98.890 S/P FOOT SURGERY, RIGHT: Primary | ICD-10-CM

## 2024-11-12 PROCEDURE — 1160F RVW MEDS BY RX/DR IN RCRD: CPT

## 2024-11-12 PROCEDURE — 99024 POSTOP FOLLOW-UP VISIT: CPT

## 2024-11-12 PROCEDURE — 3075F SYST BP GE 130 - 139MM HG: CPT

## 2024-11-12 PROCEDURE — 1159F MED LIST DOCD IN RCRD: CPT

## 2024-11-12 PROCEDURE — 3078F DIAST BP <80 MM HG: CPT

## 2024-11-25 NOTE — PROGRESS NOTES
[facility] - PODIATRY    Today's Date: 11/26/24    Patient Name: Sedrick Leahy  MRN: [id number]  CSN: [id number]  PCP: Bala Randhawa MD  Referring Provider: No ref. provider found    SUBJECTIVE     Chief Complaint   Patient presents with    Follow-up     Chano Kiran Stephon, 04/03/24 Return in about 2 weeks DOS 11/06/2024- pt states foot doing good. Inside of foot if touched is extreme pain, if foot hangs down it hurts- pt pain 9/10 at worst, if hit or hangs down    Diabetes     107mg/dl BG couple nights ago     HPI: Sedrick Leahy, a 69 y.o.female, comes to clinic as a(n) established patient for post-op appt 3 weeks s/p Right gastrocnemius recession, plantar fasciotomy,naviculocuneiform arthrodesis, and bone graft harvest . Patient has h/o Arthritis, diabetes, hypertension, low back pain, neck pain . Patient reports she has kept her dressing clean, dry, and intact. She has continued to be NWB in her splint. Reports pain and tenderness to her dorsal foot incision. Patient is NIDDM and unsure of last BG level.    Admits pain at 9/10 level and described as burning, aching, nagging and sharp. Relates previous treatment(s) including steroid injection, icing, OTC inserts, foot surgery . Denies any constitutional symptoms. No other pedal complaints at this time.    Past Medical History:   Diagnosis Date    Arthritis     Diabetes mellitus     diet controlled    Foot pain, right     around 1 year off and on    GERD (gastroesophageal reflux disease)     Hx of colonic polyp     Hypertension     Joint pain     Hip    Lower back pain     Neck pain     PONV (postoperative nausea and vomiting)      Past Surgical History:   Procedure Laterality Date    ANTERIOR CERVICAL DISCECTOMY W/ FUSION N/A 11/20/2020    Procedure: EXPLORATION OF FUSION C5-6, ANTERIOR CERVICAL DISCECTOMY FUSION C4-5, C6-7 REVISON ANTERIOR FUSION C5-6 WITH INSTRUMENTATION C4-7;  Surgeon: KEN Gilbert MD;  Location: [facility];  Service:  Orthopedic Spine;  Laterality: N/A;    APPENDECTOMY      COLONOSCOPY  06/06/2016    Normal exam repeat in 5 years    COLONOSCOPY N/A 01/14/2020    Diverticulosis repeat exam in 5 years    COLONOSCOPY W/ POLYPECTOMY  04/04/2012    Hyperplastic polyp cecum repeat exam in 1 year    ENDOSCOPY  08/14/2014    Very tortuous distal esophagus dilated 50 Fr, HH     ENDOSCOPY N/A 01/18/2021    A fundoplication was found, dilated    ENDOSCOPY  06/2023    FOOT FUSION Right 11/6/2024    Procedure: NaviculoCuneiform Joint Arthrodesis, Bone Graft [place], Endoscopic Gastrocnemius Recession, Endoscopic Plantar Fasciotomy - Right Foot;  Surgeon: Sai Mendieta DPM;  Location:  [facility];  Service: Podiatry;  Laterality: Right;    FOOT NAVICULAR EXCISION OR BONE GRAFT Right 11/6/2024    Procedure: Bone Graft [place];  Surgeon: Sai Mendieta DPM;  Location:  [facility];  Service: Podiatry;  Laterality: Right;    GALLBLADDER SURGERY      HARDWARE REMOVAL N/A 11/20/2020    Procedure: REMOVAL OF INSTRUMENTATION;  Surgeon: KEN Gilbert MD;  Location:  [facility];  Service: Orthopedic Spine;  Laterality: N/A;    HERNIA REPAIR      HYSTERECTOMY      Laparoscopic Hysterectomy bilateral salpingectomy for bleeding    LUMBAR LAMINECTOMY WITH FUSION Bilateral 12/11/2023    Procedure: LUMBAR LAMINECTOMY TRANSFORAMINAL LUMBAR INTERBODY FUSION L4/5;  Surgeon: Rick Estes DO;  Location:  [facility];  Service: Neurosurgery;  Laterality: Bilateral;    NECK SURGERY      NISSEN FUNDOPLICATION      PLANTAR FASCIA RELEASE Right 11/6/2024    Procedure: Endoscopic Plantar Fasciotomy - Right Foot;  Surgeon: Sai Mendieta DPM;  Location:  [facility];  Service: Podiatry;  Laterality: Right;    POSTERIOR CERVICAL FUSION N/A 12/03/2020    Procedure: POSTERIOR SPINAL FUSION WITH INSTRUMENTATION C4-7;  Surgeon: KEN Gilbert MD;  Location:  [facility];  Service: Orthopedic Spine;  Laterality: N/A;    RECESSION GASTROCNEMIUS Right 11/6/2024     "Procedure: Endoscopic Gastrocnemius Recession, Endoscopic Plantar Fasciotomy - Right Foot;  Surgeon: Sai Mendieta DPM;  Location:  [facility];  Service: Podiatry;  Laterality: Right;    SACROILIAC JOINT INJECTION Bilateral 2023    Procedure: SACROILIAC INJECTION, Bilateral;  Surgeon: Rick Estes DO;  Location:  [facility];  Service: Neurosurgery;  Laterality: Bilateral;    STEROID INJECTION Left 2022    Procedure: LEFT HIP FLUROSCOPIC GUIDED CORTICOSTEROID INJECTION;  Surgeon: Herberth Skelton MD;  Location:  [facility];  Service: Orthopedics;  Laterality: Left;    US GUIDED LYMPH NODE BIOPSY  2020     Family History   Problem Relation Age of Onset    No Known Problems Father     Colon cancer Mother     Colon polyps Mother     Diabetes Brother     Diabetes Brother     Diabetes Son     Diabetes Son     Heart attack Paternal Grandmother     Breast cancer Maternal Aunt     Lymphoma Maternal Aunt     Breast cancer Maternal Aunt     Lung cancer Maternal Aunt     Diabetes Maternal Aunt     Breast cancer Maternal Aunt      Social History     Socioeconomic History    Marital status:      Spouse name: Jason    Number of children: 2    Years of education: 12   Tobacco Use    Smoking status: Former     Current packs/day: 0.00     Average packs/day: 2.0 packs/day for 36.1 years (72.1 ttl pk-yrs)     Types: Cigarettes     Start date: 1954     Quit date: 7/10/1990     Years since quittin.4     Passive exposure: Past    Smokeless tobacco: Never   Vaping Use    Vaping status: Never Used   Substance and Sexual Activity    Alcohol use: Yes     Comment: rare    Drug use: No    Sexual activity: Defer     Partners: Male     Allergies   Allergen Reactions    Penicillins Swelling and Rash    Morphine GI Intolerance     Pt states morphine makes her sick and has \"had GERD surgery is unable to vomit\"     Current Outpatient Medications   Medication Sig Dispense Refill    acetaminophen (TYLENOL) 325 MG " tablet Take 2 tablets by mouth Every 4 (Four) Hours As Needed for Mild Pain.      amitriptyline (ELAVIL) 100 MG tablet TAKE ONE (1) TO TWO (2) TABLETS AT BEDTIME AS NEEDED SLEEP (Patient taking differently: Take 1 tablet by mouth At Night As Needed for Sleep. TAKE ONE (1) TO TWO (2) TABLETS AT BEDTIME AS NEEDED SLEEP) 180 tablet 0    aspirin 325 MG tablet Take 1 tablet by mouth Daily. 42 tablet 0    atorvastatin (LIPITOR) 20 MG tablet Take 1 tablet by mouth Daily. 90 tablet 5    docusate sodium (COLACE) 100 MG capsule Take 1 capsule by mouth Daily As Needed for Constipation. 7 capsule 0    naloxone (NARCAN) 4 MG/0.1ML nasal spray Call 911. Don't prime. [place] in 1 nostril for overdose. Repeat in 2-3 minutes in other nostril if no or minimal breathing/responsiveness. 2 each 0    ondansetron (Zofran) 4 MG tablet Take 1 tablet by mouth Every 8 (Eight) Hours As Needed for Nausea or Vomiting. 21 tablet 0    oxyCODONE-acetaminophen (PERCOCET) 7.5-325 MG per tablet Take 1 tablet by mouth Every 6 (Six) Hours As Needed (Pain). 28 tablet 0    polyethylene glycol (MIRALAX) 17 g packet Take 17 g by mouth Daily As Needed (Use if senna-docusate is ineffective).       No current facility-administered medications for this visit.     Review of Systems   Constitutional:  Negative for chills and fever.   HENT:  Negative for congestion.    Respiratory:  Negative for shortness of breath.    Cardiovascular:  Negative for chest pain and leg swelling.   Gastrointestinal:  Negative for constipation, diarrhea, nausea and vomiting.   Musculoskeletal:  Positive for arthralgias. Negative for myalgias.   Skin:  Negative for wound.   Neurological:  Negative for numbness.       OBJECTIVE     Vitals:    11/26/24 1318   BP: 132/74   Pulse: 68   SpO2: 97%     PHYSICAL EXAM  GEN:   Accompanied by .     Physical Exam     Foot/Ankle Exam    GENERAL  Appearance:  appears stated age  Orientation:  AAOx3  Affect:  appropriate  Gait:  non-weight  bearing  Assistance:  wheelchair  Left shoe gear: casual shoe    VASCULAR     Right Foot Vascularity   Edema grading:  Trace  CFT:  3  Pedal hair growth:  Present  Varicosities:  none     Left Foot Vascularity   Edema grading:  Trace  CFT:  3  Pedal hair growth:  Present  Varicosities:  none     NEUROLOGIC     Right Foot Neurologic   Normal sensation    Light touch sensation: normal  Vibratory sensation: normal  Hot/Cold sensation: normal     Left Foot Neurologic   Normal sensation    Light touch sensation: normal  Vibratory sensation: normal  Hot/Cold sensation:  normal    MUSCULOSKELETAL     Right Foot Musculoskeletal   Ecchymosis:  none  Tenderness:  dorsal foot and naviculocuneiform joint tenderness   (Moderately to incision sites)  Arch:  Normal     Left Foot Musculoskeletal   Ecchymosis:  none  Tenderness:  none  Arch:  Normal    MUSCLE STRENGTH     Right Foot Muscle Strength   Foot dorsiflexion:  5  Foot plantar flexion:  5  Foot inversion:  5  Foot eversion:  5     Left Foot Muscle Strength   Foot dorsiflexion:  5  Foot plantar flexion:  5  Foot inversion:  5  Foot eversion:  5    RANGE OF MOTION     Right Foot Range of Motion   Ankle dorsiflexion: 5 decreased      Left Foot Range of Motion   Foot and ankle ROM within normal limits      DERMATOLOGIC      Right Foot Dermatologic   Skin  Right foot skin is intact.      Left Foot Dermatologic   Skin  Left foot skin is intact.      Right foot additional comments: Dressing removed for evaluation of incision sites.  There is slight swelling and redness noted along incision line to naviculocuneiform arthrodesis site. Sutures removed today.  No drainage or signs of infection.       RADIOLOGY/NUCLEAR:  XR Foot 3+ View Right    Result Date: 11/6/2024  Narrative: XR FOOT 3+ VW RIGHT- 11/6/2024 10:42 AM  HISTORY: post-op; M72.2-Plantar fascial fibromatosis; M79.671-Pain in right foot; G89.29-Other chronic pain; M62.461-Contracture of muscle, right lower leg;  M19.071-Primary osteoarthritis, right ankle and foot  COMPARISON: None available  FINDINGS: Frontal, lateral and oblique radiographs of the foot were provided for review.  There is cuneiform/navicular joint arthrodesis and no hardware complication is seen. There is overlying cast material. Bone graft harvest site at the calcaneus is noted. There is a calcaneal spur and enthesophyte and degenerative change at the midfoot.      Impression:  1. Postoperative changes of cuneiform/navicular joint arthrodesis and calcaneal spur and enthesophyte.  This report was signed and finalized on 11/6/2024 1:42 PM by Bandar Sharma.      Peripheral Block    Result Date: 11/6/2024  Narrative: Doug Bernal MD     11/6/2024  9:02 AM Peripheral Block Patient reassessed immediately prior to procedure Patient location during procedure: holding area Start time: 11/6/2024 9:00 AM Stop time: 11/6/2024 9:02 AM Reason for block: procedure for pain, at surgeon's request, post-op pain management and Requested by  Performed by Anesthesiologist: Doug Bernal MD Preanesthetic Checklist Completed: patient identified, IV checked, site marked, risks and benefits discussed, surgical consent, monitors and equipment checked, pre-op evaluation and timeout performed Prep: Prep: ChloraPrep Patient monitoring: blood pressure monitoring, continuous pulse oximetry and EKG Procedure Sedation: yes Guidance:ultrasound guided and Sciatic nerve identified and local anesthetic seen surrounding nerve ULTRASOUND INTERPRETATION.  Using ultrasound guidance a 20 G gauge needle was placed in close proximity to the nerve, at which point, under ultrasound guidance anesthetic was injected in the area of the nerve and spread of the anesthesia was seen on ultrasound in close proximity thereto.  There were no abnormalities seen on ultrasound; a digital image was taken; and the patient tolerated the procedure with no complications. Images:still  images obtained, printed/placed on chart (picture printed and placed in patients chart) Loss of twitch: 0.5 mA Laterality:right Block Type:sciatic and popliteal Injection Technique:single-shot Needle Type:echogenic Medications Used: ropivacaine (NAROPIN) injection 0.5 % - Injection  20 mL - 11/6/2024 9:02:00 AM Post Assessment Injection Assessment: negative aspiration for heme, no paresthesia on injection and incremental injection Patient Tolerance:comfortable throughout block Complications:no Performed by: Doug Bernal MD     Peripheral Block    Result Date: 11/6/2024  Narrative: Doug Bernal MD     11/6/2024  9:02 AM Peripheral Block Patient reassessed immediately prior to procedure Patient location during procedure: holding area Start time: 11/6/2024 8:58 AM Stop time: 11/6/2024 8:59 AM Reason for block: procedure for pain, at surgeon's request, post-op pain management and Requested by DrLaura Performed by Anesthesiologist: Doug Bernal MD Preanesthetic Checklist Completed: patient identified, IV checked, site marked, risks and benefits discussed, surgical consent, monitors and equipment checked, pre-op evaluation and timeout performed Prep: Pt Position: supine Prep: ChloraPrep Patient monitoring: blood pressure monitoring, continuous pulse oximetry and EKG Procedure Sedation: yes Guidance:ultrasound guided and femoral artery identified in adductor canal and local anesthetic seen surrounding artery ULTRASOUND INTERPRETATION.  Using ultrasound guidance a 20 G gauge needle was placed in close proximity to the nerve, at which point, under ultrasound guidance anesthetic was injected in the area of the nerve and spread of the anesthesia was seen on ultrasound in close proximity thereto.  There were no abnormalities seen on ultrasound; a digital image was taken; and the patient tolerated the procedure with no complications. Images:still images obtained (picture printed and placed  in patients chart) Laterality:right Block Type:adductor canal block Injection Technique:single-shot Needle Type:echogenic Medications Used: ropivacaine (NAROPIN) injection 0.5 % - Injection  20 mL - 11/6/2024 8:59:00 AM Post Assessment Injection Assessment: negative aspiration for heme, no paresthesia on injection and incremental injection Patient Tolerance:comfortable throughout block Complications:no Performed by: Doug Bernal MD     XR chest 2 vw    Result Date: 10/30/2024  Narrative: EXAMINATION: XR CHEST 2 VW-  10/30/2024 9:39 AM  HISTORY: pre-op; M72.2-Plantar fascial fibromatosis; M79.671-Pain in right foot; G89.29-Other chronic pain; M62.461-Contracture of muscle, right lower leg; M19.071-Primary osteoarthritis, right ankle and foot  2 view chest x-ray.  COMPARISON: 5/1/2024 and 8/25/2023.  Heart size is normal. The mediastinum is within normal limits.  The lungs are mildly hyperexpanded with no pneumonia or pneumothorax. Mild chronic appearing interstitial disease with a few calcified granulomas. No congestive failure changes.  Thoracic spine moderate degenerative disc and endplate change.      Impression: 1. No acute disease.      This report was signed and finalized on 10/30/2024 10:54 AM by Dr. Collins Vargas MD.         LABORATORY/CULTURE RESULTS:      PATHOLOGY RESULTS:       ASSESSMENT/PLAN     Diagnoses and all orders for this visit:    1. S/P foot surgery, right (Primary)  -     XR Foot 3+ View Right; Future      Comprehensive lower extremity examination and evaluation was performed.  Discussed findings and treatment plan including risks, benefits, and treatment options with patient in detail. Patient agreed with treatment plan.  Dressing removed today.  Incision healed and coapted.  Sutures removed.  No signs of infection or drainage.  Compression sleeve applied.  Patient to continue to be non weightbearing in CAM boot.   Patient may wash the foot, but do not soak.  Patient will return  in approximately 3 weeks with updated x-rays.  Patient may contact the office with any concerns before next scheduled appointment.   An After Visit Summary was printed and given to the patient at discharge, including (if requested) any available informative/educational handouts regarding diagnosis, treatment, or medications. All questions were answered to patient/family satisfaction. Should symptoms fail to improve or worsen they agree to call or return to clinic or to go to the Emergency Department. Discussed the importance of following up with any needed screening tests/labs/specialist appointments and any requested follow-up recommended by me today. Importance of maintaining follow-up discussed and patient accepts that missed appointments can delay diagnosis and potentially lead to worsening of conditions.  Return in about 3 weeks (around 12/17/2024) for Post-op appointment, Follow-up with Podiatry Provider., or sooner if acute issues arise.        This document has been electronically signed by HECTOR Smith on November 26, 2024 14:06 CST

## 2024-11-26 ENCOUNTER — OFFICE VISIT (OUTPATIENT)
Age: 69
End: 2024-11-26
Payer: MEDICARE

## 2024-11-26 VITALS
DIASTOLIC BLOOD PRESSURE: 74 MMHG | HEART RATE: 68 BPM | WEIGHT: 177 LBS | BODY MASS INDEX: 29.49 KG/M2 | SYSTOLIC BLOOD PRESSURE: 132 MMHG | OXYGEN SATURATION: 97 % | HEIGHT: 65 IN

## 2024-11-26 DIAGNOSIS — Z98.890 S/P FOOT SURGERY, RIGHT: Primary | ICD-10-CM

## 2024-11-26 PROCEDURE — 99024 POSTOP FOLLOW-UP VISIT: CPT | Performed by: NURSE PRACTITIONER

## 2024-11-26 PROCEDURE — 3078F DIAST BP <80 MM HG: CPT | Performed by: NURSE PRACTITIONER

## 2024-11-26 PROCEDURE — 3075F SYST BP GE 130 - 139MM HG: CPT | Performed by: NURSE PRACTITIONER

## 2024-12-03 NOTE — ADDENDUM NOTE
Addended by: ASHLEY ARMAS on: 12/17/2021 05:39 PM     Modules accepted: Orders    
PAST SURGICAL HISTORY:  H/O abdominal surgery     S/P excision of lipoma

## 2024-12-12 NOTE — PROGRESS NOTES
[facility] - PODIATRY    Today's Date: 12/17/24    Patient Name: Sedrick Leahy  MRN: [id number]  CSN: [id number]  PCP: Bala Randhawa MD  Referring Provider: No ref. provider found    SUBJECTIVE     Chief Complaint   Patient presents with    Post-op     Bala Randhawa MD-04/30/2024-Return in about 3 weeks     HPI: Sedrick Leahy, a 69 y.o.female, comes to clinic as a(n) established patient for post-op appt 6 weeks s/p Right gastrocnemius recession, plantar fasciotomy,naviculocuneiform arthrodesis, and bone graft harvest . Patient has h/o Arthritis, diabetes, hypertension, low back pain, neck pain .  Patient reports she has continued to be nonweightbearing and continue to wear CAM boot for protection.   Patient is NIDDM and unsure of last BG level.    Denies pain. Relates previous treatment(s) including steroid injection, icing, OTC inserts, foot surgery . Denies any constitutional symptoms. No other pedal complaints at this time.    Past Medical History:   Diagnosis Date    Arthritis     Diabetes mellitus     diet controlled    Foot pain, right     around 1 year off and on    GERD (gastroesophageal reflux disease)     Hx of colonic polyp     Hypertension     Joint pain     Hip    Lower back pain     Neck pain     PONV (postoperative nausea and vomiting)      Past Surgical History:   Procedure Laterality Date    ANTERIOR CERVICAL DISCECTOMY W/ FUSION N/A 11/20/2020    Procedure: EXPLORATION OF FUSION C5-6, ANTERIOR CERVICAL DISCECTOMY FUSION C4-5, C6-7 REVISON ANTERIOR FUSION C5-6 WITH INSTRUMENTATION C4-7;  Surgeon: KEN Gilbert MD;  Location: [facility];  Service: Orthopedic Spine;  Laterality: N/A;    APPENDECTOMY      COLONOSCOPY  06/06/2016    Normal exam repeat in 5 years    COLONOSCOPY N/A 01/14/2020    Diverticulosis repeat exam in 5 years    COLONOSCOPY W/ POLYPECTOMY  04/04/2012    Hyperplastic polyp cecum repeat exam in 1 year    ENDOSCOPY  08/14/2014    Very tortuous distal esophagus  dilated 50 Fr, HH     ENDOSCOPY N/A 01/18/2021    A fundoplication was found, dilated    ENDOSCOPY  06/2023    FOOT FUSION Right 11/6/2024    Procedure: NaviculoCuneiform Joint Arthrodesis, Bone Graft [place], Endoscopic Gastrocnemius Recession, Endoscopic Plantar Fasciotomy - Right Foot;  Surgeon: Sai Mendieta DPM;  Location:  [facility];  Service: Podiatry;  Laterality: Right;    FOOT NAVICULAR EXCISION OR BONE GRAFT Right 11/6/2024    Procedure: Bone Graft [place];  Surgeon: Sai Mendieta DPM;  Location:  [facility];  Service: Podiatry;  Laterality: Right;    GALLBLADDER SURGERY      HARDWARE REMOVAL N/A 11/20/2020    Procedure: REMOVAL OF INSTRUMENTATION;  Surgeon: KEN Gilbert MD;  Location:  [facility];  Service: Orthopedic Spine;  Laterality: N/A;    HERNIA REPAIR      HYSTERECTOMY      Laparoscopic Hysterectomy bilateral salpingectomy for bleeding    LUMBAR LAMINECTOMY WITH FUSION Bilateral 12/11/2023    Procedure: LUMBAR LAMINECTOMY TRANSFORAMINAL LUMBAR INTERBODY FUSION L4/5;  Surgeon: Rick Estes DO;  Location:  [facility];  Service: Neurosurgery;  Laterality: Bilateral;    NECK SURGERY      NISSEN FUNDOPLICATION      PLANTAR FASCIA RELEASE Right 11/6/2024    Procedure: Endoscopic Plantar Fasciotomy - Right Foot;  Surgeon: Sai Mendieta DPM;  Location:  [facility];  Service: Podiatry;  Laterality: Right;    POSTERIOR CERVICAL FUSION N/A 12/03/2020    Procedure: POSTERIOR SPINAL FUSION WITH INSTRUMENTATION C4-7;  Surgeon: KEN Gilbert MD;  Location:  [facility];  Service: Orthopedic Spine;  Laterality: N/A;    RECESSION GASTROCNEMIUS Right 11/6/2024    Procedure: Endoscopic Gastrocnemius Recession, Endoscopic Plantar Fasciotomy - Right Foot;  Surgeon: Sai Mendieta DPM;  Location:  [facility];  Service: Podiatry;  Laterality: Right;    SACROILIAC JOINT INJECTION Bilateral 12/14/2023    Procedure: SACROILIAC INJECTION, Bilateral;  Surgeon: Rick Estes DO;  Location:  [facility]  "OR;  Service: Neurosurgery;  Laterality: Bilateral;    STEROID INJECTION Left 2022    Procedure: LEFT HIP FLUROSCOPIC GUIDED CORTICOSTEROID INJECTION;  Surgeon: Herberth Skelton MD;  Location: [facility] OR;  Service: Orthopedics;  Laterality: Left;    US GUIDED LYMPH NODE BIOPSY  2020     Family History   Problem Relation Age of Onset    No Known Problems Father     Colon cancer Mother     Colon polyps Mother     Diabetes Brother     Diabetes Brother     Diabetes Son     Diabetes Son     Heart attack Paternal Grandmother     Breast cancer Maternal Aunt     Lymphoma Maternal Aunt     Breast cancer Maternal Aunt     Lung cancer Maternal Aunt     Diabetes Maternal Aunt     Breast cancer Maternal Aunt      Social History     Socioeconomic History    Marital status:      Spouse name: Jason    Number of children: 2    Years of education: 12   Tobacco Use    Smoking status: Former     Current packs/day: 0.00     Average packs/day: 2.0 packs/day for 36.1 years (72.1 ttl pk-yrs)     Types: Cigarettes     Start date: 1954     Quit date: 7/10/1990     Years since quittin.4     Passive exposure: Past    Smokeless tobacco: Never   Vaping Use    Vaping status: Never Used   Substance and Sexual Activity    Alcohol use: Yes     Comment: rare    Drug use: No    Sexual activity: Defer     Partners: Male     Allergies   Allergen Reactions    Penicillins Swelling and Rash    Morphine GI Intolerance     Pt states morphine makes her sick and has \"had GERD surgery is unable to vomit\"     Current Outpatient Medications   Medication Sig Dispense Refill    acetaminophen (TYLENOL) 325 MG tablet Take 2 tablets by mouth Every 4 (Four) Hours As Needed for Mild Pain.      amitriptyline (ELAVIL) 100 MG tablet TAKE ONE (1) TO TWO (2) TABLETS AT BEDTIME AS NEEDED SLEEP (Patient taking differently: Take 1 tablet by mouth At Night As Needed for Sleep. TAKE ONE (1) TO TWO (2) TABLETS AT BEDTIME AS NEEDED SLEEP) 180 tablet 0    " aspirin 325 MG tablet Take 1 tablet by mouth Daily. 42 tablet 0    polyethylene glycol (MIRALAX) 17 g packet Take 17 g by mouth Daily As Needed (Use if senna-docusate is ineffective).      atorvastatin (LIPITOR) 20 MG tablet Take 1 tablet by mouth Daily. (Patient not taking: Reported on 12/17/2024) 90 tablet 5    docusate sodium (COLACE) 100 MG capsule Take 1 capsule by mouth Daily As Needed for Constipation. (Patient not taking: Reported on 12/17/2024) 7 capsule 0    naloxone (NARCAN) 4 MG/0.1ML nasal spray Call 911. Don't prime. [place] in 1 nostril for overdose. Repeat in 2-3 minutes in other nostril if no or minimal breathing/responsiveness. (Patient not taking: Reported on 12/17/2024) 2 each 0    ondansetron (Zofran) 4 MG tablet Take 1 tablet by mouth Every 8 (Eight) Hours As Needed for Nausea or Vomiting. (Patient not taking: Reported on 12/17/2024) 21 tablet 0    oxyCODONE-acetaminophen (PERCOCET) 7.5-325 MG per tablet Take 1 tablet by mouth Every 6 (Six) Hours As Needed (Pain). (Patient not taking: Reported on 12/17/2024) 28 tablet 0     No current facility-administered medications for this visit.     Review of Systems   Constitutional:  Negative for chills and fever.   HENT:  Negative for congestion.    Respiratory:  Negative for shortness of breath.    Cardiovascular:  Negative for chest pain and leg swelling.   Gastrointestinal:  Negative for constipation, diarrhea, nausea and vomiting.   Musculoskeletal:  Positive for arthralgias. Negative for myalgias.   Skin:  Negative for wound.   Neurological:  Negative for numbness.       OBJECTIVE     Vitals:    12/17/24 1315   BP: 124/80   Pulse: 89   Resp: 16   SpO2: 99%       PHYSICAL EXAM  GEN:   Accompanied by .     Physical Exam  Feet:      Right foot:      Skin integrity: Dry skin present.      Left foot:      Skin integrity: Dry skin present.          Foot/Ankle Exam    GENERAL  Appearance:  appears stated age  Orientation:  AAOx3  Affect:   appropriate  Gait:  non-weight bearing  Assistance:  wheelchair  Right shoe gear: CAM boot  Left shoe gear: casual shoe    VASCULAR     Right Foot Vascularity   Edema grading:  Trace  CFT:  3  Pedal hair growth:  Present  Varicosities:  none     Left Foot Vascularity   Edema grading:  Trace  CFT:  3  Pedal hair growth:  Present  Varicosities:  none     NEUROLOGIC     Right Foot Neurologic   Normal sensation    Light touch sensation: normal  Vibratory sensation: normal  Hot/Cold sensation: normal     Left Foot Neurologic   Normal sensation    Light touch sensation: normal  Vibratory sensation: normal  Hot/Cold sensation:  normal    MUSCULOSKELETAL     Right Foot Musculoskeletal   Ecchymosis:  none  Tenderness:  none    Arch:  Normal     Left Foot Musculoskeletal   Ecchymosis:  none  Tenderness:  none  Arch:  Normal    MUSCLE STRENGTH     Right Foot Muscle Strength   Foot dorsiflexion:  4+  Foot plantar flexion:  4+  Foot inversion:  4+  Foot eversion:  4+     Left Foot Muscle Strength   Foot dorsiflexion:  5  Foot plantar flexion:  5  Foot inversion:  5  Foot eversion:  5    RANGE OF MOTION     Right Foot Range of Motion   Ankle dorsiflexion: 5 decreased      Left Foot Range of Motion   Foot and ankle ROM within normal limits      DERMATOLOGIC      Right Foot Dermatologic   Skin  Positive for dryness.      Left Foot Dermatologic   Skin  Positive for dryness.      Right foot additional comments: Incision healed and coapted.  No drainage or signs of infection.      RADIOLOGY/NUCLEAR:  XR Foot 3+ View Right    Result Date: 12/17/2024  Narrative: EXAM: XR FOOT 3+ VW RIGHT-  INDICATION: Status post naviculocuneiform arthrodesis; Z98.890-Other specified postprocedural states  COMPARISON: 11/6/2024.  TECHNIQUE: 3 views of the right foot was obtained.  FINDINGS:  Status post navicular cuneiform arthrodesis. No radiographic hardware complication. Interval removal of the cast. Likely bone graft harvest site within the  calcaneus. Plantar and dorsal calcaneal enthesophytes. Mild midfoot arthrosis. No acute fracture.      Impression:  Status post navicular cuneiform arthrodesis. No radiographic hardware complication.   This report was signed and finalized on 12/17/2024 12:15 PM by Juwan Roca.         LABORATORY/CULTURE RESULTS:      PATHOLOGY RESULTS:       ASSESSMENT/PLAN     Diagnoses and all orders for this visit:    1. S/P foot surgery, right (Primary)        Comprehensive lower extremity examination and evaluation was performed.  Discussed findings and treatment plan including risks, benefits, and treatment options with patient in detail. Patient agreed with treatment plan.  Updated x-rays reviewed.  Incision remains healed.  Patient may begin heel touch weightbearing in CAM boot.  She may gradually transition to full weightbearing in CAM boot.  She may then transition into full weightbearing in supportive athletic shoe.  Patient may contact the office with any concerns before next scheduled appointment.   An After Visit Summary was printed and given to the patient at discharge, including (if requested) any available informative/educational handouts regarding diagnosis, treatment, or medications. All questions were answered to patient/family satisfaction. Should symptoms fail to improve or worsen they agree to call or return to clinic or to go to the Emergency Department. Discussed the importance of following up with any needed screening tests/labs/specialist appointments and any requested follow-up recommended by me today. Importance of maintaining follow-up discussed and patient accepts that missed appointments can delay diagnosis and potentially lead to worsening of conditions.  Return in about 1 month (around 1/17/2025) for Post-op appointment, Follow-up with Dr. Mendieta., or sooner if acute issues arise.        This document has been electronically signed by HECTOR Smith on December 17, 2024 14:34 CST

## 2024-12-17 ENCOUNTER — OFFICE VISIT (OUTPATIENT)
Age: 69
End: 2024-12-17
Payer: MEDICARE

## 2024-12-17 ENCOUNTER — HOSPITAL ENCOUNTER (OUTPATIENT)
Dept: GENERAL RADIOLOGY | Facility: HOSPITAL | Age: 69
Discharge: HOME | End: 2024-12-17
Admitting: NURSE PRACTITIONER
Payer: MEDICARE

## 2024-12-17 VITALS
HEART RATE: 89 BPM | OXYGEN SATURATION: 99 % | DIASTOLIC BLOOD PRESSURE: 80 MMHG | SYSTOLIC BLOOD PRESSURE: 124 MMHG | RESPIRATION RATE: 16 BRPM

## 2024-12-17 DIAGNOSIS — Z98.890 S/P FOOT SURGERY, RIGHT: ICD-10-CM

## 2024-12-17 DIAGNOSIS — Z98.890 S/P FOOT SURGERY, RIGHT: Primary | ICD-10-CM

## 2024-12-17 PROCEDURE — 73630 X-RAY EXAM OF FOOT: CPT

## 2025-01-13 NOTE — PROGRESS NOTES
[facility] - PODIATRY    Today's Date: 01/21/25    Patient Name: Sedrick Leahy  MRN: [id number]  CSN: [id number]  PCP: Bala Randhawa MD  Referring Provider: No ref. provider found    SUBJECTIVE     Chief Complaint   Patient presents with    Follow-up     Bala Randhawa MD 01/27/25   Surgery 11/07/24 - 1 month post with ABDIEL PER DERECK  Pt states she is here today for post op. Pt states no pain. Pt states if she walks a lot her foot turns purple.         HPI: Sedrick Leahy, a 69 y.o.female, comes to clinic as a(n) established patient for post-op appt 10 weeks s/p Right gastrocnemius recession, plantar fasciotomy, naviculocuneiform arthrodesis, and bone graft harvest . Patient has h/o Arthritis, diabetes, hypertension, low back pain, neck pain .  Patient reports she has transitioned back to a regular shoe without complications notes occasional soreness and swelling but otherwise continues to improve.  Patient is NIDDM and unsure of last BG level.   Denies pain. Relates previous treatment(s) including steroid injection, icing, OTC inserts, foot surgery . Denies any constitutional symptoms. No other pedal complaints at this time.    Past Medical History:   Diagnosis Date    Arthritis     Diabetes mellitus     diet controlled    Foot pain, right     around 1 year off and on    GERD (gastroesophageal reflux disease)     Hx of colonic polyp     Hypertension     Joint pain     Hip    Lower back pain     Neck pain     PONV (postoperative nausea and vomiting)      Past Surgical History:   Procedure Laterality Date    ANTERIOR CERVICAL DISCECTOMY W/ FUSION N/A 11/20/2020    Procedure: EXPLORATION OF FUSION C5-6, ANTERIOR CERVICAL DISCECTOMY FUSION C4-5, C6-7 REVISON ANTERIOR FUSION C5-6 WITH INSTRUMENTATION C4-7;  Surgeon: KEN Gilbert MD;  Location: [facility] OR;  Service: Orthopedic Spine;  Laterality: N/A;    APPENDECTOMY      COLONOSCOPY  06/06/2016    Normal exam repeat in 5 years    COLONOSCOPY N/A  01/14/2020    Diverticulosis repeat exam in 5 years    COLONOSCOPY W/ POLYPECTOMY  04/04/2012    Hyperplastic polyp cecum repeat exam in 1 year    ENDOSCOPY  08/14/2014    Very tortuous distal esophagus dilated 50 Fr, HH     ENDOSCOPY N/A 01/18/2021    A fundoplication was found, dilated    ENDOSCOPY  06/2023    FOOT FUSION Right 11/6/2024    Procedure: NaviculoCuneiform Joint Arthrodesis, Bone Graft [place], Endoscopic Gastrocnemius Recession, Endoscopic Plantar Fasciotomy - Right Foot;  Surgeon: Sia Mendieta DPM;  Location:  [facility] OR;  Service: Podiatry;  Laterality: Right;    FOOT NAVICULAR EXCISION OR BONE GRAFT Right 11/6/2024    Procedure: Bone Graft [place];  Surgeon: Sai Mendieta DPM;  Location:  [facility] OR;  Service: Podiatry;  Laterality: Right;    GALLBLADDER SURGERY      HARDWARE REMOVAL N/A 11/20/2020    Procedure: REMOVAL OF INSTRUMENTATION;  Surgeon: KEN Gilbert MD;  Location:  [facility] OR;  Service: Orthopedic Spine;  Laterality: N/A;    HERNIA REPAIR      HYSTERECTOMY      Laparoscopic Hysterectomy bilateral salpingectomy for bleeding    LUMBAR LAMINECTOMY WITH FUSION Bilateral 12/11/2023    Procedure: LUMBAR LAMINECTOMY TRANSFORAMINAL LUMBAR INTERBODY FUSION L4/5;  Surgeon: Rick Estes DO;  Location:  [facility] OR;  Service: Neurosurgery;  Laterality: Bilateral;    NECK SURGERY      NISSEN FUNDOPLICATION      PLANTAR FASCIA RELEASE Right 11/6/2024    Procedure: Endoscopic Plantar Fasciotomy - Right Foot;  Surgeon: Sai Mendieta DPM;  Location:  [facility];  Service: Podiatry;  Laterality: Right;    POSTERIOR CERVICAL FUSION N/A 12/03/2020    Procedure: POSTERIOR SPINAL FUSION WITH INSTRUMENTATION C4-7;  Surgeon: KEN Gilbert MD;  Location:  [facility];  Service: Orthopedic Spine;  Laterality: N/A;    RECESSION GASTROCNEMIUS Right 11/6/2024    Procedure: Endoscopic Gastrocnemius Recession, Endoscopic Plantar Fasciotomy - Right Foot;  Surgeon: Sai Mendieta DPM;   "Location:  [facility] OR;  Service: Podiatry;  Laterality: Right;    SACROILIAC JOINT INJECTION Bilateral 2023    Procedure: SACROILIAC INJECTION, Bilateral;  Surgeon: Rick Estes DO;  Location:  [facility];  Service: Neurosurgery;  Laterality: Bilateral;    STEROID INJECTION Left 2022    Procedure: LEFT HIP FLUROSCOPIC GUIDED CORTICOSTEROID INJECTION;  Surgeon: Herberth Skelton MD;  Location:  [facility] OR;  Service: Orthopedics;  Laterality: Left;    US GUIDED LYMPH NODE BIOPSY  2020     Family History   Problem Relation Age of Onset    No Known Problems Father     Colon cancer Mother     Colon polyps Mother     Diabetes Brother     Diabetes Brother     Diabetes Son     Diabetes Son     Heart attack Paternal Grandmother     Breast cancer Maternal Aunt     Lymphoma Maternal Aunt     Breast cancer Maternal Aunt     Lung cancer Maternal Aunt     Diabetes Maternal Aunt     Breast cancer Maternal Aunt      Social History     Socioeconomic History    Marital status:      Spouse name: Jason    Number of children: 2    Years of education: 12   Tobacco Use    Smoking status: Former     Current packs/day: 0.00     Average packs/day: 2.0 packs/day for 36.1 years (72.1 ttl pk-yrs)     Types: Cigarettes     Start date: 1954     Quit date: 7/10/1990     Years since quittin.5     Passive exposure: Past    Smokeless tobacco: Never   Vaping Use    Vaping status: Never Used   Substance and Sexual Activity    Alcohol use: Yes     Comment: rare    Drug use: No    Sexual activity: Defer     Partners: Male     Allergies   Allergen Reactions    Penicillins Swelling and Rash    Morphine GI Intolerance     Pt states morphine makes her sick and has \"had GERD surgery is unable to vomit\"     Current Outpatient Medications   Medication Sig Dispense Refill    amitriptyline (ELAVIL) 100 MG tablet TAKE ONE (1) TO TWO (2) TABLETS AT BEDTIME AS NEEDED SLEEP 180 tablet 0    acetaminophen (TYLENOL) 325 MG tablet Take 2 " tablets by mouth Every 4 (Four) Hours As Needed for Mild Pain. (Patient not taking: Reported on 1/21/2025)      aspirin 325 MG tablet Take 1 tablet by mouth Daily. (Patient not taking: Reported on 1/21/2025) 42 tablet 0    atorvastatin (LIPITOR) 20 MG tablet Take 1 tablet by mouth Daily. (Patient not taking: Reported on 1/21/2025) 90 tablet 5    docusate sodium (COLACE) 100 MG capsule Take 1 capsule by mouth Daily As Needed for Constipation. (Patient not taking: Reported on 1/21/2025) 7 capsule 0    naloxone (NARCAN) 4 MG/0.1ML nasal spray Call 911. Don't prime. [place] in 1 nostril for overdose. Repeat in 2-3 minutes in other nostril if no or minimal breathing/responsiveness. (Patient not taking: Reported on 1/21/2025) 2 each 0    ondansetron (Zofran) 4 MG tablet Take 1 tablet by mouth Every 8 (Eight) Hours As Needed for Nausea or Vomiting. (Patient not taking: Reported on 1/21/2025) 21 tablet 0    oxyCODONE-acetaminophen (PERCOCET) 7.5-325 MG per tablet Take 1 tablet by mouth Every 6 (Six) Hours As Needed (Pain). (Patient not taking: Reported on 1/21/2025) 28 tablet 0    polyethylene glycol (MIRALAX) 17 g packet Take 17 g by mouth Daily As Needed (Use if senna-docusate is ineffective).       No current facility-administered medications for this visit.     Review of Systems   Constitutional:  Negative for chills and fever.   HENT:  Negative for congestion.    Respiratory:  Negative for shortness of breath.    Cardiovascular:  Negative for chest pain and leg swelling.   Gastrointestinal:  Negative for constipation, diarrhea, nausea and vomiting.   Musculoskeletal:  Positive for arthralgias. Negative for myalgias.   Skin:  Negative for wound.   Neurological:  Negative for numbness.       OBJECTIVE     Vitals:    01/21/25 1307   BP: 130/80         PHYSICAL EXAM  GEN:   Accompanied by friend.        Foot/Ankle Exam    GENERAL  Appearance:  appears stated age  Orientation:  AAOx3  Affect:  appropriate  Gait:  antalgic  (due to right knee)  Assistance:  independent  Right shoe gear: casual shoe  Left shoe gear: casual shoe    VASCULAR     Right Foot Vascularity   Dorsalis pedis:  2+  Posterior tibial:  2+  Edema grading:  Trace  CFT:  3  Pedal hair growth:  Present  Varicosities:  none     Left Foot Vascularity   Dorsalis pedis:  2+  Posterior tibial:  2+  Edema grading:  Trace  CFT:  3  Pedal hair growth:  Present  Varicosities:  none     NEUROLOGIC     Right Foot Neurologic   Normal sensation    Light touch sensation: normal  Vibratory sensation: normal  Hot/Cold sensation: normal     Left Foot Neurologic   Normal sensation    Light touch sensation: normal  Vibratory sensation: normal  Hot/Cold sensation:  normal    MUSCULOSKELETAL     Right Foot Musculoskeletal   Ecchymosis:  none  Tenderness:  none    Arch:  Normal     Left Foot Musculoskeletal   Ecchymosis:  none  Tenderness:  none  Arch:  Normal    MUSCLE STRENGTH     Right Foot Muscle Strength   Foot dorsiflexion:  4+  Foot plantar flexion:  4+  Foot inversion:  4+  Foot eversion:  4+     Left Foot Muscle Strength   Foot dorsiflexion:  5  Foot plantar flexion:  5  Foot inversion:  5  Foot eversion:  5    RANGE OF MOTION     Right Foot Range of Motion   Foot and ankle ROM within normal limits       Left Foot Range of Motion   Foot and ankle ROM within normal limits      DERMATOLOGIC      Right Foot Dermatologic   Skin  Positive for dryness.      Left Foot Dermatologic   Skin  Positive for dryness.      Right foot additional comments: Incision healed and coapted.  No drainage or signs of infection.      RADIOLOGY/NUCLEAR:  No results found.      LABORATORY/CULTURE RESULTS:      PATHOLOGY RESULTS:       ASSESSMENT/PLAN     Diagnoses and all orders for this visit:    1. S/P foot surgery, right (Primary)          Comprehensive lower extremity examination and evaluation was performed.  Discussed findings and treatment plan including risks, benefits, and treatment options with  patient in detail. Patient agreed with treatment plan.  Ok to continue with normal activities and shoegear to tolerance.   Avoid high impact until 4 months post-op.  An After Visit Summary was printed and given to the patient at discharge, including (if requested) any available informative/educational handouts regarding diagnosis, treatment, or medications. All questions were answered to patient/family satisfaction. Should symptoms fail to improve or worsen they agree to call or return to clinic or to go to the Emergency Department. Discussed the importance of following up with any needed screening tests/labs/specialist appointments and any requested follow-up recommended by me today. Importance of maintaining follow-up discussed and patient accepts that missed appointments can delay diagnosis and potentially lead to worsening of conditions.  Return if symptoms worsen or fail to improve., or sooner if acute issues arise.        This document has been electronically signed by Sai Mendieta DPM on January 21, 2025 13:20 CST

## 2025-01-17 RX ORDER — AMITRIPTYLINE HYDROCHLORIDE 100 MG/1
TABLET ORAL
Qty: 180 TABLET | Refills: 0 | Status: SHIPPED | OUTPATIENT
Start: 2025-01-17

## 2025-01-21 ENCOUNTER — OFFICE VISIT (OUTPATIENT)
Age: 70
End: 2025-01-21
Payer: MEDICARE

## 2025-01-21 VITALS
DIASTOLIC BLOOD PRESSURE: 80 MMHG | BODY MASS INDEX: 29.49 KG/M2 | SYSTOLIC BLOOD PRESSURE: 130 MMHG | HEIGHT: 65 IN | WEIGHT: 177 LBS

## 2025-01-21 DIAGNOSIS — Z98.890 S/P FOOT SURGERY, RIGHT: Primary | ICD-10-CM

## 2025-01-27 ENCOUNTER — OFFICE VISIT (OUTPATIENT)
Dept: FAMILY MEDICINE CLINIC | Facility: CLINIC | Age: 70
End: 2025-01-27
Payer: MEDICARE

## 2025-01-27 VITALS
SYSTOLIC BLOOD PRESSURE: 120 MMHG | WEIGHT: 179 LBS | DIASTOLIC BLOOD PRESSURE: 64 MMHG | HEART RATE: 89 BPM | OXYGEN SATURATION: 95 % | BODY MASS INDEX: 29.82 KG/M2 | HEIGHT: 65 IN

## 2025-01-27 DIAGNOSIS — M70.50 PES ANSERINE BURSITIS: Primary | ICD-10-CM

## 2025-01-27 DIAGNOSIS — G47.00 INSOMNIA, UNSPECIFIED TYPE: ICD-10-CM

## 2025-01-27 DIAGNOSIS — R73.01 ELEVATED FASTING GLUCOSE: ICD-10-CM

## 2025-01-27 DIAGNOSIS — M25.561 ACUTE PAIN OF RIGHT KNEE: ICD-10-CM

## 2025-01-27 PROBLEM — Z01.89 LABORATORY TEST: Status: RESOLVED | Noted: 2018-05-04 | Resolved: 2025-01-27

## 2025-01-27 PROBLEM — E78.5 HYPERLIPIDEMIA: Status: RESOLVED | Noted: 2017-04-30 | Resolved: 2025-01-27

## 2025-01-27 PROBLEM — Z00.00 WELLNESS EXAMINATION: Status: RESOLVED | Noted: 2017-04-30 | Resolved: 2025-01-27

## 2025-01-27 PROBLEM — I10 HYPERTENSION: Status: RESOLVED | Noted: 2017-04-30 | Resolved: 2025-01-27

## 2025-01-27 PROBLEM — E11.9 TYPE 2 DIABETES MELLITUS WITHOUT COMPLICATION, WITHOUT LONG-TERM CURRENT USE OF INSULIN: Status: RESOLVED | Noted: 2019-12-04 | Resolved: 2025-01-27

## 2025-01-27 LAB
EXPIRATION DATE: ABNORMAL
HBA1C MFR BLD: 5.9 % (ref 4.5–5.7)
Lab: ABNORMAL

## 2025-01-27 PROCEDURE — 99214 OFFICE O/P EST MOD 30 MIN: CPT

## 2025-01-27 PROCEDURE — 1125F AMNT PAIN NOTED PAIN PRSNT: CPT

## 2025-01-27 PROCEDURE — 3044F HG A1C LEVEL LT 7.0%: CPT

## 2025-01-27 PROCEDURE — 83037 HB GLYCOSYLATED A1C HOME DEV: CPT

## 2025-01-27 NOTE — PROGRESS NOTES
Hammad Complaint  Hyperlipidemia, Hypertension, and Diabetes    Subjective      Sedrick Leahy presents to [facility]  History of Present Illness  The patient is a 69-year-old female who presents for follow-up of right knee pain, insomnia, and prediabetes.    She underwent foot surgery for plantar fasciitis, which included a bone graft, on 11/06/2024. Post-surgery, she was advised to wear a boot and avoid putting weight on the affected foot. She was informed that her foot was in good condition during a follow-up visit this past Monday. However, upon removing the shoe, she experienced severe pain in her right knee, the same side as the foot surgery, which has rendered her unable to walk. The onset of knee pain coincided with the removal of the boot in mid-December 2024. She reports soreness at the site of the surgical incision and experiences cramping once or twice a month. She has been unable to walk for an extended period and has difficulty squatting or bending her knee. She can flex and extend her knee but experiences pain when bearing weight or attempting to kneel. She has been performing seated exercises, including leg raises and bends, to improve her knee mobility. She has previously undergone physical therapy for other issues. She was prescribed pain medication post-surgery but discontinued it due to adverse effects. She also tried CBD oil without relief. She has a history of meniscus issues and received an injection from Dr. Skelton in the past.    She takes amitriptyline at night for insomnia.    She was given metformin once because her A1c was slightly high. She does not take any medication for diabetes. She does not have hypertension or hyperlipidemia.    Supplemental Information  She takes MiraLAX every night.    MEDICATIONS  Current: amitriptyline, MiraLAX  Discontinued: Percocet    IMMUNIZATIONS  She has received her COVID-19 and influenza vaccines.      Objective   Vital  "Signs:  /64   Pulse 89   Ht 165.1 cm (65\")   Wt 81.2 kg (179 lb)   SpO2 95%   BMI 29.79 kg/m²   Estimated body mass index is 29.79 kg/m² as calculated from the following:    Height as of this encounter: 165.1 cm (65\").    Weight as of this encounter: 81.2 kg (179 lb).        Physical Exam     Physical Exam  There is a little bit of swelling in the back of the right knee compared to the left one.      Result Review :  The following labs/imaging/notes were reviewed and discussed with the patient by Bala Randhawa MD on 01/27/2025:   Latest Reference Range & Units 05/07/24 08:49   Total Cholesterol 0 - 200 mg/dL 168   HDL Cholesterol 40 - 60 mg/dL 60   LDL Cholesterol  0 - 100 mg/dL 97   Triglycerides 0 - 150 mg/dL 57   VLDL Cholesterol Cash 5 - 40 mg/dL 11             Assessment      Diagnoses and all orders for this visit:    1. Pes anserine bursitis (Primary)  -     Ambulatory Referral to Physical Therapy for Evaluation & Treatment    2. Acute pain of right knee  -     Ambulatory Referral to Physical Therapy for Evaluation & Treatment    3. Insomnia, unspecified type    4. Elevated fasting glucose  -     POC Glycated Hemoglobin, Total             Assessment & Plan  1. Pes anserine bursitis of the right knee.  The symptoms and physical examination findings are consistent with pes anserine bursitis. There is also a potential Baker's cyst, as indicated by the presence of a knot and inflammation behind the knee. Physical therapy will be initiated to help alleviate the symptoms. A brace is recommended to reduce pressure on the knee. Over-the-counter Voltaren gel (diclofenac) should be applied 2 to 3 times daily to manage pain and inflammation. She is advised to perform daily stretches as demonstrated during physical therapy sessions. If symptoms worsen, steroid injections or drainage may be considered.    2. Insomnia.  She is currently taking amitriptyline for insomnia, which appears to be effective. She will " continue with this medication.    3. Prediabetes.  Her A1c levels are within the normal range, and she is not on any diabetic medications. She was previously given metformin when her A1c was slightly high but is not currently taking it. The diagnosis of diabetes will be removed from her chart.    4. Hyperlipidemia.  Her cholesterol levels are within the normal range. The diagnosis of hyperlipidemia will be removed from her chart.    PROCEDURE  The patient underwent foot surgery for plantar fasciitis with a bone graft on 11/06/2023. She also received a meniscus injection from Dr. Skelton in the past.    Orders Placed This Encounter   Procedures    Ambulatory Referral to Physical Therapy for Evaluation & Treatment     Referral Priority:   Routine     Referral Type:   Physical Therapy     Referral Reason:   Patient Preference     Requested Specialty:   Physical Therapy     Number of Visits Requested:   1    POC Glycated Hemoglobin, Total     Order Specific Question:   Release to patient     Answer:   Routine Release [[id number]]       Follow Up   Return in about 2 months (around 3/27/2025) for right sided knee pain post P/t. , Medicare Wellness, annual labs. .  Patient was given instructions and counseling regarding her condition or for health maintenance advice. Please see specific information pulled into the AVS if appropriate.         Patient or patient representative verbalized consent for the use of Ambient Listening during the visit with  Bala Randhawa MD for chart documentation. 1/27/2025  15:42 CST

## 2025-01-28 ENCOUNTER — PATIENT ROUNDING (BHMG ONLY) (OUTPATIENT)
Dept: FAMILY MEDICINE CLINIC | Facility: CLINIC | Age: 70
End: 2025-01-28
Payer: MEDICARE

## 2025-01-28 NOTE — PROGRESS NOTES
January 28, 2025    Hello, may I speak with Sedrick Leahy?    My name is Billie      I am  with [facility]  [address] [facility] [address] [phone].    Before we get started may I verify your date of birth? 1955    I am calling to officially welcome you to our practice and ask about your recent visit. Is this a good time to talk? yes    Tell me about your visit with us. What things went well?  visit went well       We're always looking for ways to make our patients' experiences even better. Do you have recommendations on ways we may improve?  no    Overall were you satisfied with your first visit to our practice? yes       I appreciate you taking the time to speak with me today. Is there anything else I can do for you? no      Thank you, and have a great day.

## 2025-03-05 ENCOUNTER — OFFICE VISIT (OUTPATIENT)
Dept: GASTROENTEROLOGY | Facility: CLINIC | Age: 70
End: 2025-03-05
Payer: MEDICARE

## 2025-03-05 ENCOUNTER — LAB (OUTPATIENT)
Dept: LAB | Facility: HOSPITAL | Age: 70
End: 2025-03-05
Payer: MEDICARE

## 2025-03-05 VITALS
HEART RATE: 89 BPM | HEIGHT: 64 IN | BODY MASS INDEX: 30.39 KG/M2 | WEIGHT: 178 LBS | OXYGEN SATURATION: 98 % | TEMPERATURE: 98 F

## 2025-03-05 DIAGNOSIS — R10.10 PAIN OF UPPER ABDOMEN: ICD-10-CM

## 2025-03-05 DIAGNOSIS — R10.10 PAIN OF UPPER ABDOMEN: Primary | ICD-10-CM

## 2025-03-05 DIAGNOSIS — R11.0 NAUSEA: ICD-10-CM

## 2025-03-05 DIAGNOSIS — Z78.9 NONSMOKER: ICD-10-CM

## 2025-03-05 LAB
ALBUMIN SERPL-MCNC: 4.1 G/DL (ref 3.5–5.2)
ALBUMIN/GLOB SERPL: 1.2 G/DL
ALP SERPL-CCNC: 148 U/L (ref 39–117)
ALT SERPL W P-5'-P-CCNC: 6 U/L (ref 1–33)
ANION GAP SERPL CALCULATED.3IONS-SCNC: 11 MMOL/L (ref 5–15)
AST SERPL-CCNC: 15 U/L (ref 1–32)
BASOPHILS # BLD AUTO: 0.03 10*3/MM3 (ref 0–0.2)
BASOPHILS NFR BLD AUTO: 0.4 % (ref 0–1.5)
BILIRUB SERPL-MCNC: 0.2 MG/DL (ref 0–1.2)
BUN SERPL-MCNC: 17 MG/DL (ref 8–23)
BUN/CREAT SERPL: 21.8 (ref 7–25)
CALCIUM SPEC-SCNC: 9.4 MG/DL (ref 8.6–10.5)
CHLORIDE SERPL-SCNC: 104 MMOL/L (ref 98–107)
CO2 SERPL-SCNC: 25 MMOL/L (ref 22–29)
CREAT SERPL-MCNC: 0.78 MG/DL (ref 0.57–1)
DEPRECATED RDW RBC AUTO: 50.6 FL (ref 37–54)
EGFRCR SERPLBLD CKD-EPI 2021: 81.8 ML/MIN/1.73
EOSINOPHIL # BLD AUTO: 0.07 10*3/MM3 (ref 0–0.4)
EOSINOPHIL NFR BLD AUTO: 1 % (ref 0.3–6.2)
ERYTHROCYTE [DISTWIDTH] IN BLOOD BY AUTOMATED COUNT: 17.4 % (ref 12.3–15.4)
GLOBULIN UR ELPH-MCNC: 3.4 GM/DL
GLUCOSE SERPL-MCNC: 101 MG/DL (ref 65–99)
HCT VFR BLD AUTO: 36.6 % (ref 34–46.6)
HGB BLD-MCNC: 10.8 G/DL (ref 12–15.9)
IMM GRANULOCYTES # BLD AUTO: 0.03 10*3/MM3 (ref 0–0.05)
IMM GRANULOCYTES NFR BLD AUTO: 0.4 % (ref 0–0.5)
LIPASE SERPL-CCNC: 19 U/L (ref 13–60)
LYMPHOCYTES # BLD AUTO: 2.75 10*3/MM3 (ref 0.7–3.1)
LYMPHOCYTES NFR BLD AUTO: 39.6 % (ref 19.6–45.3)
MCH RBC QN AUTO: 24 PG (ref 26.6–33)
MCHC RBC AUTO-ENTMCNC: 29.5 G/DL (ref 31.5–35.7)
MCV RBC AUTO: 81.3 FL (ref 79–97)
MONOCYTES # BLD AUTO: 0.53 10*3/MM3 (ref 0.1–0.9)
MONOCYTES NFR BLD AUTO: 7.6 % (ref 5–12)
NEUTROPHILS NFR BLD AUTO: 3.54 10*3/MM3 (ref 1.7–7)
NEUTROPHILS NFR BLD AUTO: 51 % (ref 42.7–76)
NRBC BLD AUTO-RTO: 0 /100 WBC (ref 0–0.2)
PLATELET # BLD AUTO: 393 10*3/MM3 (ref 140–450)
PMV BLD AUTO: 9.9 FL (ref 6–12)
POTASSIUM SERPL-SCNC: 4 MMOL/L (ref 3.5–5.2)
PROT SERPL-MCNC: 7.5 G/DL (ref 6–8.5)
RBC # BLD AUTO: 4.5 10*6/MM3 (ref 3.77–5.28)
SODIUM SERPL-SCNC: 140 MMOL/L (ref 136–145)
WBC NRBC COR # BLD AUTO: 6.95 10*3/MM3 (ref 3.4–10.8)

## 2025-03-05 PROCEDURE — 83690 ASSAY OF LIPASE: CPT | Performed by: CLINICAL NURSE SPECIALIST

## 2025-03-05 PROCEDURE — 80053 COMPREHEN METABOLIC PANEL: CPT

## 2025-03-05 PROCEDURE — 36415 COLL VENOUS BLD VENIPUNCTURE: CPT

## 2025-03-05 PROCEDURE — 1160F RVW MEDS BY RX/DR IN RCRD: CPT | Performed by: CLINICAL NURSE SPECIALIST

## 2025-03-05 PROCEDURE — 99214 OFFICE O/P EST MOD 30 MIN: CPT | Performed by: CLINICAL NURSE SPECIALIST

## 2025-03-05 PROCEDURE — 85025 COMPLETE CBC W/AUTO DIFF WBC: CPT

## 2025-03-05 PROCEDURE — 1159F MED LIST DOCD IN RCRD: CPT | Performed by: CLINICAL NURSE SPECIALIST

## 2025-03-05 NOTE — PROGRESS NOTES
Sedrick Leahy  1955    3/5/2025  Chief Complaint   Patient presents with    GI Problem     Severe abdominal pain     Subjective   HPI  Sedrick Leahy is a 69 y.o. female who presents with a complaint of abdominal pain upper abdomen described as a sharp stabbing pain lasts minutes. Started for a few months. She rates her pain 6 out of 10. She was having difficulty with constipation and she is on Miralax which is helping her. She is going daily. Eating makes it worse. It happens as soon as she eats she says. She has nausea but cannot vomit she says. No fever chills or sweats. No wt loss. No fever chills or sweats. No melena. No brbpr.   She has a hx of hiatal hernia that was repaired by DR Cleaning approx 5 years ago.      Past Medical History:   Diagnosis Date    Arthritis     Diabetes mellitus     diet controlled    Foot pain, right     around 1 year off and on    GERD (gastroesophageal reflux disease)     Hx of colonic polyp     Hypertension     Joint pain     Hip    Lower back pain     Neck pain     PONV (postoperative nausea and vomiting)      Past Surgical History:   Procedure Laterality Date    ANTERIOR CERVICAL DISCECTOMY W/ FUSION N/A 11/20/2020    Procedure: EXPLORATION OF FUSION C5-6, ANTERIOR CERVICAL DISCECTOMY FUSION C4-5, C6-7 REVISON ANTERIOR FUSION C5-6 WITH INSTRUMENTATION C4-7;  Surgeon: KEN Gilbert MD;  Location: [facility];  Service: Orthopedic Spine;  Laterality: N/A;    APPENDECTOMY      COLONOSCOPY  06/06/2016    Normal exam repeat in 5 years    COLONOSCOPY N/A 01/14/2020    Diverticulosis repeat exam in 5 years    COLONOSCOPY  04/10/2023    Dr. Foy-normal colon repeat 5 years    COLONOSCOPY W/ POLYPECTOMY  04/04/2012    Hyperplastic polyp cecum repeat exam in 1 year    ENDOSCOPY  08/14/2014    Very tortuous distal esophagus dilated 50 Fr, HH     ENDOSCOPY N/A 01/18/2021    A fundoplication was found, dilated    ENDOSCOPY  04/10/2023    Dr. Foy-normal endo-neg hpylori     FOOT FUSION Right 11/06/2024    Procedure: NaviculoCuneiform Joint Arthrodesis, Bone Graft [place], Endoscopic Gastrocnemius Recession, Endoscopic Plantar Fasciotomy - Right Foot;  Surgeon: Sai Mendieta DPM;  Location:  [facility] OR;  Service: Podiatry;  Laterality: Right;    FOOT NAVICULAR EXCISION OR BONE GRAFT Right 11/06/2024    Procedure: Bone Graft [place];  Surgeon: Sai Mendieta DPM;  Location:  [facility] OR;  Service: Podiatry;  Laterality: Right;    GALLBLADDER SURGERY      HARDWARE REMOVAL N/A 11/20/2020    Procedure: REMOVAL OF INSTRUMENTATION;  Surgeon: KEN Gilbert MD;  Location:  [facility] OR;  Service: Orthopedic Spine;  Laterality: N/A;    HERNIA REPAIR      HYSTERECTOMY      Laparoscopic Hysterectomy bilateral salpingectomy for bleeding    LUMBAR LAMINECTOMY WITH FUSION Bilateral 12/11/2023    Procedure: LUMBAR LAMINECTOMY TRANSFORAMINAL LUMBAR INTERBODY FUSION L4/5;  Surgeon: Rick Estes DO;  Location:  [facility] OR;  Service: Neurosurgery;  Laterality: Bilateral;    NECK SURGERY      NISSEN FUNDOPLICATION      PLANTAR FASCIA RELEASE Right 11/06/2024    Procedure: Endoscopic Plantar Fasciotomy - Right Foot;  Surgeon: Sai Mendieta DPM;  Location:  [facility] OR;  Service: Podiatry;  Laterality: Right;    POSTERIOR CERVICAL FUSION N/A 12/03/2020    Procedure: POSTERIOR SPINAL FUSION WITH INSTRUMENTATION C4-7;  Surgeon: KEN Gilbert MD;  Location:  [facility] OR;  Service: Orthopedic Spine;  Laterality: N/A;    RECESSION GASTROCNEMIUS Right 11/06/2024    Procedure: Endoscopic Gastrocnemius Recession, Endoscopic Plantar Fasciotomy - Right Foot;  Surgeon: Sai Mendieta DPM;  Location:  [facility] OR;  Service: Podiatry;  Laterality: Right;    SACROILIAC JOINT INJECTION Bilateral 12/14/2023    Procedure: SACROILIAC INJECTION, Bilateral;  Surgeon: Rick Estes DO;  Location:  [facility] OR;  Service: Neurosurgery;  Laterality: Bilateral;    STEROID INJECTION Left 06/30/2022    Procedure:  "LEFT HIP FLUROSCOPIC GUIDED CORTICOSTEROID INJECTION;  Surgeon: Herberht Skelton MD;  Location: [facility];  Service: Orthopedics;  Laterality: Left;    US GUIDED LYMPH NODE BIOPSY  2020       Outpatient Medications Marked as Taking for the 3/5/25 encounter (Office Visit) with Marva Caal APRN   Medication Sig Dispense Refill    acetaminophen (TYLENOL) 325 MG tablet Take 2 tablets by mouth Every 4 (Four) Hours As Needed for Mild Pain.      amitriptyline (ELAVIL) 100 MG tablet TAKE ONE (1) TO TWO (2) TABLETS AT BEDTIME AS NEEDED SLEEP 180 tablet 0     Allergies   Allergen Reactions    Penicillins Swelling and Rash    Morphine GI Intolerance     Pt states morphine makes her sick and has \"had GERD surgery is unable to vomit\"     Social History     Socioeconomic History    Marital status:      Spouse name: Jason    Number of children: 2    Years of education: 12   Tobacco Use    Smoking status: Former     Current packs/day: 0.00     Average packs/day: 2.0 packs/day for 36.1 years (72.1 ttl pk-yrs)     Types: Cigarettes     Start date: 1955     Quit date: 7/10/1990     Years since quittin.6     Passive exposure: Past    Smokeless tobacco: Never   Vaping Use    Vaping status: Never Used   Substance and Sexual Activity    Alcohol use: Yes     Comment: rare    Drug use: No    Sexual activity: Defer     Partners: Male     Family History   Problem Relation Age of Onset    No Known Problems Father     Colon cancer Mother     Colon polyps Mother     Diabetes Brother     Diabetes Brother     Diabetes Son     Diabetes Son     Heart attack Paternal Grandmother     Breast cancer Maternal Aunt     Lymphoma Maternal Aunt     Breast cancer Maternal Aunt     Lung cancer Maternal Aunt     Diabetes Maternal Aunt     Breast cancer Maternal Aunt      Health Maintenance   Topic Date Due    ZOSTER VACCINE (1 of 2) Never done    PAP SMEAR  Never done    Pneumococcal Vaccine 50+ (2 of 2 - PPSV23) 2020    " "DIABETIC EYE EXAM  05/11/2022    ANNUAL WELLNESS VISIT  10/20/2022    DIABETIC FOOT EXAM  10/20/2022    URINE MICROALBUMIN-CREATININE RATIO (uACR)  05/07/2025    LIPID PANEL  05/07/2025    HEMOGLOBIN A1C  07/27/2025    BMI FOLLOWUP  01/27/2026    DXA SCAN  07/15/2026    MAMMOGRAM  07/29/2026    COLORECTAL CANCER SCREENING  04/10/2028    TDAP/TD VACCINES (2 - Td or Tdap) 10/20/2031    HEPATITIS C SCREENING  Completed    COVID-19 Vaccine  Completed    INFLUENZA VACCINE  Completed     Review of Systems   Constitutional:  Negative for activity change, appetite change, chills, diaphoresis, fatigue, fever and unexpected weight change.   HENT:  Negative for ear pain, hearing loss, mouth sores, sore throat, trouble swallowing and voice change.    Eyes: Negative.    Respiratory:  Negative for cough, choking, shortness of breath and wheezing.    Cardiovascular:  Negative for chest pain and palpitations.   Gastrointestinal:  Positive for abdominal pain and nausea. Negative for blood in stool, constipation, diarrhea and vomiting.   Endocrine: Negative for cold intolerance and heat intolerance.   Genitourinary:  Negative for decreased urine volume, dysuria, frequency, hematuria and urgency.   Musculoskeletal:  Negative for back pain, gait problem and myalgias.   Skin:  Negative for color change, pallor and rash.   Allergic/Immunologic: Negative for food allergies and immunocompromised state.   Neurological:  Negative for dizziness, tremors, seizures, syncope, weakness, light-headedness, numbness and headaches.   Hematological:  Negative for adenopathy. Does not bruise/bleed easily.   Psychiatric/Behavioral:  Negative for agitation and confusion. The patient is not nervous/anxious.    All other systems reviewed and are negative.    Objective   Vitals:    03/05/25 1031   Pulse: 89   Temp: 98 °F (36.7 °C)   SpO2: 98%   Weight: 80.7 kg (178 lb)   Height: 162.6 cm (64\")     Body mass index is 30.55 kg/m².  Physical " Exam  Constitutional:       Appearance: She is well-developed.   HENT:      Head: Normocephalic and atraumatic.   Eyes:      Pupils: Pupils are equal, round, and reactive to light.   Neck:      Trachea: No tracheal deviation.   Cardiovascular:      Rate and Rhythm: Normal rate and regular rhythm.      Heart sounds: Normal heart sounds. No murmur heard.     No friction rub. No gallop.   Pulmonary:      Effort: Pulmonary effort is normal. No respiratory distress.      Breath sounds: Normal breath sounds. No wheezing or rales.   Chest:      Chest wall: No tenderness.   Abdominal:      General: Bowel sounds are normal. There is no distension.      Palpations: Abdomen is soft. Abdomen is not rigid.      Tenderness: There is no abdominal tenderness (right upper quadrant). There is no guarding or rebound.   Musculoskeletal:         General: No tenderness or deformity. Normal range of motion.      Cervical back: Normal range of motion and neck supple.   Skin:     General: Skin is warm and dry.      Coloration: Skin is not pale.      Findings: No rash.   Neurological:      Mental Status: She is alert and oriented to person, place, and time.      Deep Tendon Reflexes: Reflexes are normal and symmetric.   Psychiatric:         Behavior: Behavior normal.         Thought Content: Thought content normal.         Judgment: Judgment normal.       Assessment & Plan   Diagnoses and all orders for this visit:    1. Pain of upper abdomen (Primary)  -     Lipase  -     CBC & Differential; Future  -     Comprehensive Metabolic Panel; Future  -     CT Abdomen Pelvis With Contrast; Future    2. Nausea    3. Nonsmoker        Part of this note may be an electronic transcription/translation of spoken language to printed text using the Dragon Dictation System.  Body mass index is 30.55 kg/m².  Return in about 2 weeks (around 3/19/2025).           All risks, benefits, alternatives, and indications of colonoscopy and/or Endoscopy procedure have  been discussed with the patient. Risks to include perforation of the colon requiring possible surgery or colostomy, risk of bleeding from biopsies or removal of colon tissue, possibility of missing a colon polyp or cancer, or adverse drug reaction.  Benefits to include the diagnosis and management of disease of the colon and rectum. Alternatives to include barium enema, radiographic evaluation, lab testing or no intervention. Pt verbalizes understanding and agrees.     Marva Vaca Ten, APRN  3/5/2025  11:24 CST          If you smoke or use tobacco, 4 minutes reading provided  Steps to Quit Smoking  Smoking tobacco can be harmful to your health and can affect almost every organ in your body. Smoking puts you, and those around you, at risk for developing many serious chronic diseases. Quitting smoking is difficult, but it is one of the best things that you can do for your health. It is never too late to quit.  What are the benefits of quitting smoking?  When you quit smoking, you lower your risk of developing serious diseases and conditions, such as:  Lung cancer or lung disease, such as COPD.  Heart disease.  Stroke.  Heart attack.  Infertility.  Osteoporosis and bone fractures.  Additionally, symptoms such as coughing, wheezing, and shortness of breath may get better when you quit. You may also find that you get sick less often because your body is stronger at fighting off colds and infections. If you are pregnant, quitting smoking can help to reduce your chances of having a baby of low birth weight.  How do I get ready to quit?  When you decide to quit smoking, create a plan to make sure that you are successful. Before you quit:  Pick a date to quit. Set a date within the next two weeks to give you time to prepare.  Write down the reasons why you are quitting. Keep this list in places where you will see it often, such as on your bathroom mirror or in your car or wallet.  Identify the people, places, things, and  activities that make you want to smoke (triggers) and avoid them. Make sure to take these actions:  Throw away all cigarettes at home, at work, and in your car.  Throw away smoking accessories, such as ashtrays and lighters.  Clean your car and make sure to empty the ashtray.  Clean your home, including curtains and carpets.  Tell your family, friends, and coworkers that you are quitting. Support from your loved ones can make quitting easier.  Talk with your health care provider about your options for quitting smoking.  Find out what treatment options are covered by your health insurance.  What strategies can I use to quit smoking?  Talk with your healthcare provider about different strategies to quit smoking. Some strategies include:  Quitting smoking altogether instead of gradually lessening how much you smoke over a period of time. Research shows that quitting “cold turkey” is more successful than gradually quitting.  Attending in-person counseling to help you build problem-solving skills. You are more likely to have success in quitting if you attend several counseling sessions. Even short sessions of 10 minutes can be effective.  Finding resources and support systems that can help you to quit smoking and remain smoke-free after you quit. These resources are most helpful when you use them often. They can include:  Online chats with a counselor.  Telephone quitlines.  Printed self-help materials.  Support groups or group counseling.  Text messaging programs.  Mobile phone applications.  Taking medicines to help you quit smoking. (If you are pregnant or breastfeeding, talk with your health care provider first.) Some medicines contain nicotine and some do not. Both types of medicines help with cravings, but the medicines that include nicotine help to relieve withdrawal symptoms. Your health care provider may recommend:  Nicotine patches, gum, or lozenges.  Nicotine inhalers or sprays.  Non-nicotine medicine that is  taken by mouth.  Talk with your health care provider about combining strategies, such as taking medicines while you are also receiving in-person counseling. Using these two strategies together makes you more likely to succeed in quitting than if you used either strategy on its own.  If you are pregnant or breastfeeding, talk with your health care provider about finding counseling or other support strategies to quit smoking. Do not take medicine to help you quit smoking unless told to do so by your health care provider.  What things can I do to make it easier to quit?  Quitting smoking might feel overwhelming at first, but there is a lot that you can do to make it easier. Take these important actions:  Reach out to your family and friends and ask that they support and encourage you during this time. Call telephone quitlines, reach out to support groups, or work with a counselor for support.  Ask people who smoke to avoid smoking around you.  Avoid places that trigger you to smoke, such as bars, parties, or smoke-break areas at work.  Spend time around people who do not smoke.  Lessen stress in your life, because stress can be a smoking trigger for some people. To lessen stress, try:  Exercising regularly.  Deep-breathing exercises.  Yoga.  Meditating.  Performing a body scan. This involves closing your eyes, scanning your body from head to toe, and noticing which parts of your body are particularly tense. Purposefully relax the muscles in those areas.  Download or purchase mobile phone or tablet apps (applications) that can help you stick to your quit plan by providing reminders, tips, and encouragement. There are many free apps, such as QuitGuide from the CDC (Centers for Disease Control and Prevention). You can find other support for quitting smoking (smoking cessation) through smokefree.gov and other websites.  How will I feel when I quit smoking?  Within the first 24 hours of quitting smoking, you may start to  feel some withdrawal symptoms. These symptoms are usually most noticeable 2-3 days after quitting, but they usually do not last beyond 2-3 weeks. Changes or symptoms that you might experience include:  Mood swings.  Restlessness, anxiety, or irritation.  Difficulty concentrating.  Dizziness.  Strong cravings for sugary foods in addition to nicotine.  Mild weight gain.  Constipation.  Nausea.  Coughing or a sore throat.  Changes in how your medicines work in your body.  A depressed mood.  Difficulty sleeping (insomnia).  After the first 2-3 weeks of quitting, you may start to notice more positive results, such as:  Improved sense of smell and taste.  Decreased coughing and sore throat.  Slower heart rate.  Lower blood pressure.  Clearer skin.  The ability to breathe more easily.  Fewer sick days.  Quitting smoking is very challenging for most people. Do not get discouraged if you are not successful the first time. Some people need to make many attempts to quit before they achieve long-term success. Do your best to stick to your quit plan, and talk with your health care provider if you have any questions or concerns.  This information is not intended to replace advice given to you by your health care provider. Make sure you discuss any questions you have with your health care provider.  Document Released: 12/12/2002 Document Revised: 08/15/2017 Document Reviewed: 05/03/2016  [employer] Interactive Patient Education © 2017 [employer]

## 2025-03-06 NOTE — PROGRESS NOTES
Let her know she has anemia and we will follow up on this. No signs of pancreatitis or infection. Please get CT  HECTOR Metz

## 2025-03-17 ENCOUNTER — HOSPITAL ENCOUNTER (OUTPATIENT)
Dept: CT IMAGING | Facility: HOSPITAL | Age: 70
Discharge: HOME | End: 2025-03-17
Admitting: CLINICAL NURSE SPECIALIST
Payer: MEDICARE

## 2025-03-17 DIAGNOSIS — R10.10 PAIN OF UPPER ABDOMEN: ICD-10-CM

## 2025-03-17 PROCEDURE — 74177 CT ABD & PELVIS W/CONTRAST: CPT

## 2025-03-17 PROCEDURE — 25510000001 IOPAMIDOL 61 % SOLUTION: Performed by: CLINICAL NURSE SPECIALIST

## 2025-03-17 RX ORDER — IOPAMIDOL 612 MG/ML
100 INJECTION, SOLUTION INTRAVASCULAR
Status: COMPLETED | OUTPATIENT
Start: 2025-03-17 | End: 2025-03-17

## 2025-03-17 RX ADMIN — IOPAMIDOL 100 ML: 612 INJECTION, SOLUTION INTRAVENOUS at 16:10

## 2025-03-20 ENCOUNTER — LAB (OUTPATIENT)
Dept: LAB | Facility: HOSPITAL | Age: 70
End: 2025-03-20
Payer: MEDICARE

## 2025-03-20 ENCOUNTER — OFFICE VISIT (OUTPATIENT)
Dept: GASTROENTEROLOGY | Facility: CLINIC | Age: 70
End: 2025-03-20
Payer: MEDICARE

## 2025-03-20 VITALS
HEART RATE: 80 BPM | WEIGHT: 179.2 LBS | OXYGEN SATURATION: 100 % | SYSTOLIC BLOOD PRESSURE: 130 MMHG | DIASTOLIC BLOOD PRESSURE: 80 MMHG | BODY MASS INDEX: 30.59 KG/M2 | HEIGHT: 64 IN | TEMPERATURE: 97.3 F

## 2025-03-20 DIAGNOSIS — R74.8 ABNORMAL SERUM LEVEL OF ALKALINE PHOSPHATASE: ICD-10-CM

## 2025-03-20 DIAGNOSIS — Z78.9 NONSMOKER: ICD-10-CM

## 2025-03-20 DIAGNOSIS — R11.0 NAUSEA: ICD-10-CM

## 2025-03-20 DIAGNOSIS — Z11.59 ENCOUNTER FOR SCREENING FOR OTHER VIRAL DISEASES: ICD-10-CM

## 2025-03-20 DIAGNOSIS — R10.10 PAIN OF UPPER ABDOMEN: Primary | ICD-10-CM

## 2025-03-20 LAB
HBV SURFACE AB SER RIA-ACNC: NORMAL
HBV SURFACE AG SERPL QL IA: NORMAL
HCV AB SER QL: NORMAL

## 2025-03-20 PROCEDURE — 86803 HEPATITIS C AB TEST: CPT | Performed by: CLINICAL NURSE SPECIALIST

## 2025-03-20 PROCEDURE — 86704 HEP B CORE ANTIBODY TOTAL: CPT | Performed by: CLINICAL NURSE SPECIALIST

## 2025-03-20 PROCEDURE — 86708 HEPATITIS A ANTIBODY: CPT | Performed by: CLINICAL NURSE SPECIALIST

## 2025-03-20 PROCEDURE — 84075 ASSAY ALKALINE PHOSPHATASE: CPT

## 2025-03-20 PROCEDURE — 87340 HEPATITIS B SURFACE AG IA: CPT | Performed by: CLINICAL NURSE SPECIALIST

## 2025-03-20 PROCEDURE — 1160F RVW MEDS BY RX/DR IN RCRD: CPT | Performed by: CLINICAL NURSE SPECIALIST

## 2025-03-20 PROCEDURE — 86038 ANTINUCLEAR ANTIBODIES: CPT

## 2025-03-20 PROCEDURE — 1159F MED LIST DOCD IN RCRD: CPT | Performed by: CLINICAL NURSE SPECIALIST

## 2025-03-20 PROCEDURE — 36415 COLL VENOUS BLD VENIPUNCTURE: CPT | Performed by: CLINICAL NURSE SPECIALIST

## 2025-03-20 PROCEDURE — 86015 ACTIN ANTIBODY EACH: CPT

## 2025-03-20 PROCEDURE — 84080 ASSAY ALKALINE PHOSPHATASES: CPT

## 2025-03-20 PROCEDURE — 86706 HEP B SURFACE ANTIBODY: CPT | Performed by: CLINICAL NURSE SPECIALIST

## 2025-03-20 PROCEDURE — 99214 OFFICE O/P EST MOD 30 MIN: CPT | Performed by: CLINICAL NURSE SPECIALIST

## 2025-03-20 PROCEDURE — 86381 MITOCHONDRIAL ANTIBODY EACH: CPT

## 2025-03-20 RX ORDER — FAMOTIDINE 20 MG/1
20 TABLET, FILM COATED ORAL 2 TIMES DAILY
Qty: 60 TABLET | Refills: 10 | Status: SHIPPED | OUTPATIENT
Start: 2025-03-20

## 2025-03-20 NOTE — PROGRESS NOTES
Sedrick Leahy  1955    3/20/2025  Chief Complaint   Patient presents with    GI Problem     2 week fu-upper abd pain-still hurting     Subjective   HPI  Sedrick Leahy is a 69 y.o. female who presents with a complaint of stomach pain sharp stabbing, eating can make it worse, it lasts minutes and ongoing for months. She has been under a lot of stress. She has nausea no vomiting. No fever chills or sweats.   Constipation: treated with Miralax and going regular. No lower pain. CT was ordered noted below no findings. Her last endo was in 2023, reviewed today.   She is not on anything for acid reflux. She denies any indigestion.     Note 3/5/25   female who presents with a complaint of abdominal pain upper abdomen described as a sharp stabbing pain lasts minutes. Started for a few months. She rates her pain 6 out of 10. She was having difficulty with constipation and she is on Miralax which is helping her. She is going daily. Eating makes it worse. It happens as soon as she eats she says. She has nausea but cannot vomit she says. No fever chills or sweats. No wt loss. No fever chills or sweats. No melena. No brbpr.   She has a hx of hiatal hernia that was repaired by DR Cleaning approx 5 years ago.   CT abdomen pelvis with contrast 3/17/25   IMPRESSION:     1.  No acute findings in the abdomen or pelvis.     2.  Colonic diverticulosis without evidence of diverticulitis.     This report was signed and finalized on 3/17/2025 4:31 PM by Dr. Trey Granados MD. No suspicious liver lesions few scattered low density liver lesions are again present which likely represent cysts.     Elevated ALKP at 148.     Past Medical History:   Diagnosis Date    Arthritis     Diabetes mellitus     diet controlled    Foot pain, right     around 1 year off and on    GERD (gastroesophageal reflux disease)     Hx of colonic polyp     Hypertension     Joint pain     Hip    Lower back pain     Neck pain     PONV (postoperative nausea and  vomiting)      Past Surgical History:   Procedure Laterality Date    ANTERIOR CERVICAL DISCECTOMY W/ FUSION N/A 11/20/2020    Procedure: EXPLORATION OF FUSION C5-6, ANTERIOR CERVICAL DISCECTOMY FUSION C4-5, C6-7 REVISON ANTERIOR FUSION C5-6 WITH INSTRUMENTATION C4-7;  Surgeon: KEN Gilbert MD;  Location:  [facility];  Service: Orthopedic Spine;  Laterality: N/A;    APPENDECTOMY      COLONOSCOPY  06/06/2016    Normal exam repeat in 5 years    COLONOSCOPY N/A 01/14/2020    Diverticulosis repeat exam in 5 years    COLONOSCOPY  04/10/2023    Dr. Foy-normal colon repeat 5 years    COLONOSCOPY W/ POLYPECTOMY  04/04/2012    Hyperplastic polyp cecum repeat exam in 1 year    ENDOSCOPY  08/14/2014    Very tortuous distal esophagus dilated 50 Fr, HH     ENDOSCOPY N/A 01/18/2021    A fundoplication was found, dilated    ENDOSCOPY  04/10/2023    Dr. Foy-normal endo-neg hpylori    FOOT FUSION Right 11/06/2024    Procedure: NaviculoCuneiform Joint Arthrodesis, Bone Graft [place], Endoscopic Gastrocnemius Recession, Endoscopic Plantar Fasciotomy - Right Foot;  Surgeon: Sai Mendieta DPM;  Location:  [facility];  Service: Podiatry;  Laterality: Right;    FOOT NAVICULAR EXCISION OR BONE GRAFT Right 11/06/2024    Procedure: Bone Graft [place];  Surgeon: Sai Mendieta DPM;  Location:  [facility];  Service: Podiatry;  Laterality: Right;    GALLBLADDER SURGERY      HARDWARE REMOVAL N/A 11/20/2020    Procedure: REMOVAL OF INSTRUMENTATION;  Surgeon: KEN Gilbert MD;  Location:  [facility];  Service: Orthopedic Spine;  Laterality: N/A;    HERNIA REPAIR      HYSTERECTOMY      Laparoscopic Hysterectomy bilateral salpingectomy for bleeding    LUMBAR LAMINECTOMY WITH FUSION Bilateral 12/11/2023    Procedure: LUMBAR LAMINECTOMY TRANSFORAMINAL LUMBAR INTERBODY FUSION L4/5;  Surgeon: Rick Estes DO;  Location:  [facility];  Service: Neurosurgery;  Laterality: Bilateral;    NECK SURGERY      NISSEN FUNDOPLICATION       "PLANTAR FASCIA RELEASE Right 11/06/2024    Procedure: Endoscopic Plantar Fasciotomy - Right Foot;  Surgeon: Sai Mendieta DPM;  Location:  [facility];  Service: Podiatry;  Laterality: Right;    POSTERIOR CERVICAL FUSION N/A 12/03/2020    Procedure: POSTERIOR SPINAL FUSION WITH INSTRUMENTATION C4-7;  Surgeon: KEN Gilbert MD;  Location:  [facility];  Service: Orthopedic Spine;  Laterality: N/A;    RECESSION GASTROCNEMIUS Right 11/06/2024    Procedure: Endoscopic Gastrocnemius Recession, Endoscopic Plantar Fasciotomy - Right Foot;  Surgeon: Sai Mendieta DPM;  Location:  [facility];  Service: Podiatry;  Laterality: Right;    SACROILIAC JOINT INJECTION Bilateral 12/14/2023    Procedure: SACROILIAC INJECTION, Bilateral;  Surgeon: Rick Estes DO;  Location:  [facility];  Service: Neurosurgery;  Laterality: Bilateral;    STEROID INJECTION Left 06/30/2022    Procedure: LEFT HIP FLUROSCOPIC GUIDED CORTICOSTEROID INJECTION;  Surgeon: Herberth Skelton MD;  Location:  [facility];  Service: Orthopedics;  Laterality: Left;    US GUIDED LYMPH NODE BIOPSY  08/03/2020       Outpatient Medications Marked as Taking for the 3/20/25 encounter (Office Visit) with Marva Caal APRN   Medication Sig Dispense Refill    acetaminophen (TYLENOL) 325 MG tablet Take 2 tablets by mouth Every 4 (Four) Hours As Needed for Mild Pain.      amitriptyline (ELAVIL) 100 MG tablet TAKE ONE (1) TO TWO (2) TABLETS AT BEDTIME AS NEEDED SLEEP 180 tablet 0    polyethylene glycol (MIRALAX) 17 g packet Take 17 g by mouth Daily As Needed (Use if senna-docusate is ineffective).       Allergies   Allergen Reactions    Penicillins Swelling and Rash    Morphine GI Intolerance     Pt states morphine makes her sick and has \"had GERD surgery is unable to vomit\"     Social History     Socioeconomic History    Marital status:      Spouse name: Jason    Number of children: 2    Years of education: 12   Tobacco Use    Smoking status: Former     " Current packs/day: 0.00     Average packs/day: 2.0 packs/day for 36.1 years (72.1 ttl pk-yrs)     Types: Cigarettes     Start date: 1955     Quit date: 7/10/1990     Years since quittin.7     Passive exposure: Past    Smokeless tobacco: Never   Vaping Use    Vaping status: Never Used   Substance and Sexual Activity    Alcohol use: Yes     Comment: rare    Drug use: No    Sexual activity: Defer     Partners: Male     Family History   Problem Relation Age of Onset    No Known Problems Father     Colon cancer Mother     Colon polyps Mother     Diabetes Brother     Diabetes Brother     Diabetes Son     Diabetes Son     Heart attack Paternal Grandmother     Breast cancer Maternal Aunt     Lymphoma Maternal Aunt     Breast cancer Maternal Aunt     Lung cancer Maternal Aunt     Diabetes Maternal Aunt     Breast cancer Maternal Aunt      Health Maintenance   Topic Date Due    ZOSTER VACCINE (1 of 2) Never done    PAP SMEAR  Never done    Pneumococcal Vaccine 50+ (2 of 2 - PPSV23) 2020    DIABETIC EYE EXAM  2022    ANNUAL WELLNESS VISIT  10/20/2022    DIABETIC FOOT EXAM  10/20/2022    URINE MICROALBUMIN-CREATININE RATIO (uACR)  2025    COVID-19 Vaccine ( season) 2025    LIPID PANEL  2025    HEMOGLOBIN A1C  2025    BMI FOLLOWUP  2026    DXA SCAN  07/15/2026    MAMMOGRAM  2026    COLORECTAL CANCER SCREENING  04/10/2028    TDAP/TD VACCINES (2 - Td or Tdap) 10/20/2031    HEPATITIS C SCREENING  Completed    INFLUENZA VACCINE  Completed     Review of Systems   Constitutional:  Negative for activity change, appetite change, chills, diaphoresis, fatigue, fever and unexpected weight change.   HENT:  Negative for ear pain, hearing loss, mouth sores, sore throat, trouble swallowing and voice change.    Eyes: Negative.    Respiratory:  Negative for cough, choking, shortness of breath and wheezing.    Cardiovascular:  Negative for chest pain and palpitations.  "  Gastrointestinal:  Positive for abdominal pain and nausea. Negative for blood in stool, constipation, diarrhea and vomiting.   Endocrine: Negative for cold intolerance and heat intolerance.   Genitourinary:  Negative for decreased urine volume, dysuria, frequency, hematuria and urgency.   Musculoskeletal:  Negative for back pain, gait problem and myalgias.   Skin:  Negative for color change, pallor and rash.   Allergic/Immunologic: Negative for food allergies and immunocompromised state.   Neurological:  Negative for dizziness, tremors, seizures, syncope, weakness, light-headedness, numbness and headaches.   Hematological:  Negative for adenopathy. Does not bruise/bleed easily.   Psychiatric/Behavioral:  Negative for agitation and confusion. The patient is not nervous/anxious.    All other systems reviewed and are negative.    Objective   Vitals:    03/20/25 0916   BP: 130/80   BP Location: Left arm   Pulse: 80   Temp: 97.3 °F (36.3 °C)   TempSrc: Temporal   SpO2: 100%   Weight: 81.3 kg (179 lb 3.2 oz)   Height: 162.6 cm (64.02\")     Body mass index is 30.74 kg/m².  Physical Exam  Constitutional:       Appearance: She is well-developed.   HENT:      Head: Normocephalic and atraumatic.   Eyes:      Pupils: Pupils are equal, round, and reactive to light.   Neck:      Trachea: No tracheal deviation.   Cardiovascular:      Rate and Rhythm: Normal rate and regular rhythm.      Heart sounds: Normal heart sounds. No murmur heard.     No friction rub. No gallop.   Pulmonary:      Effort: Pulmonary effort is normal. No respiratory distress.      Breath sounds: Normal breath sounds. No wheezing or rales.   Chest:      Chest wall: No tenderness.   Abdominal:      General: Bowel sounds are normal. There is no distension.      Palpations: Abdomen is soft. Abdomen is not rigid.      Tenderness: There is no abdominal tenderness. There is no guarding or rebound.   Musculoskeletal:         General: No tenderness or deformity. " Normal range of motion.      Cervical back: Normal range of motion and neck supple.   Skin:     General: Skin is warm and dry.      Coloration: Skin is not pale.      Findings: No rash.   Neurological:      Mental Status: She is alert and oriented to person, place, and time.      Deep Tendon Reflexes: Reflexes are normal and symmetric.   Psychiatric:         Behavior: Behavior normal.         Thought Content: Thought content normal.         Judgment: Judgment normal.       Assessment & Plan   Diagnoses and all orders for this visit:    1. Pain of upper abdomen (Primary)  -     FL Upper GI Double Contrast; Future    2. Nausea    3. Nonsmoker    4. Abnormal serum level of alkaline phosphatase  -     Hepatitis B surface antigen  -     Hepatitis B surface antibody  -     Hepatitis B core antibody, total  -     Hepatitis A antibody, total  -     Hepatitis C antibody  -     Alkaline Phosphatase, Isoenzymes; Future  -     Anti-Smooth Muscle Antibody Titer; Future  -     SANTINO; Future  -     Mitochondrial Antibodies, M2; Future    5. Encounter for screening for other viral diseases  -     Hepatitis B surface antigen  -     Hepatitis B surface antibody  -     Hepatitis B core antibody, total    Other orders  -     famotidine (PEPCID) 20 MG tablet; Take 1 tablet by mouth 2 (Two) Times a Day.  Dispense: 60 tablet; Refill: 10    Will get upper GI to further evaluate her previous hiatal hernia  Will start pepcid  I discussed non pharmaceutical treatment of gerd.  This includes gradually losing weight to achieve ideal body wt., elevation of the head of bed by 4-6 inches, nothing to eat or drink 3 hours prior to lying down, avoiding tight clothing, stress reduction, tobacco cessation, reduction of alcohol intake, and dietary restrictions (avoiding caffeine, coffee, fatty foods, mints, chocolate, spicy foods and tomato based sauces as much as possible).  We discussed stress and anxiety as well as a contributing factor  She may need a  repeat endoscopy and wants to see what upper GI shows first.   CT reviewed.       Part of this note may be an electronic transcription/translation of spoken language to printed text using the Dragon Dictation System.  Body mass index is 30.74 kg/m².  Return in about 8 weeks (around 5/15/2025).           All risks, benefits, alternatives, and indications of colonoscopy and/or Endoscopy procedure have been discussed with the patient. Risks to include perforation of the colon requiring possible surgery or colostomy, risk of bleeding from biopsies or removal of colon tissue, possibility of missing a colon polyp or cancer, or adverse drug reaction.  Benefits to include the diagnosis and management of disease of the colon and rectum. Alternatives to include barium enema, radiographic evaluation, lab testing or no intervention. Pt verbalizes understanding and agrees.     Marva Caal, APRN  3/20/2025  10:04 CDT          If you smoke or use tobacco, 4 minutes reading provided  Steps to Quit Smoking  Smoking tobacco can be harmful to your health and can affect almost every organ in your body. Smoking puts you, and those around you, at risk for developing many serious chronic diseases. Quitting smoking is difficult, but it is one of the best things that you can do for your health. It is never too late to quit.  What are the benefits of quitting smoking?  When you quit smoking, you lower your risk of developing serious diseases and conditions, such as:  Lung cancer or lung disease, such as COPD.  Heart disease.  Stroke.  Heart attack.  Infertility.  Osteoporosis and bone fractures.  Additionally, symptoms such as coughing, wheezing, and shortness of breath may get better when you quit. You may also find that you get sick less often because your body is stronger at fighting off colds and infections. If you are pregnant, quitting smoking can help to reduce your chances of having a baby of low birth weight.  How do I get  ready to quit?  When you decide to quit smoking, create a plan to make sure that you are successful. Before you quit:  Pick a date to quit. Set a date within the next two weeks to give you time to prepare.  Write down the reasons why you are quitting. Keep this list in places where you will see it often, such as on your bathroom mirror or in your car or wallet.  Identify the people, places, things, and activities that make you want to smoke (triggers) and avoid them. Make sure to take these actions:  Throw away all cigarettes at home, at work, and in your car.  Throw away smoking accessories, such as ashtrays and lighters.  Clean your car and make sure to empty the ashtray.  Clean your home, including curtains and carpets.  Tell your family, friends, and coworkers that you are quitting. Support from your loved ones can make quitting easier.  Talk with your health care provider about your options for quitting smoking.  Find out what treatment options are covered by your health insurance.  What strategies can I use to quit smoking?  Talk with your healthcare provider about different strategies to quit smoking. Some strategies include:  Quitting smoking altogether instead of gradually lessening how much you smoke over a period of time. Research shows that quitting “cold turkey” is more successful than gradually quitting.  Attending in-person counseling to help you build problem-solving skills. You are more likely to have success in quitting if you attend several counseling sessions. Even short sessions of 10 minutes can be effective.  Finding resources and support systems that can help you to quit smoking and remain smoke-free after you quit. These resources are most helpful when you use them often. They can include:  Online chats with a counselor.  Telephone quitlines.  Printed self-help materials.  Support groups or group counseling.  Text messaging programs.  Mobile phone applications.  Taking medicines to help you  quit smoking. (If you are pregnant or breastfeeding, talk with your health care provider first.) Some medicines contain nicotine and some do not. Both types of medicines help with cravings, but the medicines that include nicotine help to relieve withdrawal symptoms. Your health care provider may recommend:  Nicotine patches, gum, or lozenges.  Nicotine inhalers or sprays.  Non-nicotine medicine that is taken by mouth.  Talk with your health care provider about combining strategies, such as taking medicines while you are also receiving in-person counseling. Using these two strategies together makes you more likely to succeed in quitting than if you used either strategy on its own.  If you are pregnant or breastfeeding, talk with your health care provider about finding counseling or other support strategies to quit smoking. Do not take medicine to help you quit smoking unless told to do so by your health care provider.  What things can I do to make it easier to quit?  Quitting smoking might feel overwhelming at first, but there is a lot that you can do to make it easier. Take these important actions:  Reach out to your family and friends and ask that they support and encourage you during this time. Call telephone quitlines, reach out to support groups, or work with a counselor for support.  Ask people who smoke to avoid smoking around you.  Avoid places that trigger you to smoke, such as bars, parties, or smoke-break areas at work.  Spend time around people who do not smoke.  Lessen stress in your life, because stress can be a smoking trigger for some people. To lessen stress, try:  Exercising regularly.  Deep-breathing exercises.  Yoga.  Meditating.  Performing a body scan. This involves closing your eyes, scanning your body from head to toe, and noticing which parts of your body are particularly tense. Purposefully relax the muscles in those areas.  Download or purchase mobile phone or tablet apps (applications)  that can help you stick to your quit plan by providing reminders, tips, and encouragement. There are many free apps, such as QuitGuide from the CDC (Centers for Disease Control and Prevention). You can find other support for quitting smoking (smoking cessation) through smokefree.gov and other websites.  How will I feel when I quit smoking?  Within the first 24 hours of quitting smoking, you may start to feel some withdrawal symptoms. These symptoms are usually most noticeable 2-3 days after quitting, but they usually do not last beyond 2-3 weeks. Changes or symptoms that you might experience include:  Mood swings.  Restlessness, anxiety, or irritation.  Difficulty concentrating.  Dizziness.  Strong cravings for sugary foods in addition to nicotine.  Mild weight gain.  Constipation.  Nausea.  Coughing or a sore throat.  Changes in how your medicines work in your body.  A depressed mood.  Difficulty sleeping (insomnia).  After the first 2-3 weeks of quitting, you may start to notice more positive results, such as:  Improved sense of smell and taste.  Decreased coughing and sore throat.  Slower heart rate.  Lower blood pressure.  Clearer skin.  The ability to breathe more easily.  Fewer sick days.  Quitting smoking is very challenging for most people. Do not get discouraged if you are not successful the first time. Some people need to make many attempts to quit before they achieve long-term success. Do your best to stick to your quit plan, and talk with your health care provider if you have any questions or concerns.  This information is not intended to replace advice given to you by your health care provider. Make sure you discuss any questions you have with your health care provider.  Document Released: 12/12/2002 Document Revised: 08/15/2017 Document Reviewed: 05/03/2016  [employer] Patient Education © 2017 [employer]

## 2025-03-21 LAB
ANA SER QL: NEGATIVE
HAV AB SER QL IA: NEGATIVE
HBV CORE AB SERPL QL IA: NEGATIVE
MITOCHONDRIA M2 IGG SER-ACNC: <20 UNITS (ref 0–20)
SMA IGG SER-ACNC: 7 UNITS (ref 0–19)

## 2025-03-24 LAB
ALP BONE CFR SERPL: 41 % (ref 14–68)
ALP INTEST CFR SERPL: 0 % (ref 0–18)
ALP LIVER CFR SERPL: 59 % (ref 18–85)
ALP SERPL-CCNC: 124 IU/L (ref 44–121)

## 2025-03-28 ENCOUNTER — OFFICE VISIT (OUTPATIENT)
Dept: FAMILY MEDICINE CLINIC | Facility: CLINIC | Age: 70
End: 2025-03-28
Payer: MEDICARE

## 2025-03-28 VITALS
SYSTOLIC BLOOD PRESSURE: 128 MMHG | DIASTOLIC BLOOD PRESSURE: 72 MMHG | HEART RATE: 86 BPM | WEIGHT: 179 LBS | BODY MASS INDEX: 30.56 KG/M2 | OXYGEN SATURATION: 96 % | HEIGHT: 64 IN

## 2025-03-28 DIAGNOSIS — M70.50 PES ANSERINE BURSITIS: Primary | ICD-10-CM

## 2025-03-28 DIAGNOSIS — G47.00 INSOMNIA, UNSPECIFIED TYPE: ICD-10-CM

## 2025-03-28 PROBLEM — R01.1 MURMUR: Status: RESOLVED | Noted: 2023-06-14 | Resolved: 2025-03-28

## 2025-03-28 PROBLEM — R73.01 ELEVATED FASTING GLUCOSE: Status: RESOLVED | Noted: 2017-07-10 | Resolved: 2025-03-28

## 2025-03-28 NOTE — PROGRESS NOTES
Subjective   The ABCs of the Annual Wellness Visit  Medicare Wellness Visit      Sedrick Leahy is a 69 y.o. patient who presents for a Medicare Wellness Visit.    The following portions of the patient's history were reviewed and   updated as appropriate: allergies, current medications, past family history, past medical history, past social history, past surgical history, and problem list.    Compared to one year ago, the patient's physical   health is better.  Compared to one year ago, the patient's mental   health is the same.    Recent Hospitalizations:  She was admitted within the past 365 days at [facility]. Knee and ankle injury    Current Medical Providers:  Patient Care Team:  Bala Randhawa MD as PCP - General (Family Medicine)  Abdullahi Howell MD as Consulting Physician (Gastroenterology)  Rick Estes DO as Surgeon (Neurosurgery)    Outpatient Medications Prior to Visit   Medication Sig Dispense Refill    acetaminophen (TYLENOL) 325 MG tablet Take 2 tablets by mouth Every 4 (Four) Hours As Needed for Mild Pain.      amitriptyline (ELAVIL) 100 MG tablet TAKE ONE (1) TO TWO (2) TABLETS AT BEDTIME AS NEEDED SLEEP 180 tablet 0    atorvastatin (LIPITOR) 20 MG tablet Take 1 tablet by mouth Daily. 90 tablet 5    docusate sodium (COLACE) 100 MG capsule Take 1 capsule by mouth Daily As Needed for Constipation. 7 capsule 0    famotidine (PEPCID) 20 MG tablet Take 1 tablet by mouth 2 (Two) Times a Day. 60 tablet 10    ondansetron (Zofran) 4 MG tablet Take 1 tablet by mouth Every 8 (Eight) Hours As Needed for Nausea or Vomiting. 21 tablet 0    polyethylene glycol (MIRALAX) 17 g packet Take 17 g by mouth Daily As Needed (Use if senna-docusate is ineffective).      naloxone (NARCAN) 4 MG/0.1ML nasal spray Call 911. Don't prime. [place] in 1 nostril for overdose. Repeat in 2-3 minutes in other nostril if no or minimal breathing/responsiveness. 2 each 0    aspirin 325 MG tablet Take 1 tablet by mouth Daily.  (Patient not taking: Reported on 1/21/2025) 42 tablet 0    oxyCODONE-acetaminophen (PERCOCET) 7.5-325 MG per tablet Take 1 tablet by mouth Every 6 (Six) Hours As Needed (Pain). (Patient not taking: Reported on 1/21/2025) 28 tablet 0     No facility-administered medications prior to visit.     No opioid medication identified on active medication list. I have reviewed chart for other potential  high risk medication/s and harmful drug interactions in the elderly.      Aspirin is on active medication list. Aspirin use is not indicated based on review of current medical condition/s. Risk of harm outweighs potential benefits. Patient instructed to discontinue this medication.  .      Patient Active Problem List   Diagnosis    Abnormal x-ray-resolved    Anemia: iron,    Chronic neck pain    Chronic back pain-ilanberg    Depression    Anxiety    History of kidney stones    Vitamin D deficiency    Hematuria-kupper    Nausea-problem with nissen    History of Nissen fundoplication    Lumbar radiculopathy, acute    Urinary incontinence    Chronic narcotic use-Nazario    Menopause: see osteopenia    Palpitations: holter benign    Rash    Hyperplastic colonic polyp    Positive colorectal cancer screening using Cologuard test    Family history of colon cancer in mother    Epigastric pain    Dark stools    OsteopeniaL 2020 ? 2y    Stenosis, cervical spine    Gastrointestinal hemorrhage    Class 1 obesity due to excess calories with serious comorbidity and body mass index (BMI) of 32.0 to 32.9 in adult    Degeneration of lumbar or lumbosacral intervertebral disc    Intractable low back pain    Former smoker    Gait difficulty    Memory loss    Abnormal laboratory test    Cerebral microvascular disease    Insomnia    Acquired spondylolisthesis    Foot drop, right    Sacroiliitis    Plantar fasciitis, right    Chronic heel pain, right    Gastrocnemius equinus, right    Arthrosis of midfoot, right     Advance Care Planning Advance  "Directive is not on file.  ACP discussion was held with the patient during this visit. Patient does not have an advance directive, information provided.            Objective   Vitals:    25 1327   BP: 128/72   Pulse: 86   SpO2: 96%   Weight: 81.2 kg (179 lb)   Height: 162.6 cm (64\")   PainSc: 0-No pain       Estimated body mass index is 30.73 kg/m² as calculated from the following:    Height as of this encounter: 162.6 cm (64\").    Weight as of this encounter: 81.2 kg (179 lb).            Gait and Balance Evaluation:  Normal      Does the patient have evidence of cognitive impairment? No  Lab Results   Component Value Date    HGBA1C 5.9 (A) 2025                                                                                                Health  Risk Assessment    Smoking Status:  Social History     Tobacco Use   Smoking Status Former    Current packs/day: 0.00    Average packs/day: 2.0 packs/day for 36.1 years (72.1 ttl pk-yrs)    Types: Cigarettes    Start date: 1954    Quit date: 7/10/1990    Years since quittin.7    Passive exposure: Past   Smokeless Tobacco Never     Alcohol Consumption:  Social History     Substance and Sexual Activity   Alcohol Use Yes    Comment: rare       Fall Risk Screen  STEADI Fall Risk Assessment was completed, and patient is at MODERATE risk for falls. Assessment completed on:3/28/2025    Depression Screening   Little interest or pleasure in doing things? Not at all   Feeling down, depressed, or hopeless? Not at all   PHQ-2 Total Score 0      Health Habits and Functional and Cognitive Screening:      3/28/2025     1:24 PM   Functional & Cognitive Status   Do you have difficulty preparing food and eating? No   Do you have difficulty bathing yourself, getting dressed or grooming yourself? No   Do you have difficulty using the toilet? No   Do you have difficulty moving around from place to place? No   Do you have trouble with steps or getting out of a bed or a " chair? No   Current Diet Low Carb Diet   Dental Exam Up to date   Eye Exam Not up to date   Exercise (times per week) 2 times per week   Current Exercises Include House Cleaning   Do you need help using the phone?  No   Are you deaf or do you have serious difficulty hearing?  No   Do you need help to go to places out of walking distance? No   Do you need help shopping? No   Do you need help preparing meals?  No   Do you need help with housework?  No   Do you need help with laundry? No   Do you need help taking your medications? No   Do you need help managing money? No   Do you ever drive or ride in a car without wearing a seat belt? No   Have you felt unusual stress, anger or loneliness in the last month? No   Who do you live with? Spouse   If you need help, do you have trouble finding someone available to you? No   Have you been bothered in the last four weeks by sexual problems? No   Do you have difficulty concentrating, remembering or making decisions? No           Age-appropriate Screening Schedule:  Refer to the list below for future screening recommendations based on patient's age, sex and/or medical conditions. Orders for these recommended tests are listed in the plan section. The patient has been provided with a written plan.    Health Maintenance List  Health Maintenance   Topic Date Due    ZOSTER VACCINE (1 of 2) Never done    Pneumococcal Vaccine 50+ (2 of 2 - PPSV23) 01/03/2020    DIABETIC EYE EXAM  05/11/2022    DIABETIC FOOT EXAM  10/20/2022    URINE MICROALBUMIN-CREATININE RATIO (uACR)  05/07/2025    COVID-19 Vaccine (4 - 2024-25 season) 04/30/2025    LIPID PANEL  05/07/2025    HEMOGLOBIN A1C  07/27/2025    ANNUAL WELLNESS VISIT  03/28/2026    DXA SCAN  07/15/2026    MAMMOGRAM  07/29/2026    COLORECTAL CANCER SCREENING  04/10/2028    TDAP/TD VACCINES (2 - Td or Tdap) 10/20/2031    HEPATITIS C SCREENING  Completed    INFLUENZA VACCINE  Completed                                                                                                                                                 CMS Preventative Services Quick Reference  Risk Factors Identified During Encounter  Dental Screening Recommended  Vision Screening Recommended    The above risks/problems have been discussed with the patient.  Pertinent information has been shared with the patient in the After Visit Summary.  An After Visit Summary and PPPS were made available to the patient.    Follow Up:   Next Medicare Wellness visit to be scheduled in 1 year.         Additional E&M Note during same encounter follows:  Patient has additional, significant, and separately identifiable condition(s)/problem(s) that require work above and beyond the Medicare Wellness Visit     Chief Complaint  Chief Complaint   Patient presents with    Medicare Wellness-subsequent    Knee Pain     Right knee        Subjective      History of Present Illness  The patient is a 69-year-old female who presents for a follow-up Medicare wellness visit.    She reports an improvement in her knee condition, attributing it to the use of a brace and home exercises with bands. She was last seen in January. She has been hospitalized within the past year due to ankle and knee issues. She underwent a bone graft procedure on her ankle, which involved the insertion of 6 to 8 screws and a plate. She has been able to maintain mobility post her last back surgery, with minimal complications. However, she experienced a fall yesterday, which she attributes to not paying attention to her surroundings. She also mentions that she is unable to bend her ankle, a condition that was previously communicated to her as requiring time to return to normalcy. She has a history of back surgery and was prescribed pain medication, which she discontinued due to personal aversion. She has been managing her pain with Tylenol, taking 6 to 8 tablets daily, but has since stopped this regimen.    She reports no hearing issues  "and is due for an eye examination. She does not have diabetes but recalls a single instance of hypoglycemia.    She experiences dry mouth, which she believes may be a side effect of her medication, causing her lips to adhere to her teeth. This symptom has been present for the past week.    She occasionally experiences depression, particularly following the death of her son, and acknowledges having bad days.    Supplemental Information  She has a history of GERD and underwent hernia repair surgery, which she believes may have recurred.    SOCIAL HISTORY  She reports very little alcohol use, mentioning that she might have a drink at a party or social event every four to five months. She quit smoking 30 to 40 years ago.    FAMILY HISTORY  Her mother and her siblings  from cancer. Her sister is having her third or fourth place to go for cancer treatment. Her brother passed away last year from melanoma. Her son  in  from cancer.    MEDICATIONS  Current: amitriptyline          Objective   Vital Signs:  /72   Pulse 86   Ht 162.6 cm (64\")   Wt 81.2 kg (179 lb)   SpO2 96%   BMI 30.73 kg/m²     The following labs/imaging/notes were reviewed and discussed with the patient by Bala Randhawa MD on 2025:     Latest Reference Range & Units 25 11:48 25 10:41   Sodium 136 - 145 mmol/L 140    Potassium 3.5 - 5.2 mmol/L 4.0    Chloride 98 - 107 mmol/L 104    CO2 22.0 - 29.0 mmol/L 25.0    Anion Gap 5.0 - 15.0 mmol/L 11.0    BUN 8 - 23 mg/dL 17    Creatinine 0.57 - 1.00 mg/dL 0.78    BUN/Creatinine Ratio 7.0 - 25.0  21.8    eGFR >60.0 mL/min/1.73 81.8    Glucose 65 - 99 mg/dL 101 (H)    Calcium 8.6 - 10.5 mg/dL 9.4    Alkaline Phosphatase 44 - 121 IU/L 148 (H) 124 (H)   Total Protein 6.0 - 8.5 g/dL 7.5    Albumin 3.5 - 5.2 g/dL 4.1    Globulin gm/dL 3.4    A/G Ratio g/dL 1.2    AST (SGOT) 1 - 32 U/L 15    ALT (SGPT) 1 - 33 U/L 6    Total Bilirubin 0.0 - 1.2 mg/dL 0.2    (H): Data is abnormally " high     Latest Reference Range & Units 01/27/25 14:54   Hemoglobin A1C 4.5 - 5.7 % 5.9 !   !: Data is abnormal     Latest Reference Range & Units 03/05/25 11:48   WBC 3.40 - 10.80 10*3/mm3 6.95   RBC 3.77 - 5.28 10*6/mm3 4.50   Hemoglobin 12.0 - 15.9 g/dL 10.8 (L)   Hematocrit 34.0 - 46.6 % 36.6   Platelets 140 - 450 10*3/mm3 393   RDW 12.3 - 15.4 % 17.4 (H)   MCV 79.0 - 97.0 fL 81.3   MCH 26.6 - 33.0 pg 24.0 (L)   MCHC 31.5 - 35.7 g/dL 29.5 (L)   MPV 6.0 - 12.0 fL 9.9   RDW-SD 37.0 - 54.0 fl 50.6   Neutrophil Rel % 42.7 - 76.0 % 51.0   Lymphocyte Rel % 19.6 - 45.3 % 39.6   Monocyte Rel % 5.0 - 12.0 % 7.6   Eosinophil Rel % 0.3 - 6.2 % 1.0   Basophil Rel % 0.0 - 1.5 % 0.4   Immature Granulocyte Rel % 0.0 - 0.5 % 0.4   (L): Data is abnormally low  (H): Data is abnormally high    Physical Exam    Physical Exam          Assessment      Diagnoses and all orders for this visit:    1. Pes anserine bursitis (Primary)    2. Insomnia, unspecified type             Assessment & Plan  1. Elevated alkaline phosphatase.  Her alkaline phosphatase levels were elevated on 03/05/2025 but have since shown a decreasing trend. The potential causes of elevated alkaline phosphatase, including excessive Tylenol intake, alcohol consumption, and obesity, were discussed. She was advised to monitor her alkaline phosphatase levels closely.    1.Pes anserine bursitis  Reports improvement in symptoms. Continued exercise recommended.     3. Hyperlipidemia.  Her condition will be reassessed during the next visit.      Follow-up  The patient is scheduled for a follow-up visit in September 2025.    PROCEDURE  The patient underwent a bone graft procedure on her ankle, which involved the insertion of 6 to 8 screws and a plate. She also had back surgery in the past.    No orders of the defined types were placed in this encounter.              Patient has been erroneously marked as diabetic. Based on the available clinical information, she does not have  diabetes and should therefore be excluded from diabetic health maintenance and quality measures for the remainder of the reporting period.      Follow Up   Return in about 6 months (around 9/28/2025) for Insomnia, HLD. .  Patient was given instructions and counseling regarding her condition or for health maintenance advice. Please see specific information pulled into the AVS if appropriate.    Patient or patient representative verbalized consent for the use of Ambient Listening during the visit with  Bala Randhawa MD for chart documentation. 3/28/2025  14:04 CDT

## 2025-04-14 ENCOUNTER — HOSPITAL ENCOUNTER (OUTPATIENT)
Dept: GENERAL RADIOLOGY | Facility: HOSPITAL | Age: 70
Discharge: HOME | End: 2025-04-14
Admitting: CLINICAL NURSE SPECIALIST
Payer: MEDICARE

## 2025-04-14 DIAGNOSIS — R10.10 PAIN OF UPPER ABDOMEN: ICD-10-CM

## 2025-04-14 PROCEDURE — 74246 X-RAY XM UPR GI TRC 2CNTRST: CPT

## 2025-04-14 RX ADMIN — BARIUM SULFATE 120 ML: 980 POWDER, FOR SUSPENSION ORAL at 10:39

## 2025-04-14 RX ADMIN — ANTACID/ANTIFLATULENT 1 PACKET: 380; 550; 10; 10 GRANULE, EFFERVESCENT ORAL at 10:39

## 2025-04-14 RX ADMIN — BARIUM SULFATE 240 ML: 960 POWDER, FOR SUSPENSION ORAL at 10:39

## 2025-04-14 RX ADMIN — BARIUM SULFATE 700 MG: 700 TABLET ORAL at 10:39

## 2025-04-16 ENCOUNTER — PREP FOR SURGERY (OUTPATIENT)
Dept: GASTROENTEROLOGY | Facility: CLINIC | Age: 70
End: 2025-04-16
Payer: MEDICARE

## 2025-04-16 DIAGNOSIS — R10.10 PAIN OF UPPER ABDOMEN: Primary | ICD-10-CM

## 2025-04-17 PROBLEM — R10.10 PAIN OF UPPER ABDOMEN: Status: ACTIVE | Noted: 2025-04-16

## 2025-05-15 ENCOUNTER — OFFICE VISIT (OUTPATIENT)
Dept: GASTROENTEROLOGY | Facility: CLINIC | Age: 70
End: 2025-05-15
Payer: MEDICARE

## 2025-05-15 VITALS
BODY MASS INDEX: 30.11 KG/M2 | SYSTOLIC BLOOD PRESSURE: 142 MMHG | WEIGHT: 176.4 LBS | DIASTOLIC BLOOD PRESSURE: 80 MMHG | HEIGHT: 64 IN | HEART RATE: 87 BPM | OXYGEN SATURATION: 99 % | TEMPERATURE: 97.5 F

## 2025-05-15 DIAGNOSIS — R74.8 ABNORMAL SERUM LEVEL OF ALKALINE PHOSPHATASE: ICD-10-CM

## 2025-05-15 DIAGNOSIS — D50.9 IRON DEFICIENCY ANEMIA, UNSPECIFIED IRON DEFICIENCY ANEMIA TYPE: ICD-10-CM

## 2025-05-15 DIAGNOSIS — Z78.9 NONSMOKER: ICD-10-CM

## 2025-05-15 DIAGNOSIS — R10.10 PAIN OF UPPER ABDOMEN: Primary | ICD-10-CM

## 2025-05-15 PROCEDURE — 1160F RVW MEDS BY RX/DR IN RCRD: CPT | Performed by: CLINICAL NURSE SPECIALIST

## 2025-05-15 PROCEDURE — 99213 OFFICE O/P EST LOW 20 MIN: CPT | Performed by: CLINICAL NURSE SPECIALIST

## 2025-05-15 PROCEDURE — 1159F MED LIST DOCD IN RCRD: CPT | Performed by: CLINICAL NURSE SPECIALIST

## 2025-05-15 NOTE — PROGRESS NOTES
Sedrick Leahy  1955    5/15/2025  Chief Complaint   Patient presents with    GI Problem     Pt states she is here to discuss CT/MRI     Subjective   HPI  Sedrick Leahy is a 69 y.o. female who presents with a complaint of abdominal pain. Labs show her to have anemia H/H 10.8/36.6 on 3/5/25. Ferritin 12.10 1 year ago on iron supplements. Endo/colon 2023 for this. She was last seen by me for stomach pain sharp stabbing eating making it worse for months. She has been under a lot of stress. No nausea or vomiting. Constipation as well. Hx of hiatal hernia repaired by [facility]. Upper GI shows intact. She says she is no longer taking her iron supplement.   Labs for ALKP show no underlying liver disease alkp is trending down. Will follow.      Upper GI  IMPRESSION:     No acute findings. Postoperative change of hiatal hernia repair without  evidence of hernia recurrence or other complication.     This report was signed and finalized on 4/14/2025 10:54 AM by Dr. Trey Granados MD.  CT abdomen pelvis with contrast 3/17/25   IMPRESSION:     1.  No acute findings in the abdomen or pelvis.     2.  Colonic diverticulosis without evidence of diverticulitis.     This report was signed and finalized on 3/17/2025 4:31 PM by Dr. Trey Granados MD.  Past Medical History:   Diagnosis Date    Arthritis     Diabetes mellitus     diet controlled    Foot pain, right     around 1 year off and on    GERD (gastroesophageal reflux disease)     Hx of colonic polyp     Hypertension     Joint pain     Hip    Lower back pain     Neck pain     PONV (postoperative nausea and vomiting)      Past Surgical History:   Procedure Laterality Date    ANTERIOR CERVICAL DISCECTOMY W/ FUSION N/A 11/20/2020    Procedure: EXPLORATION OF FUSION C5-6, ANTERIOR CERVICAL DISCECTOMY FUSION C4-5, C6-7 REVISON ANTERIOR FUSION C5-6 WITH INSTRUMENTATION C4-7;  Surgeon: KEN Gilbert MD;  Location: [facility] OR;  Service: Orthopedic Spine;  Laterality: N/A;     APPENDECTOMY      COLONOSCOPY  06/06/2016    Normal exam repeat in 5 years    COLONOSCOPY N/A 01/14/2020    Diverticulosis repeat exam in 5 years    COLONOSCOPY  04/10/2023    Dr. Foy-normal colon repeat 5 years    COLONOSCOPY W/ POLYPECTOMY  04/04/2012    Hyperplastic polyp cecum repeat exam in 1 year    ENDOSCOPY  08/14/2014    Very tortuous distal esophagus dilated 50 Fr, HH     ENDOSCOPY N/A 01/18/2021    A fundoplication was found, dilated    ENDOSCOPY  04/10/2023    Dr. Foy-normal endo-neg hpylori    FOOT FUSION Right 11/06/2024    Procedure: NaviculoCuneiform Joint Arthrodesis, Bone Graft Dawson, Endoscopic Gastrocnemius Recession, Endoscopic Plantar Fasciotomy - Right Foot;  Surgeon: Sai Mendieta DPM;  Location:  [facility] OR;  Service: Podiatry;  Laterality: Right;    FOOT NAVICULAR EXCISION OR BONE GRAFT Right 11/06/2024    Procedure: Bone Graft Dawson;  Surgeon: Sai Mendieta DPM;  Location:  [facility] OR;  Service: Podiatry;  Laterality: Right;    GALLBLADDER SURGERY      HARDWARE REMOVAL N/A 11/20/2020    Procedure: REMOVAL OF INSTRUMENTATION;  Surgeon: KEN Gilbert MD;  Location:  [facility] OR;  Service: Orthopedic Spine;  Laterality: N/A;    HERNIA REPAIR      HYSTERECTOMY      Laparoscopic Hysterectomy bilateral salpingectomy for bleeding    LUMBAR LAMINECTOMY WITH FUSION Bilateral 12/11/2023    Procedure: LUMBAR LAMINECTOMY TRANSFORAMINAL LUMBAR INTERBODY FUSION L4/5;  Surgeon: Rick Estes DO;  Location:  [facility] OR;  Service: Neurosurgery;  Laterality: Bilateral;    NECK SURGERY      NISSEN FUNDOPLICATION      PLANTAR FASCIA RELEASE Right 11/06/2024    Procedure: Endoscopic Plantar Fasciotomy - Right Foot;  Surgeon: Sai Mendieta DPM;  Location:  [facility] OR;  Service: Podiatry;  Laterality: Right;    POSTERIOR CERVICAL FUSION N/A 12/03/2020    Procedure: POSTERIOR SPINAL FUSION WITH INSTRUMENTATION C4-7;  Surgeon: KEN Gilbert MD;  Location:  [facility] OR;  Service:  "Orthopedic Spine;  Laterality: N/A;    RECESSION GASTROCNEMIUS Right 2024    Procedure: Endoscopic Gastrocnemius Recession, Endoscopic Plantar Fasciotomy - Right Foot;  Surgeon: Sai Mendieta DPM;  Location:  [facility] OR;  Service: Podiatry;  Laterality: Right;    SACROILIAC JOINT INJECTION Bilateral 2023    Procedure: SACROILIAC INJECTION, Bilateral;  Surgeon: Rick Estes DO;  Location:  [facility] OR;  Service: Neurosurgery;  Laterality: Bilateral;    STEROID INJECTION Left 2022    Procedure: LEFT HIP FLUROSCOPIC GUIDED CORTICOSTEROID INJECTION;  Surgeon: Herberth Skelton MD;  Location:  [facility] OR;  Service: Orthopedics;  Laterality: Left;    US GUIDED LYMPH NODE BIOPSY  2020       Outpatient Medications Marked as Taking for the 5/15/25 encounter (Office Visit) with Marva Caal APRN   Medication Sig Dispense Refill    acetaminophen (TYLENOL) 325 MG tablet Take 2 tablets by mouth Every 4 (Four) Hours As Needed for Mild Pain.      amitriptyline (ELAVIL) 100 MG tablet TAKE ONE (1) TO TWO (2) TABLETS AT BEDTIME AS NEEDED SLEEP 180 tablet 0    ondansetron (Zofran) 4 MG tablet Take 1 tablet by mouth Every 8 (Eight) Hours As Needed for Nausea or Vomiting. 21 tablet 0    polyethylene glycol (MIRALAX) 17 g packet Take 17 g by mouth Daily As Needed (Use if senna-docusate is ineffective).       Allergies   Allergen Reactions    Penicillins Swelling and Rash    Morphine GI Intolerance     Pt states morphine makes her sick and has \"had GERD surgery is unable to vomit\"     Social History     Socioeconomic History    Marital status:      Spouse name: Jason    Number of children: 2    Years of education: 12   Tobacco Use    Smoking status: Former     Current packs/day: 0.00     Average packs/day: 2.0 packs/day for 36.1 years (72.1 ttl pk-yrs)     Types: Cigarettes     Start date: 1955     Quit date: 7/10/1990     Years since quittin.8     Passive exposure: Past    Smokeless " tobacco: Never   Vaping Use    Vaping status: Never Used   Substance and Sexual Activity    Alcohol use: Yes     Comment: rare    Drug use: No    Sexual activity: Defer     Partners: Male     Family History   Problem Relation Age of Onset    No Known Problems Father     Colon cancer Mother     Colon polyps Mother     Diabetes Brother     Diabetes Brother     Diabetes Son     Diabetes Son     Heart attack Paternal Grandmother     Breast cancer Maternal Aunt     Lymphoma Maternal Aunt     Breast cancer Maternal Aunt     Lung cancer Maternal Aunt     Diabetes Maternal Aunt     Breast cancer Maternal Aunt      Health Maintenance   Topic Date Due    ZOSTER VACCINE (1 of 2) Never done    Pneumococcal Vaccine 50+ (2 of 2 - PPSV23) 11/08/2020    COVID-19 Vaccine (4 - 2024-25 season) 04/30/2025    LIPID PANEL  05/07/2025    INFLUENZA VACCINE  07/01/2025    ANNUAL WELLNESS VISIT  03/28/2026    DXA SCAN  07/15/2026    MAMMOGRAM  07/29/2026    COLORECTAL CANCER SCREENING  04/10/2028    TDAP/TD VACCINES (2 - Td or Tdap) 10/20/2031    HEPATITIS C SCREENING  Completed    HEMOGLOBIN A1C  Discontinued    URINE MICROALBUMIN-CREATININE RATIO (uACR)  Discontinued     Review of Systems   Constitutional:  Negative for activity change, appetite change, chills, diaphoresis, fatigue, fever and unexpected weight change.   HENT:  Negative for ear pain, hearing loss, mouth sores, sore throat, trouble swallowing and voice change.    Eyes: Negative.    Respiratory:  Negative for cough, choking, shortness of breath and wheezing.    Cardiovascular:  Negative for chest pain and palpitations.   Gastrointestinal:  Positive for abdominal pain. Negative for blood in stool, constipation, diarrhea, nausea and vomiting.   Endocrine: Negative for cold intolerance and heat intolerance.   Genitourinary:  Negative for decreased urine volume, dysuria, frequency, hematuria and urgency.   Musculoskeletal:  Negative for back pain, gait problem and myalgias.  "  Skin:  Negative for color change, pallor and rash.   Allergic/Immunologic: Negative for food allergies and immunocompromised state.   Neurological:  Negative for dizziness, tremors, seizures, syncope, weakness, light-headedness, numbness and headaches.   Hematological:  Negative for adenopathy. Does not bruise/bleed easily.   Psychiatric/Behavioral:  Negative for agitation and confusion. The patient is not nervous/anxious.    All other systems reviewed and are negative.    Objective   Vitals:    05/15/25 0955   BP: 142/80   Pulse: 87   Temp: 97.5 °F (36.4 °C)   SpO2: 99%   Weight: 80 kg (176 lb 6.4 oz)   Height: 162.6 cm (64\")     Body mass index is 30.28 kg/m².  Physical Exam  Constitutional:       Appearance: She is well-developed.   HENT:      Head: Normocephalic and atraumatic.   Eyes:      Pupils: Pupils are equal, round, and reactive to light.   Neck:      Trachea: No tracheal deviation.   Cardiovascular:      Rate and Rhythm: Normal rate and regular rhythm.      Heart sounds: Normal heart sounds. No murmur heard.     No friction rub. No gallop.   Pulmonary:      Effort: Pulmonary effort is normal. No respiratory distress.      Breath sounds: Normal breath sounds. No wheezing or rales.   Chest:      Chest wall: No tenderness.   Abdominal:      General: Bowel sounds are normal. There is no distension.      Palpations: Abdomen is soft. Abdomen is not rigid.      Tenderness: There is no abdominal tenderness. There is no guarding or rebound.   Musculoskeletal:         General: No tenderness or deformity. Normal range of motion.      Cervical back: Normal range of motion and neck supple.   Skin:     General: Skin is warm and dry.      Coloration: Skin is not pale.      Findings: No rash.   Neurological:      Mental Status: She is alert and oriented to person, place, and time.      Deep Tendon Reflexes: Reflexes are normal and symmetric.   Psychiatric:         Behavior: Behavior normal.         Thought Content: " Thought content normal.         Judgment: Judgment normal.       Assessment & Plan   Diagnoses and all orders for this visit:    1. Pain of upper abdomen (Primary)    2. Abnormal serum level of alkaline phosphatase    3. Nonsmoker    4. Iron deficiency anemia, unspecified iron deficiency anemia type    She wants to discuss anemia with her PCP she may need back on her iron supplements. She had endo/colon for this in 2023. She has had CT scan reviewed today  Hiatal hernia is intact.   Follow cmp for ALKP level. Underlying liver disease labs negative.       Part of this note may be an electronic transcription/translation of spoken language to printed text using the Dragon Dictation System.  Body mass index is 30.28 kg/m².  Return if symptoms worsen or fail to improve.  There are no Patient Instructions on file for this visit.         All risks, benefits, alternatives, and indications of colonoscopy and/or Endoscopy procedure have been discussed with the patient. Risks to include perforation of the colon requiring possible surgery or colostomy, risk of bleeding from biopsies or removal of colon tissue, possibility of missing a colon polyp or cancer, or adverse drug reaction.  Benefits to include the diagnosis and management of disease of the colon and rectum. Alternatives to include barium enema, radiographic evaluation, lab testing or no intervention. Pt verbalizes understanding and agrees.     Marva Caal, APRN  5/15/2025  10:36 CDT          If you smoke or use tobacco, 4 minutes reading provided  Steps to Quit Smoking  Smoking tobacco can be harmful to your health and can affect almost every organ in your body. Smoking puts you, and those around you, at risk for developing many serious chronic diseases. Quitting smoking is difficult, but it is one of the best things that you can do for your health. It is never too late to quit.  What are the benefits of quitting smoking?  When you quit smoking, you lower your  risk of developing serious diseases and conditions, such as:  Lung cancer or lung disease, such as COPD.  Heart disease.  Stroke.  Heart attack.  Infertility.  Osteoporosis and bone fractures.  Additionally, symptoms such as coughing, wheezing, and shortness of breath may get better when you quit. You may also find that you get sick less often because your body is stronger at fighting off colds and infections. If you are pregnant, quitting smoking can help to reduce your chances of having a baby of low birth weight.  How do I get ready to quit?  When you decide to quit smoking, create a plan to make sure that you are successful. Before you quit:  Pick a date to quit. Set a date within the next two weeks to give you time to prepare.  Write down the reasons why you are quitting. Keep this list in places where you will see it often, such as on your bathroom mirror or in your car or wallet.  Identify the people, places, things, and activities that make you want to smoke (triggers) and avoid them. Make sure to take these actions:  Throw away all cigarettes at home, at work, and in your car.  Throw away smoking accessories, such as ashtrays and lighters.  Clean your car and make sure to empty the ashtray.  Clean your home, including curtains and carpets.  Tell your family, friends, and coworkers that you are quitting. Support from your loved ones can make quitting easier.  Talk with your health care provider about your options for quitting smoking.  Find out what treatment options are covered by your health insurance.  What strategies can I use to quit smoking?  Talk with your healthcare provider about different strategies to quit smoking. Some strategies include:  Quitting smoking altogether instead of gradually lessening how much you smoke over a period of time. Research shows that quitting “cold turkey” is more successful than gradually quitting.  Attending in-person counseling to help you build problem-solving skills.  You are more likely to have success in quitting if you attend several counseling sessions. Even short sessions of 10 minutes can be effective.  Finding resources and support systems that can help you to quit smoking and remain smoke-free after you quit. These resources are most helpful when you use them often. They can include:  Online chats with a counselor.  Telephone quitlines.  Printed self-help materials.  Support groups or group counseling.  Text messaging programs.  Mobile phone applications.  Taking medicines to help you quit smoking. (If you are pregnant or breastfeeding, talk with your health care provider first.) Some medicines contain nicotine and some do not. Both types of medicines help with cravings, but the medicines that include nicotine help to relieve withdrawal symptoms. Your health care provider may recommend:  Nicotine patches, gum, or lozenges.  Nicotine inhalers or sprays.  Non-nicotine medicine that is taken by mouth.  Talk with your health care provider about combining strategies, such as taking medicines while you are also receiving in-person counseling. Using these two strategies together makes you more likely to succeed in quitting than if you used either strategy on its own.  If you are pregnant or breastfeeding, talk with your health care provider about finding counseling or other support strategies to quit smoking. Do not take medicine to help you quit smoking unless told to do so by your health care provider.  What things can I do to make it easier to quit?  Quitting smoking might feel overwhelming at first, but there is a lot that you can do to make it easier. Take these important actions:  Reach out to your family and friends and ask that they support and encourage you during this time. Call telephone quitlines, reach out to support groups, or work with a counselor for support.  Ask people who smoke to avoid smoking around you.  Avoid places that trigger you to smoke, such as bars,  parties, or smoke-break areas at work.  Spend time around people who do not smoke.  Lessen stress in your life, because stress can be a smoking trigger for some people. To lessen stress, try:  Exercising regularly.  Deep-breathing exercises.  Yoga.  Meditating.  Performing a body scan. This involves closing your eyes, scanning your body from head to toe, and noticing which parts of your body are particularly tense. Purposefully relax the muscles in those areas.  Download or purchase mobile phone or tablet apps (applications) that can help you stick to your quit plan by providing reminders, tips, and encouragement. There are many free apps, such as QuitGuide from the CDC (Centers for Disease Control and Prevention). You can find other support for quitting smoking (smoking cessation) through smokefree.HALFPOPS and other websites.  How will I feel when I quit smoking?  Within the first 24 hours of quitting smoking, you may start to feel some withdrawal symptoms. These symptoms are usually most noticeable 2-3 days after quitting, but they usually do not last beyond 2-3 weeks. Changes or symptoms that you might experience include:  Mood swings.  Restlessness, anxiety, or irritation.  Difficulty concentrating.  Dizziness.  Strong cravings for sugary foods in addition to nicotine.  Mild weight gain.  Constipation.  Nausea.  Coughing or a sore throat.  Changes in how your medicines work in your body.  A depressed mood.  Difficulty sleeping (insomnia).  After the first 2-3 weeks of quitting, you may start to notice more positive results, such as:  Improved sense of smell and taste.  Decreased coughing and sore throat.  Slower heart rate.  Lower blood pressure.  Clearer skin.  The ability to breathe more easily.  Fewer sick days.  Quitting smoking is very challenging for most people. Do not get discouraged if you are not successful the first time. Some people need to make many attempts to quit before they achieve long-term  success. Do your best to stick to your quit plan, and talk with your health care provider if you have any questions or concerns.  This information is not intended to replace advice given to you by your health care provider. Make sure you discuss any questions you have with your health care provider.  Document Released: 12/12/2002 Document Revised: 08/15/2017 Document Reviewed: 05/03/2016  Elsevier Interactive Patient Education © 2017 [employer]

## 2025-06-17 ENCOUNTER — TELEPHONE (OUTPATIENT)
Dept: FAMILY MEDICINE CLINIC | Facility: CLINIC | Age: 70
End: 2025-06-17
Payer: MEDICARE

## 2025-06-17 DIAGNOSIS — Z12.31 ENCOUNTER FOR SCREENING MAMMOGRAM FOR BREAST CANCER: Primary | ICD-10-CM

## 2025-06-17 NOTE — TELEPHONE ENCOUNTER
"  Caller: Sedrick Leahy \"HOLLI\"    Relationship: Self    Best call back number: [phone]     What is the medical concern/diagnosis: MAMMOGRAM    What specialty or service is being requested: MAMMOGRAM    What is the provider, practice or medical service name: [facility]    What is the office location: [address] [place] [address] [place] [address]    What is the office phone number: [phone]    Any additional details: NA        "

## 2025-06-20 ENCOUNTER — OFFICE VISIT (OUTPATIENT)
Dept: FAMILY MEDICINE CLINIC | Facility: CLINIC | Age: 70
End: 2025-06-20
Payer: MEDICARE

## 2025-06-20 VITALS
HEART RATE: 91 BPM | WEIGHT: 173 LBS | OXYGEN SATURATION: 94 % | HEIGHT: 64 IN | BODY MASS INDEX: 29.53 KG/M2 | SYSTOLIC BLOOD PRESSURE: 122 MMHG | DIASTOLIC BLOOD PRESSURE: 68 MMHG

## 2025-06-20 DIAGNOSIS — R10.9 ACUTE RIGHT FLANK PAIN: Primary | ICD-10-CM

## 2025-06-20 DIAGNOSIS — R31.0 GROSS HEMATURIA: ICD-10-CM

## 2025-06-20 PROCEDURE — 99214 OFFICE O/P EST MOD 30 MIN: CPT

## 2025-06-20 PROCEDURE — 1126F AMNT PAIN NOTED NONE PRSNT: CPT

## 2025-06-20 PROCEDURE — 3044F HG A1C LEVEL LT 7.0%: CPT

## 2025-06-20 RX ORDER — HYDROCORTISONE 10 MG/ML
LOTION TOPICAL 2 TIMES DAILY
Qty: 118 ML | Refills: 1 | Status: SHIPPED | OUTPATIENT
Start: 2025-06-20

## 2025-06-20 NOTE — PROGRESS NOTES
Chief Complaint  Back Pain (Pt states she went horse back riding Wednesday and when she went to the bathroom and she notice a lot of blood in urine for about 3 days before it cleared.) and Rash (Rash across chest that itches.)    Subjective      Sedrick Leahy presents to [facility]  History of Present Illness  The patient is a 70-year-old female who presents for evaluation of right-sided back pain and itching.    She reports experiencing right-sided back pain following a horse-riding incident last Wednesday (06/11/2025). During the ride, she felt a sudden onset of pain on the right side of her back, radiating from under her bra strap downwards. The pain was severe enough to cause nausea, leading her to dismount the horse. The pain subsided by Thursday night (06/12/2025). However, she noticed blood in her urine during a bathroom visit. The pain has been intermittent since then, coming and going. Sitting down and bending over to support herself on her knees provides some relief, while prolonged sitting, standing, walking, and ascending stairs exacerbate the pain. She reports no numbness or tingling in her legs. She has a history of kidney stones, having passed three stones in the past, but it has been several years since her last episode. She recalls a similar incident five to six years ago when she was thrown off a horse, resulting in two broken ribs and potential kidney and liver damage. 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 She has attempted to provide a urine sample twice but has been unable to urinate. Typically, she uses the bathroom once a day and does not have any issues with urination. She has been managing the pain with Tylenol, although infrequently due to her aversion to medication.    She also reports an itchy rash on her chest that developed after sitting outside late into the night. Her daughter-in-law  "also reported similar symptoms. The rash appeared on Thursday (06/12/2025) and has been causing significant discomfort. She has discontinued the use of certain laundry detergents as a precautionary measure.    PAST SURGICAL HISTORY:   - Two broken ribs from a horse-riding incident five to six years ago  - 124 surgeries related to degenerative disc disease in the lumbar spine      Objective   Vital Signs:  /68   Pulse 91   Ht 162.6 cm (64\")   Wt 78.5 kg (173 lb)   SpO2 94%   BMI 29.70 kg/m²   Estimated body mass index is 29.7 kg/m² as calculated from the following:    Height as of this encounter: 162.6 cm (64\").    Weight as of this encounter: 78.5 kg (173 lb).            Physical Exam     Physical Exam  Skin: There is a little bit of contact dermatitis on the chest.  Genitourinary: Mild suprapubic tenderness.      Result Review :  The following labs/imaging/notes were reviewed and discussed with the patient by Bala Randhawa MD on 06/20/2025:   Latest Reference Range & Units 01/27/25 14:54 03/05/25 11:48 03/20/25 10:41   Sodium 136 - 145 mmol/L  140    Potassium 3.5 - 5.2 mmol/L  4.0    Chloride 98 - 107 mmol/L  104    CO2 22.0 - 29.0 mmol/L  25.0    Anion Gap 5.0 - 15.0 mmol/L  11.0    BUN 8 - 23 mg/dL  17    Creatinine 0.57 - 1.00 mg/dL  0.78    BUN/Creatinine Ratio 7.0 - 25.0   21.8    eGFR >60.0 mL/min/1.73  81.8    Glucose 65 - 99 mg/dL  101 (H)    Calcium 8.6 - 10.5 mg/dL  9.4    Alkaline Phosphatase 44 - 121 IU/L  148 (H) 124 (H)   Total Protein 6.0 - 8.5 g/dL  7.5    Albumin 3.5 - 5.2 g/dL  4.1    Globulin gm/dL  3.4    A/G Ratio g/dL  1.2    AST (SGOT) 1 - 32 U/L  15    ALT (SGPT) 1 - 33 U/L  6    Total Bilirubin 0.0 - 1.2 mg/dL  0.2    Hemoglobin A1C 4.5 - 5.7 % 5.9 !     (H): Data is abnormally high  !: Data is abnormal         Latest Reference Range & Units 03/05/25 11:48 03/20/25 10:41   Bone Fraction: 14 - 68 %  41   Intestinal Frac.: 0 - 18 %  0   Liver Fraction: 18 - 85 %  59   Lipase 13 - " 60 U/L 19    WBC 3.40 - 10.80 10*3/mm3 6.95    RBC 3.77 - 5.28 10*6/mm3 4.50    Hemoglobin 12.0 - 15.9 g/dL 10.8 (L)    Hematocrit 34.0 - 46.6 % 36.6    Platelets 140 - 450 10*3/mm3 393    RDW 12.3 - 15.4 % 17.4 (H)    MCV 79.0 - 97.0 fL 81.3    MCH 26.6 - 33.0 pg 24.0 (L)    MCHC 31.5 - 35.7 g/dL 29.5 (L)    MPV 6.0 - 12.0 fL 9.9    RDW-SD 37.0 - 54.0 fl 50.6    Neutrophil Rel % 42.7 - 76.0 % 51.0    Lymphocyte Rel % 19.6 - 45.3 % 39.6    Monocyte Rel % 5.0 - 12.0 % 7.6    Eosinophil Rel % 0.3 - 6.2 % 1.0    Basophil Rel % 0.0 - 1.5 % 0.4    Immature Granulocyte Rel % 0.0 - 0.5 % 0.4    Neutrophils Absolute 1.70 - 7.00 10*3/mm3 3.54    Lymphocytes Absolute 0.70 - 3.10 10*3/mm3 2.75    Monocytes Absolute 0.10 - 0.90 10*3/mm3 0.53    Eosinophils Absolute 0.00 - 0.40 10*3/mm3 0.07    Basophils Absolute 0.00 - 0.20 10*3/mm3 0.03    Immature Grans, Absolute 0.00 - 0.05 10*3/mm3 0.03    nRBC 0.0 - 0.2 /100 WBC 0.0    (L): Data is abnormally low  (H): Data is abnormally high    Assessment      Diagnoses and all orders for this visit:    1. Acute right flank pain (Primary)  -     UA / M With / Rflx Culture([facility] ONLY) - Urine, Clean Catch  -     CT Abdomen Pelvis Without Contrast; Future  -     Comprehensive Metabolic Panel  -     CBC & Differential  -     Magnesium    2. Gross hematuria  -     UA / M With / Rflx Culture([facility] ONLY) - Urine, Clean Catch  -     CT Abdomen Pelvis Without Contrast; Future  -     Comprehensive Metabolic Panel  -     CBC & Differential  -     Magnesium    Other orders  -     hydrocortisone 1 % lotion; Apply  topically to the appropriate area as directed 2 (Two) Times a Day.  Dispense: 118 mL; Refill: 1             Assessment & Plan  1. Right-sided flank pain.  - Hematuria noted, suggesting potential renal etiology.  - Increased pain with movement and suprapubic tenderness.  - CT scan and urinalysis ordered to investigate further.  - Tylenol taken for pain management.    2. Contact  dermatitis.  - Rash localized to the chest, with itching.  - Physical exam reveals contact dermatitis.  - Hydrocortisone 2% lotion prescribed for twice daily application.  - Patient advised to monitor and report any worsening of the condition.    Follow-up  - Follow-up appointment scheduled in 2 weeks to assess flank pain and rash.    Orders Placed This Encounter   Procedures    CT Abdomen Pelvis Without Contrast     Standing Status:   Future     Expected Date:   6/21/2025     Expiration Date:   9/20/2026     Will Oral Contrast be needed for this procedure?:   No     Release to patient:   Routine Release [[id number]]     Reason for Exam::   flank pain and hematuria    UA / M With / Rflx Culture([facility] ONLY) - Urine, Clean Catch     Release to patient:   Routine Release [[id number]]    Comprehensive Metabolic Panel     Release to patient:   Routine Release [[id number]]    Magnesium     Release to patient:   Routine Release [[id number]]    CBC & Differential     Manual Differential:   No     Release to patient:   Routine Release [[id number]]       Follow Up   Return in about 2 weeks (around 7/4/2025) for 2 week follow up for . Flank pain, rash. .  Patient was given instructions and counseling regarding her condition or for health maintenance advice. Please see specific information pulled into the AVS if appropriate.         Patient or patient representative verbalized consent for the use of Ambient Listening during the visit with  Bala Randhawa MD for chart documentation. 6/20/2025  11:15 CDT

## 2025-06-22 LAB
ALBUMIN SERPL-MCNC: 4 G/DL (ref 3.8–4.8)
ALP SERPL-CCNC: 120 IU/L (ref 44–121)
ALT SERPL-CCNC: 7 IU/L (ref 0–32)
APPEARANCE UR: CLEAR
AST SERPL-CCNC: 14 IU/L (ref 0–40)
BACTERIA #/AREA URNS HPF: NORMAL /[HPF]
BACTERIA UR CULT: NO GROWTH
BACTERIA UR CULT: NORMAL
BASOPHILS # BLD AUTO: 0.1 X10E3/UL (ref 0–0.2)
BASOPHILS NFR BLD AUTO: 1 %
BILIRUB SERPL-MCNC: 0.2 MG/DL (ref 0–1.2)
BILIRUB UR QL STRIP: NEGATIVE
BUN SERPL-MCNC: 19 MG/DL (ref 8–27)
BUN/CREAT SERPL: 26 (ref 12–28)
CALCIUM SERPL-MCNC: 9.1 MG/DL (ref 8.7–10.3)
CASTS URNS QL MICRO: NORMAL /LPF
CHLORIDE SERPL-SCNC: 105 MMOL/L (ref 96–106)
CO2 SERPL-SCNC: 22 MMOL/L (ref 20–29)
COLOR UR: YELLOW
CREAT SERPL-MCNC: 0.72 MG/DL (ref 0.57–1)
EGFRCR SERPLBLD CKD-EPI 2021: 89 ML/MIN/1.73
EOSINOPHIL # BLD AUTO: 0.3 X10E3/UL (ref 0–0.4)
EOSINOPHIL NFR BLD AUTO: 4 %
EPI CELLS #/AREA URNS HPF: NORMAL /HPF (ref 0–10)
ERYTHROCYTE [DISTWIDTH] IN BLOOD BY AUTOMATED COUNT: 17 % (ref 11.7–15.4)
GLOBULIN SER CALC-MCNC: 2.6 G/DL (ref 1.5–4.5)
GLUCOSE SERPL-MCNC: 85 MG/DL (ref 70–99)
GLUCOSE UR QL STRIP: NEGATIVE
HCT VFR BLD AUTO: 34.5 % (ref 34–46.6)
HGB BLD-MCNC: 10.1 G/DL (ref 11.1–15.9)
HGB UR QL STRIP: NEGATIVE
IMM GRANULOCYTES # BLD AUTO: 0 X10E3/UL (ref 0–0.1)
IMM GRANULOCYTES NFR BLD AUTO: 0 %
KETONES UR QL STRIP: NEGATIVE
LEUKOCYTE ESTERASE UR QL STRIP: ABNORMAL
LYMPHOCYTES # BLD AUTO: 2.8 X10E3/UL (ref 0.7–3.1)
LYMPHOCYTES NFR BLD AUTO: 39 %
MAGNESIUM SERPL-MCNC: 2 MG/DL (ref 1.6–2.3)
MCH RBC QN AUTO: 24 PG (ref 26.6–33)
MCHC RBC AUTO-ENTMCNC: 29.3 G/DL (ref 31.5–35.7)
MCV RBC AUTO: 82 FL (ref 79–97)
MICRO URNS: ABNORMAL
MONOCYTES # BLD AUTO: 0.5 X10E3/UL (ref 0.1–0.9)
MONOCYTES NFR BLD AUTO: 7 %
NEUTROPHILS # BLD AUTO: 3.5 X10E3/UL (ref 1.4–7)
NEUTROPHILS NFR BLD AUTO: 49 %
NITRITE UR QL STRIP: NEGATIVE
PH UR STRIP: 6.5 [PH] (ref 5–7.5)
PLATELET # BLD AUTO: 423 X10E3/UL (ref 150–450)
POTASSIUM SERPL-SCNC: 4.4 MMOL/L (ref 3.5–5.2)
PROT SERPL-MCNC: 6.6 G/DL (ref 6–8.5)
PROT UR QL STRIP: NEGATIVE
RBC # BLD AUTO: 4.2 X10E6/UL (ref 3.77–5.28)
RBC #/AREA URNS HPF: NORMAL /HPF (ref 0–2)
SODIUM SERPL-SCNC: 140 MMOL/L (ref 134–144)
SP GR UR STRIP: 1.01 (ref 1–1.03)
URINALYSIS REFLEX: ABNORMAL
UROBILINOGEN UR STRIP-MCNC: 0.2 MG/DL (ref 0.2–1)
WBC # BLD AUTO: 7.2 X10E3/UL (ref 3.4–10.8)
WBC #/AREA URNS HPF: NORMAL /HPF (ref 0–5)

## 2025-06-25 RX ORDER — AMITRIPTYLINE HYDROCHLORIDE 100 MG/1
TABLET ORAL
Qty: 180 TABLET | Refills: 0 | Status: SHIPPED | OUTPATIENT
Start: 2025-06-25

## 2025-06-26 ENCOUNTER — TELEPHONE (OUTPATIENT)
Dept: FAMILY MEDICINE CLINIC | Facility: CLINIC | Age: 70
End: 2025-06-26
Payer: MEDICARE

## 2025-06-26 NOTE — TELEPHONE ENCOUNTER
Pt called asking about her lab results from 6/20, explained that results are waiting to be resulted. Please advise

## 2025-07-17 ENCOUNTER — TELEPHONE (OUTPATIENT)
Dept: FAMILY MEDICINE CLINIC | Facility: CLINIC | Age: 70
End: 2025-07-17
Payer: MEDICARE

## 2025-07-17 NOTE — TELEPHONE ENCOUNTER
Pt went to [facility] to have mammogram. She has a knot in L breast and they are needing an order for another kind of mammogram. Pt did not know which kind it was.

## 2025-07-18 DIAGNOSIS — R92.8 OTHER ABNORMAL AND INCONCLUSIVE FINDINGS ON DIAGNOSTIC IMAGING OF BREAST: ICD-10-CM

## 2025-07-18 DIAGNOSIS — N63.20 MASS OF LEFT BREAST, UNSPECIFIED QUADRANT: Primary | ICD-10-CM

## 2025-07-23 ENCOUNTER — TELEPHONE (OUTPATIENT)
Dept: FAMILY MEDICINE CLINIC | Facility: CLINIC | Age: 70
End: 2025-07-23

## 2025-08-01 ENCOUNTER — HOSPITAL ENCOUNTER (OUTPATIENT)
Dept: CT IMAGING | Facility: HOSPITAL | Age: 70
Discharge: HOME | End: 2025-08-01
Payer: MEDICARE

## 2025-08-01 DIAGNOSIS — R31.0 GROSS HEMATURIA: ICD-10-CM

## 2025-08-01 DIAGNOSIS — R10.9 ACUTE RIGHT FLANK PAIN: ICD-10-CM

## 2025-08-01 PROCEDURE — 74176 CT ABD & PELVIS W/O CONTRAST: CPT

## 2025-08-11 ENCOUNTER — OFFICE VISIT (OUTPATIENT)
Dept: FAMILY MEDICINE CLINIC | Facility: CLINIC | Age: 70
End: 2025-08-11
Payer: MEDICARE

## 2025-08-11 VITALS
HEIGHT: 64 IN | HEART RATE: 91 BPM | BODY MASS INDEX: 29.19 KG/M2 | SYSTOLIC BLOOD PRESSURE: 110 MMHG | DIASTOLIC BLOOD PRESSURE: 64 MMHG | OXYGEN SATURATION: 96 % | WEIGHT: 171 LBS

## 2025-08-11 DIAGNOSIS — G57.02 PIRIFORMIS SYNDROME OF LEFT SIDE: Primary | ICD-10-CM

## 2025-08-11 PROCEDURE — 99214 OFFICE O/P EST MOD 30 MIN: CPT

## 2025-08-11 PROCEDURE — 3044F HG A1C LEVEL LT 7.0%: CPT

## 2025-08-11 PROCEDURE — G2211 COMPLEX E/M VISIT ADD ON: HCPCS

## 2025-08-11 PROCEDURE — 1126F AMNT PAIN NOTED NONE PRSNT: CPT

## 2025-08-11 RX ORDER — NAPROXEN 500 MG/1
500 TABLET ORAL 2 TIMES DAILY WITH MEALS
Qty: 28 TABLET | Refills: 0 | Status: SHIPPED | OUTPATIENT
Start: 2025-08-11

## 2025-08-11 RX ORDER — TIZANIDINE HYDROCHLORIDE 2 MG/1
2 CAPSULE, GELATIN COATED ORAL 3 TIMES DAILY
Qty: 60 CAPSULE | Refills: 1 | Status: SHIPPED | OUTPATIENT
Start: 2025-08-11

## (undated) DEVICE — BAPTIST TURNOVER KIT: Brand: MEDLINE INDUSTRIES, INC.

## (undated) DEVICE — PK EXTRM 30

## (undated) DEVICE — Device: Brand: DEFENDO AIR/WATER/SUCTION AND BIOPSY VALVE

## (undated) DEVICE — CONMED SCOPE SAVER BITE BLOCK, 20X27 MM: Brand: SCOPE SAVER

## (undated) DEVICE — DISPOSABLE TOURNIQUET CUFF 34"X4", 1-LINE, BLUE, STERILE, 1EA/PK, 10PK/CS: Brand: ASP MEDICAL

## (undated) DEVICE — BNDG ADHS CURAD FLX/FABRC 1X3IN STRL LF

## (undated) DEVICE — SPNG GZ WOVN 4X4IN 12PLY 10/BX STRL

## (undated) DEVICE — DRSNG WND GZ CURAD OIL EMULSION 3X8IN STRL PK/3

## (undated) DEVICE — MORSELIZING BONE GRAFT HARVESTER, AO, 6MM: Brand: BABY GORILLA®/GORILLA® PLATING SYSTEM

## (undated) DEVICE — GLV SURG SENSICARE W/ALOE PF LF 7.5 STRL

## (undated) DEVICE — HALTR TRACT HD CERV STD UNIV

## (undated) DEVICE — BNDG ELAS CO-FLEX SLF ADHR 6IN 5YD LF STRL

## (undated) DEVICE — SYS SKIN CLS DERMABOND PRINEO W/22CM MESH TP

## (undated) DEVICE — COLR CERV VISTA TX 1SZ ADJ

## (undated) DEVICE — GLV SURG BIOGEL LTX PF 6 1/2

## (undated) DEVICE — DRAPE,UTILITY,TAPE,15X26,STERILE: Brand: MEDLINE

## (undated) DEVICE — 3M™ IOBAN™ 2 ANTIMICROBIAL INCISE DRAPE 6650EZ: Brand: IOBAN™ 2

## (undated) DEVICE — ESOPHAGEAL BALLOON DILATATION CATHETER: Brand: CRE FIXED WIRE

## (undated) DEVICE — GLV SURG SENSICARE PI ORTHO SZ7.5 LF STRL

## (undated) DEVICE — CVR UNIV C/ARM

## (undated) DEVICE — KT DRP MINIVIEW STRL

## (undated) DEVICE — GLV SURG DERMASSURE GRN LF PF 8.0

## (undated) DEVICE — INTENDED FOR TISSUE SEPARATION, AND OTHER PROCEDURES THAT REQUIRE A SHARP SURGICAL BLADE TO PUNCTURE OR CUT.: Brand: BARD-PARKER ® STAINLESS STEEL BLADES

## (undated) DEVICE — PAD MINOR UNIVERSAL: Brand: MEDLINE INDUSTRIES, INC.

## (undated) DEVICE — YANKAUER SUCTION INSTRUMENT WITHOUT CONTROL VENT, OPEN TIP, CLEAR: Brand: YANKAUER

## (undated) DEVICE — 4.0MM EGG BUR

## (undated) DEVICE — GOWN,NON-REINFORCED,SIRUS,SET IN SLV,XL: Brand: MEDLINE

## (undated) DEVICE — SYS INST SFT/TISS ENDOBLADE GASTROCNEMIUS/RECESSION 1P/U

## (undated) DEVICE — ELECTRD NDL EZ CLN MOD 2.75IN

## (undated) DEVICE — GLV SURG GRN DERMASSURE LF PF 7.5

## (undated) DEVICE — CUFF,BP,DISP,1 TUBE,ADULT,HP: Brand: MEDLINE

## (undated) DEVICE — CLTH CLENS READYCLEANSE PERI CARE PK/5

## (undated) DEVICE — SHEET, T, LAPAROTOMY, STERILE: Brand: MEDLINE

## (undated) DEVICE — PK TURNOVER RM ADV

## (undated) DEVICE — SUT ETHLN 3/0 FS1 30IN 669H

## (undated) DEVICE — SYR LL TP 10ML STRL

## (undated) DEVICE — PIN DISTRACT TI 14MM STRL

## (undated) DEVICE — APPL CHLORAPREP HI/LITE 26ML ORNG

## (undated) DEVICE — TP SILK DURAPORE 3IN

## (undated) DEVICE — DRILL,  2.4 X 140MM, SOLID, MEASURING, LONG, AO: Brand: BABY GORILLA®/GORILLA® PLATING SYSTEM

## (undated) DEVICE — NDL HYPO PRECISIONGLIDE REG 22G 1 1/2

## (undated) DEVICE — 3M™ STERI-DRAPE™ INSTRUMENT POUCH 1018: Brand: STERI-DRAPE™

## (undated) DEVICE — Device

## (undated) DEVICE — HDRST INTUB GENTLETOUCH SLOT 7IN RT

## (undated) DEVICE — CVR BRD ARM 13X30

## (undated) DEVICE — TBG SMPL FLTR LINE NASL 02/C02 A/ BX/100

## (undated) DEVICE — APPL CHLORAPREP HI/LITE TINTED 3ML ORNG

## (undated) DEVICE — BANDAGE,GAUZE,BULKEE II,4.5"X4.1YD,STRL: Brand: MEDLINE

## (undated) DEVICE — SYR LUERLOK 20CC BX/50

## (undated) DEVICE — BONE FENESTRATION PERFORATOR: Brand: BABY GORILLA®/GORILLA® PLATING SYSTEM

## (undated) DEVICE — BNDG ELAS CO-FLEX SLF ADHR 4IN5YD LF STRL

## (undated) DEVICE — 4-PORT MANIFOLD: Brand: NEPTUNE 2

## (undated) DEVICE — THE CHANNEL CLEANING BRUSH IS A NYLON FLEXI BRUSH ATTACHED TO A FLEXIBLE PLASTIC SHEATH DESIGNED TO SAFELY REMOVE DEBRIS FROM FLEXIBLE ENDOSCOPES.

## (undated) DEVICE — GLV SURG BIOGEL LTX PF 7 1/2

## (undated) DEVICE — SUREFIT, DUAL DISPERSIVE ELECTRODE, CONTACT QUALITY MONITOR: Brand: SUREFIT

## (undated) DEVICE — SYS INST SFT/TISS ENDOBLADE PLANTAR/FASCIA/REL 1P/U

## (undated) DEVICE — DRSNG WND GZ CURAD OIL EMULSION 3X3IN STRL

## (undated) DEVICE — ELECTRD BLD EZ CLN MOD XLNG 2.75IN

## (undated) DEVICE — YANKAUER,BULB TIP WITH VENT: Brand: ARGYLE

## (undated) DEVICE — PACK,UNIVERSAL,NO GOWNS: Brand: MEDLINE

## (undated) DEVICE — K-WIRE, SINGLE ENDED TROCAR TIP, SMOOTH, 1.2 X 150MM
Type: IMPLANTABLE DEVICE | Site: FOOT | Status: NON-FUNCTIONAL
Brand: MONSTER SCREW SYSTEM
Removed: 2024-11-06

## (undated) DEVICE — PREP SOL POVIDONE/IODINE BT 4OZ

## (undated) DEVICE — CONTAINER,SPECIMEN,OR STERILE,4OZ: Brand: MEDLINE

## (undated) DEVICE — MASK,OXYGEN,MED CONC,ADLT,7' TUB, UC: Brand: PENDING

## (undated) DEVICE — APPL DURAPREP IODOPHOR APL 26ML

## (undated) DEVICE — SCREW DRIVER ATTACHMENT, R3CON, SOLID, AO, HX-10, SHORT TAPER: Brand: BABY GORILLA/GORILLA PLATING SYSTEM

## (undated) DEVICE — TRAP FLD MINIVAC MEGADYNE 100ML

## (undated) DEVICE — ANTIBACTERIAL UNDYED BRAIDED (POLYGLACTIN 910), SYNTHETIC ABSORBABLE SUTURE: Brand: COATED VICRYL

## (undated) DEVICE — SPNG DISSCT CHRRY 3/8IN STRL PK/5

## (undated) DEVICE — 3.0MM PRECISION NEURO (MATCH HEAD)

## (undated) DEVICE — SENSR O2 OXIMAX FNGR A/ 18IN NONSTR

## (undated) DEVICE — DRILL,  2.3 X 120MM, CANNULATED, AO: Brand: MONSTER SCREW SYSTEM

## (undated) DEVICE — K-WIRE, SINGLE ENDED TROCAR TIP, SMOOTH, 2.3 X 150MM
Type: IMPLANTABLE DEVICE | Site: FOOT | Status: NON-FUNCTIONAL
Brand: MULTI SYSTEM
Removed: 2024-11-06

## (undated) DEVICE — SURGICAL SUCTION CONNECTING TUBE WITH MALE CONNECTOR AND SUCTION CLAMP, 2 FT. LONG (.6 M), 5 MM I.D.: Brand: CONMED

## (undated) DEVICE — THE PMT CERVICAL VISUALIZATION HARNESS STRETCHES TO CONFORM TO THE OUTER SHOULDER WHILE HOLDING TRACTION SECURELY.IT IS USED TO ACHIEVE SAFE, EFFECTIVE TRACTION ON THE SHOULDERS FOR INTRAOPERATIVE CERVICAL X-RAYS.: Brand: PMT CERVICAL HARNESS

## (undated) DEVICE — NEEDLE,22GX1.5",REG,BEVEL: Brand: MEDLINE

## (undated) DEVICE — NEEDLE, QUINCKE, 18GX3.5": Brand: MEDLINE

## (undated) DEVICE — 3M™ STERI-STRIP™ REINFORCED ADHESIVE SKIN CLOSURES, R1547, 1/2 IN X 4 IN (12 MM X 100 MM), 6 STRIPS/ENVELOPE: Brand: 3M™ STERI-STRIP™

## (undated) DEVICE — ADHS LIQ MASTISOL 2/3ML

## (undated) DEVICE — DRSNG WND SIL OPTIFOAM GNTL BRDR ADHS 1.6X2IN

## (undated) DEVICE — PK SPINE CERV ANT 30

## (undated) DEVICE — TOTAL TRAY, 16FR 10ML SIL FOLEY, URN: Brand: MEDLINE

## (undated) DEVICE — TOWEL,OR,DSP,ST,BLUE,STD,4/PK,20PK/CS: Brand: MEDLINE

## (undated) DEVICE — UNDERCAST PADDING: Brand: DEROYAL

## (undated) DEVICE — RESERVOIR,SUCTION,100CC,SILICONE: Brand: MEDLINE

## (undated) DEVICE — DEV INFL ALLIANCE2 SYS

## (undated) DEVICE — PATIENT RETURN ELECTRODE, SINGLE-USE, CONTACT QUALITY MONITORING, ADULT, WITH 9FT CORD, FOR PATIENTS WEIGING OVER 33LBS. (15KG): Brand: MEGADYNE

## (undated) DEVICE — COVER,MAYO STAND,STERILE: Brand: MEDLINE

## (undated) DEVICE — ENDOGATOR AUXILIARY WATER JET CONNECTOR: Brand: ENDOGATOR

## (undated) DEVICE — OLIVE WIRE, SMOOTH, 1.4MM
Type: IMPLANTABLE DEVICE | Site: FOOT | Status: NON-FUNCTIONAL
Brand: BABY GORILLA/GORILLA PLATING SYSTEM
Removed: 2024-11-06

## (undated) DEVICE — BNDG ELAS W/CLIP 6IN 10YD LF STRL

## (undated) DEVICE — SCREW DRIVER ATTACHMENT, CANNULATED, AO, TX-10: Brand: MONSTER SCREW SYSTEM

## (undated) DEVICE — PRECISION THIN (9.0 X 0.38 X 25.0MM)

## (undated) DEVICE — NEEDLE, QUINCKE 25GX3.5": Brand: MEDLINE